# Patient Record
Sex: MALE | Race: WHITE | NOT HISPANIC OR LATINO | Employment: OTHER | URBAN - METROPOLITAN AREA
[De-identification: names, ages, dates, MRNs, and addresses within clinical notes are randomized per-mention and may not be internally consistent; named-entity substitution may affect disease eponyms.]

---

## 2017-04-21 ENCOUNTER — TELEPHONE (OUTPATIENT)
Dept: AUDIOLOGY | Facility: CLINIC | Age: 82
End: 2017-04-21

## 2017-04-21 NOTE — TELEPHONE ENCOUNTER
Reason for Call:  Form, our goal is to have forms completed with 72 hours, however, some forms may require a visit or additional information.    Type of letter, form or note:  medical    Who is the form from?: Patient    Where did the form come from: Patient or family brought in       What clinic location was the form placed at?: [facility]    Where the form was placed: 's Box    What number is listed as a contact on the form?: [phone]       Additional comments: Patient need for Tyrone to fill out the Certifying Professional part of this form (Third Party Certification of Eligibility for IP Cap Tel Service) When finished patient would like a call to come pick form up at the [facility]. Thanks    Call taken on 4/21/2017 at 10:11 AM by Denisa Evans

## 2017-04-25 NOTE — TELEPHONE ENCOUNTER
I informed the patient that the requested form had been filled out and that it would be submitted for him. The patient informed me that the caption phone, which the paperwork was for, has already been installed and everything is working well.     -Tyrone Conte CCC-A

## 2017-05-05 DIAGNOSIS — I10 HYPERTENSION GOAL BP (BLOOD PRESSURE) < 140/90: ICD-10-CM

## 2017-05-05 DIAGNOSIS — N18.30 CKD (CHRONIC KIDNEY DISEASE) STAGE 3, GFR 30-59 ML/MIN (H): ICD-10-CM

## 2017-05-05 DIAGNOSIS — E78.5 HYPERLIPIDEMIA LDL GOAL <130: ICD-10-CM

## 2017-05-05 RX ORDER — SIMVASTATIN 40 MG
TABLET ORAL
Qty: 90 TABLET | Refills: 0 | Status: SHIPPED | OUTPATIENT
Start: 2017-05-05 | End: 2017-07-10

## 2017-05-05 RX ORDER — ENALAPRIL MALEATE 5 MG/1
TABLET ORAL
Qty: 90 TABLET | Refills: 0 | Status: SHIPPED | OUTPATIENT
Start: 2017-05-05 | End: 2017-07-10

## 2017-05-05 NOTE — TELEPHONE ENCOUNTER
Routing refill request to provider for review/approval because:  Labs not current:  Rey REESE RN

## 2017-05-05 NOTE — TELEPHONE ENCOUNTER
Simvastatin     Last Written Prescription Date: 4/14/17  Last Fill Quantity: 30, # refills: 0  Last Office Visit with [facility], [facility] or [facility] prescribing provider: 8/29/16       Lab Results   Component Value Date    CHOL 141 07/12/2016     Lab Results   Component Value Date    HDL 61 07/12/2016     Lab Results   Component Value Date    LDL 67 07/12/2016     Lab Results   Component Value Date    TRIG 67 07/12/2016     Lab Results   Component Value Date    CHOLHDLRATIO 2.3 08/04/2014     Enalapril      Last Written Prescription Date: 4/14/17  Last Fill Quantity: 30, # refills: 0  Last Office Visit with [facility], [facility] or [facility] prescribing provider: 8/29/16       Potassium   Date Value Ref Range Status   04/25/2016 4.4 3.4 - 5.3 mmol/L Final     Creatinine   Date Value Ref Range Status   04/14/2016 1.14 0.66 - 1.25 mg/dL Final     BP Readings from Last 3 Encounters:   12/05/16 127/65   08/29/16 128/62   07/12/16 126/74

## 2017-05-10 NOTE — TELEPHONE ENCOUNTER
Spoke to patient in regards to approved meds and needing appointment. Patient states understanding. Scheduled patient to see PCP 7/13/17.    Tiffanie Dodson CMA

## 2017-06-22 NOTE — PROGRESS NOTES
"  SUBJECTIVE:                                                            Jose Manuel Gallo is a 82 year old male who presents for Preventive Visit.  {PVP to remind patient that this is not necessarily a physical exam; physical exam may or may not be done:357916::\"click delete button to remove this line now\"}  {PVP to inform patient that additional E&M charge may apply, if additional problems addressed:710581::\"click delete button to remove this line now\"}  Are you in the first 12 months of your Medicare Part B coverage?  {No Yes:212211::\"No\"}    Healthy Habits:    Do you get at least three servings of calcium containing foods daily (dairy, green leafy vegetables, etc.)? {YES/NO, DAIRY INTAKE:235198::\"yes\"}    Amount of exercise or daily activities, outside of work: {AMOUNT EXERCISE:199630}    Problems taking medications regularly {Yes /No default:351934::\"No\"}    Medication side effects: {Yes /No default.:103228::\"No\"}    Have you had an eye exam in the past two years? {YESNOBLANK:674533}    Do you see a dentist twice per year? {YESNOBLANK:351214}    Do you have sleep apnea, excessive snoring or daytime drowsiness?{YESNOBLANK:736709}    {AWV Cognitive Screenin}    {Outside tests to abstract? :163452}    {additional problems to add:373714}    Reviewed and updated as needed this visit by clinical staff         Reviewed and updated as needed this visit by Provider        Social History   Substance Use Topics     Smoking status: Former Smoker     Years: 40.00     Types: Cigarettes     Quit date: 2003     Smokeless tobacco: Never Used     Alcohol use Yes      Comment: 1 x month       {ETOH AUDIT:621611}    Today's PHQ-2 Score:   PHQ-2 (  [employer]) 2016   Q1: Little interest or pleasure in doing things 0 0   Q2: Feeling down, depressed or hopeless 0 0   PHQ-2 Score 0 0   Q1: Little interest or pleasure in doing things - -   Q2: Feeling down, depressed or hopeless - -   PHQ-2 Score - - " "    {PHQ-2 LOOK IN ASSESSMENTS :524450}  Do you feel safe in your environment - {YES/NO/NA:262695}    Do you have a Health Care Directive?: {HEALTHCARE DIRECTIVE STATUS:789257}    Current providers sharing in care for this patient include:   Patient Care Team:  Orlando Braxton MD as PCP - General (Internal Medicine)      Hearing impairment: {NO/YES:992012}    Ability to successfully perform activities of daily living: {YES/NO (MEDICARE):558848::\"Yes, no assistance needed\"}     Fall risk:  {Document Fall Risk in the Assessments Section of the Navigator:829334}    Home safety:  {IPPE SAFETY CONCERNS:229197::\"none identified\"}  {If any of the above assessments are answered yes, consider ordering appropriate referrals:818984::\"click delete button to remove this line now\"}    The following health maintenance items are reviewed in Epic and correct as of today:  Health Maintenance   Topic Date Due     ADVANCE DIRECTIVE PLANNING Q5 YRS  2016     BMP Q1 YR  2017     HEMOGLOBIN Q1 YR  2017     MICROALBUMIN Q1 YEAR  2017     EYE EXAM Q1 YEAR  2017     FALL RISK ASSESSMENT  2017     LIPID MONITORING Q1 YEAR  2017     INFLUENZA VACCINE (SYSTEM ASSIGNED)  2017     TETANUS IMMUNIZATION (SYSTEM ASSIGNED)  2023     PNEUMOCOCCAL  Completed         {Decision Support:866482}     ROS:  {ROS COMP:094823}    {CHRONICPROBDATA:067454}  OBJECTIVE:                                                            There were no vitals taken for this visit. Estimated body mass index is 21.84 kg/(m^2) as calculated from the following:    Height as of 16: 6' (1.829 m).    Weight as of 16: 161 lb (73 kg).  EXAM:   {Exam :262265}    ASSESSMENT / PLAN:                                                            {Diag Picklist:534589}    End of Life Planning:  Patient currently has an advanced directive: { :832446}    COUNSELING:  {Medicare Counselin}    {Blood Pressure - Adult " Preventive:339476}    Estimated body mass index is 21.84 kg/(m^2) as calculated from the following:    Height as of 8/29/16: 6' (1.829 m).    Weight as of 8/29/16: 161 lb (73 kg).  {Weight Management Plan -- Delete if patient has a normal BMI:119642}   reports that he quit smoking about 14 years ago. His smoking use included Cigarettes. He quit after 40.00 years of use. He has never used smokeless tobacco.  {Tobacco Cessation -- Delete if patient is a non-smoker:943019}    Appropriate preventive services were discussed with this patient, including applicable screening as appropriate for cardiovascular disease, diabetes, osteopenia/osteoporosis, and glaucoma.  As appropriate for age/gender, discussed screening for colorectal cancer, prostate cancer, breast cancer, and cervical cancer. Checklist reviewing preventive services available has been given to the patient.    Reviewed patients plan of care and provided an AVS. The {CarePlan:951219} for Jose Manuel meets the Care Plan requirement. This Care Plan has been established and reviewed with the {PATIENT, FAMILY MEMBER, CAREGIVER:234558}.    Counseling Resources:  ATP IV Guidelines  Pooled Cohorts Equation Calculator  Breast Cancer Risk Calculator  FRAX Risk Assessment  ICSI Preventive Guidelines  Dietary Guidelines for Americans, 2010  USDA's MyPlate  ASA Prophylaxis  Lung CA Screening    Orlando Braxton MD  [facility]

## 2017-06-22 NOTE — PATIENT INSTRUCTIONS
- Stop Enalapril and begin Lisinopril.    - Follow up with a dermatologist.    Preventive Health Recommendations:       Male Ages 65 and over    Yearly exam:             See your health care provider every year in order to  o   Review health changes.   o   Discuss preventive care.    o   Review your medicines if your doctor has prescribed any.    Talk with your health care provider about whether you should have a test to screen for prostate cancer (PSA).    Every 3 years, have a diabetes test (fasting glucose). If you are at risk for diabetes, you should have this test more often.    Every 5 years, have a cholesterol test. Have this test more often if you are at risk for high cholesterol or heart disease.     Every 10 years, have a colonoscopy. Or, have a yearly FIT test (stool test). These exams will check for colon cancer.    Talk to with your health care provider about screening for Abdominal Aortic Aneurysm if you have a family history of AAA or have a history of smoking.  Shots:     Get a flu shot each year.     Get a tetanus shot every 10 years.     Talk to your doctor about your pneumonia vaccines. There are now two you should receive - Pneumovax (PPSV 23) and Prevnar (PCV 13).    Talk to your doctor about a shingles vaccine.     Talk to your doctor about the hepatitis B vaccine.  Nutrition:     Eat at least 5 servings of fruits and vegetables each day.     Eat whole-grain bread, whole-wheat pasta and brown rice instead of white grains and rice.     Talk to your doctor about Calcium and Vitamin D.   Lifestyle    Exercise for at least 150 minutes a week (30 minutes a day, 5 days a week). This will help you control your weight and prevent disease.     Limit alcohol to one drink per day.     No smoking.     Wear sunscreen to prevent skin cancer.     See your dentist every six months for an exam and cleaning.     See your eye doctor every 1 to 2 years to screen for conditions such as glaucoma, macular  degeneration and cataracts.    [facility]    If you have any questions regarding to your visit please contact your care team:     Team Pink:   Clinic Hours Telephone Number   Internal Medicine:  Dr. Mary Brink, NP       7am-7pm  Monday - Thursday   7am-5pm  Fridays  [phone]  (Appointment scheduling available 24/7)    Questions about your visit?  Team Line  [phone]   Urgent Care - [place] and [facility] - 11am-9pm Monday-Friday Saturday-Sunday- 9am-5pm   [place] - 5pm-9pm Monday-Friday Saturday-Sunday- 9am-5pm  [phone] - [facility]  [phone] - [place]       What options do I have for visits at the clinic other than the traditional office visit?  To expand how we care for you, many of our providers are utilizing electronic visits (e-visits) and telephone visits, when medically appropriate, for interactions with their patients rather than a visit in the clinic.   We also offer nurse visits for many medical concerns. Just like any other service, we will bill your insurance company for this type of visit based on time spent on the phone with your provider. Not all insurance companies cover these visits. Please check with your medical insurance if this type of visit is covered. You will be responsible for any charges that are not paid by your insurance.      E-visits via Tweegee:  generally incur a $35.00 fee.  Telephone visits:  Time spent on the phone: *charged based on time that is spent on the phone in increments of 10 minutes. Estimated cost:   5-10 mins $30.00   11-20 mins. $59.00   21-30 mins. $85.00   Use [employer] (secure email communication and access to your chart) to send your primary care provider a message or make an appointment. Ask someone on your Team how to sign up for Tweegee.    For a Price Quote for your services, please call our Consumer Price Line at [phone].    As always, Thank you for trusting us with  your health care needs!  Discharged By:  WILFREDO ROMERO

## 2017-07-10 DIAGNOSIS — E78.5 HYPERLIPIDEMIA LDL GOAL <130: ICD-10-CM

## 2017-07-10 DIAGNOSIS — I10 HYPERTENSION GOAL BP (BLOOD PRESSURE) < 140/90: ICD-10-CM

## 2017-07-10 DIAGNOSIS — N18.30 CKD (CHRONIC KIDNEY DISEASE) STAGE 3, GFR 30-59 ML/MIN (H): ICD-10-CM

## 2017-07-10 NOTE — TELEPHONE ENCOUNTER
simvastatin (ZOCOR) 40 MG tablet     Last Written Prescription Date: 5/5/2017  Last Fill Quantity: 90, # refills: 0  Last Office Visit with [facility], [facility] or [facility] prescribing provider: 8/29/2016  Next 5 appointments (look out 90 days)     Jul 13, 2017 10:30 AM CDT   Office Visit with Orlando Braxton MD   [facility]    [address]  [place] [address]   [phone]                   Lab Results   Component Value Date    CHOL 141 07/12/2016     Lab Results   Component Value Date    HDL 61 07/12/2016     Lab Results   Component Value Date    LDL 67 07/12/2016     Lab Results   Component Value Date    TRIG 67 07/12/2016     Lab Results   Component Value Date    CHOLHDLRATIO 2.3 08/04/2014     enalapril (VASOTEC) 5 MG tablet      Last Written Prescription Date: 5/5/2017  Last Fill Quantity: 90, # refills: 0    Last Office Visit with [facility], [facility] or [facility] prescribing provider:  8/29/2016   Future Office Visit:    Next 5 appointments (look out 90 days)     Jul 13, 2017 10:30 AM CDT   Office Visit with Orlando Braxton MD   [facility] ([facility])    [address]  [place] [address]   [phone]                    BP Readings from Last 3 Encounters:   12/05/16 127/65   08/29/16 128/62   07/12/16 126/74

## 2017-07-11 RX ORDER — ENALAPRIL MALEATE 5 MG/1
TABLET ORAL
Qty: 90 TABLET | Refills: 0 | Status: SHIPPED | OUTPATIENT
Start: 2017-07-11 | End: 2018-05-11

## 2017-07-11 RX ORDER — SIMVASTATIN 40 MG
TABLET ORAL
Qty: 90 TABLET | Refills: 0 | Status: SHIPPED | OUTPATIENT
Start: 2017-07-11 | End: 2017-07-13

## 2017-07-11 NOTE — TELEPHONE ENCOUNTER
Medication is being filled for 1 time refill only due to:  Patient needs to be seen because needs appt-labs.   Has upcoming appt.  Kayley Turner RN

## 2017-07-13 ENCOUNTER — OFFICE VISIT (OUTPATIENT)
Dept: FAMILY MEDICINE | Facility: CLINIC | Age: 82
End: 2017-07-13
Payer: COMMERCIAL

## 2017-07-13 ENCOUNTER — RADIANT APPOINTMENT (OUTPATIENT)
Dept: GENERAL RADIOLOGY | Facility: CLINIC | Age: 82
End: 2017-07-13
Attending: INTERNAL MEDICINE
Payer: COMMERCIAL

## 2017-07-13 VITALS
HEIGHT: 71 IN | SYSTOLIC BLOOD PRESSURE: 106 MMHG | WEIGHT: 162 LBS | OXYGEN SATURATION: 95 % | HEART RATE: 59 BPM | BODY MASS INDEX: 22.68 KG/M2 | DIASTOLIC BLOOD PRESSURE: 60 MMHG | TEMPERATURE: 97 F

## 2017-07-13 DIAGNOSIS — M47.816 SPONDYLOSIS OF LUMBAR REGION WITHOUT MYELOPATHY OR RADICULOPATHY: ICD-10-CM

## 2017-07-13 DIAGNOSIS — N18.30 CKD (CHRONIC KIDNEY DISEASE) STAGE 3, GFR 30-59 ML/MIN (H): ICD-10-CM

## 2017-07-13 DIAGNOSIS — Z12.11 SCREENING FOR COLON CANCER: ICD-10-CM

## 2017-07-13 DIAGNOSIS — R42 DIZZINESS: ICD-10-CM

## 2017-07-13 DIAGNOSIS — L82.0 INFLAMED SEBORRHEIC KERATOSIS: ICD-10-CM

## 2017-07-13 DIAGNOSIS — Z00.00 ROUTINE GENERAL MEDICAL EXAMINATION AT A HEALTH CARE FACILITY: Primary | ICD-10-CM

## 2017-07-13 DIAGNOSIS — I10 HYPERTENSION GOAL BP (BLOOD PRESSURE) < 140/90: ICD-10-CM

## 2017-07-13 DIAGNOSIS — L60.3 BRITTLE NAILS: ICD-10-CM

## 2017-07-13 DIAGNOSIS — E78.5 HYPERLIPIDEMIA LDL GOAL <130: ICD-10-CM

## 2017-07-13 DIAGNOSIS — H61.23 BILATERAL IMPACTED CERUMEN: ICD-10-CM

## 2017-07-13 DIAGNOSIS — M41.25 OTHER IDIOPATHIC SCOLIOSIS, THORACOLUMBAR REGION: ICD-10-CM

## 2017-07-13 LAB
ALBUMIN SERPL-MCNC: 3.7 G/DL (ref 3.4–5)
ALP SERPL-CCNC: 82 U/L (ref 40–150)
ALT SERPL W P-5'-P-CCNC: 27 U/L (ref 0–70)
ANION GAP SERPL CALCULATED.3IONS-SCNC: 9 MMOL/L (ref 3–14)
AST SERPL W P-5'-P-CCNC: 22 U/L (ref 0–45)
BASOPHILS # BLD AUTO: 0 10E9/L (ref 0–0.2)
BASOPHILS NFR BLD AUTO: 0.1 %
BILIRUB SERPL-MCNC: 0.8 MG/DL (ref 0.2–1.3)
BUN SERPL-MCNC: 23 MG/DL (ref 7–30)
CALCIUM SERPL-MCNC: 9.5 MG/DL (ref 8.5–10.1)
CHLORIDE SERPL-SCNC: 105 MMOL/L (ref 94–109)
CHOLEST SERPL-MCNC: 157 MG/DL
CO2 SERPL-SCNC: 24 MMOL/L (ref 20–32)
CREAT SERPL-MCNC: 1.22 MG/DL (ref 0.66–1.25)
CREAT UR-MCNC: 124 MG/DL
DIFFERENTIAL METHOD BLD: NORMAL
EOSINOPHIL # BLD AUTO: 0.1 10E9/L (ref 0–0.7)
EOSINOPHIL NFR BLD AUTO: 1.3 %
ERYTHROCYTE [DISTWIDTH] IN BLOOD BY AUTOMATED COUNT: 13.2 % (ref 10–15)
GFR SERPL CREATININE-BSD FRML MDRD: 57 ML/MIN/1.7M2
GLUCOSE SERPL-MCNC: 91 MG/DL (ref 70–99)
HCT VFR BLD AUTO: 44 % (ref 40–53)
HDLC SERPL-MCNC: 69 MG/DL
HGB BLD-MCNC: 14.7 G/DL (ref 13.3–17.7)
LDLC SERPL CALC-MCNC: 80 MG/DL
LYMPHOCYTES # BLD AUTO: 1 10E9/L (ref 0.8–5.3)
LYMPHOCYTES NFR BLD AUTO: 15.3 %
MCH RBC QN AUTO: 32.8 PG (ref 26.5–33)
MCHC RBC AUTO-ENTMCNC: 33.4 G/DL (ref 31.5–36.5)
MCV RBC AUTO: 98 FL (ref 78–100)
MICROALBUMIN UR-MCNC: 5 MG/L
MICROALBUMIN/CREAT UR: 4.07 MG/G CR (ref 0–17)
MONOCYTES # BLD AUTO: 0.6 10E9/L (ref 0–1.3)
MONOCYTES NFR BLD AUTO: 8.1 %
NEUTROPHILS # BLD AUTO: 5.1 10E9/L (ref 1.6–8.3)
NEUTROPHILS NFR BLD AUTO: 75.2 %
NONHDLC SERPL-MCNC: 88 MG/DL
PLATELET # BLD AUTO: 190 10E9/L (ref 150–450)
POTASSIUM SERPL-SCNC: 5.7 MMOL/L (ref 3.4–5.3)
PROT SERPL-MCNC: 7.1 G/DL (ref 6.8–8.8)
RBC # BLD AUTO: 4.48 10E12/L (ref 4.4–5.9)
SODIUM SERPL-SCNC: 138 MMOL/L (ref 133–144)
TRIGL SERPL-MCNC: 41 MG/DL
WBC # BLD AUTO: 6.8 10E9/L (ref 4–11)

## 2017-07-13 PROCEDURE — 82043 UR ALBUMIN QUANTITATIVE: CPT | Performed by: INTERNAL MEDICINE

## 2017-07-13 PROCEDURE — 85025 COMPLETE CBC W/AUTO DIFF WBC: CPT | Performed by: INTERNAL MEDICINE

## 2017-07-13 PROCEDURE — 80061 LIPID PANEL: CPT | Performed by: INTERNAL MEDICINE

## 2017-07-13 PROCEDURE — 99213 OFFICE O/P EST LOW 20 MIN: CPT | Mod: 25 | Performed by: INTERNAL MEDICINE

## 2017-07-13 PROCEDURE — 72100 X-RAY EXAM L-S SPINE 2/3 VWS: CPT

## 2017-07-13 PROCEDURE — 80053 COMPREHEN METABOLIC PANEL: CPT | Performed by: INTERNAL MEDICINE

## 2017-07-13 PROCEDURE — 36415 COLL VENOUS BLD VENIPUNCTURE: CPT | Performed by: INTERNAL MEDICINE

## 2017-07-13 PROCEDURE — 99397 PER PM REEVAL EST PAT 65+ YR: CPT | Performed by: INTERNAL MEDICINE

## 2017-07-13 RX ORDER — SIMVASTATIN 40 MG
TABLET ORAL
Qty: 90 TABLET | Refills: 3 | Status: SHIPPED | OUTPATIENT
Start: 2017-07-13 | End: 2018-05-11

## 2017-07-13 RX ORDER — LISINOPRIL 2.5 MG/1
2.5 TABLET ORAL DAILY
Qty: 90 TABLET | Refills: 3 | Status: SHIPPED | OUTPATIENT
Start: 2017-07-13 | End: 2018-05-11

## 2017-07-13 RX ORDER — ENALAPRIL MALEATE 5 MG/1
TABLET ORAL
Qty: 90 TABLET | Refills: 0 | Status: CANCELLED | OUTPATIENT
Start: 2017-07-13

## 2017-07-13 ASSESSMENT — PAIN SCALES - GENERAL: PAINLEVEL: SEVERE PAIN (7)

## 2017-07-13 NOTE — NURSING NOTE
"Chief Complaint   Patient presents with     Physical     Health Maintenance     Needs Fall risk       Initial /60  Pulse 59  Temp 97  F (36.1  C) (Oral)  Ht 5' 11.25\" (1.81 m)  Wt 162 lb (73.5 kg)  SpO2 95%  BMI 22.44 kg/m2 Estimated body mass index is 22.44 kg/(m^2) as calculated from the following:    Height as of this encounter: 5' 11.25\" (1.81 m).    Weight as of this encounter: 162 lb (73.5 kg).  Medication Reconciliation: complete   Sandy Jackson MA    "

## 2017-07-13 NOTE — PROGRESS NOTES
"SUBJECTIVE:   Jose Manuel Gallo is a 82 year old male who presents for Preventive Visit.    Back Pain -- Patient states his back pain flared up in the last few days. He notes he used to have sharp pain, but he describes current pain as \"muscular pain around his belt area\". He tries to rest when pain is worse and intermittently takes Ibuprofen. Attributes his back problems to his scoliosis. He states he does some back exercises.    Dizziness -- Patient reports he has intermittent dizziness. He notes he has seen neurologists for this problem. He relates that his blood pressure is lower than normal today. His dizziness symptoms have been present regardless of his blood pressure. Denies coughing and SOB.  BP Readings from Last 6 Encounters:   07/13/17 106/60   12/05/16 127/65   08/29/16 128/62   07/12/16 126/74   04/14/16 130/70   12/07/15 108/60     Wt Readings from Last 5 Encounters:   07/13/17 73.5 kg (162 lb)   08/29/16 73 kg (161 lb)   07/12/16 75.2 kg (165 lb 12.8 oz)   04/14/16 75.9 kg (167 lb 6.4 oz)   12/19/14 74.4 kg (164 lb)     Hearing Loss -- Patient reports his hearing has been getting worse. He has seen an ENT specialist and had an audiology exam. Cannot hear higher frequencies well.    Moles -- Patient states he has rapidly developed multiple moles on his left side and back. Describes the development as gradual over the last few months.    Brittle Nails -- He notes in the last few months he has developed brittle nails. Describes \"if he touches them they crack\".    Additional Notes -- We discussed the possibility of colon screening. He would like to do FIT screening. Also complains of chronic rhinitis.    Are you in the first 12 months of your Medicare coverage?  No    HPI    COGNITIVE SCREEN  1) Repeat 3 items (Banana, Sunrise, Chair)    2) Clock draw: NORMAL  3) 3 item recall: Recalls 3 objects  Results: 3 items recalled: COGNITIVE IMPAIRMENT LESS LIKELY    Mini-CogTM Copyright ANABEL Carpenter. Licensed by " the author for use in [facility]; reprinted with permission (kostas@[email or web address]). All rights reserved.        Reviewed and updated as needed this visit by clinical staff  Tobacco  Allergies  Meds  Med Hx  Surg Hx  Fam Hx  Soc Hx        Reviewed and updated as needed this visit by Provider        Social History   Substance Use Topics     Smoking status: Former Smoker     Years: 40.00     Types: Cigarettes     Quit date: 1/1/2003     Smokeless tobacco: Never Used     Alcohol use Yes      Comment: 1 x month       The patient does not drink >3 drinks per day nor >7 drinks per week.    Today's PHQ-2 Score:   PHQ-2 ( 1999 Pfizer) 7/13/2017   Q1: Little interest or pleasure in doing things 0   Q2: Feeling down, depressed or hopeless 0   PHQ-2 Score 0   Q1: Little interest or pleasure in doing things -   Q2: Feeling down, depressed or hopeless -   PHQ-2 Score -     Do you feel safe in your environment - Yes    Do you have a Health Care Directive?: Yes: Advance Directive has been received and scanned.    Current providers sharing in care for this patient include:   Patient Care Team:  Orlando Braxton MD as PCP - General (Internal Medicine)      Hearing impairment: No    Ability to successfully perform activities of daily living: Yes, no assistance needed     Fall risk:  Fallen 2 or more times in the past year?: No  Any fall with injury in the past year?: No    Home safety:  none identified    The following health maintenance items are reviewed in Epic and correct as of today:  Health Maintenance   Topic Date Due     ADVANCE DIRECTIVE PLANNING Q5 YRS  08/09/2016     BMP Q1 YR  04/14/2017     HEMOGLOBIN Q1 YR  04/14/2017     MICROALBUMIN Q1 YEAR  04/14/2017     EYE EXAM Q1 YEAR  06/28/2017     LIPID MONITORING Q1 YEAR  07/12/2017     FALL RISK ASSESSMENT  07/12/2017     INFLUENZA VACCINE (SYSTEM ASSIGNED)  09/01/2017     TETANUS IMMUNIZATION (SYSTEM ASSIGNED)  07/22/2023     PNEUMOCOCCAL  Completed     Labs  "reviewed in EPIC    ROS:  Constitutional, HEENT, cardiovascular, pulmonary, GI, , musculoskeletal, neuro, skin, endocrine and psych systems are negative, except as otherwise noted.    This document serves as a record of the services and decisions personally performed and made by Orlando Braxton MD. It was created on their behalf by Aaron Mcgill, a trained medical scribe. The creation of this document is based the provider's statements to the medical scribe.  Aaron Mcgill July 13, 2017 11:33 AM    OBJECTIVE:   /60  Pulse 59  Temp 97  F (36.1  C) (Oral)  Ht 1.81 m (5' 11.25\")  Wt 73.5 kg (162 lb)  SpO2 95%  BMI 22.44 kg/m2 Estimated body mass index is 22.44 kg/(m^2) as calculated from the following:    Height as of this encounter: 1.81 m (5' 11.25\").    Weight as of this encounter: 73.5 kg (162 lb).  EXAM:   GENERAL: healthy, alert and no distress, answers all questions appropriately, appropriate grooming and affect, appears stated age   EYES: Eyes grossly normal to inspection, PERRL and conjunctivae and sclerae normal  HENT: ear canals and TM's normal, nose and mouth without ulcers or lesions, bilateral impacted cerumen  NECK: no adenopathy, no asymmetry, masses, or scars and thyroid normal to palpation  RESP: lungs clear to auscultation - no rales, rhonchi or wheezes  CV: regular rate and rhythm, normal S1 S2, no S3 or S4, no murmur, click or rub, no peripheral edema and peripheral pulses strong  ABDOMEN: soft, nontender, no hepatosplenomegaly, no masses and bowel sounds normal  MS: slight scoliotic deformity with spine, no edema  SKIN: dozens of seborrheic keratoses on the back, multiple varicose veins, normal toenails without evidence of fungal infection  NEURO: mentation intact and speech normal  PSYCH: mentation appears normal, affect normal/bright    ASSESSMENT / PLAN:   (Z00.00) Routine general medical examination at a health care facility  (primary encounter diagnosis)  Comment: routine " screening issues   Plan: CBC with platelets differential, Comprehensive         metabolic panel            (I10) Hypertension goal BP (blood pressure) < 140/90  Comment: changed blood pressure from enalapril (Vasotec) to a smaller dose of lisinopril   Plan: Albumin Random Urine Quantitative, lisinopril         (PRINIVIL/ZESTRIL) 2.5 MG tablet            (N18.3) CKD (chronic kidney disease) stage 3, GFR 30-59 ml/min  Comment: persistent but stable chronic kidney disease stage 3   Plan: Albumin Random Urine Quantitative, lisinopril         (PRINIVIL/ZESTRIL) 2.5 MG tablet            (E78.5) Hyperlipidemia LDL goal <130  Comment: continue current plan of care     Plan: simvastatin (ZOCOR) 40 MG tablet, Lipid panel         reflex to direct LDL, CANCELED: Lipid panel         reflex to direct LDL            (R42) Dizziness  Comment: suspect multifactorial and age related issues , but push fluids and decreased hypertension medication   Plan: CBC with platelets differential, Comprehensive         metabolic panel            (M41.25) Other idiopathic scoliosis, thoracolumbar region  Comment: xrays per patient. 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  Comment: depending on the test results consider possibly physical therapy consultation   Plan: XR Lumbar Spine 2/3 Views            (L60.3) Brittle nails  Comment: laboratory studies screening    Plan: CBC with platelets differential, Comprehensive         metabolic panel        doubt sinister dangerous dxs     (Z12.11) Screening for colon cancer  Comment: as detailed above   Plan: Fecal colorectal cancer screen (FIT)            (H61.23) Bilateral impacted cerumen  Comment: successfully irrigated out by my medical assistant    Plan: REMOVE IMPACTED CERUMEN            (L82.0) Inflamed seborrheic keratosis  Comment: no good explanation for an  "explosion of seborrheic keratosis   Plan: DERMATOLOGY REFERRAL        Can be associated with malignancy      End of Life Planning:  Patient currently has an advanced directive: Yes.  Practitioner is supportive of decision.    COUNSELING:  Reviewed preventive health counseling, as reflected in patient instructions       Regular exercise       Healthy diet/nutrition       Vision screening       Hearing screening       Dental care       Aspirin Prophylaxsis       Alcohol Use       Colon cancer screening    Estimated body mass index is 22.44 kg/(m^2) as calculated from the following:    Height as of this encounter: 1.81 m (5' 11.25\").    Weight as of this encounter: 73.5 kg (162 lb).  Weight management plan noted, stable and monitoring   reports that he quit smoking about 14 years ago. His smoking use included Cigarettes. He quit after 40.00 years of use. He has never used smokeless tobacco.    Appropriate preventive services were discussed with this patient, including applicable screening as appropriate for cardiovascular disease, diabetes, osteopenia/osteoporosis, and glaucoma.  As appropriate for age/gender, discussed screening for colorectal cancer, prostate cancer, breast cancer, and cervical cancer. Checklist reviewing preventive services available has been given to the patient.    Reviewed patients plan of care and provided an AVS. The Complex Care Plan (for patients with higher acuity and needing more deliberate coordination of services) for Jose Manuel meets the Care Plan requirement. This Care Plan has been established and reviewed with the Patient.    Counseling Resources:  ATP IV Guidelines  Pooled Cohorts Equation Calculator  Breast Cancer Risk Calculator  FRAX Risk Assessment  ICSI Preventive Guidelines  Dietary Guidelines for Americans, 2010  USDA's MyPlate  ASA Prophylaxis  Lung CA Screening    The information in this document, created by the medical scribe for me, accurately reflects the services I " personally performed and the decisions made by me. I have reviewed and approved this document for accuracy.   MD Orlando Mayer MD  [facility]

## 2017-07-13 NOTE — MR AVS SNAPSHOT
After Visit Summary   7/13/2017    Jose Manuel Gallo    MRN: [id number]           Patient Information     Date Of Birth          1935        Visit Information        Provider Department      7/13/2017 10:30 AM Orlando Braxton MD [facility]        Today's Diagnoses     Routine general medical examination at a health care facility    -  1    Hypertension goal BP (blood pressure) < 140/90        CKD (chronic kidney disease) stage 3, GFR 30-59 ml/min        Hyperlipidemia LDL goal <130        Dizziness        Other idiopathic scoliosis, thoracolumbar region        Spondylosis of lumbar region without myelopathy or radiculopathy        Brittle nails        Screening for colon cancer        Bilateral impacted cerumen        Inflamed seborrheic keratosis          Care Instructions    - Stop Enalapril and begin Lisinopril.    - Follow up with a dermatologist.    Preventive Health Recommendations:       Male Ages 65 and over    Yearly exam:             See your health care provider every year in order to  o   Review health changes.   o   Discuss preventive care.    o   Review your medicines if your doctor has prescribed any.    Talk with your health care provider about whether you should have a test to screen for prostate cancer (PSA).    Every 3 years, have a diabetes test (fasting glucose). If you are at risk for diabetes, you should have this test more often.    Every 5 years, have a cholesterol test. Have this test more often if you are at risk for high cholesterol or heart disease.     Every 10 years, have a colonoscopy. Or, have a yearly FIT test (stool test). These exams will check for colon cancer.    Talk to with your health care provider about screening for Abdominal Aortic Aneurysm if you have a family history of AAA or have a history of smoking.  Shots:     Get a flu shot each year.     Get a tetanus shot every 10 years.     Talk to your doctor about your pneumonia vaccines.  There are now two you should receive - Pneumovax (PPSV 23) and Prevnar (PCV 13).    Talk to your doctor about a shingles vaccine.     Talk to your doctor about the hepatitis B vaccine.  Nutrition:     Eat at least 5 servings of fruits and vegetables each day.     Eat whole-grain bread, whole-wheat pasta and brown rice instead of white grains and rice.     Talk to your doctor about Calcium and Vitamin D.   Lifestyle    Exercise for at least 150 minutes a week (30 minutes a day, 5 days a week). This will help you control your weight and prevent disease.     Limit alcohol to one drink per day.     No smoking.     Wear sunscreen to prevent skin cancer.     See your dentist every six months for an exam and cleaning.     See your eye doctor every 1 to 2 years to screen for conditions such as glaucoma, macular degeneration and cataracts.    [facility]    If you have any questions regarding to your visit please contact your care team:     Team Pink:   Clinic Hours Telephone Number   Internal Medicine:  Dr. Mary Brink NP       7am-7pm  Monday - Thursday   7am-5pm  Fridays  [phone]  (Appointment scheduling available 24/7)    Questions about your visit?  Team Line  [phone]   Urgent Care - [place] - 11am-9pm Monday-Friday Saturday-Sunday- 9am-5pm   [place] - 5pm-9pm Monday-Friday Saturday-Sunday- 9am-5pm  [phone] - [place]   [phone] - [place]       What options do I have for visits at the clinic other than the traditional office visit?  To expand how we care for you, many of our providers are utilizing electronic visits (e-visits) and telephone visits, when medically appropriate, for interactions with their patients rather than a visit in the clinic.   We also offer nurse visits for many medical concerns. Just like any other service, we will bill your insurance company for this type of visit based on time spent on  the phone with your provider. Not all insurance companies cover these visits. Please check with your medical insurance if this type of visit is covered. You will be responsible for any charges that are not paid by your insurance.      E-visits via [email or web address]:  generally incur a $35.00 fee.  Telephone visits:  Time spent on the phone: *charged based on time that is spent on the phone in increments of 10 minutes. Estimated cost:   5-10 mins $30.00   11-20 mins. $59.00   21-30 mins. $85.00   Use [employer] (secure email communication and access to your chart) to send your primary care provider a message or make an appointment. Ask someone on your Team how to sign up for [employer].    For a Price Quote for your services, please call our ItsGoinOn Line at [phone].    As always, Thank you for trusting us with your health care needs!  Discharged By:  WILFREDO ROMERO              Follow-ups after your visit        Additional Services     DERMATOLOGY REFERRAL       Your provider has referred you to: [facility] [place] (2 locations) [phone]   [email or web address]    Please be aware that coverage of these services is subject to the terms and limitations of your health insurance plan.  Call member services at your health plan with any benefit or coverage questions.      Please bring the following with you to your appointment:    (1) Any X-Rays, CTs or MRIs which have been performed.  Contact the facility where they were done to arrange for  prior to your scheduled appointment.    (2) List of current medications  (3) This referral request   (4) Any documents/labs given to you for this referral                  Your next 10 appointments already scheduled     Dec 04, 2017  8:30 AM CST   Return Visit with Aaron Gayle MD, [place] [facility]   [facility] [place] ([facility])    [address] [place] [address]  [place] [id number] [phone]              Future  "tests that were ordered for you today     Open Future Orders        Priority Expected Expires Ordered    Fecal colorectal cancer screen (FIT) Routine 8/3/2017 10/5/2017 7/13/2017            Who to contact     If you have questions or need follow up information about today's clinic visit or your schedule please contact [facility] directly at [phone].  Normal or non-critical lab and imaging results will be communicated to you by MyChart, letter or phone within 4 business days after the clinic has received the results. If you do not hear from us within 7 days, please contact the clinic through [employer] or phone. If you have a critical or abnormal lab result, we will notify you by phone as soon as possible.  Submit refill requests through [facility] or call your pharmacy and they will forward the refill request to us. Please allow 3 business days for your refill to be completed.          Additional Information About Your Visit        [employer] Information     [facility] [employer] gives you secure access to your electronic health record. If you see a primary care provider, you can also send messages to your care team and make appointments. If you have questions, please call your primary care clinic.  If you do not have a primary care provider, please call [phone] and they will assist you.        Care EveryWhere ID     This is your Care EveryWhere ID. This could be used by other organizations to access your [facility] medical records  [id number]        Your Vitals Were     Pulse Temperature Height Pulse Oximetry BMI (Body Mass Index)       59 97  F (36.1  C) (Oral) 5' 11.25\" (1.81 m) 95% 22.44 kg/m2        Blood Pressure from Last 3 Encounters:   07/13/17 106/60   12/05/16 127/65   08/29/16 128/62    Weight from Last 3 Encounters:   07/13/17 162 lb (73.5 kg)   08/29/16 161 lb (73 kg)   07/12/16 165 lb 12.8 oz (75.2 kg)              We Performed the Following     Albumin Random Urine Quantitative     CBC with " platelets differential     Comprehensive metabolic panel     DERMATOLOGY REFERRAL     Lipid panel reflex to direct LDL     REMOVE IMPACTED CERUMEN          Today's Medication Changes          These changes are accurate as of: 7/13/17 11:42 AM.  If you have any questions, ask your nurse or doctor.               Start taking these medicines.        Dose/Directions    lisinopril 2.5 MG tablet   Commonly known as:  PRINIVIL/Zestril   Used for:  Hypertension goal BP (blood pressure) < 140/90, CKD (chronic kidney disease) stage 3, GFR 30-59 ml/min   Started by:  Orlando Braxton MD        Dose:  2.5 mg   Take 1 tablet (2.5 mg) by mouth daily   Quantity:  90 tablet   Refills:  3         These medicines have changed or have updated prescriptions.        Dose/Directions    simvastatin 40 MG tablet   Commonly known as:  ZOCOR   This may have changed:  See the new instructions.   Used for:  Hyperlipidemia LDL goal <130   Changed by:  Orlando Braxton MD        TAKE 1 TABLET EVERY DAY   Quantity:  90 tablet   Refills:  3            Where to get your medicines      These medications were sent to [facility] - [facility] [address]  [address], [facility] [address]     Phone:  [phone]     lisinopril 2.5 MG tablet    simvastatin 40 MG tablet                Primary Care Provider Office Phone # Fax #    Orlanod Braxton [phone]       [address] [address]        Equal Access to Services     [facility]: Hadii mickie sloan hadasho Somiliali, waaxda luqadaha, qaybta kaalmada adeegyada, angelica guerra haychristine krause . So [facility] [phone].    ATENCIÓN: Si habla español, tiene a phelps disposición servicios gratuitos de asistencia lingüística. Llame al [phone].    We comply with applicable federal civil rights laws and [place] laws. We do not discriminate on the basis of race, color, national origin, age, disability sex, sexual  orientation or gender identity.            Thank you!     Thank you for choosing [facility]  for your care. Our goal is always to provide you with excellent care. Hearing back from our patients is one way we can continue to improve our services. Please take a few minutes to complete the written survey that you may receive in the mail after your visit with us. Thank you!             Your Updated Medication List - Protect others around you: Learn how to safely use, store and throw away your medicines at www.disposemymeds.org.          This list is accurate as of: 7/13/17 11:42 AM.  Always use your most recent med list.                   Brand Name Dispense Instructions for use Diagnosis    albuterol 108 (90 BASE) MCG/ACT Inhaler    PROAIR HFA/PROVENTIL HFA/VENTOLIN HFA    1 Inhaler    Inhale 2 puffs into the lungs every 6 hours as needed for shortness of breath / dyspnea or wheezing    Cough       B-12 1000 MCG Tbcr      Take 1,000 mcg by mouth daily    Vitamin B12 deficiency       enalapril 5 MG tablet    VASOTEC    90 tablet    TAKE 1 TABLET EVERY DAY    Hypertension goal BP (blood pressure) < 140/90, CKD (chronic kidney disease) stage 3, GFR 30-59 ml/min       lisinopril 2.5 MG tablet    PRINIVIL/Zestril    90 tablet    Take 1 tablet (2.5 mg) by mouth daily    Hypertension goal BP (blood pressure) < 140/90, CKD (chronic kidney disease) stage 3, GFR 30-59 ml/min       prednisoLONE acetate 0.12 % ophthalmic susp    PRED MILD    5 mL    Place 1 drop into both eyes 2 times daily as needed for dry eyes    Eye pain, right       simvastatin 40 MG tablet    ZOCOR    90 tablet    TAKE 1 TABLET EVERY DAY    Hyperlipidemia LDL goal <130

## 2017-07-14 ENCOUNTER — TELEPHONE (OUTPATIENT)
Dept: FAMILY MEDICINE | Facility: CLINIC | Age: 82
End: 2017-07-14

## 2017-07-14 ENCOUNTER — TELEPHONE (OUTPATIENT)
Dept: NURSING | Facility: CLINIC | Age: 82
End: 2017-07-14

## 2017-07-14 DIAGNOSIS — E87.5 HYPERKALEMIA: ICD-10-CM

## 2017-07-14 DIAGNOSIS — E87.5 HYPERKALEMIA: Primary | ICD-10-CM

## 2017-07-14 RX ORDER — SODIUM POLYSTYRENE SULFONATE 15 G/60ML
15 SUSPENSION ORAL; RECTAL ONCE
Qty: 60 ML | Refills: 0 | Status: SHIPPED | OUTPATIENT
Start: 2017-07-14 | End: 2017-07-14

## 2017-07-14 NOTE — TELEPHONE ENCOUNTER
Checked with [facility] [place] [facility], [facility] [place] [facility], [facility] [place] [facility], [facility] [place] [facility]  Was advised by [facility] to check with Hospital pharmacies instead of local pharmacy stores as they are more likely to carry this product    Called [facility] pharmacy and spoke with Raphael  He stated that they have the kayexalate oral suspension in stock.    Called patient.   He was ok with switching to [facility] pharmacy.  Prescription resent to [facility] pharmacy    Called [facility] and cancelled prescription there per patient's request.  Rodrigo the pharmacist verbalized understanding      Williams Franco RN

## 2017-07-14 NOTE — TELEPHONE ENCOUNTER
Reason for Call:  Other prescription    Detailed comments: Pharmacy calling letting PCP know that the Kayexalate powder is not in stock. This medication is typically needed to be given to the patient right away, pharmacy asking if its ok for them to special order it for patient to have by Monday. Pharmacy asking for an answer right away so they have time to order it today if needed. Please advise.     Phone Number Patient can be reached at: Other phone number:  [phone]    Best Time: any time    Can we leave a detailed message on this number? YES    Call taken on 7/14/2017 at 10:51 AM by Neda Lucas

## 2017-07-17 DIAGNOSIS — E87.5 HYPERKALEMIA: ICD-10-CM

## 2017-07-17 LAB — POTASSIUM SERPL-SCNC: 4.7 MMOL/L (ref 3.4–5.3)

## 2017-07-17 PROCEDURE — 84132 ASSAY OF SERUM POTASSIUM: CPT | Performed by: INTERNAL MEDICINE

## 2017-07-17 PROCEDURE — 82274 ASSAY TEST FOR BLOOD FECAL: CPT | Performed by: INTERNAL MEDICINE

## 2017-07-17 PROCEDURE — 36415 COLL VENOUS BLD VENIPUNCTURE: CPT | Performed by: INTERNAL MEDICINE

## 2017-07-18 DIAGNOSIS — Z12.11 SCREENING FOR COLON CANCER: ICD-10-CM

## 2017-07-19 LAB — HEMOCCULT STL QL IA: NEGATIVE

## 2017-11-27 ENCOUNTER — OFFICE VISIT (OUTPATIENT)
Dept: INTERNAL MEDICINE | Facility: CLINIC | Age: 82
End: 2017-11-27
Payer: COMMERCIAL

## 2017-11-27 ENCOUNTER — TELEPHONE (OUTPATIENT)
Dept: INTERNAL MEDICINE | Facility: CLINIC | Age: 82
End: 2017-11-27

## 2017-11-27 ENCOUNTER — MYC MEDICAL ADVICE (OUTPATIENT)
Dept: INTERNAL MEDICINE | Facility: CLINIC | Age: 82
End: 2017-11-27

## 2017-11-27 VITALS
HEART RATE: 88 BPM | OXYGEN SATURATION: 97 % | SYSTOLIC BLOOD PRESSURE: 124 MMHG | DIASTOLIC BLOOD PRESSURE: 58 MMHG | BODY MASS INDEX: 22.3 KG/M2 | TEMPERATURE: 97.6 F | WEIGHT: 161 LBS

## 2017-11-27 DIAGNOSIS — B02.9 HERPES ZOSTER WITHOUT COMPLICATION: Primary | ICD-10-CM

## 2017-11-27 PROCEDURE — 99214 OFFICE O/P EST MOD 30 MIN: CPT | Performed by: INTERNAL MEDICINE

## 2017-11-27 RX ORDER — GABAPENTIN 300 MG/1
300 CAPSULE ORAL 3 TIMES DAILY
Qty: 90 CAPSULE | Refills: 1 | Status: SHIPPED | OUTPATIENT
Start: 2017-11-27 | End: 2018-05-11

## 2017-11-27 RX ORDER — METHYLPREDNISOLONE 4 MG
TABLET, DOSE PACK ORAL
Qty: 21 TABLET | Refills: 0 | Status: SHIPPED | OUTPATIENT
Start: 2017-11-27 | End: 2018-05-11

## 2017-11-27 RX ORDER — VALACYCLOVIR HYDROCHLORIDE 1 G/1
1000 TABLET, FILM COATED ORAL 3 TIMES DAILY
Qty: 21 TABLET | Refills: 0 | Status: SHIPPED | OUTPATIENT
Start: 2017-11-27 | End: 2018-05-11

## 2017-11-27 ASSESSMENT — PAIN SCALES - GENERAL: PAINLEVEL: EXTREME PAIN (9)

## 2017-11-27 NOTE — TELEPHONE ENCOUNTER
Prescription sent.  Patient notified of Provider's message as written.  Patient verbalized understanding.  Williams Franco RN

## 2017-11-27 NOTE — PROGRESS NOTES
SUBJECTIVE:   Jose Manuel Gallo is a 82 year old male who presents to clinic today for the following health issues:    Facial Pain -- Patient states about 5 days ago he noticed some pain above his left ear. The pain started moving down his left skull and into his left neck, cheek, and upper shoulder. There are also rashes that have appeared in those spots. Pain is constant with intermittent sharp shooting pains. Denies recent change in hearing. This is a classic Herpes zoster eruption and it's a bit worrisome as this is definitely showing along the distribution of the facial nerve.    Problem list and histories reviewed & adjusted, as indicated.  Additional history: as documented    Patient Active Problem List   Diagnosis     PVD (peripheral vascular disease) (H)     Erectile dysfunction     Osteoarthritis     HYPERLIPIDEMIA LDL GOAL <130     Advanced directives, counseling/discussion     Hypertension goal BP (blood pressure) < 140/90     History of bladder cancer     CKD (chronic kidney disease) stage 3, GFR 30-59 ml/min     Pseudophakia, Yag Caps, os     Cataract, mild-mod, od     Eye pain, od     Posterior capsular opacification, left     Dizziness     Posterior vitreous detachment, right     Hx of vitrectomy for chronic macular hole, os (ER)     Other idiopathic scoliosis, thoracolumbar region     Spondylosis of lumbar region without myelopathy or radiculopathy     Brittle nails     Past Surgical History:   Procedure Laterality Date     C SPINAL FUSION,ANT,EA ADNL LEVEL      C6-7     CATARACT IOL, RT/LT       COLONOSCOPY  11/02/2008    Normal     HC REVISE ULNAR NERVE AT ELBOW      Left     LASER YAG CAPSULOTOMY  7/2016    left eye     PHACOEMULSIFICATION WITH STANDARD INTRAOCULAR LENS IMPLANT  7/2012    left eye     TURP  1997 ?     VASECTOMY       VITRECTOMY PARSPLANA  8/2011    left eye, repair macular hole       Social History   Substance Use Topics     Smoking status: Former Smoker     Years: 40.00      Types: Cigarettes     Quit date: 1/1/2003     Smokeless tobacco: Never Used     Alcohol use Yes      Comment: 1 x month     Family History   Problem Relation Age of Onset     OSTEOPOROSIS Mother      DIABETES Father      Arthritis Father      CANCER Father      Respiratory Father      Genitourinary Problems Brother      Circulatory Brother      Macular Degeneration No family hx of      Glaucoma No family hx of      Thyroid Disease No family hx of      Hypertension No family hx of          Current Outpatient Prescriptions   Medication Sig Dispense Refill     valACYclovir (VALTREX) 1000 mg tablet Take 1 tablet (1,000 mg) by mouth 3 times daily 21 tablet 0     simvastatin (ZOCOR) 40 MG tablet TAKE 1 TABLET EVERY DAY 90 tablet 3     enalapril (VASOTEC) 5 MG tablet TAKE 1 TABLET EVERY DAY 90 tablet 0     lisinopril (PRINIVIL/ZESTRIL) 2.5 MG tablet Take 1 tablet (2.5 mg) by mouth daily (Patient not taking: Reported on 11/27/2017) 90 tablet 3     albuterol (PROAIR HFA, PROVENTIL HFA, VENTOLIN HFA) 108 (90 BASE) MCG/ACT inhaler Inhale 2 puffs into the lungs every 6 hours as needed for shortness of breath / dyspnea or wheezing (Patient not taking: Reported on 7/13/2017) 1 Inhaler 1     Cyanocobalamin (B-12) 1000 MCG TBCR Take 1,000 mcg by mouth daily       prednisoLONE acetate (PRED MILD) 0.12 % ophthalmic suspension Place 1 drop into both eyes 2 times daily as needed for dry eyes (Patient not taking: Reported on 7/13/2017) 5 mL 5     Labs reviewed in EPIC      Reviewed and updated as needed this visit by clinical staffTobacco  Allergies  Meds  Med Hx  Surg Hx  Fam Hx  Soc Hx      Reviewed and updated as needed this visit by Provider         ROS:  Constitutional, HEENT, cardiovascular, pulmonary, GI, , musculoskeletal, neuro, skin, endocrine and psych systems are negative, except as otherwise noted.    This document serves as a record of the services and decisions personally performed and made by Orlando Braxton MD. It  was created on their behalf by Aaron Mcgill, a trained medical scribe. The creation of this document is based the provider's statements to the medical scribe.  Aaron Mcgill November 27, 2017 10:33 AM    OBJECTIVE:   /58  Pulse 88  Temp 97.6  F (36.4  C) (Oral)  Wt 73 kg (161 lb)  SpO2 97%  BMI 22.3 kg/m2  Body mass index is 22.3 kg/(m^2).  GENERAL: healthy, alert and no distress, answers all questions appropriately, appropriate grooming and affect, appears stated age   EYES: Eyes grossly normal to inspection, EOMI, conjunctivae and sclerae normal  HENT: ear canals and TM's normal with no evidence of Herpes zoster inside his ear  MS: no gross musculoskeletal defects noted, no edema  SKIN: patches of erythematous blistering rash on left face, left upper neck, and posterior to left ear. No evidence of rash in left ear canal or TM  NEURO: mentation intact and speech normal, no abnormal facial asymmetry  PSYCH: mentation appears normal, affect normal/bright    Diagnostic Test Results:  Results for orders placed or performed in visit on 07/18/17   Fecal colorectal cancer screen (FIT)   Result Value Ref Range    Occult Blood Scn FIT Negative NEG     ASSESSMENT/PLAN:     (B02.9) Herpes zoster without complication  (primary encounter diagnosis)  Comment: this is worrisome due to it's distribution for potentially turning into the Amaya Sherman syndrome or causing facial symptoms. I am going to run this case past one of the Ear, Nose, and Throat specialists just to see if any additional follow up or treatment would be recommended   Plan: valACYclovir (VALTREX) 1000 mg tablet              Patient Instructions     - I will discuss your case with an ENT specialist and follow up with you.    [facility]    If you have any questions regarding to your visit please contact your care team:     Team Pink:   Clinic Hours Telephone Number   Internal Medicine:  Dr. Mary Arreguin  CACH[facility] [place]       7am-7pm  Monday - Thursday   7am-5pm  Fridays  [phone]  (Appointment scheduling available 24/7)    Questions about your visit?  Team Line  [phone]   Urgent Care - [place] - 11am-9pm Monday-Friday Saturday-Sunday- 9am-5pm   [place] - 5pm-9pm Monday-Friday Saturday-Sunday- 9am-5pm  [phone] - [place]   [phone] - [place]       What options do I have for visits at the clinic other than the traditional office visit?  To expand how we care for you, many of our providers are utilizing electronic visits (e-visits) and telephone visits, when medically appropriate, for interactions with their patients rather than a visit in the clinic.   We also offer nurse visits for many medical concerns. Just like any other service, we will bill your insurance company for this type of visit based on time spent on the phone with your provider. Not all insurance companies cover these visits. Please check with your medical insurance if this type of visit is covered. You will be responsible for any charges that are not paid by your insurance.      E-visits via YupiCall:  generally incur a $35.00 fee.  Telephone visits:  Time spent on the phone: *charged based on time that is spent on the phone in increments of 10 minutes. Estimated cost:   5-10 mins $30.00   11-20 mins. $59.00   21-30 mins. $85.00   Use Polyerahart (secure email communication and access to your chart) to send your primary care provider a message or make an appointment. Ask someone on your Team how to sign up for YupiCall.    For a Price Quote for your services, please call our Consumer Price Line at [phone].    As always, Thank you for trusting us with your health care needs!    Discharged By:  WILFREDO ROMERO      The information in this document, created by the medical scribe for me, accurately reflects the services I personally performed and the decisions made by me. I have reviewed and approved this document  for accuracy.   MD Orlando Mayer MD  [facility]

## 2017-11-27 NOTE — Clinical Note
We need to let patient know that I did review his case with the Ear, Nose, and Throat specialist and it is recommended to also treat patient with a medrol dosepak [ send in prescription as soon as possible ] and then if patient developed any loss of hearing or muffled sounds and or evidence of facial paralysis we would want patient seen urgently for follow up in Ear, Nose, and Throat specialist MD

## 2017-11-27 NOTE — MR AVS SNAPSHOT
After Visit Summary   11/27/2017    Jose Manuel Gallo    MRN: [id number]           Patient Information     Date Of Birth          1935        Visit Information        Provider Department      11/27/2017 9:50 AM Orlando Braxton MD [facility]        Today's Diagnoses     Herpes zoster without complication    -  1      Care Instructions    - I will discuss your case with an ENT specialist and follow up with you.    [facility]    If you have any questions regarding to your visit please contact your care team:     Team Pink:   Clinic Hours Telephone Number   Internal Medicine:  Dr. Mary Brink NP       7am-7pm  Monday - Thursday   7am-5pm  Fridays  [phone]  (Appointment scheduling available 24/7)    Questions about your visit?  Team Line  [phone]   Urgent Care - [place] [facility] - 11am-9pm Monday-Friday Saturday-Sunday- 9am-5pm   [place] - 5pm-9pm Monday-Friday Saturday-Sunday- 9am-5pm  [phone] - [facility]  [phone] - [place]       What options do I have for visits at the clinic other than the traditional office visit?  To expand how we care for you, many of our providers are utilizing electronic visits (e-visits) and telephone visits, when medically appropriate, for interactions with their patients rather than a visit in the clinic.   We also offer nurse visits for many medical concerns. Just like any other service, we will bill your insurance company for this type of visit based on time spent on the phone with your provider. Not all insurance companies cover these visits. Please check with your medical insurance if this type of visit is covered. You will be responsible for any charges that are not paid by your insurance.      E-visits via [email or web address]:  generally incur a $35.00 fee.  Telephone visits:  Time spent on the phone: *charged based on time that is spent on the phone in increments  of 10 minutes. Estimated cost:   5-10 mins $30.00   11-20 mins. $59.00   21-30 mins. $85.00   Use EducationSuperHighway (secure email communication and access to your chart) to send your primary care provider a message or make an appointment. Ask someone on your Team how to sign up for EducationSuperHighway.    For a Price Quote for your services, please call our Kindful Line at [phone].    As always, Thank you for trusting us with your health care needs!    Discharged By:  WILFREDO ROMERO            Follow-ups after your visit        Your next 10 appointments already scheduled     Dec 04, 2017  8:30 AM CST   Return Visit with Aaron Gayle MD, [facility]    [address] [address]   [phone]            Jan 02, 2018  3:30 PM CST   New Visit [facility] Bonif[facility] Scales [facility]   [facility]    [address] [address] [phone]              Who to contact     If you have questions or need follow up information about today's clinic visit or your schedule please contact [facility] directly at [phone].  Normal or non-critical lab and imaging results will be communicated to you by MyChart, letter or phone within 4 business days after the clinic has received the results. If you do not hear from us within 7 days, please contact the clinic through MyChart or phone. If you have a critical or abnormal lab result, we will notify you by phone as soon as possible.  Submit refill requests through EducationSuperHighway or call your pharmacy and they will forward the refill request to us. Please allow 3 business days for your refill to be completed.          Additional Information About Your Visit        EducationSuperHighwayhart Information     EducationSuperHighway gives you secure access to your electronic health record. If you see a primary care provider, you can also send messages to your care team and make appointments. If you  have questions, please call your primary care clinic.  If you do not have a primary care provider, please call [phone] and they will assist you.        Care EveryWhere ID     This is your Care EveryWhere ID. This could be used by other organizations to access your [facility] medical records  [id number]        Your Vitals Were     Pulse Temperature Pulse Oximetry BMI (Body Mass Index)          88 97.6  F (36.4  C) (Oral) 97% 22.3 kg/m2         Blood Pressure from Last 3 Encounters:   11/27/17 124/58   07/13/17 106/60   12/05/16 127/65    Weight from Last 3 Encounters:   11/27/17 161 lb (73 kg)   07/13/17 162 lb (73.5 kg)   08/29/16 161 lb (73 kg)              Today, you had the following     No orders found for display         Today's Medication Changes          These changes are accurate as of: 11/27/17 10:35 AM.  If you have any questions, ask your nurse or doctor.               Start taking these medicines.        Dose/Directions    valACYclovir 1000 mg tablet   Commonly known as:  VALTREX   Used for:  Herpes zoster without complication   Started by:  Orlando Braxton MD        Dose:  1000 mg   Take 1 tablet (1,000 mg) by mouth 3 times daily   Quantity:  21 tablet   Refills:  0            Where to get your medicines      These medications were sent to [facility] - [place]  [address] [place] [address]     Phone:  [phone]     valACYclovir 1000 mg tablet                Primary Care Provider Office Phone # Fax #    Orlando Braxton [phone] [address] [address]        Equal Access to Services     LORNA JONES : Hadrichard Vail, gio moran, qaybta angelica grier. So [facility] [phone].    ATENCIÓN: Si habla español, tiene a phelps disposición servicios gratuitos de asistencia lingüística. Llame al [phone].    We comply with applicable federal  civil rights laws and [place] laws. We do not discriminate on the basis of race, color, national origin, age, disability, sex, sexual orientation, or gender identity.            Thank you!     Thank you for choosing [facility]  for your care. Our goal is always to provide you with excellent care. Hearing back from our patients is one way we can continue to improve our services. Please take a few minutes to complete the written survey that you may receive in the mail after your visit with us. Thank you!             Your Updated Medication List - Protect others around you: Learn how to safely use, store and throw away your medicines at www.disposemymeds.org.          This list is accurate as of: 11/27/17 10:35 AM.  Always use your most recent med list.                   Brand Name Dispense Instructions for use Diagnosis    albuterol 108 (90 BASE) MCG/ACT Inhaler    PROAIR HFA/PROVENTIL HFA/VENTOLIN HFA    1 Inhaler    Inhale 2 puffs into the lungs every 6 hours as needed for shortness of breath / dyspnea or wheezing    Cough       B-12 1000 MCG Tbcr      Take 1,000 mcg by mouth daily    Vitamin B12 deficiency       enalapril 5 MG tablet    VASOTEC    90 tablet    TAKE 1 TABLET EVERY DAY    Hypertension goal BP (blood pressure) < 140/90, CKD (chronic kidney disease) stage 3, GFR 30-59 ml/min       lisinopril 2.5 MG tablet    PRINIVIL/Zestril    90 tablet    Take 1 tablet (2.5 mg) by mouth daily    Hypertension goal BP (blood pressure) < 140/90, CKD (chronic kidney disease) stage 3, GFR 30-59 ml/min       prednisoLONE acetate 0.12 % ophthalmic susp    PRED MILD    5 mL    Place 1 drop into both eyes 2 times daily as needed for dry eyes    Eye pain, right       simvastatin 40 MG tablet    ZOCOR    90 tablet    TAKE 1 TABLET EVERY DAY    Hyperlipidemia LDL goal <130       valACYclovir 1000 mg tablet    VALTREX    21 tablet    Take 1 tablet (1,000 mg) by mouth 3 times daily    Herpes zoster without  complication

## 2017-11-27 NOTE — PATIENT INSTRUCTIONS
- I will discuss your case with an ENT specialist and follow up with you.    [facility]    If you have any questions regarding to your visit please contact your care team:     Team Pink:   Clinic Hours Telephone Number   Internal Medicine:  Dr. Mary Brink, NP       7am-7pm  Monday - Thursday   7am-5pm  Fridays  [phone]  (Appointment scheduling available 24/7)    Questions about your visit?  Team Line  [phone]   Urgent Care - [place] and [place] - 11am-9pm Monday-Friday Saturday-Sunday- 9am-5pm   [place] - 5pm-9pm Monday-Friday Saturday-Sunday- 9am-5pm  [phone] - [place]   [phone] - [place]       What options do I have for visits at the clinic other than the traditional office visit?  To expand how we care for you, many of our providers are utilizing electronic visits (e-visits) and telephone visits, when medically appropriate, for interactions with their patients rather than a visit in the clinic.   We also offer nurse visits for many medical concerns. Just like any other service, we will bill your insurance company for this type of visit based on time spent on the phone with your provider. Not all insurance companies cover these visits. Please check with your medical insurance if this type of visit is covered. You will be responsible for any charges that are not paid by your insurance.      E-visits via Mysafeplace:  generally incur a $35.00 fee.  Telephone visits:  Time spent on the phone: *charged based on time that is spent on the phone in increments of 10 minutes. Estimated cost:   5-10 mins $30.00   11-20 mins. $59.00   21-30 mins. $85.00   Use [email or web address] (secure email communication and access to your chart) to send your primary care provider a message or make an appointment. Ask someone on your Team how to sign up for Mysafeplace.    For a Price Quote for your services, please call our Consumer Price Line at  [phone].    As always, Thank you for trusting us with your health care needs!    Discharged By:  WILFREDO ROMERO

## 2017-11-27 NOTE — TELEPHONE ENCOUNTER
Copied from CC'd chart:  Orlando Braxton MD  P Fz Rn Triage Pool                   We need to let patient know that I did review his case with the Ear, Nose, and Throat specialist and it is recommended to also treat patient with a medrol dosepak [ send in prescription as soon as possible ] and then if patient developed any loss of hearing or muffled sounds and or evidence of facial paralysis we would want patient seen urgently for follow up in Ear, Nose, and Throat specialist MD Corrine Hadley, RN - [place]

## 2017-11-27 NOTE — NURSING NOTE
"Chief Complaint   Patient presents with     Pain     Left sided facial pain.  Startes at top of left ear and radiates into the shoulder.  Pain in worse in cheek       Initial /58  Pulse 88  Temp 97.6  F (36.4  C) (Oral)  Wt 161 lb (73 kg)  SpO2 97%  BMI 22.3 kg/m2 Estimated body mass index is 22.3 kg/(m^2) as calculated from the following:    Height as of 7/13/17: 5' 11.25\" (1.81 m).    Weight as of this encounter: 161 lb (73 kg).  Medication Reconciliation: complete   Sandy Jackson MA    "

## 2017-12-04 ENCOUNTER — OFFICE VISIT (OUTPATIENT)
Dept: UROLOGY | Facility: CLINIC | Age: 82
End: 2017-12-04
Payer: COMMERCIAL

## 2017-12-04 VITALS — HEART RATE: 66 BPM | DIASTOLIC BLOOD PRESSURE: 65 MMHG | SYSTOLIC BLOOD PRESSURE: 113 MMHG | OXYGEN SATURATION: 96 %

## 2017-12-04 DIAGNOSIS — Z85.51 HISTORY OF BLADDER CANCER: Primary | ICD-10-CM

## 2017-12-04 PROCEDURE — 99207 ZZC DROP WITH A PROCEDURE: CPT | Mod: 25 | Performed by: UROLOGY

## 2017-12-04 PROCEDURE — 52000 CYSTOURETHROSCOPY: CPT | Performed by: UROLOGY

## 2017-12-04 NOTE — PROGRESS NOTES
S: Jose Manuel Gallo is a 82 year old male returns for bladder cancer surveillance.   he has history of bladder cancer Grade 3 non-invasive, Stage ta, s/p turbt.   He received 2 courses of BCG therapy.   Cysto: The anterior urethra showed mild stricture   The prostatic urethra turp changes.   The length is 0cm, the coaptation is 0 cm.   In the bladder there is normal mucosa.   Assessment/Plan: V10.51B History of bladder cancer (primary encounter diagnosis)   Comment: no evidence of recurrence   Plan: CYSTOURETHROSCOPY   BTR in one year

## 2017-12-04 NOTE — PATIENT INSTRUCTIONS
Your next cystoscopy is scheduled 12/3/2018 @ 8:30am. Please call  if you need to reschedule this appointment.

## 2017-12-04 NOTE — MR AVS SNAPSHOT
After Visit Summary   12/4/2017    Jose Manuel Gallo    MRN: [id number]           Patient Information     Date Of Birth          1935        Visit Information        Provider Department      12/4/2017 8:30 AM Aaron Gayle MD; [facility] CYSTO PROC ROOM [facility]        Today's Diagnoses     History of bladder cancer    -  1      Care Instructions    Your next cystoscopy is scheduled 12/3/2018 @ 8:30am. Please call  if you need to reschedule this appointment.            Follow-ups after your visit        Your next 10 appointments already scheduled     Jan 02, 2018  3:30 PM CST   New Visit with Bonifacio Scales MD   [facility] ([facility])    [address] [address] [phone]            Dec 03, 2018  8:30 AM CST   Return Visit with Aaron Gayle MD, [facility] CYSTO PROC ROOM   [facility] ([facility])    [address] [address] [phone]              Who to contact     If you have questions or need follow up information about today's clinic visit or your schedule please contact [facility] directly at [phone].  Normal or non-critical lab and imaging results will be communicated to you by [employer], letter or phone within 4 business days after the clinic has received the results. If you do not hear from us within 7 days, please contact the clinic through [employer] or phone. If you have a critical or abnormal lab result, we will notify you by phone as soon as possible.  Submit refill requests through WiDaPeople or call your pharmacy and they will forward the refill request to us. Please allow 3 business days for your refill to be completed.          Additional Information About Your Visit        [employer] Information     [employer] gives you secure access to your electronic health record. If you see a primary care provider, you can also send messages to  your care team and make appointments. If you have questions, please call your primary care clinic.  If you do not have a primary care provider, please call [phone] and they will assist you.        Care EveryWhere ID     This is your Care EveryWhere ID. This could be used by other organizations to access your [place] medical records  [id number]        Your Vitals Were     Pulse Pulse Oximetry                66 96%           Blood Pressure from Last 3 Encounters:   12/04/17 113/65   11/27/17 124/58   07/13/17 106/60    Weight from Last 3 Encounters:   11/27/17 73 kg (161 lb)   07/13/17 73.5 kg (162 lb)   08/29/16 73 kg (161 lb)              We Performed the Following     CYSTOURETHROSCOPY        Primary Care Provider Office Phone # Fax #    Orlando Braxton [phone] [address] [facility] [address]        Equal Access to Services     LORNA JONES : Hadii aad ku hadasho Soomaali, waaxda luqadaha, qaybta kaalmada adeegyada, waxay mari haychristine krause . So [facility] [phone].    ATENCIÓN: Si habla español, tiene a phelps disposición servicios gratuitos de asistencia lingüística. Llame al [phone].    We comply with applicable federal civil rights laws and [place] laws. We do not discriminate on the basis of race, color, national origin, age, disability, sex, sexual orientation, or gender identity.            Thank you!     Thank you for choosing [facility]  for your care. Our goal is always to provide you with excellent care. Hearing back from our patients is one way we can continue to improve our services. Please take a few minutes to complete the written survey that you may receive in the mail after your visit with us. Thank you!             Your Updated Medication List - Protect others around you: Learn how to safely use, store and throw away your medicines at www.disposemymeds.org.          This list is accurate as of: 12/4/17  8:51 AM.  Always use  your most recent med list.                   Brand Name Dispense Instructions for use Diagnosis    albuterol 108 (90 BASE) MCG/ACT Inhaler    PROAIR HFA/PROVENTIL HFA/VENTOLIN HFA    1 Inhaler    Inhale 2 puffs into the lungs every 6 hours as needed for shortness of breath / dyspnea or wheezing    Cough       B-12 1000 MCG Tbcr      Take 1,000 mcg by mouth daily    Vitamin B12 deficiency       enalapril 5 MG tablet    VASOTEC    90 tablet    TAKE 1 TABLET EVERY DAY    Hypertension goal BP (blood pressure) < 140/90, CKD (chronic kidney disease) stage 3, GFR 30-59 ml/min       gabapentin 300 MG capsule    NEURONTIN    90 capsule    Take 1 capsule (300 mg) by mouth 3 times daily    Herpes zoster without complication       lisinopril 2.5 MG tablet    PRINIVIL/Zestril    90 tablet    Take 1 tablet (2.5 mg) by mouth daily    Hypertension goal BP (blood pressure) < 140/90, CKD (chronic kidney disease) stage 3, GFR 30-59 ml/min       methylPREDNISolone 4 MG tablet    MEDROL DOSEPAK    21 tablet    Follow package instructions    Herpes zoster without complication       prednisoLONE acetate 0.12 % ophthalmic susp    PRED MILD    5 mL    Place 1 drop into both eyes 2 times daily as needed for dry eyes    Eye pain, right       simvastatin 40 MG tablet    ZOCOR    90 tablet    TAKE 1 TABLET EVERY DAY    Hyperlipidemia LDL goal <130       valACYclovir 1000 mg tablet    VALTREX    21 tablet    Take 1 tablet (1,000 mg) by mouth 3 times daily    Herpes zoster without complication

## 2017-12-04 NOTE — NURSING NOTE
"Chief Complaint   Patient presents with     Cystoscopy       Initial /65 (BP Location: Right arm, Patient Position: Chair, Cuff Size: Adult Regular)  Pulse 66  SpO2 96% Estimated body mass index is 22.3 kg/(m^2) as calculated from the following:    Height as of 7/13/17: 1.81 m (5' 11.25\").    Weight as of 11/27/17: 73 kg (161 lb).  Medication Reconciliation: complete   Cristel Davila CMA    "

## 2018-01-02 ENCOUNTER — OFFICE VISIT (OUTPATIENT)
Dept: OPHTHALMOLOGY | Facility: CLINIC | Age: 83
End: 2018-01-02
Payer: COMMERCIAL

## 2018-01-02 DIAGNOSIS — Z96.1 PSEUDOPHAKIA: Primary | ICD-10-CM

## 2018-01-02 DIAGNOSIS — H57.11 PAIN OF RIGHT EYE: ICD-10-CM

## 2018-01-02 DIAGNOSIS — H43.811 POSTERIOR VITREOUS DETACHMENT, RIGHT: ICD-10-CM

## 2018-01-02 DIAGNOSIS — H25.811 COMBINED FORMS OF AGE-RELATED CATARACT OF RIGHT EYE: ICD-10-CM

## 2018-01-02 DIAGNOSIS — H52.4 PRESBYOPIA: ICD-10-CM

## 2018-01-02 DIAGNOSIS — H35.342 MACULAR HOLE, LEFT: ICD-10-CM

## 2018-01-02 PROCEDURE — 92015 DETERMINE REFRACTIVE STATE: CPT | Performed by: OPHTHALMOLOGY

## 2018-01-02 PROCEDURE — 92014 COMPRE OPH EXAM EST PT 1/>: CPT | Performed by: OPHTHALMOLOGY

## 2018-01-02 ASSESSMENT — SLIT LAMP EXAM - LIDS
COMMENTS: 1+ BLEPHARITIS, 1+ MEIBOMIAN GLAND DYSFUNCTION
COMMENTS: 1+ BLEPHARITIS, 1+ MEIBOMIAN GLAND DYSFUNCTION

## 2018-01-02 ASSESSMENT — REFRACTION_MANIFEST
OD_SPHERE: +0.75
OS_CYLINDER: +2.25
OD_CYLINDER: +5.00
OS_SPHERE: -1.75
OD_ADD: +3.50
OS_ADD: +3.50
OS_AXIS: 173
OD_AXIS: 035

## 2018-01-02 ASSESSMENT — REFRACTION_WEARINGRX
OS_SPHERE: -1.75
OS_AXIS: 171
OD_SPHERE: +0.25
OS_ADD: +3.50
OS_CYLINDER: +2.00
OD_AXIS: 032
OD_ADD: +3.50
SPECS_TYPE: TRIFOCAL
OD_CYLINDER: +4.50

## 2018-01-02 ASSESSMENT — CONF VISUAL FIELD
OS_NORMAL: 1
OD_NORMAL: 1

## 2018-01-02 ASSESSMENT — CUP TO DISC RATIO
OD_RATIO: 0.4
OS_RATIO: 0.4

## 2018-01-02 ASSESSMENT — VISUAL ACUITY
CORRECTION_TYPE: GLASSES
OS_CC: >12
METHOD: SNELLEN - LINEAR
OD_CC+: +2
OD_CC: 20/25
OD_CC: 2

## 2018-01-02 ASSESSMENT — EXTERNAL EXAM - LEFT EYE: OS_EXAM: PROLAPSED FAT PADS: UPPER, LOWER; MILD-MOD BROW

## 2018-01-02 ASSESSMENT — TONOMETRY
OD_IOP_MMHG: 17
OS_IOP_MMHG: 17
IOP_METHOD: APPLANATION

## 2018-01-02 ASSESSMENT — EXTERNAL EXAM - RIGHT EYE: OD_EXAM: PROLAPSED FAT PADS: UPPER, LOWER

## 2018-01-02 NOTE — PROGRESS NOTES
Current Eye Medications:  None.       Subjective:  Comprehensive Eye Exam.  Both eyes have been feeling a little sore since yesterday.  Vision appears to be about the same in both eyes.     Ointment in past seemed of little benefit.      Objective:  See Ophthalmology Exam.       Assessment:  Stable eye exam.      ICD-10-CM    1. Pseudophakia, Yag Caps, os Z96.1 EYE EXAM (SIMPLE-NONBILLABLE)   2. Combined forms of age-related cataract, mild-mod, of right eye H25.811    3. Pain of right eye H57.11    4. 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 Posterior vitreous detachment, right H43.811    6. Presbyopia H52.4 REFRACTIVE STATUS        Plan:   Glasses Rx given - optional  Could try Genteal Gel or Celluvisc drops at night and up to four times daily if desire.  Call in September 2018 for an appointment in January 2019 for Complete Exam    Dr. Scales [phone]

## 2018-01-02 NOTE — PATIENT INSTRUCTIONS
Glasses Rx given - optional  Could try Genteal Gel or Celluvisc drops at night and up to four times daily if desire.  Call in September 2018 for an appointment in January 2019 for Complete Exam    Dr. Scales [phone]

## 2018-01-02 NOTE — MR AVS SNAPSHOT
After Visit Summary   1/2/2018    Jose Manuel Gallo    MRN: [id number]           Patient Information     Date Of Birth          1935        Visit Information        Provider Department      1/2/2018 3:30 PM Bonifacio Scales MD [facility]        Today's Diagnoses     Presbyopia        Astigmatism of both eyes, unspecified type        Myopia of left eye          Care Instructions    Glasses Rx given - optional  Could try Genteal Gel or Celluvisc drops at night and up to four times daily if desire.  Call in September 2018 for an appointment in January 2019 for Complete Exam    Dr. Scales [phone]          Follow-ups after your visit        Your next 10 appointments already scheduled     Dec 03, 2018  8:30 AM CST   Return Visit with Aaron Gayle MD, [facility] CYSTO PROC ROOM   [facility] ([facility]    [address] [place], [place] [address] [address] [phone]              Who to contact     If you have questions or need follow up information about today's clinic visit or your schedule please contact [facility] directly at [phone].  Normal or non-critical lab and imaging results will be communicated to you by MyChart, letter or phone within 4 business days after the clinic has received the results. If you do not hear from us within 7 days, please contact the clinic through [employer] or phone. If you have a critical or abnormal lab result, we will notify you by phone as soon as possible.  Submit refill requests through [employer]orest or call your pharmacy and they will forward the refill request to us. Please allow 3 business days for your refill to be completed.          Additional Information About Your Visit        MyChart Information     [employer] gives you secure access to your electronic health record. If you see a primary care provider, you can also send messages to your care team and make appointments. If you have questions,  please call your primary care clinic.  If you do not have a primary care provider, please call [phone] and they will assist you.        Care EveryWhere ID     This is your Care EveryWhere ID. This could be used by other organizations to access your [place] medical records  [id number]         Blood Pressure from Last 3 Encounters:   12/04/17 113/65   11/27/17 124/58   07/13/17 106/60    Weight from Last 3 Encounters:   11/27/17 73 kg (161 lb)   07/13/17 73.5 kg (162 lb)   08/29/16 73 kg (161 lb)              Today, you had the following     No orders found for display       Primary Care Provider Office Phone # Fax #    Orlando Braxton [phone] [address] [facility] [address]        Equal Access to Services     [facility]: Hadii aad ku hadasho Soomaali, waaxda luqadaha, qaybta kaalmada adeegyada, angelica guerra haychristine krause . So [facility] [phone].    ATENCIÓN: Si habla español, tiene a phelps disposición servicios gratuitos de asistencia lingüística. Llame al [phone].    We comply with applicable federal civil rights laws and [place] laws. We do not discriminate on the basis of race, color, national origin, age, disability, sex, sexual orientation, or gender identity.            Thank you!     Thank you for choosing [facility]  for your care. Our goal is always to provide you with excellent care. Hearing back from our patients is one way we can continue to improve our services. Please take a few minutes to complete the written survey that you may receive in the mail after your visit with us. Thank you!             Your Updated Medication List - Protect others around you: Learn how to safely use, store and throw away your medicines at www.disposemymeds.org.          This list is accurate as of: 1/2/18  4:38 PM.  Always use your most recent med list.                   Brand Name Dispense Instructions for use Diagnosis    albuterol 108 (90 BASE)  MCG/ACT Inhaler    PROAIR HFA/PROVENTIL HFA/VENTOLIN HFA    1 Inhaler    Inhale 2 puffs into the lungs every 6 hours as needed for shortness of breath / dyspnea or wheezing    Cough       B-12 1000 MCG Tbcr      Take 1,000 mcg by mouth daily    Vitamin B12 deficiency       enalapril 5 MG tablet    VASOTEC    90 tablet    TAKE 1 TABLET EVERY DAY    Hypertension goal BP (blood pressure) < 140/90, CKD (chronic kidney disease) stage 3, GFR 30-59 ml/min       gabapentin 300 MG capsule    NEURONTIN    90 capsule    Take 1 capsule (300 mg) by mouth 3 times daily    Herpes zoster without complication       lisinopril 2.5 MG tablet    PRINIVIL/Zestril    90 tablet    Take 1 tablet (2.5 mg) by mouth daily    Hypertension goal BP (blood pressure) < 140/90, CKD (chronic kidney disease) stage 3, GFR 30-59 ml/min       methylPREDNISolone 4 MG tablet    MEDROL DOSEPAK    21 tablet    Follow package instructions    Herpes zoster without complication       simvastatin 40 MG tablet    ZOCOR    90 tablet    TAKE 1 TABLET EVERY DAY    Hyperlipidemia LDL goal <130       valACYclovir 1000 mg tablet    VALTREX    21 tablet    Take 1 tablet (1,000 mg) by mouth 3 times daily    Herpes zoster without complication

## 2018-01-03 PROBLEM — H25.811 COMBINED FORMS OF AGE-RELATED CATARACT OF RIGHT EYE: Status: ACTIVE | Noted: 2018-01-03

## 2018-01-03 PROBLEM — H57.11 PAIN OF RIGHT EYE: Status: ACTIVE | Noted: 2018-01-03

## 2018-05-09 RX ORDER — GABAPENTIN 300 MG/1
300 CAPSULE ORAL 3 TIMES DAILY
Qty: 90 CAPSULE | Refills: 1 | Status: CANCELLED | OUTPATIENT
Start: 2018-05-09

## 2018-05-11 ENCOUNTER — TELEPHONE (OUTPATIENT)
Dept: FAMILY MEDICINE | Facility: CLINIC | Age: 83
End: 2018-05-11

## 2018-05-11 ENCOUNTER — OFFICE VISIT (OUTPATIENT)
Dept: FAMILY MEDICINE | Facility: CLINIC | Age: 83
End: 2018-05-11
Payer: COMMERCIAL

## 2018-05-11 VITALS
TEMPERATURE: 97 F | HEART RATE: 66 BPM | BODY MASS INDEX: 23.38 KG/M2 | WEIGHT: 167 LBS | SYSTOLIC BLOOD PRESSURE: 140 MMHG | DIASTOLIC BLOOD PRESSURE: 70 MMHG | RESPIRATION RATE: 16 BRPM | HEIGHT: 71 IN | OXYGEN SATURATION: 94 %

## 2018-05-11 DIAGNOSIS — J30.0 CHRONIC VASOMOTOR RHINITIS: ICD-10-CM

## 2018-05-11 DIAGNOSIS — H61.23 BILATERAL IMPACTED CERUMEN: ICD-10-CM

## 2018-05-11 DIAGNOSIS — Z00.00 ROUTINE GENERAL MEDICAL EXAMINATION AT A HEALTH CARE FACILITY: Primary | ICD-10-CM

## 2018-05-11 DIAGNOSIS — H93.13 TINNITUS, BILATERAL: ICD-10-CM

## 2018-05-11 DIAGNOSIS — E78.5 HYPERLIPIDEMIA LDL GOAL <130: ICD-10-CM

## 2018-05-11 DIAGNOSIS — Z86.19 HISTORY OF SHINGLES: ICD-10-CM

## 2018-05-11 DIAGNOSIS — B02.9 HERPES ZOSTER WITHOUT COMPLICATION: ICD-10-CM

## 2018-05-11 DIAGNOSIS — R42 DIZZINESS: ICD-10-CM

## 2018-05-11 DIAGNOSIS — I10 HYPERTENSION GOAL BP (BLOOD PRESSURE) < 140/90: ICD-10-CM

## 2018-05-11 DIAGNOSIS — N18.30 CKD (CHRONIC KIDNEY DISEASE) STAGE 3, GFR 30-59 ML/MIN (H): ICD-10-CM

## 2018-05-11 DIAGNOSIS — Z85.51 HISTORY OF BLADDER CANCER: ICD-10-CM

## 2018-05-11 DIAGNOSIS — R05.9 COUGH: ICD-10-CM

## 2018-05-11 LAB
ALBUMIN UR-MCNC: NEGATIVE MG/DL
ALP SERPL-CCNC: 84 U/L (ref 40–150)
ALT SERPL W P-5'-P-CCNC: 29 U/L (ref 0–70)
ANION GAP SERPL CALCULATED.3IONS-SCNC: 4 MMOL/L (ref 3–14)
APPEARANCE UR: CLEAR
BASOPHILS # BLD AUTO: 0 10E9/L (ref 0–0.2)
BASOPHILS NFR BLD AUTO: 0.5 %
BILIRUB UR QL STRIP: NEGATIVE
BUN SERPL-MCNC: 20 MG/DL (ref 7–30)
CALCIUM SERPL-MCNC: 8.8 MG/DL (ref 8.5–10.1)
CHLORIDE SERPL-SCNC: 106 MMOL/L (ref 94–109)
CHOLEST SERPL-MCNC: 149 MG/DL
CO2 SERPL-SCNC: 28 MMOL/L (ref 20–32)
COLOR UR AUTO: YELLOW
CREAT SERPL-MCNC: 1.13 MG/DL (ref 0.66–1.25)
CREAT UR-MCNC: 91 MG/DL
DIFFERENTIAL METHOD BLD: NORMAL
EOSINOPHIL # BLD AUTO: 0.1 10E9/L (ref 0–0.7)
EOSINOPHIL NFR BLD AUTO: 2.3 %
ERYTHROCYTE [DISTWIDTH] IN BLOOD BY AUTOMATED COUNT: 13.3 % (ref 10–15)
GFR SERPL CREATININE-BSD FRML MDRD: 62 ML/MIN/1.7M2
GLUCOSE SERPL-MCNC: 91 MG/DL (ref 70–99)
GLUCOSE UR STRIP-MCNC: NEGATIVE MG/DL
HCT VFR BLD AUTO: 43.3 % (ref 40–53)
HDLC SERPL-MCNC: 77 MG/DL
HGB BLD-MCNC: 14.3 G/DL (ref 13.3–17.7)
HGB UR QL STRIP: NEGATIVE
KETONES UR STRIP-MCNC: NEGATIVE MG/DL
LDLC SERPL CALC-MCNC: 61 MG/DL
LEUKOCYTE ESTERASE UR QL STRIP: NEGATIVE
LYMPHOCYTES # BLD AUTO: 0.9 10E9/L (ref 0.8–5.3)
LYMPHOCYTES NFR BLD AUTO: 15.6 %
MCH RBC QN AUTO: 32.4 PG (ref 26.5–33)
MCHC RBC AUTO-ENTMCNC: 33 G/DL (ref 31.5–36.5)
MCV RBC AUTO: 98 FL (ref 78–100)
MICROALBUMIN UR-MCNC: 7 MG/L
MICROALBUMIN/CREAT UR: 8.04 MG/G CR (ref 0–17)
MONOCYTES # BLD AUTO: 0.5 10E9/L (ref 0–1.3)
MONOCYTES NFR BLD AUTO: 8.8 %
NEUTROPHILS # BLD AUTO: 4.1 10E9/L (ref 1.6–8.3)
NEUTROPHILS NFR BLD AUTO: 72.8 %
NITRATE UR QL: NEGATIVE
NON-SQ EPI CELLS #/AREA URNS LPF: NORMAL /LPF
NONHDLC SERPL-MCNC: 72 MG/DL
PH UR STRIP: 5.5 PH (ref 5–7)
PHOSPHATE SERPL-MCNC: 2.7 MG/DL (ref 2.5–4.5)
PLATELET # BLD AUTO: 213 10E9/L (ref 150–450)
POTASSIUM SERPL-SCNC: 5 MMOL/L (ref 3.4–5.3)
PTH-INTACT SERPL-MCNC: 103 PG/ML (ref 18–80)
RBC # BLD AUTO: 4.41 10E12/L (ref 4.4–5.9)
RBC #/AREA URNS AUTO: NORMAL /HPF
SODIUM SERPL-SCNC: 138 MMOL/L (ref 133–144)
SOURCE: NORMAL
SP GR UR STRIP: 1.02 (ref 1–1.03)
TRIGL SERPL-MCNC: 53 MG/DL
UROBILINOGEN UR STRIP-ACNC: 0.2 EU/DL (ref 0.2–1)
VIT B12 SERPL-MCNC: 222 PG/ML (ref 193–986)
WBC # BLD AUTO: 5.6 10E9/L (ref 4–11)
WBC #/AREA URNS AUTO: NORMAL /HPF

## 2018-05-11 PROCEDURE — 84075 ASSAY ALKALINE PHOSPHATASE: CPT | Performed by: INTERNAL MEDICINE

## 2018-05-11 PROCEDURE — 84460 ALANINE AMINO (ALT) (SGPT): CPT | Performed by: INTERNAL MEDICINE

## 2018-05-11 PROCEDURE — 81001 URINALYSIS AUTO W/SCOPE: CPT | Performed by: INTERNAL MEDICINE

## 2018-05-11 PROCEDURE — 99213 OFFICE O/P EST LOW 20 MIN: CPT | Mod: 25 | Performed by: INTERNAL MEDICINE

## 2018-05-11 PROCEDURE — 80061 LIPID PANEL: CPT | Performed by: INTERNAL MEDICINE

## 2018-05-11 PROCEDURE — 36415 COLL VENOUS BLD VENIPUNCTURE: CPT | Performed by: INTERNAL MEDICINE

## 2018-05-11 PROCEDURE — 83970 ASSAY OF PARATHORMONE: CPT | Performed by: INTERNAL MEDICINE

## 2018-05-11 PROCEDURE — 80048 BASIC METABOLIC PNL TOTAL CA: CPT | Performed by: INTERNAL MEDICINE

## 2018-05-11 PROCEDURE — 85025 COMPLETE CBC W/AUTO DIFF WBC: CPT | Performed by: INTERNAL MEDICINE

## 2018-05-11 PROCEDURE — 84100 ASSAY OF PHOSPHORUS: CPT | Performed by: INTERNAL MEDICINE

## 2018-05-11 PROCEDURE — 82607 VITAMIN B-12: CPT | Performed by: INTERNAL MEDICINE

## 2018-05-11 PROCEDURE — 82043 UR ALBUMIN QUANTITATIVE: CPT | Performed by: INTERNAL MEDICINE

## 2018-05-11 PROCEDURE — 82306 VITAMIN D 25 HYDROXY: CPT | Performed by: INTERNAL MEDICINE

## 2018-05-11 PROCEDURE — 69209 REMOVE IMPACTED EAR WAX UNI: CPT | Mod: 50 | Performed by: INTERNAL MEDICINE

## 2018-05-11 PROCEDURE — 99397 PER PM REEVAL EST PAT 65+ YR: CPT | Mod: 25 | Performed by: INTERNAL MEDICINE

## 2018-05-11 RX ORDER — LISINOPRIL 2.5 MG/1
2.5 TABLET ORAL DAILY
Qty: 90 TABLET | Refills: 3 | Status: SHIPPED | OUTPATIENT
Start: 2018-05-11 | End: 2018-12-13

## 2018-05-11 RX ORDER — ENALAPRIL MALEATE 5 MG/1
TABLET ORAL
Qty: 90 TABLET | Refills: 0 | Status: CANCELLED | OUTPATIENT
Start: 2018-05-11

## 2018-05-11 RX ORDER — ALBUTEROL SULFATE 90 UG/1
2 AEROSOL, METERED RESPIRATORY (INHALATION) EVERY 6 HOURS PRN
Qty: 1 INHALER | Refills: 1 | Status: CANCELLED | OUTPATIENT
Start: 2018-05-11

## 2018-05-11 RX ORDER — SIMVASTATIN 40 MG
TABLET ORAL
Qty: 90 TABLET | Refills: 3 | Status: SHIPPED | OUTPATIENT
Start: 2018-05-11 | End: 2018-12-13

## 2018-05-11 ASSESSMENT — ACTIVITIES OF DAILY LIVING (ADL)
CURRENT_FUNCTION: NO ASSISTANCE NEEDED
I_NEED_ASSISTANCE_FOR_THE_FOLLOWING_DAILY_ACTIVITIES:: NO ASSISTANCE IS NEEDED

## 2018-05-11 NOTE — LETTER
[facility]  [address] [place] [address]. [place] [address]    May 17, 2018    Jose Manuel Gallo  [address] [facility] [place] [address]          Dear Jose Manuel,  All of these tests are within acceptable limits . The parathyroid hormone elevation is of uncertain significance and we can just recheck this in a year.  Please let me know if you have any questions for me about all of these lab tests. Take care and have a great summer !   Results for orders placed or performed in visit on 05/11/18   Phosphorus   Result Value Ref Range    Phosphorus 2.7 2.5 - 4.5 mg/dL   Alkaline phosphatase   Result Value Ref Range    Alkaline Phosphatase 84 40 - 150 U/L   Parathyroid Hormone Intact   Result Value Ref Range    Parathyroid Hormone Intact 103 (H) 18 - 80 pg/mL   Basic metabolic panel   Result Value Ref Range    Sodium 138 133 - 144 mmol/L    Potassium 5.0 3.4 - 5.3 mmol/L    Chloride 106 94 - 109 mmol/L    Carbon Dioxide 28 20 - 32 mmol/L    Anion Gap 4 3 - 14 mmol/L    Glucose 91 70 - 99 mg/dL    Urea Nitrogen 20 7 - 30 mg/dL    Creatinine 1.13 0.66 - 1.25 mg/dL    GFR Estimate 62 >60 mL/min/1.7m2    GFR Estimate If Black 75 >60 mL/min/1.7m2    Calcium 8.8 8.5 - 10.1 mg/dL   Lipid panel reflex to direct LDL Fasting   Result Value Ref Range    Cholesterol 149 <200 mg/dL    Triglycerides 53 <150 mg/dL    HDL Cholesterol 77 >39 mg/dL    LDL Cholesterol Calculated 61 <100 mg/dL    Non HDL Cholesterol 72 <130 mg/dL   ALT   Result Value Ref Range    ALT 29 0 - 70 U/L   Albumin Random Urine Quantitative with Creat Ratio   Result Value Ref Range    Creatinine Urine 91 mg/dL    Albumin Urine mg/L 7 mg/L    Albumin Urine mg/g Cr 8.04 0 - 17 mg/g Cr   Vitamin D Deficiency   Result Value Ref Range    Vitamin D Deficiency screening 26 20 - 75 ug/L   UA with Microscopic   Result Value Ref Range    Color Urine Yellow     Appearance Urine Clear     Glucose Urine Negative  NEG^Negative mg/dL    Bilirubin Urine Negative NEG^Negative    Ketones Urine Negative NEG^Negative mg/dL    Specific Gravity Urine 1.025 1.003 - 1.035    pH Urine 5.5 5.0 - 7.0 pH    Protein Albumin Urine Negative NEG^Negative mg/dL    Urobilinogen Urine 0.2 0.2 - 1.0 EU/dL    Nitrite Urine Negative NEG^Negative    Blood Urine Negative NEG^Negative    Leukocyte Esterase Urine Negative NEG^Negative    Source Midstream Urine     WBC Urine 0 - 5 OTO5^0 - 5 /HPF    RBC Urine O - 2 OTO2^O - 2 /HPF    Squamous Epithelial /LPF Urine Few FEW^Few /LPF   CBC with platelets differential   Result Value Ref Range    WBC 5.6 4.0 - 11.0 10e9/L    RBC Count 4.41 4.4 - 5.9 10e12/L    Hemoglobin 14.3 13.3 - 17.7 g/dL    Hematocrit 43.3 40.0 - 53.0 %    MCV 98 78 - 100 fl    MCH 32.4 26.5 - 33.0 pg    MCHC 33.0 31.5 - 36.5 g/dL    RDW 13.3 10.0 - 15.0 %    Platelet Count 213 150 - 450 10e9/L    Diff Method Automated Method     % Neutrophils 72.8 %    % Lymphocytes 15.6 %    % Monocytes 8.8 %    % Eosinophils 2.3 %    % Basophils 0.5 %    Absolute Neutrophil 4.1 1.6 - 8.3 10e9/L    Absolute Lymphocytes 0.9 0.8 - 5.3 10e9/L    Absolute Monocytes 0.5 0.0 - 1.3 10e9/L    Absolute Eosinophils 0.1 0.0 - 0.7 10e9/L    Absolute Basophils 0.0 0.0 - 0.2 10e9/L   Vitamin B12   Result Value Ref Range    Vitamin B12 222 193 - 986 pg/mL       If you have any questions or concerns, please me or my clinic team at [phone].      Sincerely,        Orlando Braxton MD/bt

## 2018-05-11 NOTE — TELEPHONE ENCOUNTER
Reason for Call:  Other call back    Detailed comments: verbal referral given but the clinic needs one faxed over    Phone Number Patient can be reached at: Other phone number:  [phone] fax [phone]    Best Time: any    Can we leave a detailed message on this number? YES    Call taken on 5/11/2018 at 3:58 PM by Marleen Wall

## 2018-05-11 NOTE — PROGRESS NOTES
SUBJECTIVE:   Jose Manuel Gallo is a 83 year old male who presents for Preventive Visit.    Dizziness-- He states that he has been having chronic symptoms of lightheadedness. He notes that he sometimes does not like driving because of his symptoms. He has had his symptoms for several years, but it got more severe in the last couple of weeks. He has been to neurologist several years ago, but they did not find any issue leading to his symptoms. He denies excessive snoring or unplanned naps.     Sleep-- He states that he has been finding it hard to fall back asleep within the last 4-5 months when he wakes up in the middle of the night. He takes Melatonin which helps him fall back to sleep but this makes him drowsy when he wakes up in the morning.     Nasal drip-- He states that he has been having symptoms of nasal drips. He notes that he has had his symptoms for a couple years and he gets his symptoms especially when he eats. These are symptoms most consistent with vasomotor rhinitis     Hearing-- Patient states that his tinnitus has been getting worse. He is currently not wearing any hearing aids, but he has scheduled a hearing exam in 2 weeks.     Zoster-- Patient was prescribed gabapentin 300 mg after his break out. He reports that his symptoms are resolved and he no longer uses that gabapentin medication.     Bladder-- Patient is a survival of bladder cancer. He still follows up with his specialists once a year for check up.     Seborrheic keratosis-- He has a lot of lesions scattered around his back. He notes that he has an appointment with a dermatologist. He thought the seborrheic keratoses were moles     Weight-- He states that he has been eating a lot but he tends not to gain weight. He notes that he has also been losing muscle mass. We demonstrated no weight loss for years and years with the vital signs flow sheet and graph function   Wt Readings from Last 5 Encounters:   05/11/18 75.8 kg (167 lb)    11/27/17 73 kg (161 lb)   07/13/17 73.5 kg (162 lb)   08/29/16 73 kg (161 lb)   07/12/16 75.2 kg (165 lb 12.8 oz)      Additional Information-- Patient reports that his toes get purple in the winter and are back to their normal color when the weather starts getting warm. This has been evaluated in the past and is most consistent with Raynauds phenomenon and not Purple Toe Syndrome or vascular disease     Are you in the first 12 months of your Medicare coverage?  No    Physical   Annual:     Getting at least 3 servings of Calcium per day::  NO    Bi-annual eye exam::  Yes    Dental care twice a year::  NO    Sleep apnea or symptoms of sleep apnea::  Daytime drowsiness    Diet::  Regular (no restrictions)    Frequency of exercise::  None    Taking medications regularly::  Yes    Medication side effects::  None    Additional concerns today::  YES (nasal drip)    Ability to successfully perform activities of daily living: no assistance needed  Home Safety:  No safety concerns identified  Hearing Impairment: difficulty following a conversation in a noisy restaurant or crowded room and difficulty understanding soft or whispered speech        Fall risk:       COGNITIVE SCREEN  1) Repeat 3 items (Banana, Sunrise, Chair)    2) Clock draw: NORMAL  3) 3 item recall: Recalls 3 objects  Results: 3 items recalled: COGNITIVE IMPAIRMENT LESS LIKELY    Mini-CogTM Copyright ANABEL Carpenter. Licensed by the author for use in [facility]; reprinted with permission (kostas@[facility]). All rights reserved.        Reviewed and updated as needed this visit by clinical staff  Tobacco  Allergies  Meds  Med Hx  Surg Hx  Fam Hx  Soc Hx        Reviewed and updated as needed this visit by Provider        Social History   Substance Use Topics     Smoking status: Former Smoker     Years: 40.00     Types: Cigarettes     Quit date: 1/1/2003     Smokeless tobacco: Never Used     Alcohol use Yes      Comment: 1 x month       Alcohol Use  "5/11/2018   If you drink alcohol do you typically have greater than 3 drinks per day OR greater than 7 drinks per week? Not Applicable     Today's PHQ-2 Score:   PHQ-2 ( 1999 Pfizer) 5/11/2018   Q1: Little interest or pleasure in doing things 0   Q2: Feeling down, depressed or hopeless 0   PHQ-2 Score 0   Q1: Little interest or pleasure in doing things Not at all   Q2: Feeling down, depressed or hopeless Not at all   PHQ-2 Score 0       Do you feel safe in your environment - Yes    Do you have a Health Care Directive?: Yes: Patient states has Advance Directive and will bring in a copy to clinic.    Current providers sharing in care for this patient include:   Patient Care Team:  Orlando Braxton MD as PCP - General (Internal Medicine)    The following health maintenance items are reviewed in Epic and correct as of today:  Health Maintenance   Topic Date Due     ADVANCE DIRECTIVE PLANNING Q5 YRS  08/09/2016     BMP Q1 YR  07/13/2018     HEMOGLOBIN Q1 YR  07/13/2018     FALL RISK ASSESSMENT  07/13/2018     LIPID MONITORING Q1 YEAR  07/13/2018     MICROALBUMIN Q1 YEAR  07/13/2018     INFLUENZA VACCINE (Season Ended) 09/01/2018     EYE EXAM Q1 YEAR  01/03/2019     TETANUS IMMUNIZATION (SYSTEM ASSIGNED)  07/22/2023     PNEUMOCOCCAL  Completed     Labs reviewed in EPIC      Review of Systems  Constitutional, HEENT, cardiovascular, pulmonary, GI, , musculoskeletal, neuro, skin, endocrine and psych systems are negative, except as otherwise noted.    This document serves as a record of the services and decisions personally performed and made by Orlando Braxton MD. It was created on their behalf by Rene Pulido, a trained medical scribe. The creation of this document is based the provider's statements to the medical scribe.  Rene Pulido  May 11, 2018 9:27 AM   OBJECTIVE:   /70  Pulse 66  Temp 97  F (36.1  C) (Oral)  Resp 16  Ht 1.81 m (5' 11.25\")  Wt 75.8 kg (167 lb)  SpO2 94%  BMI 23.13 kg/m2 Estimated body " "mass index is 23.13 kg/(m^2) as calculated from the following:    Height as of this encounter: 1.81 m (5' 11.25\").    Weight as of this encounter: 75.8 kg (167 lb).  Physical Exam  GENERAL: healthy, alert and no distress  EYES: Eyes grossly normal to inspection, EOMI, conjunctivae and sclerae normal,  HENT: ear canals and TM's normal, nose and mouth without ulcers or lesions, presence of wax in both ears.   NECK: no adenopathy, no asymmetry, masses, or scars and thyroid normal to palpation  RESP: lungs clear to auscultation - no rales, rhonchi or wheezes  CV: regular rate and rhythm, normal S1 S2, no S3 or S4, no murmur, click or rub, no peripheral edema and peripheral pulses strong  ABDOMEN: soft, nontender, no hepatosplenomegaly, no masses and bowel sounds normal  MS: no gross musculoskeletal defects noted, no edema  SKIN: Presence of scattered seborrheic keratosis in his back   NEURO: Mentation intact and speech normal  PSYCH: mentation appears normal, affect normal/bright    ASSESSMENT / PLAN:   (Z00.00) Routine general medical examination at a health care facility  (primary encounter diagnosis)  Comment: routine screening issues   Plan: see orders section of this encounter     (R05) Cough  Comment:   Plan:     (I10) Hypertension goal BP (blood pressure) < 140/90  Comment: controlled within acceptable limits   BP Readings from Last 6 Encounters:   05/11/18 140/70   12/04/17 113/65   11/27/17 124/58   07/13/17 106/60   12/05/16 127/65   08/29/16 128/62       Plan: lisinopril (PRINIVIL/ZESTRIL) 2.5 MG tablet            (N18.3) CKD (chronic kidney disease) stage 3, GFR 30-59 ml/min  Comment: due for the chronic kidney disease annual laboratory studies   Plan: lisinopril (PRINIVIL/ZESTRIL) 2.5 MG tablet,         Phosphorus, Alkaline phosphatase, Parathyroid         Hormone Intact, Basic metabolic panel, Lipid         panel reflex to direct LDL Fasting, ALT,         Albumin Random Urine Quantitative with Creat        " " Ratio, Vitamin D Deficiency, UA with         Microscopic            (B02.9) Herpes zoster without complication  Comment: noted as a point of historical importance   Plan: declines ShingRx vaccination against herpes zoster     (H93.13) Tinnitus, bilateral  Comment: noted as a point of historical importance   Plan: as above     (R42) Dizziness  Comment: refractory chronic symptoms . 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  Comment: noted as a point of historical importance   Plan: recommended over the counter nonprescription Zyrtec [ cetirizine ] and fluticasone (FLONASE) 50 MCG/ACT nasal spray     (Z85.51) History of bladder cancer  Comment: noted as a point of historical importance   Plan: as above     (Z86.19) History of shingles  Comment: noted as a point of historical importance   Plan: as above     (H61.23) Bilateral impacted cerumen  Comment: successfully irrigated out by my medical assistant     Plan: REMOVE IMPACTED CERUMEN            (E78.5) Hyperlipidemia LDL goal <130  Comment: continue current plan of care     Plan: simvastatin (ZOCOR) 40 MG tablet              End of Life Planning:  Patient currently has an advanced directive: Yes, but the clinic is yet to receive a copy     COUNSELING:  Reviewed preventive health counseling, as reflected in patient instructions       Regular exercise       Healthy diet/nutrition       Vision screening       Hearing screening       Dental care        Estimated body mass index is 23.13 kg/(m^2) as calculated from the following:    Height as of this encounter: 1.81 m (5' 11.25\").    Weight as of this encounter: 75.8 kg (167 lb).     reports that he quit smoking about 15 years ago. His smoking use included Cigarettes. He quit after 40.00 years of use. He has never used smokeless tobacco.      Appropriate " preventive services were discussed with this patient, including applicable screening as appropriate for cardiovascular disease, diabetes, osteopenia/osteoporosis, and glaucoma.  As appropriate for age/gender, discussed screening for colorectal cancer, prostate cancer, breast cancer, and cervical cancer. Checklist reviewing preventive services available has been given to the patient.    Reviewed patients plan of care and provided an AVS. The Basic Care Plan (routine screening as documented in Health Maintenance) for Jose Manuel meets the Care Plan requirement. This Care Plan has been established and reviewed with the Patient.    Counseling Resources:  ATP IV Guidelines  Pooled Cohorts Equation Calculator  Breast Cancer Risk Calculator  FRAX Risk Assessment  ICSI Preventive Guidelines  Dietary Guidelines for Americans, 2010  [employer]'s MyPlate  ASA Prophylaxis  Lung CA Screening    The information in this document, created by the medical scribe for me, accurately reflects the services I personally performed and the decisions made by me. I have reviewed and approved this document for accuracy.   MD Orlando Mayer MD  [facility]

## 2018-05-11 NOTE — MR AVS SNAPSHOT
After Visit Summary   5/11/2018    Jose Manuel Gallo    MRN: [id number]           Patient Information     Date Of Birth          1935        Visit Information        Provider Department      5/11/2018 9:10 AM Orlando Braxton MD [facility]        Today's Diagnoses     Routine general medical examination at a health care facility    -  1    Cough        Hypertension goal BP (blood pressure) < 140/90        CKD (chronic kidney disease) stage 3, GFR 30-59 ml/min        Herpes zoster without complication        Tinnitus, bilateral        Dizziness        Chronic vasomotor rhinitis        History of bladder cancer        History of shingles        Bilateral impacted cerumen        Hyperlipidemia LDL goal <130          Care Instructions    -- Patient should use over-the-counter Zyrtec once a day and Flonase Nasal Spray twice a day for his nasal symptoms.This should be done for atleast a month.     -- I will follow up with you about lab results.         [facility]    If you have any questions regarding to your visit please contact your care team:     Team Pink:   Clinic Hours Telephone Number   Internal Medicine:  Dr. Mary Brink NP       7am-7pm  Monday - Thursday   7am-5pm  Fridays  [phone]  (Appointment scheduling available 24/7)    Questions about your recent visit?  Team Line  [phone]   Urgent Care - [place] - 11am-9pm Monday-Friday Saturday-Sunday- 9am-5pm   [place] - 5pm-9pm Monday-Friday Saturday-Sunday- 9am-5pm  [phone] - Anya Lazo  [phone] - [place]       What options do I have for a visit other than an office visit? We offer electronic visits (e-visits) and telephone visits, when medically appropriate.  Please check with your medical insurance to see if these types of visits are covered, as you will be responsible for any charges that are not paid by your insurance.       You can use [employer] (secure electronic communication) to access to your chart, send your primary care provider a message, or make an appointment. Ask a team member how to get started.     For a price quote for your services, please call our Consumer Price Line at [phone] or our Imaging Cost estimation line at [phone] (for imaging tests).  Lizeth FELIPE CMA ([facility])            Follow-ups after your visit        Additional Services     NEUROLOGY ADULT REFERRAL       Your provider has referred you for the following:   Consult at [facility] [phone]   [email or web address]    Please be aware that coverage of these services is subject to the terms and limitations of your health insurance plan.  Call member services at your health plan with any benefit or coverage questions.      Please bring the following with you to your appointment:    (1) Any X-Rays, CTs or MRIs which have been performed.  Contact the facility where they were done to arrange for  prior to your scheduled appointment.    (2) List of current medications  (3) This referral request   (4) Any documents/labs given to you for this referral                  Your next 10 appointments already scheduled     May 29, 2018  8:00 AM CDT   Hearing Aid Consult with Gen Holliday   [facility] ([facility])    [address] [place] [id number] [phone]            Dec 03, 2018  8:30 AM CST   Return Visit with Aaron Gayle MD, [facility]   [facility]    [address] [place] [id number]   [phone]              Who to contact     If you have questions or need follow up information about today's clinic visit or your schedule please contact [facility] directly at [phone].  Normal or non-critical lab and imaging results will be communicated to you by  "MyChart, letter or phone within 4 business days after the clinic has received the results. If you do not hear from us within 7 days, please contact the clinic through [employer] or phone. If you have a critical or abnormal lab result, we will notify you by phone as soon as possible.  Submit refill requests through [employer] or call your pharmacy and they will forward the refill request to us. Please allow 3 business days for your refill to be completed.          Additional Information About Your Visit        [employer] Information     [employer] gives you secure access to your electronic health record. If you see a primary care provider, you can also send messages to your care team and make appointments. If you have questions, please call your primary care clinic.  If you do not have a primary care provider, please call [phone] and they will assist you.        Care EveryWhere ID     This is your Care EveryWhere ID. This could be used by other organizations to access your [facility] medical records  [id number]        Your Vitals Were     Pulse Temperature Respirations Height Pulse Oximetry BMI (Body Mass Index)    66 97  F (36.1  C) (Oral) 16 5' 11.25\" (1.81 m) 94% 23.13 kg/m2       Blood Pressure from Last 3 Encounters:   05/11/18 150/70   12/04/17 113/65   11/27/17 124/58    Weight from Last 3 Encounters:   05/11/18 167 lb (75.8 kg)   11/27/17 161 lb (73 kg)   07/13/17 162 lb (73.5 kg)              We Performed the Following     Albumin Random Urine Quantitative with Creat Ratio     Alkaline phosphatase     ALT     Basic metabolic panel     CBC with platelets differential     Lipid panel reflex to direct LDL Fasting     NEUROLOGY ADULT REFERRAL     Parathyroid Hormone Intact     Phosphorus     REMOVE IMPACTED CERUMEN     UA with Microscopic     Vitamin B12     Vitamin D Deficiency          Today's Medication Changes          These changes are accurate as of 5/11/18  9:58 AM.  If you have any questions, ask your nurse or " doctor.               Stop taking these medicines if you haven't already. Please contact your care team if you have questions.     albuterol 108 (90 Base) MCG/ACT Inhaler   Commonly known as:  PROAIR HFA/PROVENTIL HFA/VENTOLIN HFA   Stopped by:  Orlando Braxton MD           enalapril 5 MG tablet   Commonly known as:  VASOTEC   Stopped by:  Orlando Braxton MD           gabapentin 300 MG capsule   Commonly known as:  NEURONTIN   Stopped by:  Orlando Braxton MD           methylPREDNISolone 4 MG tablet   Commonly known as:  MEDROL DOSEPAK   Stopped by:  Orlando Braxton MD           valACYclovir 1000 mg tablet   Commonly known as:  VALTREX   Stopped by:  rOlando Braxton MD                Where to get your medicines      These medications were sent to [facility] - [facility] [address]  [address], [facility] [address]     Phone:  [phone]     lisinopril 2.5 MG tablet    simvastatin 40 MG tablet                Primary Care Provider Office Phone # Fax #    Orlando Braxton [phone] [address] [facility] [address]        Equal Access to Services     [facility]: Hadii aad ku hadasho Soomaali, waaxda luqadaha, qaybta kaalmada adeegyada, angelica guerra haychristine krause . So [facility] [phone].    ATENCIÓN: Si habla español, tiene a phelps disposición servicios gratuitos de asistencia lingüística. [facility] [phone].    We comply with applicable federal civil rights laws and [place] laws. We do not discriminate on the basis of race, color, national origin, age, disability, sex, sexual orientation, or gender identity.            Thank you!     Thank you for choosing [facility]  for your care. Our goal is always to provide you with excellent care. Hearing back from our patients is one way we can continue to improve our services. Please take a few minutes to complete the written survey that you may receive in the mail after your visit  with us. Thank you!             Your Updated Medication List - Protect others around you: Learn how to safely use, store and throw away your medicines at www.disposemymeds.org.          This list is accurate as of 5/11/18  9:58 AM.  Always use your most recent med list.                   Brand Name Dispense Instructions for use Diagnosis    B-12 1000 MCG Tbcr      Take 1,000 mcg by mouth daily    Vitamin B12 deficiency       lisinopril 2.5 MG tablet    PRINIVIL/Zestril    90 tablet    Take 1 tablet (2.5 mg) by mouth daily    Hypertension goal BP (blood pressure) < 140/90, CKD (chronic kidney disease) stage 3, GFR 30-59 ml/min       simvastatin 40 MG tablet    ZOCOR    90 tablet    TAKE 1 TABLET EVERY DAY    Hyperlipidemia LDL goal <130

## 2018-05-11 NOTE — PATIENT INSTRUCTIONS
-- Patient should use over-the-counter Zyrtec once a day and Flonase Nasal Spray twice a day for his nasal symptoms.This should be done for atleast a month.     -- I will follow up with you about lab results.         [facility]    If you have any questions regarding to your visit please contact your care team:     Team Pink:   Clinic Hours Telephone Number   Internal Medicine:  Dr. Mary Brink, NP       7am-7pm  Monday - Thursday   7am-5pm  Fridays  [phone]  (Appointment scheduling available 24/7)    Questions about your recent visit?  Team Line  [phone]   Urgent Care - [place] - 11am-9pm Monday-Friday Saturday-Sunday- 9am-5pm   [place] - 5pm-9pm Monday-Friday Saturday-Sunday- 9am-5pm  [phone] - Anya Lazo  [phone] - [place]       What options do I have for a visit other than an office visit? We offer electronic visits (e-visits) and telephone visits, when medically appropriate.  Please check with your medical insurance to see if these types of visits are covered, as you will be responsible for any charges that are not paid by your insurance.      You can use [employer] (secure electronic communication) to access to your chart, send your primary care provider a message, or make an appointment. Ask a team member how to get started.     For a price quote for your services, please call our Consumer Price Line at [phone] or our Imaging Cost estimation line at [phone] (for imaging tests).  Lizeth FELIPE CMA ([facility])

## 2018-05-15 LAB — DEPRECATED CALCIDIOL+CALCIFEROL SERPL-MC: 26 UG/L (ref 20–75)

## 2018-05-29 ENCOUNTER — OFFICE VISIT (OUTPATIENT)
Dept: AUDIOLOGY | Facility: CLINIC | Age: 83
End: 2018-05-29
Payer: COMMERCIAL

## 2018-05-29 DIAGNOSIS — H90.3 SENSORINEURAL HEARING LOSS, BILATERAL: Primary | ICD-10-CM

## 2018-05-29 PROCEDURE — 99207 ZZC NO CHARGE LOS: CPT | Performed by: AUDIOLOGIST

## 2018-05-29 PROCEDURE — 92567 TYMPANOMETRY: CPT | Performed by: AUDIOLOGIST

## 2018-05-29 PROCEDURE — 92557 COMPREHENSIVE HEARING TEST: CPT | Performed by: AUDIOLOGIST

## 2018-05-29 NOTE — MR AVS SNAPSHOT
After Visit Summary   5/29/2018    Jose Manuel Gallo    MRN: [id number]           Patient Information     Date Of Birth          1935        Visit Information        Provider Department      5/29/2018 8:00 AM Tyrone Ennis [facility]D [facility]        Today's Diagnoses     Sensorineural hearing loss, bilateral    -  1       Follow-ups after your visit        Your next 10 appointments already scheduled     Dec 03, 2018  8:30 AM CST   Return Visit with Aaron Gayle MD, [facility] CYSTO PROC ROOM   [facility] ([address] [id number] [phone]              Who to contact     If you have questions or need follow up information about today's clinic visit or your schedule please contact [facility] directly at [phone].  Normal or non-critical lab and imaging results will be communicated to you by MyChart, letter or phone within 4 business days after the clinic has received the results. If you do not hear from us within 7 days, please contact the clinic through MyChart or phone. If you have a critical or abnormal lab result, we will notify you by phone as soon as possible.  Submit refill requests through [employer] or call your pharmacy and they will forward the refill request to us. Please allow 3 business days for your refill to be completed.          Additional Information About Your Visit        MyChart Information     [employer] gives you secure access to your electronic health record. If you see a primary care provider, you can also send messages to your care team and make appointments. If you have questions, please call your primary care clinic.  If you do not have a primary care provider, please call [phone] and they will assist you.        Care EveryWhere ID     This is your Care EveryWhere ID. This could be used by other organizations to access your [place] medical records  [id number]          Blood Pressure from Last 3 Encounters:   05/11/18 140/70   12/04/17 113/65   11/27/17 124/58    Weight from Last 3 Encounters:   05/11/18 167 lb (75.8 kg)   11/27/17 161 lb (73 kg)   07/13/17 162 lb (73.5 kg)              We Performed the Following     AUDIOGRAM/TYMPANOGRAM - INTERFACE     COMPREHENSIVE HEARING TEST     TYMPANOMETRY        Primary Care Provider Office Phone # Fax #    Orlando Braxton [phone] [address] [facility] [address]        Equal Access to Services     [facility]: Hadii aad ku hadasho Soomaali, waaxda luqadaha, qaybta kaalmada adeegyada, angelica guerra haychristine krause . So [facility] [phone].    ATENCIÓN: Si habla español, tiene a phelps disposición servicios gratuitos de asistencia lingüística. [facility] [phone].    We comply with applicable federal civil rights laws and [place] laws. We do not discriminate on the basis of race, color, national origin, age, disability, sex, sexual orientation, or gender identity.            Thank you!     Thank you for choosing [facility]  for your care. Our goal is always to provide you with excellent care. Hearing back from our patients is one way we can continue to improve our services. Please take a few minutes to complete the written survey that you may receive in the mail after your visit with us. Thank you!             Your Updated Medication List - Protect others around you: Learn how to safely use, store and throw away your medicines at www.disposemymeds.org.          This list is accurate as of 5/29/18  8:30 AM.  Always use your most recent med list.                   Brand Name Dispense Instructions for use Diagnosis    B-12 1000 MCG Tbcr      Take 1,000 mcg by mouth daily    Vitamin B12 deficiency       lisinopril 2.5 MG tablet    PRINIVIL/Zestril    90 tablet    Take 1 tablet (2.5 mg) by mouth daily    Hypertension goal BP (blood pressure) < 140/90, CKD (chronic kidney disease)  stage 3, GFR 30-59 ml/min       simvastatin 40 MG tablet    ZOCOR    90 tablet    TAKE 1 TABLET EVERY DAY    Hyperlipidemia LDL goal <130

## 2018-05-29 NOTE — PROGRESS NOTES
AUDIOLOGY REPORT    SUBJECTIVE:  Jose Manuel Gallo is a 83 year old male who was seen in the Audiology Clinic [facility] on 5/29/18 for audiologic evaluation, referred by Self.  The patient has been seen previously in this clinic on 9/7/2016 for assessment and results indicated asymmetric sensorineural hearing loss. The patient reports a decline in hearing and an increase in left ear tinnitus. The patient notes difficulty with communication in a variety of listening situations.     OBJECTIVE:    Otoscopic exam indicates ears are clear of cerumen bilaterally     Pure Tone Thresholds assessed using standard techniques  audiometry with good  reliability from 250-8000 Hz bilaterally using insert earphones     RIGHT:  mild and moderate-severe sensorineural hearing loss    LEFT:    mild and moderate-severe sensorineural hearing loss    Tympanogram:    RIGHT: normal eardrum mobility    LEFT:   normal eardrum mobility    Speech Reception Threshold:    RIGHT: 35 dB HL    LEFT:   25 dB HL    Word Recognition Score:     RIGHT: 100% at 75 dB HL using NU-6 recorded word list.    LEFT:   92% at 70 dB HL using NU-6 recorded word list.      ASSESSMENT:   Asymmetric sensorineural hearing loss      Compared to patient's previous audiogram dated 9/7/2016, hearing has remained stable. Patient requested and was provided a copy of his audiogram. Today s results were discussed with the patient in detail.     PLAN:  Patient was counseled regarding hearing loss and impact on communication.  Patient is a good candidate for amplification at this time.  Handout on good communication strategies, and hearing aid use was given to patient. It is recommended that the patient return for a hearing aid consultation.  Please call this clinic with questions regarding these results or recommendations.      Tyrone Conte CCC-A  Licensed Audiologist #[id number]  5/29/2018

## 2018-07-03 ENCOUNTER — TRANSFERRED RECORDS (OUTPATIENT)
Dept: HEALTH INFORMATION MANAGEMENT | Facility: CLINIC | Age: 83
End: 2018-07-03

## 2018-07-26 ENCOUNTER — TRANSFERRED RECORDS (OUTPATIENT)
Dept: HEALTH INFORMATION MANAGEMENT | Facility: CLINIC | Age: 83
End: 2018-07-26

## 2018-10-26 ENCOUNTER — TRANSFERRED RECORDS (OUTPATIENT)
Dept: HEALTH INFORMATION MANAGEMENT | Facility: CLINIC | Age: 83
End: 2018-10-26

## 2018-12-03 ENCOUNTER — OFFICE VISIT (OUTPATIENT)
Dept: UROLOGY | Facility: CLINIC | Age: 83
End: 2018-12-03
Payer: COMMERCIAL

## 2018-12-03 ENCOUNTER — TELEPHONE (OUTPATIENT)
Dept: FAMILY MEDICINE | Facility: CLINIC | Age: 83
End: 2018-12-03

## 2018-12-03 DIAGNOSIS — Z85.51 HISTORY OF BLADDER CANCER: Primary | ICD-10-CM

## 2018-12-03 PROCEDURE — 52000 CYSTOURETHROSCOPY: CPT | Performed by: UROLOGY

## 2018-12-03 NOTE — PROGRESS NOTES
S: Jose Manuel Gallo is a 83 year old male returns for bladder cancer surveillance.   he has history of bladder cancer Grade 3 non-invasive, Stage ta, s/p turbt.   He received 2 courses of BCG therapy.   Cysto: The anterior urethra showed mild stricture   The prostatic urethra turp changes.   The length is 0cm, the coaptation is 0 cm.   In the bladder there is normal mucosa.   Assessment/Plan: V10.51B History of bladder cancer (primary encounter diagnosis)   Comment: no evidence of recurrence   Plan: CYSTOURETHROSCOPY   BTR in one year

## 2018-12-03 NOTE — TELEPHONE ENCOUNTER
Reason for Call:  Request for results:From the  [facility]    Name of test or procedure:  8 weeks of studies    Date of test of procedure:  Started in Sept just ended in November    Location of the test or procedure: [facility] in [place]    OK to leave the result message on voice mail or with a family member? YES    Phone number Patient can be reached at:  Home number on file [phone] (home)    Additional comments: Jose Manuel would like a copy of these results. The [facility] stated he would need to get them from Dr Braxton    Call taken on 12/3/2018 at 8:51 AM by Osiris Jackson

## 2018-12-03 NOTE — TELEPHONE ENCOUNTER
Spoke to Carito at the [facility] ([phone]).  I told her what the patient told us regarding us giving him a copy of their records.  She said the patient will need to sign an PEG with them in order to get the records.  I called patient and spoke to Evi, his wife, as he was not home.  She will inform Jose Manuel to sign the release with the [facility].  Abbey Raphael,

## 2018-12-12 ENCOUNTER — MYC MEDICAL ADVICE (OUTPATIENT)
Dept: FAMILY MEDICINE | Facility: CLINIC | Age: 83
End: 2018-12-12

## 2018-12-13 DIAGNOSIS — E53.8 VITAMIN B12 DEFICIENCY: ICD-10-CM

## 2018-12-13 DIAGNOSIS — N18.30 CKD (CHRONIC KIDNEY DISEASE) STAGE 3, GFR 30-59 ML/MIN (H): ICD-10-CM

## 2018-12-13 DIAGNOSIS — I10 HYPERTENSION GOAL BP (BLOOD PRESSURE) < 140/90: ICD-10-CM

## 2018-12-13 DIAGNOSIS — E78.5 HYPERLIPIDEMIA LDL GOAL <130: ICD-10-CM

## 2018-12-13 RX ORDER — LISINOPRIL 2.5 MG/1
2.5 TABLET ORAL DAILY
Qty: 90 TABLET | Refills: 1 | Status: SHIPPED | OUTPATIENT
Start: 2018-12-13 | End: 2019-04-08

## 2018-12-13 RX ORDER — SIMVASTATIN 40 MG
TABLET ORAL
Qty: 90 TABLET | Refills: 1 | Status: SHIPPED | OUTPATIENT
Start: 2018-12-13 | End: 2019-05-24

## 2018-12-13 NOTE — TELEPHONE ENCOUNTER
Simvastatin and Lisinopril prescriptions local printed.  See patient's message below    Williams Franco RN

## 2019-01-31 ENCOUNTER — DOCUMENTATION ONLY (OUTPATIENT)
Dept: OPHTHALMOLOGY | Facility: CLINIC | Age: 84
End: 2019-01-31

## 2019-02-20 ENCOUNTER — OFFICE VISIT (OUTPATIENT)
Dept: OPHTHALMOLOGY | Facility: CLINIC | Age: 84
End: 2019-02-20
Payer: COMMERCIAL

## 2019-02-20 DIAGNOSIS — H25.811 COMBINED FORMS OF AGE-RELATED CATARACT OF RIGHT EYE: ICD-10-CM

## 2019-02-20 DIAGNOSIS — H52.4 PRESBYOPIA: ICD-10-CM

## 2019-02-20 DIAGNOSIS — H35.342 MACULAR HOLE, LEFT: ICD-10-CM

## 2019-02-20 DIAGNOSIS — Z01.01 ENCOUNTER FOR EXAMINATION OF EYES AND VISION WITH ABNORMAL FINDINGS: Primary | ICD-10-CM

## 2019-02-20 DIAGNOSIS — Z96.1 PSEUDOPHAKIA: ICD-10-CM

## 2019-02-20 DIAGNOSIS — H43.811 POSTERIOR VITREOUS DETACHMENT, RIGHT: ICD-10-CM

## 2019-02-20 PROCEDURE — 92015 DETERMINE REFRACTIVE STATE: CPT | Performed by: OPHTHALMOLOGY

## 2019-02-20 PROCEDURE — 92014 COMPRE OPH EXAM EST PT 1/>: CPT | Performed by: OPHTHALMOLOGY

## 2019-02-20 ASSESSMENT — REFRACTION_WEARINGRX
OS_AXIS: 172
OD_CYLINDER: +4.25
OD_AXIS: 033
OS_SPHERE: -1.75
OS_ADD: +3.50
OS_CYLINDER: +2.00
SPECS_TYPE: TRIFOCAL
OD_ADD: +3.50
OD_SPHERE: +0.50

## 2019-02-20 ASSESSMENT — EXTERNAL EXAM - LEFT EYE: OS_EXAM: PROLAPSED FAT PADS: UPPER, LOWER; MILD-MOD BROW

## 2019-02-20 ASSESSMENT — VISUAL ACUITY
OS_CC: >12
CORRECTION_TYPE: GLASSES
OD_PH_CC+: -2
OD_CC: 1-
OD_CC: 20/50
OD_PH_CC: 20/30
OS_CC: 20/125
METHOD: SNELLEN - LINEAR
OS_CC+: -1
OD_CC+: +2

## 2019-02-20 ASSESSMENT — REFRACTION_MANIFEST
OD_CYLINDER: +5.00
OS_ADD: +3.00
OS_CYLINDER: +2.50
OD_SPHERE: +0.75
OD_AXIS: 032
OS_SPHERE: -1.75
OS_AXIS: 165
OD_ADD: +3.00

## 2019-02-20 ASSESSMENT — TONOMETRY
OS_IOP_MMHG: 08
OS_IOP_MMHG: UA
OD_IOP_MMHG: 10
OD_IOP_MMHG: 16
IOP_METHOD: APPLANATION
IOP_METHOD: ICARE

## 2019-02-20 ASSESSMENT — CONF VISUAL FIELD
OD_NORMAL: 1
OS_NORMAL: 1

## 2019-02-20 ASSESSMENT — CUP TO DISC RATIO
OD_RATIO: 0.4
OS_RATIO: 0.4

## 2019-02-20 ASSESSMENT — EXTERNAL EXAM - RIGHT EYE: OD_EXAM: PROLAPSED FAT PADS: UPPER, LOWER; MILD-MOD BROW

## 2019-02-20 NOTE — LETTER
2/20/2019         RE: Jose Manuel Gallo  [address] [place] [address]        Dear Colleague,    [facility] Please see a copy of my visit note below.     Current Eye Medications:  Generic artificial tears as needed for soreness.  He is unsure if the artificial tears help.       Subjective:  Comprehensive Eye Exam.  Patient states his distance vision is clearest if he looks through the very top edge of his lenses- as he looks down through his lenses (through the optical center), his distance vision becomes more blurry.  Reading vision is adequate.       Objective:  See Ophthalmology Exam.       Assessment:  Mild refractive shift right eye; otherwise stable eye exam.      ICD-10-CM    1. Encounter for examination of eyes and vision with abnormal findings Z01.01 REFRACTIVE STATUS   2. Presbyopia H52.4 REFRACTIVE STATUS   3. Combined forms of age-related cataract, mild-mod, of right eye H25.811 EYE EXAM (SIMPLE-NONBILLABLE)   4. Pseudophakia, Yag Caps, os Z96.1    5. 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 Posterior vitreous detachment, right H43.811          Plan:  Glasses Rx given - optional.  Use artificial tears up to 4 times daily both eyes.  (Refresh Tears, Systane Ultra/Balance, or Theratears)  Could try using a gel tear at bedtime for additional comfort (Celluvisc, Refresh PM, Genteal Gel, etc)  Call in October 2019 for an appointment in February 2020 for Complete Exam.    Dr. Scales [phone]             Again, thank you for allowing me to participate in the care of your patient.        Sincerely,        Bonifacio Scales MD

## 2019-02-20 NOTE — PROGRESS NOTES
Current Eye Medications:  Generic artificial tears as needed for soreness.  He is unsure if the artificial tears help.       Subjective:  Comprehensive Eye Exam.  Patient states his distance vision is clearest if he looks through the very top edge of his lenses- as he looks down through his lenses (through the optical center), his distance vision becomes more blurry.  Reading vision is adequate.       Objective:  See Ophthalmology Exam.       Assessment:  Mild refractive shift right eye; otherwise stable eye exam.      ICD-10-CM    1. Encounter for examination of eyes and vision with abnormal findings Z01.01 REFRACTIVE STATUS   2. Presbyopia H52.4 REFRACTIVE STATUS   3. Combined forms of age-related cataract, mild-mod, of right eye H25.811 EYE EXAM (SIMPLE-NONBILLABLE)   4. Pseudophakia, Yag Caps, os Z96.1    5. 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 Posterior vitreous detachment, right H43.811          Plan:  Glasses Rx given - optional.  Use artificial tears up to 4 times daily both eyes.  (Refresh Tears, Systane Ultra/Balance, or Theratears)  Could try using a gel tear at bedtime for additional comfort (Celluvisc, Refresh PM, Genteal Gel, etc)  Call in October 2019 for an appointment in February 2020 for Complete Exam.    Dr. Scales [phone]

## 2019-02-20 NOTE — PATIENT INSTRUCTIONS
Glasses Rx given - optional.  Use artificial tears up to 4 times daily both eyes.  (Refresh Tears, Systane Ultra/Balance, or Theratears)  Could try using a gel tear at bedtime for additional comfort (Celluvisc, Refresh PM, Genteal Gel, etc)  Call in October 2019 for an appointment in February 2020 for Complete Exam.    Dr. Scales [phone]

## 2019-03-14 DIAGNOSIS — Z53.9 ERRONEOUS ENCOUNTER--DISREGARD: Primary | ICD-10-CM

## 2019-04-08 ENCOUNTER — TELEPHONE (OUTPATIENT)
Dept: FAMILY MEDICINE | Facility: CLINIC | Age: 84
End: 2019-04-08

## 2019-04-08 DIAGNOSIS — I10 HYPERTENSION GOAL BP (BLOOD PRESSURE) < 140/90: ICD-10-CM

## 2019-04-08 DIAGNOSIS — N18.30 CKD (CHRONIC KIDNEY DISEASE) STAGE 3, GFR 30-59 ML/MIN (H): ICD-10-CM

## 2019-04-08 RX ORDER — LISINOPRIL 2.5 MG/1
2.5 TABLET ORAL DAILY
Qty: 180 TABLET | Refills: 0 | Status: SHIPPED | OUTPATIENT
Start: 2019-04-08 | End: 2019-05-24

## 2019-04-08 RX ORDER — LISINOPRIL 2.5 MG/1
2.5 TABLET ORAL DAILY
Qty: 90 TABLET | Refills: 0 | Status: SHIPPED | OUTPATIENT
Start: 2019-04-08 | End: 2019-04-08

## 2019-04-08 NOTE — TELEPHONE ENCOUNTER
Spoke to patient.  Offered to schedule appointment for him, however, he said he will have to check his calendar and will call back to schedule appointment. Abbey Raphael,

## 2019-04-08 NOTE — TELEPHONE ENCOUNTER
180 tabs would be a full 6 month supply but it is almost a year since he was last seen  Patient needs to schedule a visit next month for any further refills as it will be a full year since last appointment    Called [facility] and spoke with Hope  She stated that patient is requesting 180 tabs as he has a coupon to help with savings  Prescription sent as requested and explained to her that patient is due for an appointment next month for any further refills and we normally would not approve so much especially if due for an appointment soon    Please call patient to schedule. Due for annual visit on 5/11/19    Williams Franco RN

## 2019-04-08 NOTE — TELEPHONE ENCOUNTER
Reason for call:  Other   Patient called regarding (reason for call): call back  Additional comments: Hope from [facility] pharmacy is calling because the patient is there wanting a 180 tablet supply of lisinopril. Please call back. He normally goes through [facility] order     Phone number to reach patient:  Other phone number:  [phone]    Best Time:  any    Can we leave a detailed message on this number?  YES

## 2019-04-08 NOTE — LETTER
[facility]  [address]  [place] [id number] [phone]          April 12, 2019    Jose Manuel Gallo                                                                                                        [address] [place] [address]        Dear Jose Manuel,    We are sending you this letter as a reminder to schedule your annual visit   anytime after 5/11/19.  We will need to see you to be able to refill any of   your medications.    Please call [phone], to schedule this visit.  Thank you.    Sincerely,         Orlando Braxton MD/jacquie

## 2019-05-24 ENCOUNTER — TELEPHONE (OUTPATIENT)
Dept: INTERNAL MEDICINE | Facility: CLINIC | Age: 84
End: 2019-05-24

## 2019-05-24 ENCOUNTER — OFFICE VISIT (OUTPATIENT)
Dept: FAMILY MEDICINE | Facility: CLINIC | Age: 84
End: 2019-05-24
Payer: COMMERCIAL

## 2019-05-24 VITALS
RESPIRATION RATE: 18 BRPM | OXYGEN SATURATION: 96 % | SYSTOLIC BLOOD PRESSURE: 134 MMHG | WEIGHT: 165.6 LBS | DIASTOLIC BLOOD PRESSURE: 70 MMHG | HEART RATE: 64 BPM | BODY MASS INDEX: 22.93 KG/M2

## 2019-05-24 DIAGNOSIS — N18.30 CKD (CHRONIC KIDNEY DISEASE) STAGE 3, GFR 30-59 ML/MIN (H): ICD-10-CM

## 2019-05-24 DIAGNOSIS — Z00.00 MEDICARE ANNUAL WELLNESS VISIT, SUBSEQUENT: Primary | ICD-10-CM

## 2019-05-24 DIAGNOSIS — H61.23 BILATERAL IMPACTED CERUMEN: ICD-10-CM

## 2019-05-24 DIAGNOSIS — E78.5 HYPERLIPIDEMIA LDL GOAL <130: ICD-10-CM

## 2019-05-24 DIAGNOSIS — I10 HYPERTENSION GOAL BP (BLOOD PRESSURE) < 140/90: ICD-10-CM

## 2019-05-24 DIAGNOSIS — I73.00 RAYNAUD'S DISEASE WITHOUT GANGRENE: ICD-10-CM

## 2019-05-24 DIAGNOSIS — Z23 NEED FOR SHINGLES VACCINE: ICD-10-CM

## 2019-05-24 LAB
ANION GAP SERPL CALCULATED.3IONS-SCNC: 7 MMOL/L (ref 3–14)
BUN SERPL-MCNC: 18 MG/DL (ref 7–30)
CALCIUM SERPL-MCNC: 9.1 MG/DL (ref 8.5–10.1)
CHLORIDE SERPL-SCNC: 105 MMOL/L (ref 94–109)
CHOLEST SERPL-MCNC: 169 MG/DL
CO2 SERPL-SCNC: 27 MMOL/L (ref 20–32)
CREAT SERPL-MCNC: 1.09 MG/DL (ref 0.66–1.25)
CREAT UR-MCNC: 163 MG/DL
GFR SERPL CREATININE-BSD FRML MDRD: 62 ML/MIN/{1.73_M2}
GLUCOSE SERPL-MCNC: 94 MG/DL (ref 70–99)
HDLC SERPL-MCNC: 70 MG/DL
HGB BLD-MCNC: 15.2 G/DL (ref 13.3–17.7)
LDLC SERPL CALC-MCNC: 88 MG/DL
MICROALBUMIN UR-MCNC: 8 MG/L
MICROALBUMIN/CREAT UR: 5.09 MG/G CR (ref 0–17)
NONHDLC SERPL-MCNC: 99 MG/DL
POTASSIUM SERPL-SCNC: 4.8 MMOL/L (ref 3.4–5.3)
SODIUM SERPL-SCNC: 139 MMOL/L (ref 133–144)
TRIGL SERPL-MCNC: 57 MG/DL

## 2019-05-24 PROCEDURE — 85018 HEMOGLOBIN: CPT | Performed by: INTERNAL MEDICINE

## 2019-05-24 PROCEDURE — 36415 COLL VENOUS BLD VENIPUNCTURE: CPT | Performed by: INTERNAL MEDICINE

## 2019-05-24 PROCEDURE — 82043 UR ALBUMIN QUANTITATIVE: CPT | Performed by: INTERNAL MEDICINE

## 2019-05-24 PROCEDURE — 80048 BASIC METABOLIC PNL TOTAL CA: CPT | Performed by: INTERNAL MEDICINE

## 2019-05-24 PROCEDURE — G0438 PPPS, INITIAL VISIT: HCPCS | Performed by: INTERNAL MEDICINE

## 2019-05-24 PROCEDURE — 99213 OFFICE O/P EST LOW 20 MIN: CPT | Mod: 25 | Performed by: INTERNAL MEDICINE

## 2019-05-24 PROCEDURE — 80061 LIPID PANEL: CPT | Performed by: INTERNAL MEDICINE

## 2019-05-24 PROCEDURE — 69209 REMOVE IMPACTED EAR WAX UNI: CPT | Mod: 50 | Performed by: INTERNAL MEDICINE

## 2019-05-24 RX ORDER — SIMVASTATIN 40 MG
TABLET ORAL
Qty: 180 TABLET | Refills: 1 | Status: SHIPPED | OUTPATIENT
Start: 2019-05-24 | End: 2020-03-04

## 2019-05-24 RX ORDER — LISINOPRIL 2.5 MG/1
2.5 TABLET ORAL DAILY
Qty: 180 TABLET | Refills: 1 | Status: SHIPPED | OUTPATIENT
Start: 2019-05-24 | End: 2019-11-06

## 2019-05-24 ASSESSMENT — ENCOUNTER SYMPTOMS
HEMATOCHEZIA: 0
COUGH: 0
CONSTIPATION: 0
HEMATURIA: 0
ABDOMINAL PAIN: 0
CHILLS: 0

## 2019-05-24 ASSESSMENT — ACTIVITIES OF DAILY LIVING (ADL): CURRENT_FUNCTION: NO ASSISTANCE NEEDED

## 2019-05-24 NOTE — PROGRESS NOTES
"   Answers for HPI/ROS submitted by the patient on 5/24/2019   Annual Exam:  In general, how would you rate your overall physical health?: good  Frequency of exercise:: 1 day/week  Do you usually eat at least 4 servings of fruit and vegetables a day, include whole grains & fiber, and avoid regularly eating high fat or \"junk\" foods? : No  Taking medications regularly:: Yes  Medication side effects:: None  Activities of Daily Living: no assistance needed  Home safety: throw rugs in the hallway  Hearing Impairment:: difficulty following a conversation in a noisy restaurant or crowded room, difficulty following dialogue in the theater, find that men's voices are easier to understand than woman's  In the past 6 months, have you been bothered by leaking of urine?: No  abdominal pain: No  Blood in stool: No  Blood in urine: No  chest pain: No  chills: No  congestion: No  constipation: No  cough: No  In general, how would you rate your overall mental or emotional health?: good  Additional concerns today:: Yes  Duration of exercise:: Less than 15 minutes    "

## 2019-05-24 NOTE — TELEPHONE ENCOUNTER
Patient was in today and per , is due for advance directive planning  Dr. Braxton was not able to find one on file but patient was sure he had filled out a form before    Was able to locate the form completed in 2003 that was scanned in on 8/10/2011  Called patient and he would like to keep everything as is.   udpated    Williams Franco RN

## 2019-05-24 NOTE — PROGRESS NOTES
"SUBJECTIVE:   Jose Manuel Gallo is a 84 year old male who presents for Preventive Visit.       Medicare annual wellness visit, subsequent  Hypertension goal BP (blood pressure) < 140/90  CKD (chronic kidney disease) stage 3, GFR 30-59 ml/min (H)  Hyperlipidemia LDL goal <130  Raynaud's disease without gangrene  Need for shingles vaccine  Bilateral impacted cerumen   Last appointment was a full year ago. No major issues in the interim.chr dizziness symptoms , he was evaluated [facility] and this was for naruss. Despite 10 physical therapy treatments he feels no improvement.  Are you in the first 12 months of your Medicare coverage?  No    Healthy Habits:     In general, how would you rate your overall health?  Good    Frequency of exercise:  1 day/week    Duration of exercise:  Less than 15 minutes    Do you usually eat at least 4 servings of fruit and vegetables a day, include whole grains    & fiber and avoid regularly eating high fat or \"junk\" foods?  No    Taking medications regularly:  Yes    Medication side effects:  None    Ability to successfully perform activities of daily living:  No assistance needed    Home Safety:  Throw rugs in the hallway    Hearing Impairment:  Difficulty following a conversation in a noisy restaurant or crowded room, difficulty following dialogue in the theater and find that men's voices are easier to understand than woman's    In the past 6 months, have you been bothered by leaking of urine?  No    In general, how would you rate your overall mental or emotional health?  Good      PHQ-2 Total Score: 0    Additional concerns today:  Yes    Do you feel safe in your environment? Yes    Do you have a Health Care Directive? Yes: Advance Directive has been received and scanned.    Patient has known hearing loss bur declines hearing aids .  Fall risk  Fallen 2 or more times in the past year?: No  Any fall with injury in the past year?: No  As detailed above, " chronic dizziness symptoms and see documentation from [facility]     Cognitive Screening   1) Repeat 3 items (Leader, Season, Table)    2) Clock draw: NORMAL  3) 3 item recall: Recalls 3 objects  Results: 3 items recalled: COGNITIVE IMPAIRMENT LESS LIKELY    Mini-CogTM Copyright ANABEL Carpenter. Licensed by the author for use in [facility]; reprinted with permission (kostas@[email or web address]). All rights reserved.      Do you have sleep apnea, excessive snoring or daytime drowsiness?: no    Reviewed and updated as needed this visit by clinical staff  Allergies         Reviewed and updated as needed this visit by Provider        Social History     Tobacco Use     Smoking status: Former Smoker     Years: 40.00     Types: Cigarettes     Last attempt to quit: 2003     Years since quittin.4     Smokeless tobacco: Never Used   Substance Use Topics     Alcohol use: Yes     Comment: 1 x month     If you drink alcohol do you typically have >3 drinks per day or >7 drinks per week? No    Alcohol Use 2018   Prescreen: >3 drinks/day or >7 drinks/week? Not Applicable   Prescreen: >3 drinks/day or >7 drinks/week? -   No flowsheet data found.        Current providers sharing in care for this patient include:   Patient Care Team:  Orlando Braxton MD as PCP - General (Internal Medicine)  Orlando Braxton MD as Assigned PCP    The following health maintenance items are reviewed in Epic and correct as of today:  Health Maintenance   Topic Date Due     ZOSTER IMMUNIZATION (2 of 3) 10/13/2012     ADVANCED DIRECTIVE PLANNING  2016     MEDICARE ANNUAL WELLNESS VISIT  2018     FALL RISK ASSESSMENT  2018     PHQ-2  2019     BMP  2019     LIPID  2019     MICROALBUMIN  2019     INFLUENZA VACCINE (Season Ended) 2019     DIABETIC EYE EXAM  2020     DTAP/TDAP/TD IMMUNIZATION (3 - Td) 2023     IPV IMMUNIZATION  Aged Out     MENINGITIS IMMUNIZATION  Aged  Out     Lab work is in process  Labs reviewed in EPIC  BP Readings from Last 3 Encounters:   19 134/70   18 140/70   17 113/65    Wt Readings from Last 3 Encounters:   19 75.1 kg (165 lb 9.6 oz)   18 75.8 kg (167 lb)   17 73 kg (161 lb)                  Patient Active Problem List   Diagnosis     PVD (peripheral vascular disease) (H)     Erectile dysfunction     Osteoarthritis     HYPERLIPIDEMIA LDL GOAL <130     Advanced directives, counseling/discussion     Hypertension goal BP (blood pressure) < 140/90     History of bladder cancer     CKD (chronic kidney disease) stage 3, GFR 30-59 ml/min (H)     Pseudophakia, Yag Caps, os     Dizziness     Posterior vitreous detachment, right     Hx of vitrectomy for chronic macular hole, os (ER)     Other idiopathic scoliosis, thoracolumbar region     Spondylosis of lumbar region without myelopathy or radiculopathy     Brittle nails     Combined forms of age-related cataract, mild-mod, of right eye     Pain of right eye     History of shingles     Past Surgical History:   Procedure Laterality Date     C SPINAL FUSION,ANT,EA ADNL LEVEL      C6-7     CATARACT IOL, RT/LT       COLONOSCOPY  2008    Normal     HC REVISE ULNAR NERVE AT ELBOW      Left     LASER YAG CAPSULOTOMY  2016    left eye     PHACOEMULSIFICATION WITH STANDARD INTRAOCULAR LENS IMPLANT  2012    left eye     TURP   ?     VASECTOMY       VITRECTOMY PARSPLANA  2011    left eye, repair macular hole       Social History     Tobacco Use     Smoking status: Former Smoker     Years: 40.00     Types: Cigarettes     Last attempt to quit: 2003     Years since quittin.4     Smokeless tobacco: Never Used   Substance Use Topics     Alcohol use: Yes     Comment: 1 x month     Family History   Problem Relation Age of Onset     Osteoporosis Mother      Diabetes Father      Arthritis Father      Cancer Father      Respiratory Father      Genitourinary Problems Brother   "    Circulatory Brother      Macular Degeneration No family hx of      Glaucoma No family hx of      Thyroid Disease No family hx of      Hypertension No family hx of          Current Outpatient Medications   Medication Sig Dispense Refill     Cyanocobalamin (B-12) 1000 MCG TBCR Take 1,000 mcg by mouth daily       lisinopril (PRINIVIL/ZESTRIL) 2.5 MG tablet Take 1 tablet (2.5 mg) by mouth daily 180 tablet 0     simvastatin (ZOCOR) 40 MG tablet TAKE 1 TABLET EVERY DAY 90 tablet 1     No Known Allergies  Recent Labs   Lab Test 05/11/18  1014 07/17/17  0745 07/13/17  1215 07/13/17  1210 07/12/16  1011  03/26/12  1008   LDL 61  --   --  80 67   < > 104   HDL 77  --   --  69 61   < > 53   TRIG 53  --   --  41 67   < > 67   ALT 29  --  27  --  26   < > 35   CR 1.13  --  1.22  --   --    < > 1.19   GFRESTIMATED 62  --  57*  --   --    < > 59*   GFRESTBLACK 75  --  69  --   --    < > 72   POTASSIUM 5.0 4.7 5.7*  --   --    < > 5.0   TSH  --   --   --   --  0.67  --  0.83    < > = values in this interval not displayed.        Review of Systems   Constitutional: Negative for chills.   HENT: Negative for congestion.    Respiratory: Negative for cough.    Cardiovascular: Negative for chest pain.   Gastrointestinal: Negative for abdominal pain, constipation and hematochezia.   Genitourinary: Negative for hematuria.     Constitutional, HEENT, cardiovascular, pulmonary, gi and gu systems are negative, except as otherwise noted.    OBJECTIVE:   /70   Pulse 64   Resp 18   Wt 75.1 kg (165 lb 9.6 oz)   SpO2 96%   BMI 22.93 kg/m   Estimated body mass index is 23.13 kg/m  as calculated from the following:    Height as of 5/11/18: 1.81 m (5' 11.25\").    Weight as of 5/11/18: 75.8 kg (167 lb).  Physical Exam  GENERAL: healthy, alert and no distress, appears his stated age   EYES: Eyes grossly normal to inspection, PERRL and conjunctivae and sclerae normal  HENT: ear canals and TM's abnormal with bilateral cerumen impactions " especially left ear, nose and mouth without ulcers or lesions  NECK: no adenopathy, no asymmetry, masses, or scars and thyroid normal to palpation  RESP: lungs clear to auscultation - no rales, rhonchi or wheezes  CV: regular rate and rhythm, normal S1 S2, no S3 or S4, no murmur, click or rub, no peripheral edema and peripheral pulses strong  ABDOMEN: soft, nontender, no hepatosplenomegaly, no masses and bowel sounds normal  MS: no gross musculoskeletal defects noted, no edema  SKIN: no suspicious lesions or rashes  NEURO: Normal strength and tone, mentation intact and speech normal  PSYCH: mentation appears normal, affect normal/bright    Diagnostic Test Results:  Labs reviewed in Epic  No orders of the defined types were placed in this encounter.        ASSESSMENT / PLAN:   (Z00.00) Medicare annual wellness visit, subsequent  (primary encounter diagnosis)  Comment: routine screening issues   Plan: see orders section of this encounter     (I10) Hypertension goal BP (blood pressure) < 140/90  Comment: controlled within acceptable limits   Plan: BASIC METABOLIC PANEL, Albumin Random Urine         Quantitative with Creat Ratio, lisinopril         (PRINIVIL/ZESTRIL) 2.5 MG tablet, Hemoglobin            (N18.3) CKD (chronic kidney disease) stage 3, GFR 30-59 ml/min (H)  Comment: recheck and follow up as indicated on results   Plan: lisinopril (PRINIVIL/ZESTRIL) 2.5 MG tablet,         Hemoglobin            (E78.5) Hyperlipidemia LDL goal <130  Comment: continue current plan of care    Plan: Lipid panel reflex to direct LDL Fasting,         simvastatin (ZOCOR) 40 MG tablet            (I73.00) Raynaud's disease without gangrene  Comment: this diagnosis is derived from his history which is certainly classic for this diagnosis, as during the warm months he has no symptoms at all with toes and no evidence of vascular disease. Then each winter he described classic cold and bluish - whitish toes when exposed to cold associated  "with some pain and then after warming up over 1-2 hours at the very most, his toes become normal. Interestingly his symptoms do not affect his hands. We discussed the possibility of Purple Toe Syndrome and whether or not there's any point to ankle brachial index tests or other workup and we agreed to a posture of observation . He's going to update us with how things go over the next few months and then into winter 2318-5706 if still concerned. Further work-up is always an option  Plan: as above     (Z23) Need for shingles vaccine  Comment: pharmacy router form given   Plan:     (H61.23) Bilateral impacted cerumen  Comment: successfully irrigated out by my medical assistant    Plan: REMOVE IMPACTED CERUMEN              End of Life Planning:  Patient currently has an advanced directive: Yes.  Practitioner is supportive of decision.    COUNSELING:  Reviewed preventive health counseling, as reflected in patient instructions    Estimated body mass index is 23.13 kg/m  as calculated from the following:    Height as of 5/11/18: 1.81 m (5' 11.25\").    Weight as of 5/11/18: 75.8 kg (167 lb).         reports that he quit smoking about 16 years ago. His smoking use included cigarettes. He quit after 40.00 years of use. He has never used smokeless tobacco.      Appropriate preventive services were discussed with this patient, including applicable screening as appropriate for cardiovascular disease, diabetes, osteopenia/osteoporosis, and glaucoma.  As appropriate for age/gender, discussed screening for colorectal cancer, prostate cancer, breast cancer, and cervical cancer. Checklist reviewing preventive services available has been given to the patient.    Reviewed patients plan of care and provided an AVS. The Basic Care Plan (routine screening as documented in Health Maintenance) for Jose Manuel meets the Care Plan requirement. This Care Plan has been established and reviewed with the Patient.    Counseling Resources:  ATP IV " Guidelines  Pooled Cohorts Equation Calculator  Breast Cancer Risk Calculator  FRAX Risk Assessment  ICSI Preventive Guidelines  Dietary Guidelines for Americans, 2010  [employer] for Parents's MyPlate  ASA Prophylaxis  Lung CA Screening    Orlando Braxton MD  [facility]    Identified Health Risks:

## 2019-11-04 ENCOUNTER — HEALTH MAINTENANCE LETTER (OUTPATIENT)
Age: 84
End: 2019-11-04

## 2019-11-06 DIAGNOSIS — I10 HYPERTENSION GOAL BP (BLOOD PRESSURE) < 140/90: ICD-10-CM

## 2019-11-06 DIAGNOSIS — N18.30 CKD (CHRONIC KIDNEY DISEASE) STAGE 3, GFR 30-59 ML/MIN (H): ICD-10-CM

## 2019-11-06 RX ORDER — LISINOPRIL 2.5 MG/1
2.5 TABLET ORAL DAILY
Qty: 180 TABLET | Refills: 1 | Status: SHIPPED | OUTPATIENT
Start: 2019-11-06 | End: 2020-05-21

## 2019-11-06 NOTE — TELEPHONE ENCOUNTER
Reason for Call:  Other prescription  URGENT!    Detailed comments: [facility] at [facility] called regarding patient, Jose Manuel Gallo.    Patient needs prescription for    lisinopril (PRINIVIL/ZESTRIL) 2.5 MG tablet      [facility] [place]  [address] [place] [address]    [phone]      Call taken on 11/6/2019 at 4:15 PM by Laura Montanez

## 2019-12-02 ENCOUNTER — OFFICE VISIT (OUTPATIENT)
Dept: UROLOGY | Facility: CLINIC | Age: 84
End: 2019-12-02
Payer: COMMERCIAL

## 2019-12-02 DIAGNOSIS — Z85.51 HISTORY OF BLADDER CANCER: Primary | ICD-10-CM

## 2019-12-02 PROCEDURE — 52000 CYSTOURETHROSCOPY: CPT | Performed by: UROLOGY

## 2019-12-02 NOTE — PROGRESS NOTES
S: Jose Manuel Gallo is a 84 year old male returns for bladder cancer surveillance.   he has history of bladder cancer Grade 3 non-invasive, Stage ta, s/p turbt.   He received 2 courses of BCG therapy.   Cysto: The anterior urethra showed mild stricture   The prostatic urethra turp changes.   The length is 0cm, the coaptation is 0 cm.   In the bladder there is normal mucosa.   Assessment/Plan: V10.51B History of bladder cancer (primary encounter diagnosis)   Comment: no evidence of recurrence   Plan: CYSTOURETHROSCOPY   BTR in one year

## 2020-01-10 ENCOUNTER — DOCUMENTATION ONLY (OUTPATIENT)
Dept: OPHTHALMOLOGY | Facility: CLINIC | Age: 85
End: 2020-01-10

## 2020-03-03 DIAGNOSIS — E78.5 HYPERLIPIDEMIA LDL GOAL <130: ICD-10-CM

## 2020-03-04 RX ORDER — SIMVASTATIN 40 MG
TABLET ORAL
Qty: 90 TABLET | Refills: 0 | Status: SHIPPED | OUTPATIENT
Start: 2020-03-04 | End: 2020-05-20

## 2020-03-23 ENCOUNTER — TELEPHONE (OUTPATIENT)
Dept: OPHTHALMOLOGY | Facility: CLINIC | Age: 85
End: 2020-03-23

## 2020-03-23 NOTE — TELEPHONE ENCOUNTER
Pt called to make a routine eye appt  And was told because of  covid 19 we  Are putting off such appts, pt then went on to  Say that he has been having headaches and pressure feeling behind and also pain in his face, pt wanted to  Come in for this . I told  Pt I  Will talk to Dr Scales about this and get back to him on what he should  do.

## 2020-03-23 NOTE — TELEPHONE ENCOUNTER
Discussion with patient.  Mild headache and ache in both eyes.  Has noted gradual decrease in vision right eye.  No redness or discharge.  Suggested Systane Ultra both eyes four times daily (he had) and Refresh PM at bedtime both eyes.  Could also prescribe Christian 128 if necessary.  Decreased vision likely due to cataract progression right eye.  Tylenol as needed for headache.  I will call him in one week for an update and have in when COVID situation allows.  Bonifacio Scales M.D.

## 2020-03-30 ENCOUNTER — TELEPHONE (OUTPATIENT)
Dept: OPHTHALMOLOGY | Facility: CLINIC | Age: 85
End: 2020-03-30

## 2020-03-30 NOTE — TELEPHONE ENCOUNTER
Discussion with patient.  Headache improved.  Using ointment only in right eye and having AM blurring until noon.  Vision same.  Mild ache right eye.  Recommend only small amount of ointment into right eye, or could discontinue.  Bright light for reading.  Schedule complete exam when COVID situation allows.  Call in interim if worse.  Bonifacio Scales M.D.

## 2020-05-20 ENCOUNTER — TELEPHONE (OUTPATIENT)
Dept: FAMILY MEDICINE | Facility: CLINIC | Age: 85
End: 2020-05-20

## 2020-05-20 DIAGNOSIS — E78.5 HYPERLIPIDEMIA LDL GOAL <130: ICD-10-CM

## 2020-05-20 RX ORDER — SIMVASTATIN 40 MG
TABLET ORAL
Qty: 90 TABLET | Refills: 0 | Status: SHIPPED | OUTPATIENT
Start: 2020-05-20 | End: 2020-05-21

## 2020-05-20 NOTE — TELEPHONE ENCOUNTER
Called and spoke to patient. Informed patient of message. Patient verbally understand. Tried to help patient scheduled an appointment but he wants to check with his insurance first to see if the virtual visit is cover. Patient will call back for an appointment.  Delfina Adams CMA

## 2020-05-20 NOTE — TELEPHONE ENCOUNTER
Medication is being filled for 1 time refill only due to:  Patient needs to be seen because it has been more than one year since last visit.   Please call to schedule.

## 2020-05-21 ENCOUNTER — VIRTUAL VISIT (OUTPATIENT)
Dept: INTERNAL MEDICINE | Facility: CLINIC | Age: 85
End: 2020-05-21
Payer: COMMERCIAL

## 2020-05-21 DIAGNOSIS — J30.0 VASOMOTOR RHINITIS: ICD-10-CM

## 2020-05-21 DIAGNOSIS — R13.10 SWALLOWING IMPAIRED: ICD-10-CM

## 2020-05-21 DIAGNOSIS — E53.8 VITAMIN B12 DEFICIENCY (NON ANEMIC): ICD-10-CM

## 2020-05-21 DIAGNOSIS — E78.5 HYPERLIPIDEMIA LDL GOAL <130: Primary | ICD-10-CM

## 2020-05-21 DIAGNOSIS — I10 HYPERTENSION GOAL BP (BLOOD PRESSURE) < 140/90: ICD-10-CM

## 2020-05-21 DIAGNOSIS — N18.30 CKD (CHRONIC KIDNEY DISEASE) STAGE 3, GFR 30-59 ML/MIN (H): ICD-10-CM

## 2020-05-21 PROCEDURE — 99214 OFFICE O/P EST MOD 30 MIN: CPT | Mod: 95 | Performed by: INTERNAL MEDICINE

## 2020-05-21 RX ORDER — SIMVASTATIN 40 MG
40 TABLET ORAL DAILY
Qty: 90 TABLET | Refills: 1 | Status: SHIPPED | OUTPATIENT
Start: 2020-05-21 | End: 2021-03-03

## 2020-05-21 RX ORDER — LISINOPRIL 2.5 MG/1
2.5 TABLET ORAL DAILY
Qty: 180 TABLET | Refills: 1 | Status: SHIPPED | OUTPATIENT
Start: 2020-05-21 | End: 2020-07-25

## 2020-05-21 NOTE — PROGRESS NOTES
"Jose Manuel Gallo is a 85 year old male who is being evaluated via a billable telephone visit.      The patient has been notified of following:     \"This telephone visit will be conducted via a call between you and your physician/provider. We have found that certain health care needs can be provided without the need for a physical exam.  This service lets us provide the care you need with a short phone conversation.  If a prescription is necessary we can send it directly to your pharmacy.  If lab work is needed we can place an order for that and you can then stop by our lab to have the test done at a later time.    Telephone visits are billed at different rates depending on your insurance coverage. During this emergency period, for some insurers they may be billed the same as an in-person visit.  Please reach out to your insurance provider with any questions.    If during the course of the call the physician/provider feels a telephone visit is not appropriate, you will not be charged for this service.\"    Patient has given verbal consent for Telephone visit?  Yes    What phone number would you like to be contacted at? [phone]    How would you like to obtain your AVS? Mail a copy    Subjective     Jose Manuel Gallo is a 85 year old male who presents via phone visit today for the following health issues:    HPI  Hyperlipidemia Follow-Up      Are you regularly taking any medication or supplement to lower your cholesterol?   Yes-      Are you having muscle aches or other side effects that you think could be caused by your cholesterol lowering medication?  No      How many servings of fruits and vegetables do you eat daily?  4 or more    On average, how many sweetened beverages do you drink each day (Examples: soda, juice, sweet tea, etc.  Do NOT count diet or artificially sweetened beverages)?   0    How many days per week do you exercise enough to make your heart beat faster? 3 or less    How many minutes a " day do you exercise enough to make your heart beat faster? 10 - 19    How many days per week do you miss taking your medication? 0      Patient Active Problem List   Diagnosis     PVD (peripheral vascular disease) (H)     Erectile dysfunction     Osteoarthritis     HYPERLIPIDEMIA LDL GOAL <130     Advanced directives, counseling/discussion     Hypertension goal BP (blood pressure) < 140/90     History of bladder cancer     CKD (chronic kidney disease) stage 3, GFR 30-59 ml/min (H)     Pseudophakia, Yag Caps, os     Dizziness     Posterior vitreous detachment, right     Hx of vitrectomy for chronic macular hole, os (ER)     Other idiopathic scoliosis, thoracolumbar region     Spondylosis of lumbar region without myelopathy or radiculopathy     Brittle nails     Combined forms of age-related cataract, mild-mod, of right eye     Pain of right eye     History of shingles     Past Surgical History:   Procedure Laterality Date     C SPINAL FUSION,ANT,EA ADNL LEVEL      C6-7     CATARACT IOL, RT/LT       COLONOSCOPY  2008    Normal     HC REVISE ULNAR NERVE AT ELBOW      Left     LASER YAG CAPSULOTOMY  2016    left eye     PHACOEMULSIFICATION WITH STANDARD INTRAOCULAR LENS IMPLANT  2012    left eye     TURP   ?     VASECTOMY       VITRECTOMY PARSPLANA  2011    left eye, repair macular hole       Social History     Tobacco Use     Smoking status: Former Smoker     Years: 40.00     Types: Cigarettes     Last attempt to quit: 2003     Years since quittin.3     Smokeless tobacco: Never Used   Substance Use Topics     Alcohol use: Yes     Comment: 1 x month     Family History   Problem Relation Age of Onset     Osteoporosis Mother      Diabetes Father      Arthritis Father      Cancer Father      Respiratory Father      Genitourinary Problems Brother      Circulatory Brother      Macular Degeneration No family hx of      Glaucoma No family hx of      Thyroid Disease No family hx of      Hypertension No  family hx of          Current Outpatient Medications   Medication Sig Dispense Refill     lisinopril (ZESTRIL) 2.5 MG tablet Take 1 tablet (2.5 mg) by mouth daily 180 tablet 1     simvastatin (ZOCOR) 40 MG tablet Take 1 tablet (40 mg) by mouth daily 90 tablet 1     No Known Allergies  Recent Labs   Lab Test 05/24/19  1210 05/11/18  1014  07/13/17  1215 07/13/17  1210 07/12/16  1011  03/26/12  1008   LDL 88 61  --   --  80 67   < > 104   HDL 70 77  --   --  69 61   < > 53   TRIG 57 53  --   --  41 67   < > 67   ALT  --  29  --  27  --  26   < > 35   CR 1.09 1.13  --  1.22  --   --    < > 1.19   GFRESTIMATED 62 62  --  57*  --   --    < > 59*   GFRESTBLACK 72 75  --  69  --   --    < > 72   POTASSIUM 4.8 5.0   < > 5.7*  --   --    < > 5.0   TSH  --   --   --   --   --  0.67  --  0.83    < > = values in this interval not displayed.      BP Readings from Last 3 Encounters:   05/24/19 134/70   05/11/18 140/70   12/04/17 113/65    Wt Readings from Last 3 Encounters:   05/24/19 75.1 kg (165 lb 9.6 oz)   05/11/18 75.8 kg (167 lb)   11/27/17 73 kg (161 lb)                    Reviewed and updated as needed this visit by Provider         Review of Systems   Constitutional, HEENT, cardiovascular, pulmonary, gi and gu systems are negative, except as otherwise noted.       Objective   Reported vitals:  There were no vitals taken for this visit.   healthy, alert and no distress  PSYCH: Alert and oriented times 3; coherent speech, normal   rate and volume, able to articulate logical thoughts, able   to abstract reason, no tangential thoughts, no hallucinations   or delusions  His affect is normal  RESP: No cough, no audible wheezing, able to talk in full sentences  Remainder of exam unable to be completed due to telephone visits    Diagnostic Test Results:  Labs reviewed in Epic        Assessment/Plan:  1. Hyperlipidemia LDL goal <130  Patient seems to be dong fine but needs the laboratory studies . Future ordered with further  follow up depending on the test results   - Lipid panel reflex to direct LDL Fasting; Future  - simvastatin (ZOCOR) 40 MG tablet; Take 1 tablet (40 mg) by mouth daily  Dispense: 90 tablet; Refill: 1    2. Hypertension goal BP (blood pressure) < 140/90  As above   - **Basic metabolic panel FUTURE anytime; Future  - lisinopril (ZESTRIL) 2.5 MG tablet; Take 1 tablet (2.5 mg) by mouth daily  Dispense: 180 tablet; Refill: 1    3. CKD (chronic kidney disease) stage 3, GFR 30-59 ml/min (H)  As above   - **Basic metabolic panel FUTURE anytime; Future  - lisinopril (ZESTRIL) 2.5 MG tablet; Take 1 tablet (2.5 mg) by mouth daily  Dispense: 180 tablet; Refill: 1    4. Vasomotor rhinitis  This was an unrelated problem to discuss . His symptoms are routine and garden variety chronic vasomotor rhinitis symptoms. The diagnosis explained . Treatment options reviewed. +/- on value of Ear, Nose, and Throat specialist consultation , he's going to think about this    5. Vitamin B12 deficiency (non anemic)  Definitely something that needs recheck   - Vitamin B12; Future  - CBC with platelets and differential; Future  Follow up as indicated on results     6. Swallowing impaired  I reviewed patients history which is chronic , going as far back as 3-4 years without change, no red flag symptoms here. No odynophagia or dysphagia  Or weight loss. No evidence of stroke or extrinsic or extrinsic compression of the esophagus. We discussed what to be on the watch for  , update me if significant changes have taken place . Try to use more liquid with eating and have smaller meals. Suspect presbyesophagus        No follow-ups on file.    Patient feels he has no reason to be complaining, generally doing well. Major concerns     Telephone MD visit     Call began at 12:16 PM   Call ended at 12:33 PM          Phone call duration:  17 minutes    Orlando Braxton MD

## 2020-05-28 DIAGNOSIS — N18.30 CKD (CHRONIC KIDNEY DISEASE) STAGE 3, GFR 30-59 ML/MIN (H): ICD-10-CM

## 2020-05-28 DIAGNOSIS — I10 HYPERTENSION GOAL BP (BLOOD PRESSURE) < 140/90: ICD-10-CM

## 2020-05-28 DIAGNOSIS — E53.8 VITAMIN B12 DEFICIENCY (NON ANEMIC): ICD-10-CM

## 2020-05-28 DIAGNOSIS — E78.5 HYPERLIPIDEMIA LDL GOAL <130: ICD-10-CM

## 2020-05-28 LAB
ANION GAP SERPL CALCULATED.3IONS-SCNC: 5 MMOL/L (ref 3–14)
BASOPHILS # BLD AUTO: 0 10E9/L (ref 0–0.2)
BASOPHILS NFR BLD AUTO: 0.5 %
BUN SERPL-MCNC: 28 MG/DL (ref 7–30)
CALCIUM SERPL-MCNC: 8.6 MG/DL (ref 8.5–10.1)
CHLORIDE SERPL-SCNC: 107 MMOL/L (ref 94–109)
CHOLEST SERPL-MCNC: 137 MG/DL
CO2 SERPL-SCNC: 26 MMOL/L (ref 20–32)
CREAT SERPL-MCNC: 1.23 MG/DL (ref 0.66–1.25)
DIFFERENTIAL METHOD BLD: ABNORMAL
EOSINOPHIL # BLD AUTO: 0.1 10E9/L (ref 0–0.7)
EOSINOPHIL NFR BLD AUTO: 2.2 %
ERYTHROCYTE [DISTWIDTH] IN BLOOD BY AUTOMATED COUNT: 13.3 % (ref 10–15)
GFR SERPL CREATININE-BSD FRML MDRD: 53 ML/MIN/{1.73_M2}
GLUCOSE SERPL-MCNC: 106 MG/DL (ref 70–99)
HCT VFR BLD AUTO: 41.9 % (ref 40–53)
HDLC SERPL-MCNC: 59 MG/DL
HGB BLD-MCNC: 13.6 G/DL (ref 13.3–17.7)
LDLC SERPL CALC-MCNC: 68 MG/DL
LYMPHOCYTES # BLD AUTO: 1 10E9/L (ref 0.8–5.3)
LYMPHOCYTES NFR BLD AUTO: 17.8 %
MCH RBC QN AUTO: 31.9 PG (ref 26.5–33)
MCHC RBC AUTO-ENTMCNC: 32.5 G/DL (ref 31.5–36.5)
MCV RBC AUTO: 98 FL (ref 78–100)
MONOCYTES # BLD AUTO: 0.5 10E9/L (ref 0–1.3)
MONOCYTES NFR BLD AUTO: 9.1 %
NEUTROPHILS # BLD AUTO: 3.9 10E9/L (ref 1.6–8.3)
NEUTROPHILS NFR BLD AUTO: 70.4 %
NONHDLC SERPL-MCNC: 78 MG/DL
PLATELET # BLD AUTO: 199 10E9/L (ref 150–450)
POTASSIUM SERPL-SCNC: 5.3 MMOL/L (ref 3.4–5.3)
RBC # BLD AUTO: 4.26 10E12/L (ref 4.4–5.9)
SODIUM SERPL-SCNC: 138 MMOL/L (ref 133–144)
TRIGL SERPL-MCNC: 51 MG/DL
VIT B12 SERPL-MCNC: 184 PG/ML (ref 193–986)
WBC # BLD AUTO: 5.5 10E9/L (ref 4–11)

## 2020-05-28 PROCEDURE — 85025 COMPLETE CBC W/AUTO DIFF WBC: CPT | Performed by: INTERNAL MEDICINE

## 2020-05-28 PROCEDURE — 82607 VITAMIN B-12: CPT | Performed by: INTERNAL MEDICINE

## 2020-05-28 PROCEDURE — 80048 BASIC METABOLIC PNL TOTAL CA: CPT | Performed by: INTERNAL MEDICINE

## 2020-05-28 PROCEDURE — 36415 COLL VENOUS BLD VENIPUNCTURE: CPT | Performed by: INTERNAL MEDICINE

## 2020-05-28 PROCEDURE — 80061 LIPID PANEL: CPT | Performed by: INTERNAL MEDICINE

## 2020-06-02 ENCOUNTER — VIRTUAL VISIT (OUTPATIENT)
Dept: INTERNAL MEDICINE | Facility: CLINIC | Age: 85
End: 2020-06-02
Payer: COMMERCIAL

## 2020-06-02 DIAGNOSIS — N18.30 CKD (CHRONIC KIDNEY DISEASE) STAGE 3, GFR 30-59 ML/MIN (H): ICD-10-CM

## 2020-06-02 DIAGNOSIS — R79.89 LOW VITAMIN B12 LEVEL: Primary | ICD-10-CM

## 2020-06-02 PROCEDURE — 99213 OFFICE O/P EST LOW 20 MIN: CPT | Mod: 95 | Performed by: INTERNAL MEDICINE

## 2020-06-02 NOTE — PROGRESS NOTES
"Jose Manuel Gallo is a 85 year old male who is being evaluated via a billable telephone visit.      The patient has been notified of following:     \"This telephone visit will be conducted via a call between you and your physician/provider. We have found that certain health care needs can be provided without the need for a physical exam.  This service lets us provide the care you need with a short phone conversation.  If a prescription is necessary we can send it directly to your pharmacy.  If lab work is needed we can place an order for that and you can then stop by our lab to have the test done at a later time.    Telephone visits are billed at different rates depending on your insurance coverage. During this emergency period, for some insurers they may be billed the same as an in-person visit.  Please reach out to your insurance provider with any questions.    If during the course of the call the physician/provider feels a telephone visit is not appropriate, you will not be charged for this service.\"    Patient has given verbal consent for Telephone visit?  Yes    What phone number would you like to be contacted at? [phone]    How would you like to obtain your AVS? Mail a copy    Subjective     Jose Manuel Gallo is a 85 year old male who presents via phone visit today for the following health issues:    HPI  Patient presents with:  Results       Low vitamin B12 level  CKD (chronic kidney disease) stage 3, GFR 30-59 ml/min (H)     GFR Estimate   Date Value Ref Range Status   05/28/2020 53 (L) >60 mL/min/[1.73_m2] Final     Comment:     Non  GFR Calc  Starting 12/18/2018, serum creatinine based estimated GFR (eGFR) will be   calculated using the Chronic Kidney Disease Epidemiology Collaboration   (CKD-EPI) equation.     05/24/2019 62 >60 mL/min/[1.73_m2] Final     Comment:     Non  GFR Calc  Starting 12/18/2018, serum creatinine based estimated GFR (eGFR) will be   calculated " using the Chronic Kidney Disease Epidemiology Collaboration   (CKD-EPI) equation.     05/11/2018 62 >60 mL/min/1.7m2 Final     Comment:     Non  GFR Calc   07/13/2017 57 (L) >60 mL/min/1.7m2 Final     Comment:     Non  GFR Calc   04/14/2016 62 >60 mL/min/1.7m2 Final     Comment:     Non  GFR Calc     GFR Estimate If Black   Date Value Ref Range Status   05/28/2020 62 >60 mL/min/[1.73_m2] Final     Comment:      GFR Calc  Starting 12/18/2018, serum creatinine based estimated GFR (eGFR) will be   calculated using the Chronic Kidney Disease Epidemiology Collaboration   (CKD-EPI) equation.     05/24/2019 72 >60 mL/min/[1.73_m2] Final     Comment:      GFR Calc  Starting 12/18/2018, serum creatinine based estimated GFR (eGFR) will be   calculated using the Chronic Kidney Disease Epidemiology Collaboration   (CKD-EPI) equation.     05/11/2018 75 >60 mL/min/1.7m2 Final     Comment:      GFR Calc   07/13/2017 69 >60 mL/min/1.7m2 Final     Comment:      GFR Calc   04/14/2016 75 >60 mL/min/1.7m2 Final     Comment:      GFR Calc     This patient had a telephone MD visit with me a few days ago and we follow up on his abnormal laboratory results .     We reviewed the decreased gfr [ glomerular filtration rate ] . Recommended using acetaminophen and not non-steroidal anti-inflammatory drugs. This is a minor and not significant issue.    The more serious discussion today has to do with the finding of a low vitamin B 12  . It is interesting to note that in point of fact we have seen his vitamin B 12 gradually drifting downwards for actual several years. 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     Results for CLAY LLAMAS (MRN [id number]) as of 2020 22:07   Ref. Range 2009 15:42 3/26/2012 10:08 2016 10:11 2018 10:14 2020 14:00   Vitamin B12 Latest Ref Range: 193 - 986 pg/mL 332 333 237 222 184 (L)       Patient Active Problem List   Diagnosis     PVD (peripheral vascular disease) (H)     Erectile dysfunction     Osteoarthritis     HYPERLIPIDEMIA LDL GOAL <130     Advanced directives, counseling/discussion     Hypertension goal BP (blood pressure) < 140/90     History of bladder cancer     CKD (chronic kidney disease) stage 3, GFR 30-59 ml/min (H)     Pseudophakia, Yag Caps, os     Dizziness     Posterior vitreous detachment, right     Hx of vitrectomy for chronic macular hole, os (ER)     Other idiopathic scoliosis, thoracolumbar region     Spondylosis of lumbar region without myelopathy or radiculopathy     Brittle nails     Combined forms of age-related cataract, mild-mod, of right eye     Pain of right eye     History of shingles     Past Surgical History:   Procedure Laterality Date     C SPINAL FUSION,ANT,EA ADNL LEVEL      C6-7     CATARACT IOL, RT/LT       COLONOSCOPY  2008    Normal     HC REVISE ULNAR NERVE AT ELBOW      Left     LASER YAG CAPSULOTOMY  2016    left eye     PHACOEMULSIFICATION WITH STANDARD INTRAOCULAR LENS IMPLANT  2012    left eye     TURP   ?     VASECTOMY       VITRECTOMY PARSPLANA  2011    left eye, repair macular hole       Social History     Tobacco Use     Smoking status: Former Smoker     Years: 40.00     Types: Cigarettes     Last attempt to quit: 2003     Years since quittin.4     Smokeless tobacco: Never Used   Substance Use Topics     Alcohol use: Yes     Comment: 1 x month     Family History   Problem Relation Age of Onset     Osteoporosis Mother      Diabetes Father      Arthritis Father      Cancer Father      Respiratory Father       Genitourinary Problems Brother      Circulatory Brother      Macular Degeneration No family hx of      Glaucoma No family hx of      Thyroid Disease No family hx of      Hypertension No family hx of          Current Outpatient Medications   Medication Sig Dispense Refill     lisinopril (ZESTRIL) 2.5 MG tablet Take 1 tablet (2.5 mg) by mouth daily 180 tablet 1     simvastatin (ZOCOR) 40 MG tablet Take 1 tablet (40 mg) by mouth daily 90 tablet 1     No Known Allergies  Recent Labs   Lab Test 05/28/20  1400 05/24/19  1210 05/11/18  1014  07/13/17  1215  07/12/16  1011  03/26/12  1008   LDL 68 88 61  --   --    < > 67   < > 104   HDL 59 70 77  --   --    < > 61   < > 53   TRIG 51 57 53  --   --    < > 67   < > 67   ALT  --   --  29  --  27  --  26   < > 35   CR 1.23 1.09 1.13  --  1.22  --   --    < > 1.19   GFRESTIMATED 53* 62 62  --  57*  --   --    < > 59*   GFRESTBLACK 62 72 75  --  69  --   --    < > 72   POTASSIUM 5.3 4.8 5.0   < > 5.7*  --   --    < > 5.0   TSH  --   --   --   --   --   --  0.67  --  0.83    < > = values in this interval not displayed.      BP Readings from Last 3 Encounters:   05/24/19 134/70   05/11/18 140/70   12/04/17 113/65    Wt Readings from Last 3 Encounters:   05/24/19 75.1 kg (165 lb 9.6 oz)   05/11/18 75.8 kg (167 lb)   11/27/17 73 kg (161 lb)                    Reviewed and updated as needed this visit by Provider        Other questions - can he come in for acute problem focused visits.     Review of Systems   Constitutional, HEENT, cardiovascular, pulmonary, gi and gu systems are negative, except as otherwise noted.       Objective   Reported vitals:  There were no vitals taken for this visit.   healthy, alert and no distress  PSYCH: Alert and oriented times 3; coherent speech, normal   rate and volume, able to articulate logical thoughts, able   to abstract reason, no tangential thoughts, no hallucinations   or delusions  His affect is normal  RESP: No cough, no audible wheezing,  able to talk in full sentences  Remainder of exam unable to be completed due to telephone visits    Diagnostic Test Results:  Labs reviewed in Epic        Assessment/Plan:    (E53.8) Low vitamin B12 level  (primary encounter diagnosis)  Comment: recommended to take 1 milligram [ 1000 micrograms] daily for 3 months and then three times a week thereafter   Plan: we will see him for a follow up appointment in 6 months to recheck vitamin B 12  . Reminded patient that a small percentage of patients will require B12 replacement therapy with injections     (N18.3) CKD (chronic kidney disease) stage 3, GFR 30-59 ml/min (H)  Comment: nominal issue, as detailed above   Plan: recheck basic metabolic panel in 6 months     No follow-ups on file.    2:35 PM   2:50 PM       Phone call duration: 15 minutes    Orlando Braxton MD

## 2020-06-03 NOTE — PATIENT INSTRUCTIONS
Assessment/Plan:    (E53.8) Low vitamin B12 level  (primary encounter diagnosis)  Comment: recommended to take 1 milligram [ 1000 micrograms] daily for 3 months and then three times a week thereafter   Plan: we will see him for a follow up appointment in 6 months to recheck vitamin B 12  . Reminded patient that a small percentage of patients will eventually require B12 replacement therapy with injections     (N18.3) CKD (chronic kidney disease) stage 3, GFR 30-59 ml/min (H)  Comment: nominal issue, as detailed above   Plan: recheck basic metabolic panel in 6 months

## 2020-07-14 DIAGNOSIS — E53.8 B12 NUTRITIONAL DEFICIENCY: Primary | ICD-10-CM

## 2020-07-14 RX ORDER — LANOLIN ALCOHOL/MO/W.PET/CERES
1000 CREAM (GRAM) TOPICAL DAILY
Qty: 90 TABLET | Refills: 3 | Status: SHIPPED | OUTPATIENT
Start: 2020-07-14 | End: 2020-07-20

## 2020-07-14 NOTE — TELEPHONE ENCOUNTER
Received fax from [facility], and requesting Vitamin B12 1000mg.     Vitamin B12 1000mg      Last Written Prescription Date:  ?  Last Fill Quantity: ?,   # refills: ?  Last Office Visit: 06/02/2020  Future Office visit:       Routing refill request to provider for review/approval because:  Drug not active on patient's medication list

## 2020-07-14 NOTE — TELEPHONE ENCOUNTER
Routing refill request to provider for review/approval because:  Drug not active on patient's medication list    Requested Prescriptions   Pending Prescriptions Disp Refills     cyanocobalamin (VITAMIN B-12) 1000 MCG tablet       Sig: Take 1 tablet (1,000 mcg) by mouth daily       There is no refill protocol information for this order        Katya Kerr RN

## 2020-07-15 ENCOUNTER — NURSE TRIAGE (OUTPATIENT)
Dept: FAMILY MEDICINE | Facility: CLINIC | Age: 85
End: 2020-07-15

## 2020-07-15 NOTE — TELEPHONE ENCOUNTER
Reason for call:  Symptom   Symptom or request: Back pain    Duration (how long have symptoms been present): Years  Have you been treated for this before? Yes    Additional comments: na    Phone number to reach patient:  Home number on file [phone] (home)    Best Time:  any    Can we leave a detailed message on this number?  YES    Travel screening: Not Applicable

## 2020-07-15 NOTE — TELEPHONE ENCOUNTER
Pt states that he has issues with chronic back pain, pain used to be primarily in the middle of his back, but is now getting worse and is in the left hip. Pt is able to stand and walk okay without complication and declines any swelling.  Pt reports that pain shifted to hip in past month or so. It was gradual. Rate of pain varies. He can't feel any pain right now while standing. Rates as 3-4/10 when walking, can be 8/10 in middle of night when he wakes up. He has tried lotions and sprays and his routine back exercises with no improvement.   Pt was scheduled for appointment on 07/21 with Dr. Sorto and advised that if symptoms are worsening to call back to be seen earlier.     Trudi Jordan RN        Additional Information    Back pain lasts > 2 weeks    MODERATE back pain (e.g., interferes with normal activities) and present > 3 days    Negative: SEVERE back pain (e.g., excruciating, unable to do any normal activities) and not improved after pain medicine and CARE ADVICE    Negative: Numbness in an arm or hand (i.e., loss of sensation) and upper back pain    Negative: Numbness in a leg or foot (i.e., loss of sensation)    Negative: High-risk adult (e.g., history of cancer, history of HIV, or history of IV drug abuse)    Negative: Painful rash with multiple small blisters grouped together (i.e., dermatomal distribution or 'band' or 'stripe')    Negative: Pain radiates into the thigh or further down the leg, and in both legs    Negative: Fever > 100.5 F (38.1 C) and flank pain    Negative: Pain or burning with passing urine (urination)    Negative: Pain radiates into groin, scrotum    Negative: Blood in urine (red, pink, or tea-colored)    Negative: Vomiting and pain over lower ribs of back (i.e., flank - kidney area)    Negative: Weakness of a leg or foot (e.g., unable to bear weight, dragging foot)    Negative: Patient sounds very sick or weak to the triager    Negative: SEVERE back pain of sudden onset and age >  60    Negative: SEVERE abdominal pain (e.g., excruciating)    Negative: Abdominal pain and age > 60    Negative: Unable to urinate (or only a few drops) and bladder feels very full    Negative: Loss of bladder or bowel control (urine or bowel incontinence; wetting self, leaking stool) of new onset    Negative: Numbness (loss of sensation) in groin or rectal area    Negative: Major injury to the back (e.g., MVA, fall > 10 feet or 3 meters, penetrating injury, etc.)    Negative: Pain in the upper back over the ribs (rib cage) that radiates (travels) into the chest    Negative: Pain in the upper back over the ribs (rib cage) and worsened by coughing (or clearly increases with breathing)    Negative: Passed out (i.e., fainted, collapsed and was not responding)    Negative: Shock suspected (e.g., cold/pale/clammy skin, too weak to stand, low BP, rapid pulse)    Negative: Sounds like a life-threatening emergency to the triager    Protocols used: BACK PAIN-A-OH

## 2020-07-17 DIAGNOSIS — E53.8 B12 NUTRITIONAL DEFICIENCY: ICD-10-CM

## 2020-07-17 NOTE — TELEPHONE ENCOUNTER
Called pharmacy, they state that pt was requesting refill. Please call patient to ask if he wants prescription sent to [facility] instead? It was [facility] to [facility]. We can send in a new prescription if he would like.

## 2020-07-17 NOTE — TELEPHONE ENCOUNTER
Reason for call:  Other   Patient called regarding (reason for call): call back  Additional comments: Patients pharmacy ([facility]) is calling to get some information about cyanocobalamin (VITAMIN B-12) 1000 MCG tablet. Please call back to discuss the issue.    Phone number to reach patient:    [phone]  Best Time:  Any      Can we leave a detailed message on this number?  YES    Travel screening: Negative

## 2020-07-20 RX ORDER — LANOLIN ALCOHOL/MO/W.PET/CERES
1000 CREAM (GRAM) TOPICAL DAILY
Qty: 90 TABLET | Refills: 3 | Status: SHIPPED | OUTPATIENT
Start: 2020-07-20

## 2020-07-20 NOTE — TELEPHONE ENCOUNTER
[facility] from [facility] pharmacy is calling about the prescription, please call back at  [phone]

## 2020-07-21 ENCOUNTER — ANCILLARY PROCEDURE (OUTPATIENT)
Dept: GENERAL RADIOLOGY | Facility: CLINIC | Age: 85
End: 2020-07-21
Attending: FAMILY MEDICINE
Payer: COMMERCIAL

## 2020-07-21 ENCOUNTER — OFFICE VISIT (OUTPATIENT)
Dept: FAMILY MEDICINE | Facility: CLINIC | Age: 85
End: 2020-07-21
Payer: COMMERCIAL

## 2020-07-21 VITALS
OXYGEN SATURATION: 97 % | SYSTOLIC BLOOD PRESSURE: 118 MMHG | TEMPERATURE: 97.6 F | BODY MASS INDEX: 22.02 KG/M2 | WEIGHT: 159 LBS | DIASTOLIC BLOOD PRESSURE: 60 MMHG | HEART RATE: 59 BPM

## 2020-07-21 DIAGNOSIS — M53.3 SACROILIAC JOINT PAIN: ICD-10-CM

## 2020-07-21 DIAGNOSIS — M54.50 CHRONIC BILATERAL LOW BACK PAIN, UNSPECIFIED WHETHER SCIATICA PRESENT: ICD-10-CM

## 2020-07-21 DIAGNOSIS — M54.50 CHRONIC BILATERAL LOW BACK PAIN, UNSPECIFIED WHETHER SCIATICA PRESENT: Primary | ICD-10-CM

## 2020-07-21 DIAGNOSIS — G89.29 CHRONIC BILATERAL LOW BACK PAIN, UNSPECIFIED WHETHER SCIATICA PRESENT: ICD-10-CM

## 2020-07-21 DIAGNOSIS — G89.29 CHRONIC BILATERAL LOW BACK PAIN, UNSPECIFIED WHETHER SCIATICA PRESENT: Primary | ICD-10-CM

## 2020-07-21 DIAGNOSIS — I73.9 PVD (PERIPHERAL VASCULAR DISEASE) (H): ICD-10-CM

## 2020-07-21 PROCEDURE — 72170 X-RAY EXAM OF PELVIS: CPT

## 2020-07-21 PROCEDURE — 99213 OFFICE O/P EST LOW 20 MIN: CPT | Performed by: FAMILY MEDICINE

## 2020-07-21 NOTE — PROGRESS NOTES
Subjective     Jose Manuel Gallo is a 85 year old male who presents to clinic today for the following health issues:    HPI     Back Pain     Has had chronic low back pain but in the last month been having pain in the left hip, mainly after waking up, sometimes wakes him as feel uncomfortable. Walking does not bother him.  No hx of injury      Duration: ongoing, and getting worse        Specific cause: none    Description:   Location of pain: hip left  Character of pain: sharp and dull ache  Pain radiation:none  New numbness or weakness in legs, not attributed to pain:  no     Intensity: mild, moderate    History:   Pain interferes with job: Not applicable  History of back problems: no prior back problems  Any previous MRI or X-rays: None  Sees a specialist for back pain:  No  Therapies tried without relief: none    Alleviating factors:   Improved by: none      Precipitating factors:  Worsened by: Nothing    LUMBAR SPINE TWO TO THREE VIEWS July 13, 2017 12:41 PM      HISTORY: Spondylosis without myelopathy or radiculopathy, lumbar  region.      COMPARISON: Lumbar spine x-ray 12/19/2014.                                                                      IMPRESSION: Three views of the lumbar spine are performed. There is  slight progression of patient's degenerative dextroscoliotic deformity  of the lumbar spine with apex at the L1-L2 disc level. Osteophytes and  disc space narrowing are noted predominantly along the internal aspect  of the curvature on the patient's left. No fracture or malalignment is  appreciated. Calcified abdominal aorta shows no obvious aneurysm.     PATRICIA RAND MD    Review of Systems   HEENT, cardiovascular, pulmonary, gi and gu systems are negative, except as otherwise noted.      Objective    /60   Pulse 59   Temp 97.6  F (36.4  C) (Oral)   Wt 72.1 kg (159 lb)   SpO2 97%   BMI 22.02 kg/m    Body mass index is 22.02 kg/m .  Physical Exam   GENERAL: healthy, alert and no  distress  RESP: lungs clear to auscultation - no rales, rhonchi or wheezes  CV: regular rate and rhythm,  no murmur, click or rub, no peripheral edema  MS: vague tenderness over posterior iliac crest region. Hip joint with FROM    Assessment & Plan     Jose Manuel was seen today for back pain.    Diagnoses and all orders for this visit:    Chronic bilateral low back pain, unspecified whether sciatica present    Pain in iliac region, xray shows no acute bony abnormality. Discussed conservative treatment with Tylenol as needed and PT  -     XR Pelvis 1/2 Views; Future  -     [facility]; Future    Sacroiliac joint pain     X ray non yielding.  -     XR Pelvis 1/2 Views; Future  -     [facility]; Future    PVD (peripheral vascular disease) (H)       -    stable    Return in about 2 weeks (around 8/4/2020) for Follow up for symptoms recheck.    Walker Sorto MD  [facility]

## 2020-07-22 DIAGNOSIS — I10 HYPERTENSION GOAL BP (BLOOD PRESSURE) < 140/90: ICD-10-CM

## 2020-07-22 DIAGNOSIS — N18.30 CKD (CHRONIC KIDNEY DISEASE) STAGE 3, GFR 30-59 ML/MIN (H): ICD-10-CM

## 2020-07-25 RX ORDER — LISINOPRIL 2.5 MG/1
TABLET ORAL
Qty: 180 TABLET | Refills: 0 | Status: SHIPPED | OUTPATIENT
Start: 2020-07-25 | End: 2021-05-27

## 2020-09-16 ENCOUNTER — OFFICE VISIT (OUTPATIENT)
Dept: OPTOMETRY | Facility: CLINIC | Age: 85
End: 2020-09-16
Payer: COMMERCIAL

## 2020-09-16 DIAGNOSIS — H43.811 POSTERIOR VITREOUS DETACHMENT, RIGHT: ICD-10-CM

## 2020-09-16 DIAGNOSIS — H52.4 PRESBYOPIA: ICD-10-CM

## 2020-09-16 DIAGNOSIS — Z01.01 ENCOUNTER FOR EXAMINATION OF EYES AND VISION WITH ABNORMAL FINDINGS: Primary | ICD-10-CM

## 2020-09-16 DIAGNOSIS — H04.123 DRY EYES: ICD-10-CM

## 2020-09-16 DIAGNOSIS — H25.811 COMBINED FORMS OF AGE-RELATED CATARACT OF RIGHT EYE: ICD-10-CM

## 2020-09-16 DIAGNOSIS — H52.01 HYPERMETROPIA, RIGHT: ICD-10-CM

## 2020-09-16 DIAGNOSIS — H52.12 MYOPIA, LEFT: ICD-10-CM

## 2020-09-16 DIAGNOSIS — H52.223 REGULAR ASTIGMATISM OF BOTH EYES: ICD-10-CM

## 2020-09-16 DIAGNOSIS — Z96.1 PSEUDOPHAKIA: ICD-10-CM

## 2020-09-16 DIAGNOSIS — H35.342 MACULAR HOLE, LEFT: ICD-10-CM

## 2020-09-16 PROCEDURE — 92015 DETERMINE REFRACTIVE STATE: CPT | Performed by: OPTOMETRIST

## 2020-09-16 PROCEDURE — 92014 COMPRE OPH EXAM EST PT 1/>: CPT | Performed by: OPTOMETRIST

## 2020-09-16 RX ORDER — CARBOXYMETHYLCELLULOSE SODIUM 10 MG/ML
1 GEL OPHTHALMIC 4 TIMES DAILY
Qty: 1 BOTTLE | Refills: 11 | Status: SHIPPED | OUTPATIENT
Start: 2020-09-16

## 2020-09-16 ASSESSMENT — VISUAL ACUITY
OD_CC+: -2
OS_CC+: -1
OD_CC: 20/30
OD_SC: 20/100
METHOD: SNELLEN - LINEAR
CORRECTION_TYPE: GLASSES
OD_CC: 20/50
OS_CC: 20/800-1
OS_SC: 20/150
OS_CC: 20/100

## 2020-09-16 ASSESSMENT — REFRACTION_WEARINGRX
OD_CYLINDER: +5.00
OD_ADD: +3.00
SPECS_TYPE: TRIFOCAL
OS_AXIS: 165
OS_ADD: +3.00
OD_SPHERE: +0.75
OS_SPHERE: -1.75
OS_CYLINDER: +2.50
OD_AXIS: 032

## 2020-09-16 ASSESSMENT — CONF VISUAL FIELD
OD_NORMAL: 1
METHOD: COUNTING FINGERS
OS_INFERIOR_TEMPORAL_RESTRICTION: 3

## 2020-09-16 ASSESSMENT — CUP TO DISC RATIO
OS_RATIO: 0.3
OD_RATIO: 0.4

## 2020-09-16 ASSESSMENT — TONOMETRY
OS_IOP_MMHG: 13
IOP_METHOD: APPLANATION
OD_IOP_MMHG: 14

## 2020-09-16 ASSESSMENT — REFRACTION_MANIFEST
OS_CYLINDER: +1.75
OS_SPHERE: -1.25
OD_AXIS: 035
OD_CYLINDER: +5.25
OD_SPHERE: +1.00
OD_ADD: +3.00
OS_ADD: +3.00
OS_AXIS: 164

## 2020-09-16 ASSESSMENT — EXTERNAL EXAM - RIGHT EYE: OD_EXAM: PROLAPSED FAT PADS: UPPER, LOWER; MILD-MOD BROW

## 2020-09-16 ASSESSMENT — EXTERNAL EXAM - LEFT EYE: OS_EXAM: PROLAPSED FAT PADS: UPPER, LOWER; MILD-MOD BROW

## 2020-09-16 NOTE — LETTER
9/16/2020         RE: Jose Manuel Gallo  [address] [place] [place] [address]        Dear Colleague,    Thank you for referring your patient, Jose Manuel Gallo, to the [facility]. Please see a copy of my visit note below.    Chief Complaint   Patient presents with     Annual Eye Exam      Accompanied by Daughter Sydni  Last Eye Exam: 2-19  Dilated Previously: Yes    What are you currently using to see?  glasses       Distance Vision Acuity: patient states he has noticed a drastic change in his distance visual acuity, but if he looks in the middle of his glasses his vision clears up. He states his eyes will often feel sore. No problems with his near visual acuity     Near Vision Acuity: Satisfied with vision while reading  with glasses    Eye Comfort: sore  Do you use eye drops? : uses artificial tears as needed.     Occupation or Hobbies: Not since Covid      Medical, surgical and family histories reviewed and updated 9/16/2020.       OBJECTIVE: See Ophthalmology exam    ASSESSMENT:    ICD-10-CM    1. Encounter for examination of eyes and vision with abnormal findings  Z01.01 EYE EXAM (SIMPLE-NONBILLABLE)   2. Hx of vitrectomy for chronic macular hole, os (ER)  H35.342 EYE EXAM (SIMPLE-NONBILLABLE)   3. Combined forms of age-related cataract, mild-mod, of right eye  H25.811 EYE EXAM (SIMPLE-NONBILLABLE)   4. Pseudophakia, Yag Caps, os  Z96.1 EYE EXAM (SIMPLE-NONBILLABLE)   5. Posterior vitreous detachment, right  H43.811 EYE EXAM (SIMPLE-NONBILLABLE)   6. Dry eyes  H04.123 carboxymethylcellulose PF (REFRESH LIQUIGEL) 1 % ophthalmic gel     EYE EXAM (SIMPLE-NONBILLABLE)   7. Hypermetropia, right  H52.01 REFRACTIVE STATUS     EYE EXAM (SIMPLE-NONBILLABLE)   8. Myopia, left  H52.12 REFRACTIVE STATUS     EYE EXAM (SIMPLE-NONBILLABLE)   9. Regular astigmatism of both eyes  H52.223 REFRACTIVE STATUS     EYE EXAM (SIMPLE-NONBILLABLE)   10. Presbyopia  H52.4 REFRACTIVE STATUS     EYE EXAM  (SIMPLE-NONBILLABLE)      PLAN:     Patient Instructions   Updated glasses prescription provided today.   Recommend filling new glasses. Optional to get bifocal or trifocal, but if you get a bifocal you will lose the area of the lens that is helpful for the computer and intermediate distances.     Mild cataract right eye. Monitor.     Macular hole left eye consistent with visual acuity.     I recommend trying Refresh Celluvisc. This eyedrop is a hybrid between a drop and a gel. It will last longer on the eye to give you more extended relief. Use up to 4x daily in each eye.     Return in 1 year for comprehensive eye exam, or sooner if needed.     The effects of the dilating drops last for 4- 6 hours.  You will be more sensitive to light and vision will be blurry up close.  Mydriatic sunglasses were given if needed.      Wilder Logan, ABBY  [facility]  [address] [place] [address]. [place]  [address]    [phone]           Again, thank you for allowing me to participate in the care of your patient.        Sincerely,        Wilder Logan, OD

## 2020-09-16 NOTE — PATIENT INSTRUCTIONS
Updated glasses prescription provided today.   Recommend filling new glasses. Optional to get bifocal or trifocal, but if you get a bifocal you will lose the area of the lens that is helpful for the computer and intermediate distances.     Mild cataract right eye. Monitor.     Macular hole left eye consistent with visual acuity.     I recommend trying Refresh Celluvisc. This eyedrop is a hybrid between a drop and a gel. It will last longer on the eye to give you more extended relief. Use up to 4x daily in each eye.     Return in 1 year for comprehensive eye exam, or sooner if needed.     The effects of the dilating drops last for 4- 6 hours.  You will be more sensitive to light and vision will be blurry up close.  Mydriatic sunglasses were given if needed.      Wilder Logan, OD  [address]  [place]  [address]    [phone]

## 2020-09-16 NOTE — PROGRESS NOTES
Chief Complaint   Patient presents with     Annual Eye Exam      Accompanied by Daughter Sydni  Last Eye Exam: 2-19  Dilated Previously: Yes    What are you currently using to see?  glasses       Distance Vision Acuity: patient states he has noticed a drastic change in his distance visual acuity, but if he looks in the middle of his glasses his vision clears up. He states his eyes will often feel sore. No problems with his near visual acuity     Near Vision Acuity: Satisfied with vision while reading  with glasses    Eye Comfort: sore  Do you use eye drops? : uses artificial tears as needed.     Occupation or Hobbies: Not since Covid      Medical, surgical and family histories reviewed and updated 9/16/2020.       OBJECTIVE: See Ophthalmology exam    ASSESSMENT:    ICD-10-CM    1. Encounter for examination of eyes and vision with abnormal findings  Z01.01 EYE EXAM (SIMPLE-NONBILLABLE)   2. Hx of vitrectomy for chronic macular hole, os (ER)  H35.342 EYE EXAM (SIMPLE-NONBILLABLE)   3. Combined forms of age-related cataract, mild-mod, of right eye  H25.811 EYE EXAM (SIMPLE-NONBILLABLE)   4. Pseudophakia, Yag Caps, os  Z96.1 EYE EXAM (SIMPLE-NONBILLABLE)   5. Posterior vitreous detachment, right  H43.811 EYE EXAM (SIMPLE-NONBILLABLE)   6. Dry eyes  H04.123 carboxymethylcellulose PF (REFRESH LIQUIGEL) 1 % ophthalmic gel     EYE EXAM (SIMPLE-NONBILLABLE)   7. Hypermetropia, right  H52.01 REFRACTIVE STATUS     EYE EXAM (SIMPLE-NONBILLABLE)   8. Myopia, left  H52.12 REFRACTIVE STATUS     EYE EXAM (SIMPLE-NONBILLABLE)   9. Regular astigmatism of both eyes  H52.223 REFRACTIVE STATUS     EYE EXAM (SIMPLE-NONBILLABLE)   10. Presbyopia  H52.4 REFRACTIVE STATUS     EYE EXAM (SIMPLE-NONBILLABLE)      PLAN:     Patient Instructions   Updated glasses prescription provided today.   Recommend filling new glasses. Optional to get bifocal or trifocal, but if you get a bifocal you will lose the area of the lens that is helpful for the  computer and intermediate distances.     Mild cataract right eye. Monitor.     Macular hole left eye consistent with visual acuity.     I recommend trying Refresh Celluvisc. This eyedrop is a hybrid between a drop and a gel. It will last longer on the eye to give you more extended relief. Use up to 4x daily in each eye.     Return in 1 year for comprehensive eye exam, or sooner if needed.     The effects of the dilating drops last for 4- 6 hours.  You will be more sensitive to light and vision will be blurry up close.  Mydriatic sunglasses were given if needed.      Wilder Logan, OD  [facility]  [address] [place] [address]. [place]  [place]  [address] [phone]

## 2020-10-09 ENCOUNTER — ALLIED HEALTH/NURSE VISIT (OUTPATIENT)
Dept: FAMILY MEDICINE | Facility: CLINIC | Age: 85
End: 2020-10-09

## 2020-10-09 ENCOUNTER — ALLIED HEALTH/NURSE VISIT (OUTPATIENT)
Dept: NURSING | Facility: CLINIC | Age: 85
End: 2020-10-09
Payer: COMMERCIAL

## 2020-10-09 VITALS — SYSTOLIC BLOOD PRESSURE: 152 MMHG | DIASTOLIC BLOOD PRESSURE: 70 MMHG

## 2020-10-09 VITALS — SYSTOLIC BLOOD PRESSURE: 142 MMHG | HEART RATE: 78 BPM | DIASTOLIC BLOOD PRESSURE: 72 MMHG

## 2020-10-09 DIAGNOSIS — I10 HYPERTENSION GOAL BP (BLOOD PRESSURE) < 140/90: Primary | ICD-10-CM

## 2020-10-09 DIAGNOSIS — Z01.30 BP CHECK: Primary | ICD-10-CM

## 2020-10-09 PROCEDURE — 99207 PR NO CHARGE NURSE ONLY: CPT | Performed by: INTERNAL MEDICINE

## 2020-10-09 NOTE — PROGRESS NOTES
Jose Manuel Gallo was evaluated at [facility] on October 9, 2020 at which time his blood pressure was:    BP Readings from Last 3 Encounters:   10/09/20 (!) 152/70   07/21/20 118/60   05/24/19 134/70     Pulse Readings from Last 3 Encounters:   07/21/20 59   05/24/19 64   05/11/18 66       Reviewed lifestyle modifications for blood pressure control and reduction: including making healthy food choices, managing weight, getting regular exercise, smoking cessation, reducing alcohol consumption, monitoring blood pressure regularly.     Symptoms: Blurry Vision    BP Goal:< 140/90 mmHg    BP Assessment:  BP too high    Potential Reasons for BP too high: Unknown/Other: feeling off today, fuzzy, blurred vision.     BP Follow-Up Plan: Referral to PCP    Recommendation to Provider: Was immediately sent upstairs to see RN.     Note completed by: Thank you,  Ssisy Kennedy, PharmD  [facility]  [phone]

## 2020-10-09 NOTE — Clinical Note
Routing message to PCP for review because BP checked at pharmacy is above goal. Recommended patient to follow-up with PCP.  PCP please close this encounter.

## 2020-10-09 NOTE — PROGRESS NOTES
Jose Manuel Gallo is a 85 year old male who presents with high blood pressure.  Patient states that today he feels woozy and dizzy. Symptoms have been present for the last few days, but were more noticeable yesterday, today symptoms are a little worse. He also has some blurred vision  Pt takes Lisinopril  No headache, no chest pain, no SOB or difficulty breathing, declines any new weakness or n/t.   BP re-checked on right arm was 142/72    NURSING ASSESSMENT:  Description:  High blood pressure, dizzy, blurred vision, woozy  Onset/duration:  A few days  Precip. factors:  n/a  Associated symptoms:  n/a  Improves/worsens symptoms:  Worse today  Pain scale (0-10)   N/a, pt has chronic pain unchanged  LMP/preg/breast feeding:  n/a  Last exam/Treatment:  n/a  Allergies: No Known Allergies    MEDICATIONS:   Taking medication(s) as prescribed? Yes  Taking over the counter medication(s?) N/A  Any medication side effects? No significant side effects    Any barriers to taking medication(s) as prescribed?  No  Medication(s) improving/managing symptoms?  N/A  Medication reconciliation completed: Yes      NURSING PLAN: Nursing advice to patient Recommended that patient present to UC today for symptoms of dizziness, blurred vision. Unable to huddle with PCP, not in clinic today. No current openings on provider's schedules today. Pt was given directions (address, phone #) for closest UC locations. Reviewed with patient stroke-like symptoms to monitor for.      RECOMMENDED DISPOSITION:  UC, ER if current symptoms worsening or pt develops chest pain, SOB, severe headache, new n/t, new weakness.  Will comply with recommendation: Yes, pt in agreement with plan.    Trudi Jordan, RN, RN

## 2020-11-12 ENCOUNTER — ALLIED HEALTH/NURSE VISIT (OUTPATIENT)
Dept: FAMILY MEDICINE | Facility: CLINIC | Age: 85
End: 2020-11-12
Payer: COMMERCIAL

## 2020-11-12 VITALS — DIASTOLIC BLOOD PRESSURE: 60 MMHG | SYSTOLIC BLOOD PRESSURE: 124 MMHG

## 2020-11-12 DIAGNOSIS — Z01.30 BP CHECK: Primary | ICD-10-CM

## 2020-11-12 PROCEDURE — 99207 PR NO CHARGE NURSE ONLY: CPT | Performed by: INTERNAL MEDICINE

## 2020-11-12 NOTE — PROGRESS NOTES
Jose Manuel Gallo was evaluated at [facility] on November 12, 2020 at which time his blood pressure was:    BP Readings from Last 3 Encounters:   11/12/20 124/60   10/09/20 (!) 152/70   10/09/20 (!) 142/72     Pulse Readings from Last 3 Encounters:   10/09/20 78   07/21/20 59   05/24/19 64       Reviewed lifestyle modifications for blood pressure control and reduction: including making healthy food choices, managing weight, getting regular exercise, smoking cessation, reducing alcohol consumption, monitoring blood pressure regularly.     Symptoms: None    BP Goal:< 140/90 mmHg    BP Assessment:  BP at goal    Potential Reasons for BP too high: NA - Not applicable    BP Follow-Up Plan: Recheck BP in 6 months at pharmacy    Recommendation to Provider: Continue with current plan.     Note completed by: Thank you,  Sissy Kennedy, PharmD  [facility]  [phone]

## 2020-11-23 ENCOUNTER — THERAPY VISIT (OUTPATIENT)
Dept: PHYSICAL THERAPY | Facility: CLINIC | Age: 85
End: 2020-11-23
Attending: FAMILY MEDICINE
Payer: COMMERCIAL

## 2020-11-23 DIAGNOSIS — M53.3 SACROILIAC JOINT PAIN: ICD-10-CM

## 2020-11-23 DIAGNOSIS — M54.50 CHRONIC BILATERAL LOW BACK PAIN, UNSPECIFIED WHETHER SCIATICA PRESENT: ICD-10-CM

## 2020-11-23 DIAGNOSIS — G89.29 CHRONIC BILATERAL LOW BACK PAIN, UNSPECIFIED WHETHER SCIATICA PRESENT: ICD-10-CM

## 2020-11-23 PROCEDURE — 97110 THERAPEUTIC EXERCISES: CPT | Mod: GP | Performed by: PHYSICAL THERAPIST

## 2020-11-23 PROCEDURE — 97161 PT EVAL LOW COMPLEX 20 MIN: CPT | Mod: GP | Performed by: PHYSICAL THERAPIST

## 2020-11-23 NOTE — PROGRESS NOTES
[facility] Initial Evaluation  Subjective:    Patient Health History           General health as reported by patient is good.  Pertinent medical history includes: cancer, high blood pressure, numbness/tingling and smoking.   Red flags:  Unexplained weight loss.  Medical allergies: none.   Surgeries include:  Cancer surgery.    Current medications:  High blood pressure medication.    Current occupation is Retired.                                       Objective:  System    Physical Exam    General     ROS    Assessment/Plan:

## 2020-11-23 NOTE — LETTER
[facility]  [address]  [address]  [place] [address]  [phone]    2020    Re: Jose Manuel Gallo   :   1935  MRN:  [id number]   REFERRING PHYSICIAN:   MD K[facility] PT  Date of Initial Evaluation:  2020  Visits:  Rxs Used: 1  Reason for Referral:     Chronic bilateral low back pain, unspecified whether sciatica present  Sacroiliac joint pain    EVALUATION SUMMARY    [facility] Initial Evaluation  Subjective:  Therapist Generated HPI Evaluation  Problem details: Patient reports the onset of left sided low back pain 3 weeks ago and he says it may be related to raking leaves. He reports a long history of a mild back ache that has persisted for 50 years, per patient report, but it has never been this bad. Patient reports having scoliosis..         Type of problem:  Lumbar.  This is a chronic condition.  Where condition occurred: other.  Patient reports pain:  Lower lumbar spine and lumbar spine left.  Pain is described as aching and is constant.  Pain radiates to:  Gluteals left. Pain is worse during the day.  Since onset symptoms are unchanged.  Associated symptoms:  Loss of motion/stiffness and loss of strength. Symptoms are exacerbated by nothing  and relieved by rest.  Special tests included:  X-ray (see epic).    Work activity restrictions: retired.  Barriers include:  None as reported by patient.    Patient Health History  General health as reported by patient is good.  Pertinent medical history includes: cancer, high blood pressure, numbness/tingling and smoking.   Red flags:  Unexplained weight loss.  Medical allergies: none.   Surgeries include:  Cancer surgery.    Current medications:  High blood pressure medication.    Current occupation is Retired.                       Re: Jose Manuel Gallo   :   1935    Objective:  Standing Alignment:    Cervical/Thoracic:  Forward head  Shoulder/UE:  Rounded  shoulders       Lumbar/SI Evaluation  ROM:    AROM Lumbar:   Flexion:            Finger tips to knee, mild pain  Ext:                    Min limitation, mild pain   Side Bend:        Left:  Finger tips to mid thigh, significant pain    Right:  Finger tips to mid thigh, mild pain  Rotation:           Left:  WNL, sig pain at end range    Right:  WNL, min pain  Side Glide:        Left:     Right:         Lumbar Myotomes:  Lumbar myotomes: grossly 4+/5 bilaterally, pain with resisted left hip flexion.    Lumbar Dermtomes:  normal    Neural Tension/Mobility:    Left side:  SLR; SLR w/DF and Slump positive.   Right side:   SLR positive.  Right side:   SLR w/DF or Slump  negative.     Lumbar Palpation:    Tenderness present at Left:    Piriformis; PSIS and Gluteus Medius  Tenderness not present at Right:  Piriformis or PSIS    Lumbar Provocation:    Left positive with:  PROM hip  Right positive with: PROM hip    Spinal Segmental Conclusions:   Level: Hypo noted at L3, L2, L1, L5 and L4    SI joint/Sacrum:      Left positive at:    Forward bend standing and Forward bend seated  Sacral conclusion left:  Upslip       Joanne Lumbar Evaluation  Test Movements:  FIS: During: produces  After: no effect  Mechanical Response: no effect  Repeat FIS: During: produces  After: no effect  Mechanical Response: no effect  EIS: During: produces  After: no effect  Mechanical Response: no effect  Repeat EIS: During: abolishes  After: better  Mechanical Response: IncROM          Re: Jose Manuel Gallo   :   1935    Assessment/Plan:    Patient is a 85 year old male with lumbar complaints.    Patient has the following significant findings with corresponding treatment plan.                Diagnosis 1:  Acute low back pain  Pain -  hot/cold therapy, manual therapy, self management, education, directional preference exercise and home program  Decreased ROM/flexibility - manual therapy, therapeutic exercise, therapeutic activity and home  program  Decreased joint mobility - manual therapy, therapeutic exercise, therapeutic activity and home program  Decreased strength - therapeutic exercise, therapeutic activities and home program  Decreased function - therapeutic activities and home program  Impaired posture - neuro re-education, therapeutic activities and home program    Therapy Evaluation Codes:   1) History comprised of:   Personal factors that impact the plan of care:      None.    Comorbidity factors that impact the plan of care are:      lumbar scoliosis.     Medications impacting care: None.  2) Examination of Body Systems comprised of:   Body structures and functions that impact the plan of care:      Lumbar spine.   Activity limitations that impact the plan of care are:      Bathing, Bending, Cooking, Dressing, Lifting, Reading/Computer work, Sitting, Squatting/kneeling, Stairs, Standing and Walking.  3) Clinical presentation characteristics are:   Stable/Uncomplicated.  4) Decision-Making    Low complexity using standardized patient assessment instrument and/or measureable assessment of functional outcome.  Cumulative Therapy Evaluation is: Low complexity.    Previous and current functional limitations:  (See Goal Flow Sheet for this information)    Short term and Long term goals: (See Goal Flow Sheet for this information)     Communication ability:  Patient appears to be able to clearly communicate and understand verbal and written communication and follow directions correctly.  Treatment Explanation - The following has been discussed with the patient:   RX ordered/plan of care  Anticipated outcomes  Possible risks and side effects  This patient would benefit from PT intervention to resume normal activities.   Rehab potential is good.    Frequency:  1 X week, once daily  Duration:  for 6 weeks  Discharge Plan:  Achieve all LTG.  Independent in home treatment program.  Reach maximal therapeutic benefit.    Re: Jose Manuel Gallo   :    1935    Please refer to the daily flowsheet for treatment today, total treatment time and time spent performing 1:1 timed codes.       Thank you for your referral.    INQUIRIES  Therapist: Tyrone Anguiano, PT, DPT  [facility]  [address] [address]  [place] [address]  Phone: [phone]  Fax: [phone]

## 2020-11-23 NOTE — PROGRESS NOTES
[facility] Initial Evaluation  Subjective:    Therapist Generated HPI Evaluation  Problem details: Patient reports the onset of left sided low back pain 3 weeks ago and he says it may be related to raking leaves. He reports a long history of a mild back ache that has persisted for 50 years, per patient report, but it has never been this bad. Patient reports having scoliosis..         Type of problem:  Lumbar.    This is a chronic condition.    Where condition occurred: other.  Patient reports pain:  Lower lumbar spine and lumbar spine left.  Pain is described as aching and is constant.  Pain radiates to:  Gluteals left. Pain is worse during the day.  Since onset symptoms are unchanged.  Associated symptoms:  Loss of motion/stiffness and loss of strength. Symptoms are exacerbated by nothing  and relieved by rest.  Special tests included:  X-ray (see epic).    Work activity restrictions: retired.  Barriers include:  None as reported by patient.                        Objective:  Standing Alignment:    Cervical/Thoracic:  Forward head  Shoulder/UE:  Rounded shoulders                             Lumbar/SI Evaluation  ROM:    AROM Lumbar:   Flexion:            Finger tips to knee, mild pain  Ext:                    Min limitation, mild pain   Side Bend:        Left:  Finger tips to mid thigh, significant pain    Right:  Finger tips to mid thigh, mild pain  Rotation:           Left:  WNL, sig pain at end range    Right:  WNL, min pain  Side Glide:        Left:     Right:           Lumbar Myotomes:  Lumbar myotomes: grossly 4+/5 bilaterally, pain with resisted left hip flexion.                Lumbar Dermtomes:  normal                Neural Tension/Mobility:    Left side:  SLR; SLR w/DF and Slump positive.   Right side:   SLR positive.  Right side:   SLR w/DF or Slump  negative.   Lumbar Palpation:    Tenderness present at Left:    Piriformis; PSIS and Gluteus Medius    Tenderness not present at Right:   Piriformis or PSIS    Lumbar Provocation:    Left positive with:  PROM hip  Right positive with: PROM hip  Spinal Segmental Conclusions:     Level: Hypo noted at L3, L2, L1, L5 and L4      SI joint/Sacrum:        Left positive at:    Forward bend standing and Forward bend seated    Sacral conclusion left:  Upslip                                                 Joanne Lumbar Evaluation        Test Movements:  FIS: During: produces  After: no effect  Mechanical Response: no effect  Repeat FIS: During: produces  After: no effect  Mechanical Response: no effect  EIS: During: produces  After: no effect  Mechanical Response: no effect  Repeat EIS: During: abolishes  After: better  Mechanical Response: IncROM                                                     ROS    Assessment/Plan:    Patient is a 85 year old male with lumbar complaints.    Patient has the following significant findings with corresponding treatment plan.                Diagnosis 1:  Acute low back pain  Pain -  hot/cold therapy, manual therapy, self management, education, directional preference exercise and home program  Decreased ROM/flexibility - manual therapy, therapeutic exercise, therapeutic activity and home program  Decreased joint mobility - manual therapy, therapeutic exercise, therapeutic activity and home program  Decreased strength - therapeutic exercise, therapeutic activities and home program  Decreased function - therapeutic activities and home program  Impaired posture - neuro re-education, therapeutic activities and home program    Therapy Evaluation Codes:   1) History comprised of:   Personal factors that impact the plan of care:      None.    Comorbidity factors that impact the plan of care are:      lumbar scoliosis.     Medications impacting care: None.  2) Examination of Body Systems comprised of:   Body structures and functions that impact the plan of care:      Lumbar spine.   Activity limitations that impact the plan of care  are:      Bathing, Bending, Cooking, Dressing, Lifting, Reading/Computer work, Sitting, Squatting/kneeling, Stairs, Standing and Walking.  3) Clinical presentation characteristics are:   Stable/Uncomplicated.  4) Decision-Making    Low complexity using standardized patient assessment instrument and/or measureable assessment of functional outcome.  Cumulative Therapy Evaluation is: Low complexity.    Previous and current functional limitations:  (See Goal Flow Sheet for this information)    Short term and Long term goals: (See Goal Flow Sheet for this information)     Communication ability:  Patient appears to be able to clearly communicate and understand verbal and written communication and follow directions correctly.  Treatment Explanation - The following has been discussed with the patient:   RX ordered/plan of care  Anticipated outcomes  Possible risks and side effects  This patient would benefit from PT intervention to resume normal activities.   Rehab potential is good.    Frequency:  1 X week, once daily  Duration:  for 6 weeks  Discharge Plan:  Achieve all LTG.  Independent in home treatment program.  Reach maximal therapeutic benefit.    Please refer to the daily flowsheet for treatment today, total treatment time and time spent performing 1:1 timed codes.

## 2020-12-01 ENCOUNTER — OFFICE VISIT (OUTPATIENT)
Dept: UROLOGY | Facility: CLINIC | Age: 85
End: 2020-12-01
Payer: COMMERCIAL

## 2020-12-01 DIAGNOSIS — Z85.51 HISTORY OF BLADDER CANCER: Primary | ICD-10-CM

## 2020-12-01 PROCEDURE — 52000 CYSTOURETHROSCOPY: CPT | Performed by: UROLOGY

## 2020-12-01 NOTE — PROGRESS NOTES
S: Jose Manuel Gallo is a 85 year old male returns for bladder cancer surveillance.   he has history of bladder cancer Grade 3 non-invasive, Stage ta, s/p turbt.   He received 2 courses of BCG therapy.   Cysto: The anterior urethra showed mild stricture   The prostatic urethra turp changes.   The length is 0cm, the coaptation is 0 cm.   In the bladder there is normal mucosa.   Assessment/Plan: V10.51B History of bladder cancer (primary encounter diagnosis)   Comment: no evidence of recurrence   Plan: CYSTOURETHROSCOPY   BTR in one year

## 2021-01-05 ENCOUNTER — VIRTUAL VISIT (OUTPATIENT)
Dept: INTERNAL MEDICINE | Facility: CLINIC | Age: 86
End: 2021-01-05
Payer: COMMERCIAL

## 2021-01-05 DIAGNOSIS — R63.4 UNINTENTIONAL WEIGHT LOSS: ICD-10-CM

## 2021-01-05 DIAGNOSIS — I73.00 RAYNAUD'S PHENOMENON WITHOUT GANGRENE: ICD-10-CM

## 2021-01-05 DIAGNOSIS — R09.89 RUNNY NOSE: ICD-10-CM

## 2021-01-05 DIAGNOSIS — M54.2 NECK PAIN: Primary | ICD-10-CM

## 2021-01-05 PROCEDURE — 99214 OFFICE O/P EST MOD 30 MIN: CPT | Mod: 95 | Performed by: INTERNAL MEDICINE

## 2021-01-05 NOTE — PROGRESS NOTES
Jose Manuel Gallo is a 85 year old male who is being evaluated via a billable telephone visit.      What phone number would you like to be contacted at? [phone]  How would you like to obtain your AVS? Mail a copy  Assessment & Plan     Neck pain  See as detailed below . Patient has had about a month of symptoms . It's not a cervical radiculopathy nor a clear cut cervical degenerative disc disease or cervical degenerative joint disease although that is what I suspect. 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 Patient has noted his nasal drainage has flare-ups mostly when he smells food. He's tried antihistamines and fluticasone (FLONASE) 50 MCG/ACT nasal spray without benefit. At this point patients best logical next step is to review his symptoms with an Ear, Nose, and Throat specialist   - OTOLARYNGOLOGY REFERRAL    Raynaud's phenomenon without gangrene  Patients symptoms are most consistent with Raynauds phenomenon which is fundamentally a benign condition. Certainly he's had no symptoms of gangrene and so I don't see this as needing further evaluation.    Unintentional weight loss  This is a fundamentally small amount of weight and has been slow over greater then a year. This is just not associated with other symptoms to suggest a pathological process and this is more consistent with age related issues . Patient was comfortable  With a posture of observation  And further workup is deferred for right now      19 minutes spent on the date of the encounter doing patient visit         Return in about 6 months (around 7/5/2021) for Routine Visit.    Orlando Braxton MD  [facility]     Jose Manuel Gallo is a 85 year old male who presents to  clinic today for the following health issues   HPI     Chief Complaint   Patient presents with     Pain     neck pain and tingling, left side of neck tingling, itching, and pain, not red      Nasal Congestion     Nasal drip, constant      Weight Loss     recent weight loss      Minor Surgery     talk about diagnosis        Review of Systems   Constitutional, HEENT, cardiovascular, pulmonary, gi and gu systems are negative, except as otherwise noted.      Objective           Vitals:  No vitals were obtained today due to virtual visit.    Physical Exam   healthy, alert and no distress  PSYCH: Alert and oriented times 3; coherent speech, normal   rate and volume, able to articulate logical thoughts, able   to abstract reason, no tangential thoughts, no hallucinations   or delusions  His affect is normal  RESP: No cough, no audible wheezing, able to talk in full sentences  Remainder of exam unable to be completed due to telephone visits    See note for details.        Phone call duration: 19 minutes    11:16 AM  11:37 AM     Tingling and numbess type sensation left side of neck, last 5-10 minutes , itching afterwards down the neck. This has been a few months not getting worse , nearly every hour this is happening    One other thing - constant nasal dripping, especially when eating. Getting worse, started 5+ years     Also weight loss - 15 pounds and loss of strength but this has been very gradual and over a 3 year period now    Toes turn almost purple Raynauds phenomenon is the problem , when he comes in from the cold he has purplish colored toes and these take 30-45 to warm up. This has been also for several years he's not sure how long    Now 4-6 weeks middle insomnia new. We recommended a trial of Melatonin 5 milligrams at night    Wt Readings from Last 5 Encounters:   07/21/20 72.1 kg (159 lb)   05/24/19 75.1 kg (165 lb 9.6 oz)   05/11/18 75.8 kg (167 lb)   11/27/17 73 kg (161 lb)   07/13/17 73.5 kg (162 lb)      Current weight is 155

## 2021-01-15 ENCOUNTER — HEALTH MAINTENANCE LETTER (OUTPATIENT)
Age: 86
End: 2021-01-15

## 2021-01-19 ENCOUNTER — ANCILLARY PROCEDURE (OUTPATIENT)
Dept: GENERAL RADIOLOGY | Facility: CLINIC | Age: 86
End: 2021-01-19
Payer: COMMERCIAL

## 2021-01-19 ENCOUNTER — MYC MEDICAL ADVICE (OUTPATIENT)
Dept: FAMILY MEDICINE | Facility: CLINIC | Age: 86
End: 2021-01-19

## 2021-01-19 DIAGNOSIS — M54.2 NECK PAIN: ICD-10-CM

## 2021-01-19 PROCEDURE — 72040 X-RAY EXAM NECK SPINE 2-3 VW: CPT | Performed by: RADIOLOGY

## 2021-03-01 DIAGNOSIS — E78.5 HYPERLIPIDEMIA LDL GOAL <130: ICD-10-CM

## 2021-03-03 RX ORDER — SIMVASTATIN 40 MG
TABLET ORAL
Qty: 90 TABLET | Refills: 0 | Status: SHIPPED | OUTPATIENT
Start: 2021-03-03 | End: 2021-06-08

## 2021-03-31 ENCOUNTER — OFFICE VISIT (OUTPATIENT)
Dept: FAMILY MEDICINE | Facility: CLINIC | Age: 86
End: 2021-03-31
Payer: COMMERCIAL

## 2021-03-31 VITALS
TEMPERATURE: 96.9 F | HEIGHT: 71 IN | WEIGHT: 166 LBS | SYSTOLIC BLOOD PRESSURE: 150 MMHG | DIASTOLIC BLOOD PRESSURE: 92 MMHG | BODY MASS INDEX: 23.24 KG/M2 | HEART RATE: 59 BPM

## 2021-03-31 DIAGNOSIS — L03.119 CELLULITIS OF HAND: Primary | ICD-10-CM

## 2021-03-31 DIAGNOSIS — M25.512 LEFT SHOULDER PAIN, UNSPECIFIED CHRONICITY: ICD-10-CM

## 2021-03-31 PROCEDURE — 99213 OFFICE O/P EST LOW 20 MIN: CPT | Performed by: FAMILY MEDICINE

## 2021-03-31 RX ORDER — CEPHALEXIN 500 MG/1
500 CAPSULE ORAL 3 TIMES DAILY
Qty: 30 CAPSULE | Refills: 0 | Status: SHIPPED | OUTPATIENT
Start: 2021-03-31 | End: 2021-04-10

## 2021-03-31 ASSESSMENT — MIFFLIN-ST. JEOR: SCORE: 1459.06

## 2021-03-31 NOTE — PROGRESS NOTES
"         Subjective   Enmanuel is a 86 year old who presents for the following health issues     HPI     Fell and injured right hand and now thinks it might be infected  He has also been feeling dizzy today      Review of Systems   Tripped 5 days ago, hit hand and scraped up    Hand and shoulder better after ibuprofen today     Dizziness ongoing, comes and goes    Not real well hydrated        Objective    BP (!) 173/74 (BP Location: Left arm, Patient Position: Chair, Cuff Size: Adult Regular)   Pulse 59   Temp 96.9  F (36.1  C) (Oral)   Ht 1.81 m (5' 11.25\")   Wt 75.3 kg (166 lb)   BMI 22.99 kg/m    Body mass index is 22.99 kg/m .  Physical Exam    Full physical not done     Mentation and affect are fine    No tremor of speech or extremity    On right hand patient has two arrow shaped abrasions / evidence of skin tear over distal dorsum of hand near base of index and middle fingers  Scab/ crusting present, no drainage    Sensation and range of motion of fingers/ hand normal though per patient feels a bit tight    He dose have about 2 cm of erythema surrounding the wounds, not real warm and nontender    No red streaks going up hand/ arm    Left shoulder nontender     Range of motion of shoulder fine             ASSESSMENT / PLAN:  (L03.119) Cellulitis of hand  (primary encounter diagnosis)  Comment: prudent to cover for cellulitis.  Start antibiotics.  If symptoms worsen, to emergency room.  He understands and agrees.  Plan: cephALEXin (KEFLEX) 500 MG capsule             (M25.512) Left shoulder pain, unspecified chronicity  Comment: symptoms resolved, exam very reassuring  Plan: as above       I reviewed the patient's medications, allergies, medical history, family history, and social history.    Omkar Munoz MD              "

## 2021-03-31 NOTE — PATIENT INSTRUCTIONS
Take the antibiotics 3x daily    Get at least two doses in today    If hand worsens, go to emergency room     Increase hydration    Call/ return to clinic with problems/ questions

## 2021-04-09 ENCOUNTER — NURSE TRIAGE (OUTPATIENT)
Dept: NURSING | Facility: CLINIC | Age: 86
End: 2021-04-09

## 2021-04-09 ENCOUNTER — ALLIED HEALTH/NURSE VISIT (OUTPATIENT)
Dept: NURSING | Facility: CLINIC | Age: 86
End: 2021-04-09
Payer: COMMERCIAL

## 2021-04-09 VITALS
TEMPERATURE: 97.6 F | SYSTOLIC BLOOD PRESSURE: 161 MMHG | OXYGEN SATURATION: 100 % | WEIGHT: 168.5 LBS | BODY MASS INDEX: 23.34 KG/M2 | HEART RATE: 56 BPM | DIASTOLIC BLOOD PRESSURE: 78 MMHG

## 2021-04-09 DIAGNOSIS — R42 DIZZINESS: Primary | ICD-10-CM

## 2021-04-09 DIAGNOSIS — H53.8 BLURRED VISION: ICD-10-CM

## 2021-04-09 PROCEDURE — 99207 PR NO CHARGE NURSE ONLY: CPT

## 2021-04-09 NOTE — NURSING NOTE
Jose Manuel Gallo is a 86 year old male who presents with Dizziness and blurred vision.    NURSING ASSESSMENT:  Description:  Pt states he fell 2 weeks ago. When he fell, he braced his head and hurt his left shoulder and right hand. He was seen in clinic for the laceration last week. States today at 11am was having dizzy spells and blurred vision and the spells had worsened. Thinks he may have lost consciousness when he fell.   Onset/duration:  2 weeks ago  Precip. factors:  Fall  Associated symptoms:  Denies headache, one sided weakness, denies chest pain, denies SOB  Improves/worsens symptoms:  States symptoms have improved since 11am  Last exam/Treatment:  3/31/2021  Allergies: No Known Allergies    MEDICATIONS:   Taking medication(s) as prescribed? N/A  Taking over the counter medication(s?) N/A  Any medication side effects? Not Applicable    Any barriers to taking medication(s) as prescribed?  N/A  Medication(s) improving/managing symptoms?  N/A  Medication reconciliation completed: No      NURSING PLAN: Huddle with provider, plan includes Huddled with Dr. Braxton. He advised that concussion is a clinical diagnosis. He recommended pt be seen by provider on Monday 4/12. Appt scheduled. Pt to go to UC/ER if symptoms worsen or if he develops chest pain, SOB, one sided weakness needs to be evaluated asap over the weekend in ER.    RECOMMENDED DISPOSITION:  Home care advice - Pt to be seen in clinic on 4/12. Appt scheduled  Will comply with recommendation: Yes  If further questions/concerns or if symptoms do not improve, worsen or new symptoms develop, call your PCP or [facility] Nurse Advisors as soon as possible.      Guideline used:  Telephone Triage Protocols for Nurses, Fifth Edition, Chaenl Dillon RN

## 2021-04-09 NOTE — TELEPHONE ENCOUNTER
Enmanuel calls in to report a fall 2 weeks ago and he did hit his head he reports.  He states that ha increasing issues of dizziness, blurred vision and some confusion.  Per protocol he should be  Seen today .  If in clinic that is fine, if not go to urgent care or ED.  Enmanuel agrees to this plan.     Additional Information    Concussion symptoms are worsening    Protocols used: CONCUSSION (MT[facility]) LESS THAN 14 DAYS AGO FOLLOW-UP CALL-A-[facility]

## 2021-04-12 ENCOUNTER — OFFICE VISIT (OUTPATIENT)
Dept: INTERNAL MEDICINE | Facility: CLINIC | Age: 86
End: 2021-04-12
Payer: COMMERCIAL

## 2021-04-12 VITALS
HEART RATE: 63 BPM | SYSTOLIC BLOOD PRESSURE: 140 MMHG | TEMPERATURE: 98.2 F | BODY MASS INDEX: 22.44 KG/M2 | OXYGEN SATURATION: 96 % | WEIGHT: 162 LBS | RESPIRATION RATE: 16 BRPM | DIASTOLIC BLOOD PRESSURE: 62 MMHG

## 2021-04-12 DIAGNOSIS — I73.9 PVD (PERIPHERAL VASCULAR DISEASE) (H): ICD-10-CM

## 2021-04-12 DIAGNOSIS — H53.8 BLURRED VISION: ICD-10-CM

## 2021-04-12 DIAGNOSIS — R42 DIZZINESS: ICD-10-CM

## 2021-04-12 DIAGNOSIS — M54.81 OCCIPITAL NEURALGIA OF LEFT SIDE: ICD-10-CM

## 2021-04-12 DIAGNOSIS — N18.31 STAGE 3A CHRONIC KIDNEY DISEASE (H): ICD-10-CM
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
## 2021-04-12 PROCEDURE — 99214 OFFICE O/P EST MOD 30 MIN: CPT | Performed by: INTERNAL MEDICINE

## 2021-04-12 NOTE — PROGRESS NOTES
"    Assessment & Plan     Fall, subsequent encounter  Today's appointment is needed to address several different patient concerns . The main unifying point at this time is that patient sustained a fall, roughly 2 and a 1/2 weeks ago. He feel forwards as he tripped over something he did not see and this was due to bad luck and not a pre-fall loss of consciousness or unusual level of dizziness symptoms. This patient has a complicated history in that he has a pre-existing condition of a chronic dizziness symptoms that has defied a precise diagnosis. It has not responded to vestibular reconditioning and treatments aimed at a vestibular cause and neither has orthostatic hypotension but clearly dilineated. As a general rule of thumb, he's just learned to live his symptoms which would be described most accurately in my opinion as chronic disequilibrium concerns but not enough to prompt patient to use a walker or cane. And indeed in point of fact he's not had falls much at all. Unfortunately after his fall from 2.5 weeks ago he just feels he's had some newer somewhat worsened dizziness symptoms . More of a sense of a] blurry vision b] just worsened \" fogginess\". As before he's a difficult to pin down historian. His neurology checks today are perfect and his exam is without change from what we've seen before. At this point we are primarily going to assume a posture of observation . The most likely diagnosis is a mild post-concussive syndrome . In the great majority of times this is a condition that is a self-limited illness . I recommended that if his symptoms are persistent and without change past 2-4 weeks the logical next step is  A] simple non-contrasted head CT scan   B] neurological consultation     Dizziness  As above     Blurred vision  This patient had his usual eye exams roughly 6 months ago. Now he can't read. This doesn't make sense to me. We need an ophthalmologist to evaluate his vision and his eye glass " "prescription. His description of symptoms make it not completely clear to me that his symptoms are completely due to his fall.    Occipital neuralgia of left side  A separate matter. He has a persistent tingling in the left sided occiput in a way that is most consistent with a diagnosis of occipital neuralgia , this isn't at a severe level. We discussed neurological consultation, and possibly steroid injections or medications , for now he's just going to keep an eye on things     PVD (peripheral vascular disease) (H)  Problem is stable and ongoing monitoring      Stage 3a chronic kidney disease  Problem is stable and ongoing monitoring      [id number]}      Return in about 6 months (around 10/12/2021).    Orlando Braxton MD  [facility] ALIREZA Singer is a 86 year old who presents for the following health issues   Encounter Diagnoses   Name Primary?     Fall, subsequent encounter Yes     Dizziness      Blurred vision      Occipital neuralgia of left side      PVD (peripheral vascular disease) (H)      Stage 3a chronic kidney disease        I had a status report with this patient actually last Friday 4/9/2021, due to a [facility] Triage RN exam with patient, and we agreed to see you today and that I felt was an adequate plan of care.    By the way is status post Pfizer Covid 19 vaccine both vaccinations.    Patient sustained a fall 2 and 1/2 weeks ago, he fell forwards and did put his hands over his face. He suspects he had a total loss of consciousness of about a minute or so. He came out of this virtually immediately. Emergency medical personnel did come and gave him \" the whole 9 yards\". He had some pretty good bleeding with his laceration from the dorsal surface of left hand. But no emergency room evaluation ever happened.    He remains with a persistent pain with his left shoulder , which is only since his fall.    He has had no xrays since this fall.    Patient has symptoms of being dizziness " symptoms - worse with rapidly or slowly rising and standing up too fast , to some extent this is indeed a rotatory dysequilibrium feeling. This feeling is always there, just more then before. 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 Anatomically, eyes seem fine.    Basic questions is all these symptoms not only not getting better but maybe even getting worse.      HPI   Chief Complaint   Patient presents with     Fall     x2 weeks ago, fell on hand, and left shoulder in pain     Dizziness     worse since the fall x2 weeks ago      Eye Problem     blurry vision since fall x2 weeks      Nasal Congestion     nasal drip      Finally patient has a refractory chronic vasomotor rhinitis which is reviewed today , consider Ear, Nose, and Throat specialist consultation if still concerned     Review of Systems   Constitutional, HEENT, cardiovascular, pulmonary, gi and gu systems are negative, except as otherwise noted.      Objective    BP (!) 148/72   Pulse 63   Temp 98.2  F (36.8  C) (Oral)   Resp 16   Wt 73.5 kg (162 lb)   SpO2 96%   BMI 22.44 kg/m    Body mass index is 22.44 kg/m .  Physical Exam   GENERAL: healthy, alert and no distress  NECK: no adenopathy, no asymmetry, masses, or scars and thyroid normal to palpation  RESP: lungs clear to auscultation - no rales, rhonchi or wheezes  CV: regular rate and rhythm, normal S1 S2, no S3 or S4, no murmur, click or rub, no peripheral edema and peripheral pulses strong  MS: no gross musculoskeletal defects noted, no  edema  NEURO: Normal strength and tone, mentation intact and speech normal, negative Romberg's test , normal cranial nerve exam    No orders of the defined types were placed in this encounter.

## 2021-04-25 ENCOUNTER — MYC MEDICAL ADVICE (OUTPATIENT)
Dept: INTERNAL MEDICINE | Facility: CLINIC | Age: 86
End: 2021-04-25

## 2021-04-28 ENCOUNTER — MYC MEDICAL ADVICE (OUTPATIENT)
Dept: INTERNAL MEDICINE | Facility: CLINIC | Age: 86
End: 2021-04-28

## 2021-04-28 DIAGNOSIS — M25.512 CHRONIC LEFT SHOULDER PAIN: Primary | ICD-10-CM

## 2021-04-28 DIAGNOSIS — G89.29 CHRONIC LEFT SHOULDER PAIN: Primary | ICD-10-CM

## 2021-04-28 NOTE — TELEPHONE ENCOUNTER
Patient would like her prescription for gabapentin changed over to a different pharmacy to [facility] it is cheaper. She will need a 6 week supply she will be out of town that long.   See MyChart jorge Braxton MD

## 2021-04-30 NOTE — PROGRESS NOTES
SUBJECTIVE:  Jose Manuel Gallo is a 86 year old male who is seen in consultation at the request of Orlando Braxton MD for left shoulder injury that occurred that started/occurred  5 weeks ago.   Cause: Following acute injury.  Mechanism of injury:  Fell forward with arm outstretched..  No pain immediately  Gradually over time the shoulder has worsented  Present symptoms: pain with ADL's (dressing),  pain with overhead activities,  pain reaching behind back,  pain reaching out or away from body (flexion/ abduction),  positional night pain,  pain lifting,  no weakness with lifting,  no popping/snapping  Pain location: lateral shoulder and posterior  Associated symptoms: none    Treatment up to this point:ice, NSAIDS and analgesic balm  Prior history of related problems: no prior problems with this area in the past      Past Medical History:   Diagnosis Date     Cataract, mild, od; mod, os 7/2/2012     Chronic low back pain      CKD (chronic kidney disease) stage 3, GFR 30-59 ml/min      Erectile dysfunction      Hemorrhoids      History of bladder cancer          HTN      Hyperlipidemia LDL goal <130      Kidney stones      Macular degeneration      Osteoarthritis      Personal history of colonic polyps     10/2002     Personal history of malignant neoplasm of bladder      Personal history of tobacco use     Smoked 40 years, Quit 2003     PVD (peripheral vascular disease) (H)     Left Vertebral Artery occlusion       Past Surgical History:   Procedure Laterality Date     C SPINAL FUSION,ANT,EA ADNL LEVEL      C6-7     CATARACT IOL, RT/LT       COLONOSCOPY  11/02/2008    Normal     HC REVISE ULNAR NERVE AT ELBOW      Left     LASER YAG CAPSULOTOMY  7/2016    left eye     PHACOEMULSIFICATION WITH STANDARD INTRAOCULAR LENS IMPLANT  7/2012    left eye     TURP  1997 ?     VASECTOMY       VITRECTOMY PARSPLANA  8/2011    left eye, repair macular hole       REVIEW OF SYSTEMS:  CONSTITUTIONAL:  NEGATIVE for fever,  "chills, change in weight  INTEGUMENTARY/SKIN:  NEGATIVE for worrisome rashes, moles or lesions  EYES:  NEGATIVE for vision changes or irritation  ENT/MOUTH:  NEGATIVE for ear, mouth and throat problems  RESP:  NEGATIVE for significant cough or SOB  BREAST:  NEGATIVE for masses, tenderness or discharge  CV:  NEGATIVE for chest pain, palpitations or peripheral edema  GI:  NEGATIVE for nausea, abdominal pain, heartburn, or change in bowel habits  :  Negative   MUSCULOSKELETAL:  See HPI above  NEURO:  NEGATIVE for weakness, dizziness or paresthesias  ENDOCRINE:  NEGATIVE for temperature intolerance, skin/hair changes  HEME/ALLERGY/IMMUNE:  NEGATIVE for bleeding problems  PSYCHIATRIC:  NEGATIVE for changes in mood or affect    OBJECTIVE:  /76 (BP Location: Left arm, Patient Position: Sitting, Cuff Size: Adult Regular)   Pulse 64   Ht 1.81 m (5' 11.25\")   Wt 73.8 kg (162 lb 9.6 oz)   SpO2 96%   BMI 22.52 kg/m       GENERAL: healthy, alert and no distress  GAIT: normal   SKIN: no suspicious lesions or rashes  NEURO: Normal strength and tone, mentation intact and speech normal  VASCULAR:  normal pulses and cappillary refill   PSYCH:  mentation appears normal and affect normal/bright    MUSCULOSKELETAL:    NECK:  Cervical range of motion: limited,  painfree, and does not cause shoulder pain or reproduce shoulder pain.  Sensory deficits:  none  Motor deficits:  none    SHOULDER:  Shoulder Inspection: no swelling, bruising, discoloration, or obvious deformity or asymmetry  symmetrical muscle atrophy bilateral     Tender: proximal bicep tendon and greater tuberosity  Non-tender: AC joint  Range of Motion:   Active:forward flexion 140 degrees, internal rotation  L1   Passive: forward flexion 150 degrees, external rotation  60 degrees  Impingement: all grade 2 positive  Strength: forward flexion 5/5, External rotation 5/5  Liftoff: Able     Special tests: Speed: Positive  Anterior Drawer: 0  Posterior Drawer: " 0  Spurling's: Negative  O'Maral (SLAP/biceps):  Positive    X-RAY INTERPRETATION  Essentially normal. Some osteopenia.    MRI:    none    ASSESSMENT    ICD-10-CM    1. Superior glenoid labrum lesion of left shoulder, initial encounter  S43.432A    2. Chronic left shoulder pain  M25.512 XR Shoulder Left G/E 3 Views    G89.29 Orthopedic & Spine  Referral   I suspect a SLAP tear, possible small anterior supraspinatus tear    PLAN:   MRI obtained. Follow up in the office / virtual visit/ MyChart for the results.  We will direct treatment based on the MRI findings.    DESTINEE Hastings MD  Dept. Orthopedic Surgery  [facility]

## 2021-05-05 ENCOUNTER — ANCILLARY PROCEDURE (OUTPATIENT)
Dept: GENERAL RADIOLOGY | Facility: CLINIC | Age: 86
End: 2021-05-05
Attending: ORTHOPAEDIC SURGERY
Payer: COMMERCIAL

## 2021-05-05 ENCOUNTER — OFFICE VISIT (OUTPATIENT)
Dept: ORTHOPEDICS | Facility: CLINIC | Age: 86
End: 2021-05-05
Attending: INTERNAL MEDICINE
Payer: COMMERCIAL

## 2021-05-05 VITALS
SYSTOLIC BLOOD PRESSURE: 129 MMHG | HEART RATE: 64 BPM | OXYGEN SATURATION: 96 % | BODY MASS INDEX: 22.76 KG/M2 | WEIGHT: 162.6 LBS | HEIGHT: 71 IN | DIASTOLIC BLOOD PRESSURE: 76 MMHG

## 2021-05-05 DIAGNOSIS — M25.512 CHRONIC LEFT SHOULDER PAIN: ICD-10-CM

## 2021-05-05 DIAGNOSIS — G89.29 CHRONIC LEFT SHOULDER PAIN: ICD-10-CM

## 2021-05-05 DIAGNOSIS — S43.432A SUPERIOR GLENOID LABRUM LESION OF LEFT SHOULDER, INITIAL ENCOUNTER: Primary | ICD-10-CM

## 2021-05-05 PROCEDURE — 73030 X-RAY EXAM OF SHOULDER: CPT | Mod: LT | Performed by: RADIOLOGY

## 2021-05-05 PROCEDURE — 99204 OFFICE O/P NEW MOD 45 MIN: CPT | Performed by: ORTHOPAEDIC SURGERY

## 2021-05-05 ASSESSMENT — MIFFLIN-ST. JEOR: SCORE: 1443.64

## 2021-05-05 NOTE — LETTER
5/5/2021         RE: Jose Manuel Gallo  [address] [place] [address]        Dear Colleague,    Thank you for referring your patient, Jose Manuel Gallo, to the [facility]. Please see a copy of my visit note below.    SUBJECTIVE:  Jose Manuel Gallo is a 86 year old male who is seen in consultation at the request of Orlando Braxton MD for left shoulder injury that occurred that started/occurred  5 weeks ago.   Cause: Following acute injury.  Mechanism of injury:  Fell forward with arm outstretched..  No pain immediately  Gradually over time the shoulder has worsented  Present symptoms: pain with ADL's (dressing),  pain with overhead activities,  pain reaching behind back,  pain reaching out or away from body (flexion/ abduction),  positional night pain,  pain lifting,  no weakness with lifting,  no popping/snapping  Pain location: lateral shoulder and posterior  Associated symptoms: none    Treatment up to this point:ice, NSAIDS and analgesic balm  Prior history of related problems: no prior problems with this area in the past      Past Medical History:   Diagnosis Date     Cataract, mild, od; mod, os 7/2/2012     Chronic low back pain      CKD (chronic kidney disease) stage 3, GFR 30-59 ml/min      Erectile dysfunction      Hemorrhoids      History of bladder cancer          HTN      Hyperlipidemia LDL goal <130      Kidney stones      Macular degeneration      Osteoarthritis      Personal history of colonic polyps     10/2002     Personal history of malignant neoplasm of bladder      Personal history of tobacco use     Smoked 40 years, Quit 2003     PVD (peripheral vascular disease) (H)     Left Vertebral Artery occlusion       Past Surgical History:   Procedure Laterality Date     C SPINAL FUSION,ANT,EA ADNL LEVEL      C6-7     CATARACT IOL, RT/LT       COLONOSCOPY  11/02/2008    Normal     HC REVISE ULNAR NERVE AT ELBOW      Left     LASER YAG CAPSULOTOMY  7/2016     "left eye     PHACOEMULSIFICATION WITH STANDARD INTRAOCULAR LENS IMPLANT  7/2012    left eye     TURP  1997 ?     VASECTOMY       VITRECTOMY PARSPLANA  8/2011    left eye, repair macular hole       REVIEW OF SYSTEMS:  CONSTITUTIONAL:  NEGATIVE for fever, chills, change in weight  INTEGUMENTARY/SKIN:  NEGATIVE for worrisome rashes, moles or lesions  EYES:  NEGATIVE for vision changes or irritation  ENT/MOUTH:  NEGATIVE for ear, mouth and throat problems  RESP:  NEGATIVE for significant cough or SOB  BREAST:  NEGATIVE for masses, tenderness or discharge  CV:  NEGATIVE for chest pain, palpitations or peripheral edema  GI:  NEGATIVE for nausea, abdominal pain, heartburn, or change in bowel habits  :  Negative   MUSCULOSKELETAL:  See HPI above  NEURO:  NEGATIVE for weakness, dizziness or paresthesias  ENDOCRINE:  NEGATIVE for temperature intolerance, skin/hair changes  HEME/ALLERGY/IMMUNE:  NEGATIVE for bleeding problems  PSYCHIATRIC:  NEGATIVE for changes in mood or affect    OBJECTIVE:  /76 (BP Location: Left arm, Patient Position: Sitting, Cuff Size: Adult Regular)   Pulse 64   Ht 1.81 m (5' 11.25\")   Wt 73.8 kg (162 lb 9.6 oz)   SpO2 96%   BMI 22.52 kg/m       GENERAL: healthy, alert and no distress  GAIT: normal   SKIN: no suspicious lesions or rashes  NEURO: Normal strength and tone, mentation intact and speech normal  VASCULAR:  normal pulses and cappillary refill   PSYCH:  mentation appears normal and affect normal/bright    MUSCULOSKELETAL:    NECK:  Cervical range of motion: limited,  painfree, and does not cause shoulder pain or reproduce shoulder pain.  Sensory deficits:  none  Motor deficits:  none    SHOULDER:  Shoulder Inspection: no swelling, bruising, discoloration, or obvious deformity or asymmetry  symmetrical muscle atrophy bilateral     Tender: proximal bicep tendon and greater tuberosity  Non-tender: AC joint  Range of Motion:   Active:forward flexion 140 degrees, internal rotation  " L1   Passive: forward flexion 150 degrees, external rotation  60 degrees  Impingement: all grade 2 positive  Strength: forward flexion 5/5, External rotation 5/5  Liftoff: Able     Special tests: Speed: Positive  Anterior Drawer: 0  Posterior Drawer: 0  Spurling's: Negative  O'Maral (SLAP/biceps):  Positive    X-RAY INTERPRETATION  Essentially normal. Some osteopenia.    MRI:    none    ASSESSMENT    ICD-10-CM    1. Superior glenoid labrum lesion of left shoulder, initial encounter  S43.432A    2. Chronic left shoulder pain  M25.512 XR Shoulder Left G/E 3 Views    G89.29 Orthopedic & Spine  Referral   I suspect a SLAP tear, possible small anterior supraspinatus tear    PLAN:   MRI obtained. Follow up in the office / virtual visit/ MyChart for the results.  We will direct treatment based on the MRI findings.    DESTINEE Hastings MD  Dept. Orthopedic Surgery  [facility]      Again, thank you for allowing me to participate in the care of your patient.        Sincerely,        Rodrigo Hastings MD

## 2021-05-06 ENCOUNTER — NURSE TRIAGE (OUTPATIENT)
Dept: NURSING | Facility: CLINIC | Age: 86
End: 2021-05-06

## 2021-05-06 NOTE — TELEPHONE ENCOUNTER
Patient wanted to know the plan from his ov yesterday.    Advised that he is to have the MRI and the treatment plan will be based from the MRI findings.    Cristel Mcclain RN  [facility] Nurse Advisor  1:13 PM  5/6/2021    Additional Information    Question about upcoming scheduled test, no triage required and triager able to answer question    Protocols used: INFORMATION ONLY CALL-A-AH

## 2021-05-13 ENCOUNTER — ANCILLARY PROCEDURE (OUTPATIENT)
Dept: MRI IMAGING | Facility: CLINIC | Age: 86
End: 2021-05-13
Attending: ORTHOPAEDIC SURGERY
Payer: COMMERCIAL

## 2021-05-13 DIAGNOSIS — S43.432A SUPERIOR GLENOID LABRUM LESION OF LEFT SHOULDER, INITIAL ENCOUNTER: ICD-10-CM

## 2021-05-13 PROCEDURE — 73221 MRI JOINT UPR EXTREM W/O DYE: CPT | Mod: LT | Performed by: RADIOLOGY

## 2021-05-19 NOTE — PROGRESS NOTES
SUBJECTIVE:  Jose Manuel Gallo returns for MRI results from 5/13 of the left shoulder, which are reviewed with the patient by myself and showed:  Motion partially compromising assessment.  1. Rotator cuff tendinosis without high-grade tear. Focal subchondral  edema of the anterior facet of greater tuberosity, query enthesitis.  2. Strain of the infraspinatus.  3. Query mild subacromial/subdeltoid bursitis.  4. Mild acromioclavicular joint degenerative change with subacromial  enthesophyte.  5. Extensive subchondral cystlike changes of the posteroinferior  glenoid which is intimate with triceps proximal attachment without  substantial cartilage abnormality, likely reflecting intra-osseous  ganglion cysts.    Review of Systems:  Constitutional/General: Negative for fever, chills, change in weight  Integumentary/Skin: Negative for worrisome rashes, moles, or lesions  Neuro: Negative for weakness, dizziness, or paresthesias   Psychiatric: negative for changes in mood or affect    OBJECTIVE:  Physical Exam:  /53 (BP Location: Left arm, Patient Position: Sitting)   Pulse 58   Wt 73.5 kg (162 lb)   SpO2 97%   BMI 22.44 kg/m    General Appearance: healthy, alert and no distress   Skin: no suspicious lesions or rashes  Neuro: Normal strength and tone, mentation intact and speech normal  Vascular: good pulses, and cappillary refill   Lymph: no lymphadenopathy   Psych:  mentation appears normal and affect normal/bright  Resp: no increased work of breathing    4=/5 ER strength  5- flexion  +  Impingement signs    ASSESSMENT:   Rotator cuff tendonitis  SA bursitis    PLAN:    We talked about the merits of an injection vs surgery.  MICHELLE vs glenohumeral joint corticosteroid injection discussed.    Your MRI showed no rotator cuff tear.  There is some tendonitis of the rotator cuff and the biceps within the shoulder.  There are some cysts in the bone of shoulder's socket, which could be a sign of some arthritis. There is  some bursitis as well.  The biceps attachment may be compromised as well, but it's difficult to tell for sure. There is nothing that would suggest that surgery would be the first choice of treatment.  I think possibly a cortisone shot, with some physical therapy may be helpful.     physical therapy ordered  Procedure Note:   Informed consent obtained. Risks, benefits and complications of the injection were discussed with the patient and the patient elected to proceed. Left shoulder injected into the subacromial space after sterile prep, using 80mg Depomedrol and 4cc local anesthetic.    Return to clinic: as needed   He also mentioned low back pain.  I suggested he ask his primary care provider about that. We could see him another time about that if needed, but I don't do back surgeries.  I told him this.     Total time spent was 15 minutes;     DESTINEE Hastings MD  Dept. Orthopedic Surgery  [facility]

## 2021-05-25 ENCOUNTER — OFFICE VISIT (OUTPATIENT)
Dept: ORTHOPEDICS | Facility: CLINIC | Age: 86
End: 2021-05-25
Payer: COMMERCIAL

## 2021-05-25 VITALS
OXYGEN SATURATION: 97 % | SYSTOLIC BLOOD PRESSURE: 125 MMHG | HEART RATE: 58 BPM | BODY MASS INDEX: 22.44 KG/M2 | DIASTOLIC BLOOD PRESSURE: 53 MMHG | WEIGHT: 162 LBS

## 2021-05-25 DIAGNOSIS — M19.019 SHOULDER ARTHRITIS: ICD-10-CM

## 2021-05-25 DIAGNOSIS — I10 HYPERTENSION GOAL BP (BLOOD PRESSURE) < 140/90: ICD-10-CM

## 2021-05-25 DIAGNOSIS — M75.82 ROTATOR CUFF TENDINITIS, LEFT: ICD-10-CM

## 2021-05-25 DIAGNOSIS — M25.512 CHRONIC LEFT SHOULDER PAIN: Primary | ICD-10-CM

## 2021-05-25 DIAGNOSIS — N18.30 CKD (CHRONIC KIDNEY DISEASE) STAGE 3, GFR 30-59 ML/MIN (H): ICD-10-CM

## 2021-05-25 DIAGNOSIS — G89.29 CHRONIC LEFT SHOULDER PAIN: Primary | ICD-10-CM

## 2021-05-25 PROCEDURE — 20610 DRAIN/INJ JOINT/BURSA W/O US: CPT | Mod: LT | Performed by: ORTHOPAEDIC SURGERY

## 2021-05-25 PROCEDURE — 99213 OFFICE O/P EST LOW 20 MIN: CPT | Mod: 25 | Performed by: ORTHOPAEDIC SURGERY

## 2021-05-25 RX ORDER — METHYLPREDNISOLONE ACETATE 80 MG/ML
80 INJECTION, SUSPENSION INTRA-ARTICULAR; INTRALESIONAL; INTRAMUSCULAR; SOFT TISSUE
Status: SHIPPED | OUTPATIENT
Start: 2021-05-25

## 2021-05-25 RX ORDER — LIDOCAINE HYDROCHLORIDE 10 MG/ML
4 INJECTION, SOLUTION EPIDURAL; INFILTRATION; INTRACAUDAL; PERINEURAL
Status: DISCONTINUED | OUTPATIENT
Start: 2021-05-25 | End: 2023-05-23

## 2021-05-25 RX ADMIN — LIDOCAINE HYDROCHLORIDE 4 ML: 10 INJECTION, SOLUTION EPIDURAL; INFILTRATION; INTRACAUDAL; PERINEURAL at 09:11

## 2021-05-25 RX ADMIN — METHYLPREDNISOLONE ACETATE 80 MG: 80 INJECTION, SUSPENSION INTRA-ARTICULAR; INTRALESIONAL; INTRAMUSCULAR; SOFT TISSUE at 09:11

## 2021-05-25 ASSESSMENT — PAIN SCALES - GENERAL: PAINLEVEL: MODERATE PAIN (5)

## 2021-05-25 NOTE — LETTER
5/25/2021         RE: Jose Manuel Gallo  [address] [place] [address]        Dear Colleague,    Thank you for referring your patient, Jose Manuel Gallo, to the [facility]. Please see a copy of my visit note below.    SUBJECTIVE:  Jose Manuel Gallo returns for MRI results from 5/13 of the left shoulder, which are reviewed with the patient by myself and showed:  Motion partially compromising assessment.  1. Rotator cuff tendinosis without high-grade tear. Focal subchondral  edema of the anterior facet of greater tuberosity, query enthesitis.  2. Strain of the infraspinatus.  3. Query mild subacromial/subdeltoid bursitis.  4. Mild acromioclavicular joint degenerative change with subacromial  enthesophyte.  5. Extensive subchondral cystlike changes of the posteroinferior  glenoid which is intimate with triceps proximal attachment without  substantial cartilage abnormality, likely reflecting intra-osseous  ganglion cysts.    Review of Systems:  Constitutional/General: Negative for fever, chills, change in weight  Integumentary/Skin: Negative for worrisome rashes, moles, or lesions  Neuro: Negative for weakness, dizziness, or paresthesias   Psychiatric: negative for changes in mood or affect    OBJECTIVE:  Physical Exam:  /53 (BP Location: Left arm, Patient Position: Sitting)   Pulse 58   Wt 73.5 kg (162 lb)   SpO2 97%   BMI 22.44 kg/m    General Appearance: healthy, alert and no distress   Skin: no suspicious lesions or rashes  Neuro: Normal strength and tone, mentation intact and speech normal  Vascular: good pulses, and cappillary refill   Lymph: no lymphadenopathy   Psych:  mentation appears normal and affect normal/bright  Resp: no increased work of breathing    4=/5 ER strength  5- flexion  +  Impingement signs    ASSESSMENT:   Rotator cuff tendonitis  SA bursitis    PLAN:    We talked about the merits of an injection vs surgery.  MICHELLE vs glenohumeral joint  corticosteroid injection discussed.    Your MRI showed no rotator cuff tear.  There is some tendonitis of the rotator cuff and the biceps within the shoulder.  There are some cysts in the bone of shoulder's socket, which could be a sign of some arthritis. There is some bursitis as well.  The biceps attachment may be compromised as well, but it's difficult to tell for sure. There is nothing that would suggest that surgery would be the first choice of treatment.  I think possibly a cortisone shot, with some physical therapy may be helpful.     physical therapy ordered  Procedure Note:   Informed consent obtained. Risks, benefits and complications of the injection were discussed with the patient and the patient elected to proceed. Left shoulder injected into the subacromial space after sterile prep, using 80mg Depomedrol and 4cc local anesthetic.    Return to clinic: as needed   He also mentioned low back pain.  I suggested he ask his primary care provider about that. We could see him another time about that if needed, but I don't do back surgeries.  I told him this.     Total time spent was 15 minutes;     DESTINEE Hastings MD  Dept. Orthopedic Surgery  [facility]        Large Joint Injection/Arthocentesis: L subacromial bursa    Date/Time: 5/25/2021 9:11 AM  Performed by: Jimmy Mancera  Authorized by: Rodrigo Hastings MD     Indications:  Pain  Needle Size:  22 G  Guidance: landmark guided    Approach:  Lateral  Location:  Shoulder      Site:  L subacromial bursa  Medications:  80 mg methylPREDNISolone 80 MG/ML; 4 mL lidocaine (PF) 1 %  Outcome:  Tolerated well, no immediate complications  Procedure discussed: discussed risks, benefits, and alternatives    Consent Given by:  Patient  Timeout: timeout called immediately prior to procedure    Prep: patient was prepped and draped in usual sterile fashion              Again, thank you for allowing me to participate in the care of your patient.         Sincerely,        Rodrigo Hastings MD

## 2021-05-25 NOTE — PATIENT INSTRUCTIONS
AFTER VISIT SUMMARY    Comment: Physical Therapy, Hand Therapy and Chiropractic Care are available through:   *[facility]   *Hand Therapy (Occupational Therapy or Physical Therapy)   *[facility]      Dx:  Rotator Cuff Tendonitis      GOAL:  Decrease pain, increase strength.  Education.      RX:  Rotator cuff strengthening program, Therabands, scapular stabilizers.  Modalities prn.      # VISITS: # of visits per therapist's discretion home exercise program          Call one number to schedule at any of the above locations: [phone]         [facility] Injection Discharge Instructions     You may shower, however avoid swimming, tub baths or hot tubs for 24 hours following your procedure     You may have a mild to moderate increase in pain for several days following the injection.     It may take up to 14 days for the steroid medication to start working although you may feel the effect as early as a few days after the procedure.     You may use ice packs for 10-15 minutes, 3 to 4 times a day at the injection site for comfort     You may use anti-inflammatory medications (such as Ibuprofen or Aleve or Advil) or Tylenol for pain control if necessary     If you were fasting, you may resume your normal diet and medications after the procedure     If you have diabetes, check your blood sugar more frequently than usual as your blood sugar may be higher than normal for 10-14 days following a steroid injection. Contact your doctor who manages your diabetes if your blood sugar is higher than usual      If you experience any of the following, call [facility] [phone]  -Fever over 100 degree F  -Swelling, bleeding, redness, drainage, warmth at the injection site  - New or worsening pain

## 2021-05-25 NOTE — PROGRESS NOTES
Large Joint Injection/Arthocentesis: L subacromial bursa    Date/Time: 5/25/2021 9:11 AM  Performed by: Jimmy Mancera  Authorized by: Rodrigo Hastings MD     Indications:  Pain  Needle Size:  22 G  Guidance: landmark guided    Approach:  Lateral  Location:  Shoulder      Site:  L subacromial bursa  Medications:  80 mg methylPREDNISolone 80 MG/ML; 4 mL lidocaine (PF) 1 %  Outcome:  Tolerated well, no immediate complications  Procedure discussed: discussed risks, benefits, and alternatives    Consent Given by:  Patient  Timeout: timeout called immediately prior to procedure    Prep: patient was prepped and draped in usual sterile fashion

## 2021-05-27 RX ORDER — LISINOPRIL 2.5 MG/1
TABLET ORAL
Qty: 180 TABLET | Refills: 1 | Status: SHIPPED | OUTPATIENT
Start: 2021-05-27 | End: 2021-11-18

## 2021-06-04 ENCOUNTER — OFFICE VISIT (OUTPATIENT)
Dept: OPHTHALMOLOGY | Facility: CLINIC | Age: 86
End: 2021-06-04
Payer: COMMERCIAL

## 2021-06-04 DIAGNOSIS — H35.342 MACULAR HOLE, LEFT: ICD-10-CM

## 2021-06-04 DIAGNOSIS — H57.11 PAIN OF RIGHT EYE: ICD-10-CM

## 2021-06-04 DIAGNOSIS — Z96.1 PSEUDOPHAKIA: ICD-10-CM

## 2021-06-04 DIAGNOSIS — H25.811 COMBINED FORMS OF AGE-RELATED CATARACT OF RIGHT EYE: Primary | ICD-10-CM

## 2021-06-04 DIAGNOSIS — H43.811 POSTERIOR VITREOUS DETACHMENT, RIGHT: ICD-10-CM

## 2021-06-04 DIAGNOSIS — H52.4 PRESBYOPIA: ICD-10-CM

## 2021-06-04 PROCEDURE — 92015 DETERMINE REFRACTIVE STATE: CPT | Performed by: OPHTHALMOLOGY

## 2021-06-04 PROCEDURE — 92014 COMPRE OPH EXAM EST PT 1/>: CPT | Performed by: OPHTHALMOLOGY

## 2021-06-04 ASSESSMENT — REFRACTION_WEARINGRX
OS_AXIS: 165
OS_CYLINDER: +2.50
OD_SPHERE: +1.00
SPECS_TYPE: TRIFOCAL
OD_CYLINDER: +5.25
OD_ADD: +3.00
OS_ADD: +3.00
OS_SPHERE: -1.25
OD_AXIS: 035

## 2021-06-04 ASSESSMENT — TONOMETRY
IOP_METHOD: APPLANATION
OS_IOP_MMHG: 14
OD_IOP_MMHG: 13

## 2021-06-04 ASSESSMENT — EXTERNAL EXAM - RIGHT EYE: OD_EXAM: PROLAPSED FAT PADS: UPPER, LOWER; MILD-MOD BROW

## 2021-06-04 ASSESSMENT — REFRACTION_MANIFEST
OD_SPHERE: +0.50
OS_SPHERE: -1.25
OD_AXIS: 032
OS_AXIS: 165
OS_ADD: +3.00
OD_CYLINDER: +5.25
OD_ADD: +3.00
OS_CYLINDER: +2.50

## 2021-06-04 ASSESSMENT — VISUAL ACUITY
OD_CC: J2+
METHOD: SNELLEN - LINEAR
CORRECTION_TYPE: GLASSES
OS_CC: J2+
OS_CC: 20/100-1
OD_CC: 20/30-2

## 2021-06-04 ASSESSMENT — CUP TO DISC RATIO
OS_RATIO: 0.4
OD_RATIO: 0.4

## 2021-06-04 ASSESSMENT — EXTERNAL EXAM - LEFT EYE: OS_EXAM: PROLAPSED FAT PADS: UPPER, LOWER; MILD-MOD BROW

## 2021-06-04 ASSESSMENT — CONF VISUAL FIELD
OS_NORMAL: 1
OD_NORMAL: 1

## 2021-06-04 NOTE — PROGRESS NOTES
Current Eye Medications:  Systane Tears prn      Subjective:  Blurred vision right eye.   Pt reports that his eye often feel sore , would a recommendation of what drop would be best this problem. Pt also states his vision as worsened over the past 6 mos.     Objective:  See Ophthalmology Exam.       Assessment:  Worsening cataract in patient's better seeing eye.      ICD-10-CM    1. Combined forms of age-related cataract, mild-mod, of right eye  H25.811    2. Pain of right eye  H57.11    3. Hx of vitrectomy for chronic macular hole, os (ER)  H35.342    4. Pseudophakia, Yag Caps, os  Z96.1    5. Posterior vitreous detachment, right  H43.811    6. Presbyopia  H52.4 REFRACTIVE STATUS        Plan:  Glasses Rx given - optional.  Use artificial tears up to 4 times daily both eyes. (Refresh Tears, Systane Ultra/Balance, or Theratears).  Could try Celluvisc eye drop up to 4 times daily.  Call in February 2022 for an appointment in June 2022 for Complete Exam    Dr. Scales [phone]

## 2021-06-04 NOTE — LETTER
6/4/2021         RE: Jose Manuel Gallo  [address] [place] [address]        Dear Colleague,    Thank you for referring your patient, Jose Manuel Gallo, to the [facility]. Please see a copy of my visit note below.     Current Eye Medications:  Systane Tears prn      Subjective:  Blurred vision right eye.   Pt reports that his eye often feel sore , would a recommendation of what drop would be best this problem. Pt also states his vision as worsened over the past 6 mos.     Objective:  See Ophthalmology Exam.       Assessment:  Worsening cataract in patient's better seeing eye.      ICD-10-CM    1. Combined forms of age-related cataract, mild-mod, of right eye  H25.811    2. Pain of right eye  H57.11    3. Hx of vitrectomy for chronic macular hole, os (ER)  H35.342    4. Pseudophakia, Yag Caps, os  Z96.1    5. Posterior vitreous detachment, right  H43.811    6. Presbyopia  H52.4 REFRACTIVE STATUS        Plan:  Glasses Rx given - optional.  Use artificial tears up to 4 times daily both eyes. (Refresh Tears, Systane Ultra/Balance, or Theratears).  Could try Celluvisc eye drop up to 4 times daily.  Call in February 2022 for an appointment in June 2022 for Complete Exam    Dr. Scales [phone]         Again, thank you for allowing me to participate in the care of your patient.        Sincerely,        Bonifacio Scales MD

## 2021-06-04 NOTE — PATIENT INSTRUCTIONS
Glasses Rx given - optional.  Use artificial tears up to 4 times daily both eyes. (Refresh Tears, Systane Ultra/Balance, or Theratears).  Could try Celluvisc eye drop up to 4 times daily.  Call in February 2022 for an appointment in June 2022 for Complete Exam    Dr. Scales [phone]

## 2021-09-18 ENCOUNTER — HEALTH MAINTENANCE LETTER (OUTPATIENT)
Age: 86
End: 2021-09-18

## 2021-09-20 DIAGNOSIS — E78.5 HYPERLIPIDEMIA LDL GOAL <130: ICD-10-CM

## 2021-09-21 NOTE — TELEPHONE ENCOUNTER
Routing refill request to provider for review/approval because:  Labs not current:    Lab Results   Component Value Date    CHOL 137 05/28/2020     Lab Results   Component Value Date    HDL 59 05/28/2020     Lab Results   Component Value Date    LDL 68 05/28/2020     Lab Results   Component Value Date    TRIG 51 05/28/2020     Lab Results   Component Value Date    CHOLHDLRATIO 2.3 08/04/2014

## 2021-09-22 RX ORDER — SIMVASTATIN 40 MG
40 TABLET ORAL DAILY
Qty: 60 TABLET | Refills: 0 | Status: SHIPPED | OUTPATIENT
Start: 2021-09-22 | End: 2021-11-18

## 2021-10-12 NOTE — PROGRESS NOTES
SUBJECTIVE:  Jose Manuel Gallo is a 86 year old male who is seen in consultation at the request of Dr. Braxton, in follow-up for left shoulder pain.  In May he had an injection for rotator cuff tendonitis.  He also has glenohumeral joint osteoarthritis     Present symptoms: pain worse problably from raking.,   Associated symptoms: none    Treatment up to this point: corticosteroid injection in May helped x 4-5 months  Hasn't done any physical therapy, and is not interested in that right now.  Wants another injection     Ortho PMH:     Past Medical History:   Past Medical History:   Diagnosis Date     Cataract, mild, od; mod, os 7/2/2012     Chronic low back pain      CKD (chronic kidney disease) stage 3, GFR 30-59 ml/min      Erectile dysfunction      Hemorrhoids      History of bladder cancer          HTN      Hyperlipidemia LDL goal <130      Kidney stones      Macular degeneration      Osteoarthritis      Personal history of colonic polyps     10/2002     Personal history of malignant neoplasm of bladder      Personal history of tobacco use     Smoked 40 years, Quit 2003     PVD (peripheral vascular disease) (H)     Left Vertebral Artery occlusion     Rotator cuff tendinitis, left      Past Surgical History:   Past Surgical History:   Procedure Laterality Date     C SPINAL FUSION,ANT,EA ADNL LEVEL      C6-7     CATARACT IOL, RT/LT       COLONOSCOPY  11/02/2008    Normal     HC REVISE ULNAR NERVE AT ELBOW      Left     LASER YAG CAPSULOTOMY  7/2016    left eye     PHACOEMULSIFICATION WITH STANDARD INTRAOCULAR LENS IMPLANT  7/2012    left eye     TURP  1997 ?     VASECTOMY       VITRECTOMY PARSPLANA  8/2011    left eye, repair macular hole     Family History:   Family History   Problem Relation Age of Onset     Osteoporosis Mother      Diabetes Father      Arthritis Father      Cancer Father      Respiratory Father      Genitourinary Problems Brother      Circulatory Brother      Macular Degeneration No  "family hx of      Glaucoma No family hx of      Thyroid Disease No family hx of      Hypertension No family hx of      Social History:   Social History     Tobacco Use     Smoking status: Former Smoker     Years: 40.00     Types: Cigarettes     Quit date: 2003     Years since quittin.7     Smokeless tobacco: Never Used   Substance Use Topics     Alcohol use: Yes     Comment: 1 x month       Review of Systems:  Constitutional:  NEGATIVE for fever, chills, change in weight  Integumentary/Skin:  NEGATIVE for worrisome rashes, moles or lesions  Eyes:  NEGATIVE for vision changes or irritation  ENT/Mouth:  NEGATIVE for ear, mouth and throat problems  Resp:  NEGATIVE for significant cough or SOB  Breast:  NEGATIVE for masses, tenderness or discharge  CV:  NEGATIVE for chest pain, palpitations or peripheral edema  GI:  NEGATIVE for nausea, abdominal pain, heartburn, or change in bowel habits  :  Negative   Musculoskeletal:  See HPI above  Neuro:  NEGATIVE for weakness, dizziness or paresthesias  Endocrine:  NEGATIVE for temperature intolerance, skin/hair changes  Heme/allergy/immune:  NEGATIVE for bleeding problems  Psychiatric:  NEGATIVE for changes in mood or affect    OBJECTIVE:  Physical Exam:  BP (!) 153/63 (BP Location: Left arm, Patient Position: Sitting, Cuff Size: Adult Regular)   Pulse 62   Ht 1.81 m (5' 11.25\")   Wt 72.6 kg (160 lb)   SpO2 93%   BMI 22.16 kg/m    General Appearance: healthy, alert and in no distress   Skin: no suspicious lesions or rashes  Neuro: Normal strength and tone, mentation intact and speech normal  Vascular: good pulses, and cappillary refill   Lymph: no lymphadenopathy   Psych:  mentation appears normal and affect normal/bright  Resp: no increased work of breathing      Left Shoulder Exam:  Shoulder Inspection: no swelling, bruising, discoloration, or obvious deformity or asymmetry  no atrophy  Tender:  Non-tender  Strength: forward flexion: 5-/5, external rotation: " 5-/5, Liftoff: Able  Impingement: all grade 2 positive      MRI:   From 5/13/21 : left shoulder    Motion partially compromising assessment.  1. Rotator cuff tendinosis without high-grade tear. Focal subchondral  edema of the anterior facet of greater tuberosity, query enthesitis.  2. Strain of the infraspinatus.  3. Query mild subacromial/subdeltoid bursitis.  4. Mild acromioclavicular joint degenerative change with subacromial  enthesophyte.  5. Extensive subchondral cystlike changes of the posteroinferior  glenoid which is intimate with triceps proximal attachment without  substantial cartilage abnormality, likely reflecting intra-osseous  ganglion cysts.      ASSESSMENT:  Encounter Diagnoses   Name Primary?     Rotator cuff tendinitis, left Yes     Chronic left shoulder pain      Superior glenoid labrum lesion of left shoulder, initial encounter      Shoulder arthritis         PLAN:  He would like to repeat the injection   I strongly recommended physical therapy, but he just wanted the shot today.  Hopefully he will reconsider.    Procedure Note:   Informed consent obtained. Risks, benefits and complications of the injection were discussed with the patient and the patient elected to proceed. Left shoulder injected into the subacromial space after sterile prep, using 80mg Depomedrol and 4cc local anesthetic.    Total time spent was 25 minutes; including but not limited to prep time and discussion.    Return to clinic: as needed     DESTINEE Hastings MD  Dept. Orthopedic Surgery  [facility]

## 2021-10-13 ENCOUNTER — OFFICE VISIT (OUTPATIENT)
Dept: ORTHOPEDICS | Facility: CLINIC | Age: 86
End: 2021-10-13
Payer: COMMERCIAL

## 2021-10-13 VITALS
HEIGHT: 71 IN | DIASTOLIC BLOOD PRESSURE: 63 MMHG | BODY MASS INDEX: 22.4 KG/M2 | HEART RATE: 62 BPM | OXYGEN SATURATION: 93 % | WEIGHT: 160 LBS | SYSTOLIC BLOOD PRESSURE: 153 MMHG

## 2021-10-13 DIAGNOSIS — M25.512 CHRONIC LEFT SHOULDER PAIN: ICD-10-CM

## 2021-10-13 DIAGNOSIS — S43.432D SUPERIOR GLENOID LABRUM LESION OF LEFT SHOULDER, SUBSEQUENT ENCOUNTER: ICD-10-CM

## 2021-10-13 DIAGNOSIS — G89.29 CHRONIC LEFT SHOULDER PAIN: ICD-10-CM

## 2021-10-13 DIAGNOSIS — M19.019 SHOULDER ARTHRITIS: ICD-10-CM

## 2021-10-13 DIAGNOSIS — M75.82 ROTATOR CUFF TENDINITIS, LEFT: Primary | ICD-10-CM

## 2021-10-13 DIAGNOSIS — M75.22 BICEPS TENDONITIS ON LEFT: ICD-10-CM

## 2021-10-13 PROCEDURE — 20610 DRAIN/INJ JOINT/BURSA W/O US: CPT | Mod: LT | Performed by: ORTHOPAEDIC SURGERY

## 2021-10-13 PROCEDURE — 99214 OFFICE O/P EST MOD 30 MIN: CPT | Mod: 25 | Performed by: ORTHOPAEDIC SURGERY

## 2021-10-13 RX ORDER — LIDOCAINE HYDROCHLORIDE 10 MG/ML
4 INJECTION, SOLUTION EPIDURAL; INFILTRATION; INTRACAUDAL; PERINEURAL
Status: SHIPPED | OUTPATIENT
Start: 2021-10-13

## 2021-10-13 RX ORDER — METHYLPREDNISOLONE ACETATE 80 MG/ML
80 INJECTION, SUSPENSION INTRA-ARTICULAR; INTRALESIONAL; INTRAMUSCULAR; SOFT TISSUE
Status: SHIPPED | OUTPATIENT
Start: 2021-10-13

## 2021-10-13 RX ADMIN — METHYLPREDNISOLONE ACETATE 80 MG: 80 INJECTION, SUSPENSION INTRA-ARTICULAR; INTRALESIONAL; INTRAMUSCULAR; SOFT TISSUE at 09:25

## 2021-10-13 RX ADMIN — LIDOCAINE HYDROCHLORIDE 4 ML: 10 INJECTION, SOLUTION EPIDURAL; INFILTRATION; INTRACAUDAL; PERINEURAL at 09:25

## 2021-10-13 ASSESSMENT — PAIN SCALES - GENERAL: PAINLEVEL: MODERATE PAIN (5)

## 2021-10-13 ASSESSMENT — MIFFLIN-ST. JEOR: SCORE: 1431.85

## 2021-10-13 NOTE — PROGRESS NOTES
Large Joint Injection/Arthocentesis: L subacromial bursa    Date/Time: 10/13/2021 9:25 AM  Performed by: Jimmy Mancera  Authorized by: Rodrigo Hastings MD     Indications:  Pain and osteoarthritis  Needle Size:  22 G  Guidance: landmark guided    Approach:  Lateral  Location:  Shoulder      Site:  L subacromial bursa  Medications:  80 mg methylPREDNISolone 80 MG/ML; 4 mL lidocaine (PF) 1 %  Outcome:  Tolerated well, no immediate complications  Procedure discussed: discussed risks, benefits, and alternatives    Consent Given by:  Patient  Timeout: timeout called immediately prior to procedure    Prep: patient was prepped and draped in usual sterile fashion

## 2021-10-13 NOTE — PATIENT INSTRUCTIONS
AFTER VISIT SUMMARY    [facility] CORTISONE Injection Discharge Instructions     You may shower, however avoid swimming, tub baths or hot tubs for 24 hours following your procedure     You may have a mild to moderate increase in pain for several days following the injection.     It may take up to 14 days for the steroid medication to start working although you may feel the effect as early as a few days after the procedure.     You may use ice packs for 10-15 minutes, 3 to 4 times a day at the injection site for comfort     You may use anti-inflammatory medications (such as Ibuprofen or Aleve or Advil) or Tylenol for pain control if necessary     If you were fasting, you may resume your normal diet and medications after the procedure     If you have diabetes, check your blood sugar more frequently than usual as your blood sugar may be higher than normal for 10-14 days following a steroid injection. Contact your doctor who manages your diabetes if your blood sugar is higher than usual      If you experience any of the following, call [facility] [phone]  -Fever over 100 degree F  -Swelling, bleeding, redness, drainage, warmth at the injection site  - New or worsening pain

## 2021-10-13 NOTE — LETTER
10/13/2021         RE: Jose Manuel Gallo  [address] [place] [address]        Dear Colleague,    Thank you for referring your patient, Jose Manuel Gallo, to the [facility]. Please see a copy of my visit note below.    SUBJECTIVE:  Jose Manuel Gallo is a 86 year old male who is seen in consultation at the request of Dr. Braxton, in follow-up for left shoulder pain.  In May he had an injection for rotator cuff tendonitis.  He also has glenohumeral joint osteoarthritis     Present symptoms: pain worse problably from raking.,   Associated symptoms: none    Treatment up to this point: corticosteroid injection in May helped x 4-5 months  Hasn't done any physical therapy, and is not interested in that right now.  Wants another injection     Ortho PMH:     Past Medical History:   Past Medical History:   Diagnosis Date     Cataract, mild, od; mod, os 7/2/2012     Chronic low back pain      CKD (chronic kidney disease) stage 3, GFR 30-59 ml/min      Erectile dysfunction      Hemorrhoids      History of bladder cancer          HTN      Hyperlipidemia LDL goal <130      Kidney stones      Macular degeneration      Osteoarthritis      Personal history of colonic polyps     10/2002     Personal history of malignant neoplasm of bladder      Personal history of tobacco use     Smoked 40 years, Quit 2003     PVD (peripheral vascular disease) (H)     Left Vertebral Artery occlusion     Rotator cuff tendinitis, left      Past Surgical History:   Past Surgical History:   Procedure Laterality Date     C SPINAL FUSION,ANT,EA ADNL LEVEL      C6-7     CATARACT IOL, RT/LT       COLONOSCOPY  11/02/2008    Normal     HC REVISE ULNAR NERVE AT ELBOW      Left     LASER YAG CAPSULOTOMY  7/2016    left eye     PHACOEMULSIFICATION WITH STANDARD INTRAOCULAR LENS IMPLANT  7/2012    left eye     TURP  1997 ?     VASECTOMY       VITRECTOMY PARSPLANA  8/2011    left eye, repair macular hole     Family  "History:   Family History   Problem Relation Age of Onset     Osteoporosis Mother      Diabetes Father      Arthritis Father      Cancer Father      Respiratory Father      Genitourinary Problems Brother      Circulatory Brother      Macular Degeneration No family hx of      Glaucoma No family hx of      Thyroid Disease No family hx of      Hypertension No family hx of      Social History:   Social History     Tobacco Use     Smoking status: Former Smoker     Years: 40.00     Types: Cigarettes     Quit date: 2003     Years since quittin.7     Smokeless tobacco: Never Used   Substance Use Topics     Alcohol use: Yes     Comment: 1 x month       Review of Systems:  Constitutional:  NEGATIVE for fever, chills, change in weight  Integumentary/Skin:  NEGATIVE for worrisome rashes, moles or lesions  Eyes:  NEGATIVE for vision changes or irritation  ENT/Mouth:  NEGATIVE for ear, mouth and throat problems  Resp:  NEGATIVE for significant cough or SOB  Breast:  NEGATIVE for masses, tenderness or discharge  CV:  NEGATIVE for chest pain, palpitations or peripheral edema  GI:  NEGATIVE for nausea, abdominal pain, heartburn, or change in bowel habits  :  Negative   Musculoskeletal:  See HPI above  Neuro:  NEGATIVE for weakness, dizziness or paresthesias  Endocrine:  NEGATIVE for temperature intolerance, skin/hair changes  Heme/allergy/immune:  NEGATIVE for bleeding problems  Psychiatric:  NEGATIVE for changes in mood or affect    OBJECTIVE:  Physical Exam:  BP (!) 153/63 (BP Location: Left arm, Patient Position: Sitting, Cuff Size: Adult Regular)   Pulse 62   Ht 1.81 m (5' 11.25\")   Wt 72.6 kg (160 lb)   SpO2 93%   BMI 22.16 kg/m    General Appearance: healthy, alert and in no distress   Skin: no suspicious lesions or rashes  Neuro: Normal strength and tone, mentation intact and speech normal  Vascular: good pulses, and cappillary refill   Lymph: no lymphadenopathy   Psych:  mentation appears normal and affect " normal/bright  Resp: no increased work of breathing      Left Shoulder Exam:  Shoulder Inspection: no swelling, bruising, discoloration, or obvious deformity or asymmetry  no atrophy  Tender:  Non-tender  Strength: forward flexion: 5-/5, external rotation: 5-/5, Liftoff: Able  Impingement: all grade 2 positive      MRI:   From 5/13/21 : left shoulder    Motion partially compromising assessment.  1. Rotator cuff tendinosis without high-grade tear. Focal subchondral  edema of the anterior facet of greater tuberosity, query enthesitis.  2. Strain of the infraspinatus.  3. Query mild subacromial/subdeltoid bursitis.  4. Mild acromioclavicular joint degenerative change with subacromial  enthesophyte.  5. Extensive subchondral cystlike changes of the posteroinferior  glenoid which is intimate with triceps proximal attachment without  substantial cartilage abnormality, likely reflecting intra-osseous  ganglion cysts.      ASSESSMENT:  Encounter Diagnoses   Name Primary?     Rotator cuff tendinitis, left Yes     Chronic left shoulder pain      Superior glenoid labrum lesion of left shoulder, initial encounter      Shoulder arthritis         PLAN:  He would like to repeat the injection   I strongly recommended physical therapy, but he just wanted the shot today.  Hopefully he will reconsider.    Procedure Note:   Informed consent obtained. Risks, benefits and complications of the injection were discussed with the patient and the patient elected to proceed. Left shoulder injected into the subacromial space after sterile prep, using 80mg Depomedrol and 4cc local anesthetic.    Total time spent was 25 minutes; including but not limited to prep time and discussion.    Return to clinic: as needed     DESTINEE Hastings MD  Dept. Orthopedic Surgery  [facility]      Large Joint Injection/Arthocentesis: L subacromial bursa    Date/Time: 10/13/2021 9:25 AM  Performed by: Jimmy Mancera  Authorized by: Rodrigo Hastings  MD Anna     Indications:  Pain and osteoarthritis  Needle Size:  22 G  Guidance: landmark guided    Approach:  Lateral  Location:  Shoulder      Site:  L subacromial bursa  Medications:  80 mg methylPREDNISolone 80 MG/ML; 4 mL lidocaine (PF) 1 %  Outcome:  Tolerated well, no immediate complications  Procedure discussed: discussed risks, benefits, and alternatives    Consent Given by:  Patient  Timeout: timeout called immediately prior to procedure    Prep: patient was prepped and draped in usual sterile fashion              Again, thank you for allowing me to participate in the care of your patient.        Sincerely,        Rodrigo Hastings MD

## 2021-11-04 ENCOUNTER — VIRTUAL VISIT (OUTPATIENT)
Dept: INTERNAL MEDICINE | Facility: CLINIC | Age: 86
End: 2021-11-04
Payer: COMMERCIAL

## 2021-11-04 DIAGNOSIS — G89.29 CHRONIC MIDLINE LOW BACK PAIN WITHOUT SCIATICA: ICD-10-CM

## 2021-11-04 DIAGNOSIS — M54.50 CHRONIC MIDLINE LOW BACK PAIN WITHOUT SCIATICA: ICD-10-CM

## 2021-11-04 DIAGNOSIS — Z00.00 ENCOUNTER FOR MEDICARE ANNUAL WELLNESS EXAM: Primary | ICD-10-CM

## 2021-11-04 DIAGNOSIS — R13.10 SWALLOWING DISORDER: ICD-10-CM

## 2021-11-04 DIAGNOSIS — R06.83 SNORING: ICD-10-CM

## 2021-11-04 PROCEDURE — 99213 OFFICE O/P EST LOW 20 MIN: CPT | Mod: 25 | Performed by: INTERNAL MEDICINE

## 2021-11-04 PROCEDURE — 99207 PR ANNUAL WELLNESS VISIT, PPS, SUBSEQUENT STAT: CPT | Mod: 95 | Performed by: INTERNAL MEDICINE

## 2021-11-04 NOTE — PROGRESS NOTES
SUBJECTIVE:   Jose Manuel Gallo is a 86 year old male who presents for Preventive Visit.    He has an appointment with me on 2021. Plan for a follow up of all issues     Patient has been advised of split billing requirements and indicates understanding: Yes   Are you in the first 12 months of your Medicare coverage?  No    HPI  Do you feel safe in your environment? Yes    Have you ever done Advance Care Planning? (For example, a Health Directive, POLST, or a discussion with a medical provider or your loved ones about your wishes): No, advance care planning information given to patient to review.  Patient plans to discuss their wishes with loved ones or provider.      Discussed     12:28 PM      Fall risk  Fallen 2 or more times in the past year?: Yes  Any fall with injury in the past year?: Yes     He had one fall about 6 months ago, tripped, only once    Cognitive Screening Unable to complete due to virtual visit; need for additional assessment in future face-to-face visit    Do you have sleep apnea, excessive snoring or daytime drowsiness?: yes he has had worse and worse daytime hypersomnolence / napping behavior . He thinks this is due aging .    Reviewed and updated as needed this visit by clinical staff   Allergies  Meds              Reviewed and updated as needed this visit by Provider                Social History     Tobacco Use     Smoking status: Former Smoker     Years: 40.00     Types: Cigarettes     Quit date: 2003     Years since quittin.8     Smokeless tobacco: Never Used   Substance Use Topics     Alcohol use: Yes     Comment: 1 x month     If you drink alcohol do you typically have >3 drinks per day or >7 drinks per week? Not applicable    Alcohol Use 2019   Prescreen: >3 drinks/day or >7 drinks/week? No   Prescreen: >3 drinks/day or >7 drinks/week? -         Current providers sharing in care for this patient include:   Patient Care Team:  Orlando Braxton MD as PCP -  General (Internal Medicine)  Orlando Braxton MD as Assigned PCP  Rodrigo Hastings MD as Assigned Musculoskeletal Provider  Bonfiacio Scales MD as Assigned Surgical Provider    The following health maintenance items are reviewed in Epic and correct as of today:  Health Maintenance Due   Topic Date Due     ANNUAL REVIEW OF HM ORDERS  Never done     MEDICARE ANNUAL WELLNESS VISIT  05/24/2020     MICROALBUMIN  05/24/2020     FALL RISK ASSESSMENT  05/21/2021     BMP  05/28/2021     LIPID  05/28/2021     ZOSTER IMMUNIZATION (3 of 3) 12/28/2020     Labs reviewed in EPIC  BP Readings from Last 3 Encounters:   10/13/21 (!) 153/63   05/25/21 125/53   05/05/21 129/76    Wt Readings from Last 3 Encounters:   10/13/21 72.6 kg (160 lb)   05/25/21 73.5 kg (162 lb)   05/05/21 73.8 kg (162 lb 9.6 oz)                  Patient Active Problem List   Diagnosis     PVD (peripheral vascular disease) (H)     Erectile dysfunction     Osteoarthritis     HYPERLIPIDEMIA LDL GOAL <130     Advanced directives, counseling/discussion     Hypertension goal BP (blood pressure) < 140/90     History of bladder cancer     CKD (chronic kidney disease) stage 3, GFR 30-59 ml/min (H)     Pseudophakia, Yag Caps, os     Dizziness     Posterior vitreous detachment, right     Hx of vitrectomy for chronic macular hole, os (ER)     Other idiopathic scoliosis, thoracolumbar region     Spondylosis of lumbar region without myelopathy or radiculopathy     Brittle nails     Combined forms of age-related cataract, mild-mod, of right eye     Pain of right eye     History of shingles     Low vitamin B12 level     B12 nutritional deficiency     Occipital neuralgia of left side     Past Surgical History:   Procedure Laterality Date     C SPINAL FUSION,ANT,EA ADNL LEVEL      C6-7     CATARACT IOL, RT/LT       COLONOSCOPY  11/02/2008    Normal     HC REVISE ULNAR NERVE AT ELBOW      Left     LASER YAG CAPSULOTOMY  7/2016    left eye     PHACOEMULSIFICATION WITH  STANDARD INTRAOCULAR LENS IMPLANT  2012    left eye     TURP   ?     VASECTOMY       VITRECTOMY PARSPLANA  2011    left eye, repair macular hole       Social History     Tobacco Use     Smoking status: Former Smoker     Years: 40.00     Types: Cigarettes     Quit date: 2003     Years since quittin.8     Smokeless tobacco: Never Used   Substance Use Topics     Alcohol use: Yes     Comment: 1 x month     Family History   Problem Relation Age of Onset     Osteoporosis Mother      Diabetes Father      Arthritis Father      Cancer Father      Respiratory Father      Genitourinary Problems Brother      Circulatory Brother      Macular Degeneration No family hx of      Glaucoma No family hx of      Thyroid Disease No family hx of      Hypertension No family hx of          Current Outpatient Medications   Medication Sig Dispense Refill     carboxymethylcellulose PF (REFRESH LIQUIGEL) 1 % ophthalmic gel Place 1 drop into both eyes 4 times daily 1 Bottle 11     cyanocobalamin (VITAMIN B-12) 1000 MCG tablet Take 1 tablet (1,000 mcg) by mouth daily 90 tablet 3     lisinopril (ZESTRIL) 2.5 MG tablet TAKE ONE TABLET BY MOUTH EVERY  tablet 1     simvastatin (ZOCOR) 40 MG tablet Take 1 tablet (40 mg) by mouth daily Please make appointment for additional refills, laboratory and appointment with primary health care provider 60 tablet 0     No Known Allergies  Recent Labs   Lab Test 20  1400 19  1210 18  1014 18  1014 17  0745 17  1215 17  1210 16  1011   LDL 68 88  --  61  --   --    < > 67   HDL 59 70  --  77  --   --    < > 61   TRIG 51 57  --  53  --   --    < > 67   ALT  --   --   --  29  --  27  --  26   CR 1.23 1.09   < > 1.13  --  1.22  --   --    GFRESTIMATED 53* 62   < > 62  --  57*  --   --    GFRESTBLACK 62 72   < > 75  --  69  --   --    POTASSIUM 5.3 4.8   < > 5.0   < > 5.7*  --   --    TSH  --   --   --   --   --   --   --  0.67    < > = values in  "this interval not displayed.      Patient is having worse and worse problems with troubles swallowing especially such as a \" pill dysphagia\"     Review of Systems  Constitutional, HEENT, cardiovascular, pulmonary, gi and gu systems are negative, except as otherwise noted.    OBJECTIVE:   There were no vitals taken for this visit. Estimated body mass index is 22.16 kg/m  as calculated from the following:    Height as of 10/13/21: 1.81 m (5' 11.25\").    Weight as of 10/13/21: 72.6 kg (160 lb).  Physical Exam  GENERAL: healthy, alert and no distress    Diagnostic Test Results:  Labs reviewed in Epic       ASSESSMENT / PLAN:   (Z00.00) Encounter for Medicare annual wellness exam  (primary encounter diagnosis)  Comment: routine screening issues   Plan: see as detailed above     (R06.83) Snoring  Comment: we discussed his symptoms. He is none to keen on scheduling appointment with sleep disorder specialist   Plan: assume a posture of observation     (R13.10) Swallowing disorder  Comment: he's had some sense that foods are getting stuck in his throat and while there was no actually worrisome red flag symptoms , I nevertheless feel compelled to evaluate him for fully with radiographic images   Plan: XR Esophagram w Upper GI        Further follow up depending on the test results     (M54.50,  G89.29) Chronic midline low back pain without sciatica  Comment: his spouse received some steroid injections for symptoms he feels aren't so different from his own  Plan: this requires a face to face encounter and I am seeing patient for that on November 18th, later this month     Patient has been advised of split billing requirements and indicates understanding: Yes  COUNSELING:  Reviewed preventive health counseling, as reflected in patient instructions    Estimated body mass index is 22.16 kg/m  as calculated from the following:    Height as of 10/13/21: 1.81 m (5' 11.25\").    Weight as of 10/13/21: 72.6 kg (160 lb).        He " reports that he quit smoking about 18 years ago. His smoking use included cigarettes. He quit after 40.00 years of use. He has never used smokeless tobacco.      Appropriate preventive services were discussed with this patient, including applicable screening as appropriate for cardiovascular disease, diabetes, osteopenia/osteoporosis, and glaucoma.  As appropriate for age/gender, discussed screening for colorectal cancer, prostate cancer, breast cancer, and cervical cancer. Checklist reviewing preventive services available has been given to the patient.    Reviewed patients plan of care and provided an AVS. The Basic Care Plan (routine screening as documented in Health Maintenance) for Jose Manuel meets the Care Plan requirement. This Care Plan has been established and reviewed with the Patient.    Counseling Resources:  ATP IV Guidelines  Pooled Cohorts Equation Calculator  Breast Cancer Risk Calculator  Breast Cancer: Medication to Reduce Risk  FRAX Risk Assessment  ICSI Preventive Guidelines  Dietary Guidelines for Americans, 2010  USDA's MyPlate  ASA Prophylaxis  Lung CA Screening    Orlando Braxton MD  [facility]    Identified Health Risks:    He is at risk for falling and has been provided with information to reduce the risk of falling at home.

## 2021-11-04 NOTE — PATIENT INSTRUCTIONS
At your visit today, we discussed your risk for falls and preventive options.    Fall Prevention  Falls often occur due to slipping, tripping or losing your balance. Millions of people fall every year and injure themselves. Here are ways to reduce your risk of falling again.     Think about your fall, was there anything that caused your fall that can be fixed, removed, or replaced?    Make your home safe by keeping walkways clear of objects you may trip over, such as electric cords.    Use non-slip pads under rugs. Don't use area rugs or small throw rugs.    Use non-slip mats in bathtubs and showers.    Install handrails and lights on staircases. The handrails should be on both sides of the stairs.    Don't walk in poorly lit areas.    Don't stand on chairs or wobbly ladders.    Use caution when reaching overhead or looking upward. This position can cause a loss of balance.    Be sure your shoes fit properly, have non-slip bottoms and are in good condition.     Wear shoes both inside and out. Don't go barefoot or wear slippers.    Be cautious when going up and down stairs, curbs, and when walking on uneven sidewalks.    If your balance is poor, consider using a cane or walker.    If your fall was related to alcohol use, stop or limit alcohol intake.     If your fall was related to use of sleeping medicines, talk to your healthcare provider about this. You may need to reduce your dosage at bedtime if you awaken during the night to go to the bathroom.      To reduce the need for nighttime bathroom trips:  ? Don't drink fluids for several hours before going to bed  ? Empty your bladder before going to bed  ? Men can keep a urinal at the bedside    Stay as active as you can. Balance, flexibility, strength, and endurance all come from exercise. They all play a role in preventing falls. Ask your healthcare provider which types of activity are right for you.    Get your vision checked on a regular basis.    If you have  pets, know where they are before you stand up or walk so you don't trip over them.    Use night lights.    Go over all your medicines with a pharmacist or other healthcare provider to see if any of them could make you more likely to fall.  [employer] last reviewed this educational content on 4/1/2018 2000-2021 [employer]. All rights reserved. This information is not intended as a substitute for professional medical care. Always follow your healthcare professional's instructions.        Patient Education   Personalized Prevention Plan  You are due for the preventive services outlined below.  Your care team is available to assist you in scheduling these services.  If you have already completed any of these items, please share that information with your care team to update in your medical record.  Health Maintenance Due   Topic Date Due     ANNUAL REVIEW OF  ORDERS  Never done     Kidney Microalbumin Urine Test  05/24/2020     FALL RISK ASSESSMENT  05/21/2021     Basic Metabolic Panel  05/28/2021     Cholesterol Lab  05/28/2021     Zoster (Shingles) Vaccine (3 of 3) 12/28/2020       Preventing Falls at Home  A person can fall for many reasons. Older adults may fall because reaction time slows down as we age. Your muscles and joints may get stiff, weak, or less flexible because of illness, medicines, or a physical condition.   Other health problems that make falls more likely include:     Arthritis    Dizziness or lightheadedness when you stand up (orthostatic hypotension)    History of a stroke    Dizziness    Anemia    Certain medicines taken for mental illness or to control blood pressure.    Problems with balance or gait    Bladder or urinary problems    History of falling    Changes in vision (vision impairment)    Changes in thinking skills and memory (cognitive impairment)  Injuries from a fall can include serious injuries such as broken bones, dislocated joints, internal bleeding and cuts.  Injuries like these can limit your independence.   Prevention tips  To help prevent falls and fall-related injuries, follow the tips below.    Floors  To make floors safer:     Put nonskid pads under area rugs.    Remove small rugs.    Replace worn floor coverings.    Tack carpets firmly to each step on carpeted stairs. Put nonskid strips on the edges of uncarpeted stairs.    Keep floors and stairs free of clutter and cords.    Arrange furniture so there are clear pathways.    Clean up any spills right away.  Bathrooms    To make bathrooms safer:     Install grab bars in the tub or shower.    Apply nonskid strips or put a nonskid rubber mat in the tub or shower.    Sit on a bath chair to bathe.    Use bathmats with nonskid backing.  Lighting  To improve visibility in your home:      Keep a flashlight in each room. Or put a lamp next to the bed within easy reach.    Put nightlights in the bedrooms, hallways, kitchen, and bathrooms.    Make sure all stairways have good lighting.    Take your time when going up and down stairs.    Put handrails on both sides of stairs and in walkways for more support. To prevent injury to your wrist or arm, don t use handrails to pull yourself up.    Install grab bars to pull yourself up.    Move or rearrange items that you use often. This will make them easier to find or reach.    Look at your home to find any safety hazards. Especially look at doorways, walkways, and the driveway. Remove or repair any safety problems that you find.  Other changes to make    Look around to find any safety hazards. Look closely at doorways, walkways, and the driveway. Remove or repair any safety problems that you find.    Wear shoes that fit well.    Take your time when going up and down stairs.    Put handrails on both sides of stairs and in walkways for more support. To prevent injury to your wrist or arm, don t use handrails to pull yourself up.    Install grab bars wherever needed to pull yourself  up.    Arrange items that you use often. This will make them easier to find or reach.    [employer] last reviewed this educational content on 3/1/2020    7683-7845 [employer]. All rights reserved. This information is not intended as a substitute for professional medical care. Always follow your healthcare professional's instructions.

## 2021-11-05 ENCOUNTER — TELEPHONE (OUTPATIENT)
Dept: FAMILY MEDICINE | Facility: CLINIC | Age: 86
End: 2021-11-05
Payer: COMMERCIAL

## 2021-11-05 NOTE — TELEPHONE ENCOUNTER
Reason for call:  Form   Our goal is to have forms completed within 72 hours, however some forms may require a visit or additional information.     Who is the form from? Insurance comp  Where did the form come from? Patient or family brought in     What clinic location was the form placed at? [facility]  Where was the form placed? [facility]Folder  What number is listed as a contact on the form? [phone]    Phone call message - patient request for a letter, form or note:     Date needed: as soon as possible  Mail to: [address] [place] [address] [place] [address]  Has the patient signed a consent form for release of information? Not Applicable    Additional comments: NA    Type of letter, form or note: medical    Phone number to reach patient:  Home number on file [phone] (home)    Best Time:  Anytime    Can we leave a detailed message on this number?  YES    Travel screening: Not Applicable

## 2021-11-07 NOTE — TELEPHONE ENCOUNTER
I was looking out for any form associated with this patient and nothing was given to me or put on my desk as of Friday 11/5/2021.    Please reroute once form located    Orlando Braxton MD

## 2021-11-08 NOTE — TELEPHONE ENCOUNTER
$25 gift card Medicare Annual Wellness Visit form received and given to Dr. Braxton to sign. Abbey Raphael,

## 2021-11-10 ENCOUNTER — ANCILLARY PROCEDURE (OUTPATIENT)
Dept: GENERAL RADIOLOGY | Facility: CLINIC | Age: 86
End: 2021-11-10
Attending: INTERNAL MEDICINE
Payer: COMMERCIAL

## 2021-11-10 DIAGNOSIS — R13.10 SWALLOWING DISORDER: ICD-10-CM

## 2021-11-10 PROCEDURE — 74240 X-RAY XM UPR GI TRC 1CNTRST: CPT | Mod: GC | Performed by: RADIOLOGY

## 2021-11-18 ENCOUNTER — OFFICE VISIT (OUTPATIENT)
Dept: INTERNAL MEDICINE | Facility: CLINIC | Age: 86
End: 2021-11-18
Payer: COMMERCIAL

## 2021-11-18 VITALS
WEIGHT: 159 LBS | OXYGEN SATURATION: 98 % | HEART RATE: 60 BPM | SYSTOLIC BLOOD PRESSURE: 118 MMHG | BODY MASS INDEX: 22.02 KG/M2 | DIASTOLIC BLOOD PRESSURE: 60 MMHG | TEMPERATURE: 97.4 F

## 2021-11-18 DIAGNOSIS — R42 DIZZINESS: ICD-10-CM

## 2021-11-18 DIAGNOSIS — R13.10 DYSPHAGIA, UNSPECIFIED TYPE: ICD-10-CM

## 2021-11-18 DIAGNOSIS — E78.5 HYPERLIPIDEMIA LDL GOAL <130: ICD-10-CM

## 2021-11-18 DIAGNOSIS — R63.4 UNINTENDED WEIGHT LOSS: ICD-10-CM

## 2021-11-18 DIAGNOSIS — R09.82 POST-NASAL DRIP: ICD-10-CM

## 2021-11-18 DIAGNOSIS — I10 HYPERTENSION GOAL BP (BLOOD PRESSURE) < 140/90: Primary | ICD-10-CM

## 2021-11-18 DIAGNOSIS — N18.31 STAGE 3A CHRONIC KIDNEY DISEASE (H): ICD-10-CM

## 2021-11-18 LAB
BASOPHILS # BLD AUTO: 0 10E3/UL (ref 0–0.2)
BASOPHILS NFR BLD AUTO: 0 %
EOSINOPHIL # BLD AUTO: 0.2 10E3/UL (ref 0–0.7)
EOSINOPHIL NFR BLD AUTO: 3 %
ERYTHROCYTE [DISTWIDTH] IN BLOOD BY AUTOMATED COUNT: 13.3 % (ref 10–15)
ERYTHROCYTE [SEDIMENTATION RATE] IN BLOOD BY WESTERGREN METHOD: 7 MM/HR (ref 0–20)
HCT VFR BLD AUTO: 45.2 % (ref 40–53)
HGB BLD-MCNC: 14.5 G/DL (ref 13.3–17.7)
LYMPHOCYTES # BLD AUTO: 1.2 10E3/UL (ref 0.8–5.3)
LYMPHOCYTES NFR BLD AUTO: 18 %
MCH RBC QN AUTO: 32 PG (ref 26.5–33)
MCHC RBC AUTO-ENTMCNC: 32.1 G/DL (ref 31.5–36.5)
MCV RBC AUTO: 100 FL (ref 78–100)
MONOCYTES # BLD AUTO: 0.7 10E3/UL (ref 0–1.3)
MONOCYTES NFR BLD AUTO: 10 %
NEUTROPHILS # BLD AUTO: 4.7 10E3/UL (ref 1.6–8.3)
NEUTROPHILS NFR BLD AUTO: 69 %
PLATELET # BLD AUTO: 207 10E3/UL (ref 150–450)
RBC # BLD AUTO: 4.53 10E6/UL (ref 4.4–5.9)
WBC # BLD AUTO: 6.8 10E3/UL (ref 4–11)

## 2021-11-18 PROCEDURE — 82607 VITAMIN B-12: CPT | Performed by: INTERNAL MEDICINE

## 2021-11-18 PROCEDURE — 82043 UR ALBUMIN QUANTITATIVE: CPT | Performed by: INTERNAL MEDICINE

## 2021-11-18 PROCEDURE — 80061 LIPID PANEL: CPT | Performed by: INTERNAL MEDICINE

## 2021-11-18 PROCEDURE — 86140 C-REACTIVE PROTEIN: CPT | Performed by: INTERNAL MEDICINE

## 2021-11-18 PROCEDURE — 36415 COLL VENOUS BLD VENIPUNCTURE: CPT | Performed by: INTERNAL MEDICINE

## 2021-11-18 PROCEDURE — 85652 RBC SED RATE AUTOMATED: CPT | Performed by: INTERNAL MEDICINE

## 2021-11-18 PROCEDURE — 80053 COMPREHEN METABOLIC PANEL: CPT | Performed by: INTERNAL MEDICINE

## 2021-11-18 PROCEDURE — 99214 OFFICE O/P EST MOD 30 MIN: CPT | Performed by: INTERNAL MEDICINE

## 2021-11-18 PROCEDURE — 85025 COMPLETE CBC W/AUTO DIFF WBC: CPT | Performed by: INTERNAL MEDICINE

## 2021-11-18 RX ORDER — SIMVASTATIN 40 MG
40 TABLET ORAL DAILY
Qty: 90 TABLET | Refills: 3 | Status: SHIPPED | OUTPATIENT
Start: 2021-11-18 | End: 2022-11-07

## 2021-11-18 RX ORDER — LISINOPRIL 2.5 MG/1
2.5 TABLET ORAL DAILY
Qty: 90 TABLET | Refills: 3 | Status: SHIPPED | OUTPATIENT
Start: 2021-11-18 | End: 2022-12-19

## 2021-11-18 NOTE — LETTER
November 22, 2021      Jose Manuel Gallo  [address] [place] [address]        Dear ,    We are writing to inform you of your test results.    All of these tests are within acceptable limits , Things look good !          Resulted Orders   Lipid panel reflex to direct LDL Non-fasting   Result Value Ref Range    Cholesterol 178 <200 mg/dL    Triglycerides 79 <150 mg/dL    Direct Measure HDL 79 >=40 mg/dL    LDL Cholesterol Calculated 83 <=100 mg/dL    Non HDL Cholesterol 99 <130 mg/dL    Patient Fasting > 8hrs? No     Narrative    Cholesterol  Desirable:  <200 mg/dL    Triglycerides  Normal:  Less than 150 mg/dL  Borderline High:  150-199 mg/dL  High:  200-499 mg/dL  Very High:  Greater than or equal to 500 mg/dL    Direct Measure HDL  Female:  Greater than or equal to 50 mg/dL   Male:  Greater than or equal to 40 mg/dL    LDL Cholesterol  Desirable:  <100mg/dL  Above Desirable:  100-129 mg/dL   Borderline High:  130-159 mg/dL   High:  160-189 mg/dL   Very High:  >= 190 mg/dL    Non HDL Cholesterol  Desirable:  130 mg/dL  Above Desirable:  130-159 mg/dL  Borderline High:  160-189 mg/dL  High:  190-219 mg/dL  Very High:  Greater than or equal to 220 mg/dL   Vitamin B12   Result Value Ref Range    Vitamin B12 517 193 - 986 pg/mL   CRP, inflammation   Result Value Ref Range    CRP Inflammation <2.9 0.0 - 8.0 mg/L   ESR: Erythrocyte sedimentation rate   Result Value Ref Range    Erythrocyte Sedimentation Rate 7 0 - 20 mm/hr   Comprehensive metabolic panel (BMP + Alb, Alk Phos, ALT, AST, Total. Bili, TP)   Result Value Ref Range    Sodium 139 133 - 144 mmol/L    Potassium 5.4 (H) 3.4 - 5.3 mmol/L    Chloride 109 94 - 109 mmol/L    Carbon Dioxide (CO2) 27 20 - 32 mmol/L    Anion Gap 3 3 - 14 mmol/L    Urea Nitrogen 22 7 - 30 mg/dL    Creatinine 1.05 0.66 - 1.25 mg/dL    Calcium 9.0 8.5 - 10.1 mg/dL    Glucose 88 70 - 99 mg/dL    Alkaline Phosphatase 84 40 - 150 U/L    AST 24 0 - 45 U/L    ALT 30 0  - 70 U/L    Protein Total 7.2 6.8 - 8.8 g/dL    Albumin 3.6 3.4 - 5.0 g/dL    Bilirubin Total 0.4 0.2 - 1.3 mg/dL    GFR Estimate 64 >60 mL/min/1.73m2      Comment:      As of July 11, 2021, eGFR is calculated by the CKD-EPI creatinine equation, without race adjustment. eGFR can be influenced by muscle mass, exercise, and diet. The reported eGFR is an estimation only and is only applicable if the renal function is stable.   Albumin Random Urine Quantitative with Creat Ratio   Result Value Ref Range    Creatinine Urine mg/dL 125 mg/dL    Albumin Urine mg/L 9 mg/L    Albumin Urine mg/g Cr 7.20 0.00 - 17.00 mg/g Cr   CBC with platelets and differential   Result Value Ref Range    WBC Count 6.8 4.0 - 11.0 10e3/uL    RBC Count 4.53 4.40 - 5.90 10e6/uL    Hemoglobin 14.5 13.3 - 17.7 g/dL    Hematocrit 45.2 40.0 - 53.0 %     78 - 100 fL    MCH 32.0 26.5 - 33.0 pg    MCHC 32.1 31.5 - 36.5 g/dL    RDW 13.3 10.0 - 15.0 %    Platelet Count 207 150 - 450 10e3/uL    % Neutrophils 69 %    % Lymphocytes 18 %    % Monocytes 10 %    % Eosinophils 3 %    % Basophils 0 %    Absolute Neutrophils 4.7 1.6 - 8.3 10e3/uL    Absolute Lymphocytes 1.2 0.8 - 5.3 10e3/uL    Absolute Monocytes 0.7 0.0 - 1.3 10e3/uL    Absolute Eosinophils 0.2 0.0 - 0.7 10e3/uL    Absolute Basophils 0.0 0.0 - 0.2 10e3/uL       If you have any questions or concerns, please call the clinic at the number listed above.       Sincerely,      Orlando Braxton MD/yunior

## 2021-11-18 NOTE — PROGRESS NOTES
Assessment & Plan     Hypertension goal BP (blood pressure) < 140/90  I am seeing Jose Manuel HUNTER Marlenynancy on the heels of a telephone MD visit we had which was insufficient in my opinion, it was an attempt at a complete physical exam with an 86 years old man with multiple medical problems and questions and I ordered a Swallowing evaluation and recommended he come in for a brief recheck office visit, face to face encounter also so he could get the annual laboratory studies done. We spent quite a bit of time in review today. Fundamentally this patient is doing quite well for himself and we answered all of patient's questions. His blood pressure is controlled within acceptable limits   - Albumin Random Urine Quantitative with Creat Ratio; Future  - lisinopril (ZESTRIL) 2.5 MG tablet; Take 1 tablet (2.5 mg) by mouth daily  - Albumin Random Urine Quantitative with Creat Ratio    Dizziness  This patient has an absolutely refractory chronic dizziness symptoms that has defied a precise diagnosis although typically in such a patient is caused from age related issues including a degree of aging of the central nervous system with cerebellar dysfunction and a chronic lightheadedness component. Clearly at this point I am not concerned that this symptom presents a new pathological process that os progressive. We agreed to do comprehensive tests today. He's not interested to go back to neurologist or to Ear, Nose, and Throat specialist MD   - CBC with platelets and differential; Future  - Comprehensive metabolic panel (BMP + Alb, Alk Phos, ALT, AST, Total. Bili, TP); Future  - ESR: Erythrocyte sedimentation rate; Future  - CRP, inflammation; Future  - Vitamin B12; Future  - Vitamin B12  - CRP, inflammation  - ESR: Erythrocyte sedimentation rate  - Comprehensive metabolic panel (BMP + Alb, Alk Phos, ALT, AST, Total. Bili, TP)  - CBC with platelets and differential    Dysphagia, unspecified type  He has a presbyesophagus type of  picture with no extrinsic or extrinsic compression of the esophagus. I think this is a minor and very chronic issue but suggested a follow up appointment with a Speech Language Pathologist which he is not interested to do. Therefore he will keep an eye on things and will send me a heads up if he notices any worsening with this problem   - CBC with platelets and differential; Future  - Comprehensive metabolic panel (BMP + Alb, Alk Phos, ALT, AST, Total. Bili, TP); Future  - ESR: Erythrocyte sedimentation rate; Future  - CRP, inflammation; Future  - Vitamin B12; Future  - Vitamin B12  - CRP, inflammation  - ESR: Erythrocyte sedimentation rate  - Comprehensive metabolic panel (BMP + Alb, Alk Phos, ALT, AST, Total. Bili, TP)  - CBC with platelets and differential    Unintended weight loss  This is a tiny and nonsignificant number. 10 pounds in 5 years or so. Update me if significant changes have taken place, we discussed what to be on the watch for    - CBC with platelets and differential; Future  - Comprehensive metabolic panel (BMP + Alb, Alk Phos, ALT, AST, Total. Bili, TP); Future  - ESR: Erythrocyte sedimentation rate; Future  - CRP, inflammation; Future  - Vitamin B12; Future  - Vitamin B12  - CRP, inflammation  - ESR: Erythrocyte sedimentation rate  - Comprehensive metabolic panel (BMP + Alb, Alk Phos, ALT, AST, Total. Bili, TP)  - CBC with platelets and differential    Post-nasal drip  Another absolutely chronic refractory problem. See patient after-visit summary   - CBC with platelets and differential; Future  - Comprehensive metabolic panel (BMP + Alb, Alk Phos, ALT, AST, Total. Bili, TP); Future  - ESR: Erythrocyte sedimentation rate; Future  - CRP, inflammation; Future  - Vitamin B12; Future  - Vitamin B12  - CRP, inflammation  - ESR: Erythrocyte sedimentation rate  - Comprehensive metabolic panel (BMP + Alb, Alk Phos, ALT, AST, Total. Bili, TP)  - CBC with platelets and differential    Stage 3a chronic  kidney disease (H)  A minute and early finding.  - Comprehensive metabolic panel (BMP + Alb, Alk Phos, ALT, AST, Total. Bili, TP); Future  - lisinopril (ZESTRIL) 2.5 MG tablet; Take 1 tablet (2.5 mg) by mouth daily  - Comprehensive metabolic panel (BMP + Alb, Alk Phos, ALT, AST, Total. Bili, TP)    Hyperlipidemia LDL goal <130  Continue current plan of care    - simvastatin (ZOCOR) 40 MG tablet; Take 1 tablet (40 mg) by mouth daily  - Lipid panel reflex to direct LDL Non-fasting; Future  - Lipid panel reflex to direct LDL Non-fasting    Review of the result(s) of each unique test - todays labs  Prescription drug management  28 minutes spent on the date of the encounter doing chart review, history and exam, documentation and further activities per the note    Return in about 1 year (around 11/18/2022).    Orlando Braxton MD  [facility] ALIREZA Singer is a 86 year old who presents for the following health issues  accompanied by his self.    HPI     Medication Refills    Wt Readings from Last 5 Encounters:   11/18/21 72.1 kg (159 lb)   10/13/21 72.6 kg (160 lb)   05/25/21 73.5 kg (162 lb)   05/05/21 73.8 kg (162 lb 9.6 oz)   04/12/21 73.5 kg (162 lb)     Body mass index is 22.02 kg/m .    Review of Systems   Constitutional, HEENT, cardiovascular, pulmonary, gi and gu systems are negative, except as otherwise noted.      Objective    BP (!) 169/78 (BP Location: Right arm, Patient Position: Chair, Cuff Size: Adult Regular)   Pulse 60   Temp 97.4  F (36.3  C) (Oral)   Wt 72.1 kg (159 lb)   SpO2 98%   BMI 22.02 kg/m    Body mass index is 22.02 kg/m .  Physical Exam   GENERAL: healthy, alert and no distress  EYES: Eyes grossly normal to inspection, PERRL and conjunctivae and sclerae normal  MS: no gross musculoskeletal defects noted, no edema  NEURO: Normal strength and tone, mentation intact and speech normal  PSYCH: mentation appears normal, affect normal/bright    Orders Placed This  Encounter   Procedures     Albumin Random Urine Quantitative with Creat Ratio     Comprehensive metabolic panel (BMP + Alb, Alk Phos, ALT, AST, Total. Bili, TP)     ESR: Erythrocyte sedimentation rate     CRP, inflammation     Vitamin B12     Lipid panel reflex to direct LDL Non-fasting     CBC with platelets and differential

## 2021-11-18 NOTE — PATIENT INSTRUCTIONS
Zyrtec [ cetirizine ] is recommended to try for the chronic postnasal drip you have . This is an over the counter nonprescription , and recommended to take once a day. If this isn't helping you, and you want to get an opinion form the Ear, Nose, and Throat specialist MD , please let me know

## 2021-11-19 LAB
ALBUMIN SERPL-MCNC: 3.6 G/DL (ref 3.4–5)
ALP SERPL-CCNC: 84 U/L (ref 40–150)
ALT SERPL W P-5'-P-CCNC: 30 U/L (ref 0–70)
ANION GAP SERPL CALCULATED.3IONS-SCNC: 3 MMOL/L (ref 3–14)
AST SERPL W P-5'-P-CCNC: 24 U/L (ref 0–45)
BILIRUB SERPL-MCNC: 0.4 MG/DL (ref 0.2–1.3)
BUN SERPL-MCNC: 22 MG/DL (ref 7–30)
CALCIUM SERPL-MCNC: 9 MG/DL (ref 8.5–10.1)
CHLORIDE BLD-SCNC: 109 MMOL/L (ref 94–109)
CHOLEST SERPL-MCNC: 178 MG/DL
CO2 SERPL-SCNC: 27 MMOL/L (ref 20–32)
CREAT SERPL-MCNC: 1.05 MG/DL (ref 0.66–1.25)
CREAT UR-MCNC: 125 MG/DL
CRP SERPL-MCNC: <2.9 MG/L (ref 0–8)
FASTING STATUS PATIENT QL REPORTED: NO
GFR SERPL CREATININE-BSD FRML MDRD: 64 ML/MIN/1.73M2
GLUCOSE BLD-MCNC: 88 MG/DL (ref 70–99)
HDLC SERPL-MCNC: 79 MG/DL
LDLC SERPL CALC-MCNC: 83 MG/DL
MICROALBUMIN UR-MCNC: 9 MG/L
MICROALBUMIN/CREAT UR: 7.2 MG/G CR (ref 0–17)
NONHDLC SERPL-MCNC: 99 MG/DL
POTASSIUM BLD-SCNC: 5.4 MMOL/L (ref 3.4–5.3)
PROT SERPL-MCNC: 7.2 G/DL (ref 6.8–8.8)
SODIUM SERPL-SCNC: 139 MMOL/L (ref 133–144)
TRIGL SERPL-MCNC: 79 MG/DL
VIT B12 SERPL-MCNC: 517 PG/ML (ref 193–986)

## 2021-11-19 ASSESSMENT — PATIENT HEALTH QUESTIONNAIRE - PHQ9: SUM OF ALL RESPONSES TO PHQ QUESTIONS 1-9: 0

## 2022-01-09 ENCOUNTER — MYC MEDICAL ADVICE (OUTPATIENT)
Dept: INTERNAL MEDICINE | Facility: CLINIC | Age: 87
End: 2022-01-09
Payer: COMMERCIAL

## 2022-01-11 ENCOUNTER — THERAPY VISIT (OUTPATIENT)
Dept: PHYSICAL THERAPY | Facility: CLINIC | Age: 87
End: 2022-01-11
Attending: PHYSICIAN ASSISTANT
Payer: COMMERCIAL

## 2022-01-11 ENCOUNTER — ANCILLARY PROCEDURE (OUTPATIENT)
Dept: GENERAL RADIOLOGY | Facility: CLINIC | Age: 87
End: 2022-01-11
Attending: PHYSICIAN ASSISTANT
Payer: COMMERCIAL

## 2022-01-11 ENCOUNTER — OFFICE VISIT (OUTPATIENT)
Dept: FAMILY MEDICINE | Facility: CLINIC | Age: 87
End: 2022-01-11
Payer: COMMERCIAL

## 2022-01-11 VITALS
OXYGEN SATURATION: 96 % | BODY MASS INDEX: 22.85 KG/M2 | HEIGHT: 71 IN | SYSTOLIC BLOOD PRESSURE: 126 MMHG | HEART RATE: 76 BPM | DIASTOLIC BLOOD PRESSURE: 60 MMHG | TEMPERATURE: 97 F | WEIGHT: 163.2 LBS

## 2022-01-11 DIAGNOSIS — M54.50 LUMBAR SPINE PAIN: ICD-10-CM

## 2022-01-11 DIAGNOSIS — N18.30 STAGE 3 CHRONIC KIDNEY DISEASE, UNSPECIFIED WHETHER STAGE 3A OR 3B CKD (H): ICD-10-CM

## 2022-01-11 DIAGNOSIS — M47.26 OSTEOARTHRITIS OF SPINE WITH RADICULOPATHY, LUMBAR REGION: ICD-10-CM

## 2022-01-11 DIAGNOSIS — M47.26 OSTEOARTHRITIS OF SPINE WITH RADICULOPATHY, LUMBAR REGION: Primary | ICD-10-CM

## 2022-01-11 LAB
CREAT UR-MCNC: 121 MG/DL
MICROALBUMIN UR-MCNC: 639 MG/L
MICROALBUMIN/CREAT UR: 528.1 MG/G CR (ref 0–17)

## 2022-01-11 PROCEDURE — 99214 OFFICE O/P EST MOD 30 MIN: CPT | Performed by: PHYSICIAN ASSISTANT

## 2022-01-11 PROCEDURE — 82043 UR ALBUMIN QUANTITATIVE: CPT | Performed by: PHYSICIAN ASSISTANT

## 2022-01-11 PROCEDURE — 72100 X-RAY EXAM L-S SPINE 2/3 VWS: CPT | Performed by: RADIOLOGY

## 2022-01-11 PROCEDURE — 97161 PT EVAL LOW COMPLEX 20 MIN: CPT | Mod: GP | Performed by: PHYSICAL THERAPIST

## 2022-01-11 PROCEDURE — 97110 THERAPEUTIC EXERCISES: CPT | Mod: GP | Performed by: PHYSICAL THERAPIST

## 2022-01-11 RX ORDER — SENNOSIDES 8.6 MG
650 CAPSULE ORAL EVERY 8 HOURS PRN
Qty: 60 TABLET | Refills: 1 | Status: SHIPPED | OUTPATIENT
Start: 2022-01-11

## 2022-01-11 ASSESSMENT — MIFFLIN-ST. JEOR: SCORE: 1446.36

## 2022-01-11 NOTE — PROGRESS NOTES
Assessment & Plan     Osteoarthritis of spine with radiculopathy, lumbar region  - XR Lumbar Spine 2/3 Views; Future  - acetaminophen (TYLENOL) 650 MG CR tablet; Take 1 tablet (650 mg) by mouth every 8 hours as needed for pain  - [facility] Referral; Future    Stage 3 chronic kidney disease, unspecified whether stage 3a or 3b CKD (H)            Return in about 2 months (around 3/11/2022) for follow up if symptoms persist, change or worsen.    СВЕТЛАНА Gonzalez [facility] ALIREZA Singer is a 86 year old who presents for the following health issues  accompanied by his self.    HPI     Pain History:  When did you first notice your pain? - Less than 1 week   Have you seen anyone else for your pain? No  Where in your body do you have pain? Back Pain  Patient presents with:  Back Pain: having lower back pain x 1 week. did have incontinence and diarrhea within the 1 week but have resolved. does have a history of lower back pain and in the past did see a [facility] doctor.   Dizziness      Onset/Duration: 1 week  Description:   Location of pain: low back left  Character of pain: sharp at times  Pain radiation: radiates into the left hip  New numbness or weakness in legs, not attributed to pain: no   Intensity: Currently 2/10, At its worst 10/10  Progression of Symptoms: worsening  History:   Specific cause: none  Pain interferes with job: not applicable  History of back problems: yes   Any previous MRI or X-rays: Yes- at [facility].  Date 10/14/2013,12/19/2014, 07/13/2017,07/21/2020  Sees a specialist for back pain: No  Alleviating factors:   Improved by: acetaminophen (Tylenol)    Precipitating factors:  Worsened by: Bending  Therapies tried and outcome: acetaminophen (Tylenol)    Patient has noticed symptoms progressing.  Amenable to additional work up.  Patient referral placed.     Review of Systems   Constitutional, HEENT, cardiovascular, pulmonary, gi and gu systems are  "negative, except as otherwise noted.      Objective    /60   Pulse 76   Temp 97  F (36.1  C) (Oral)   Ht 1.81 m (5' 11.25\")   Wt 74 kg (163 lb 3.2 oz)   SpO2 96%   BMI 22.60 kg/m    Body mass index is 22.6 kg/m .     Physical Exam   GENERAL: healthy, alert and no distress  MS: no gross musculoskeletal defects noted, no edema  SKIN: no suspicious lesions or rashes  Comprehensive back pain exam:  No tenderness, Range of motion not limited by pain, Lower extremity strength functional and equal on both sides, Lower extremity reflexes within normal limits bilaterally and Straight leg positive on  left, indicating possible ipsilateral radiculopathy                "

## 2022-01-11 NOTE — PROGRESS NOTES
Physical Therapy Initial Evaluation  Subjective:  The history is provided by the patient.   Therapist Generated HPI Evaluation  Problem details: Has had back pain off and on for years. Last week back flared up again and hurt no matter which way he moved. Gets pain in left low back and into left posterior hip. Bending over will set off symptoms. Making bed, washing dishes, washing clothes are all difficult to do. Usually not bending over will relieve symptoms..         Type of problem:  Lumbar.    This is a recurrent condition.  Condition occurred with:  Insidious onset.  Where condition occurred: for unknown reasons.  Patient reports pain:  Lumbar spine left and SI joint left.  Pain is described as stabbing and is constant.  Pain radiates to:  No radiation. Pain is the same all the time.    Symptoms are exacerbated by bending  and relieved by rest and analgesics.    Previous treatment includes physical therapy. There was mild improvement following previous treatment.  Barriers include:  None as reported by patient.    Patient Health History  Jose Manuel Gallo being seen for Lower left back.          Pain is reported as 7/10 on pain scale.  General health as reported by patient is good.  Pertinent medical history includes: high blood pressure and smoking.   Red flags:  Changes in bowel and bladder habits and pain at rest/night.         Current medications:  High blood pressure medication and pain medication.    Current occupation is Retired.   Primary job tasks include:  Prolonged sitting.                                    Objective:    Gait:  Minimal lumbar rotation. Good heel strike and toe off.                   Lumbar/SI Evaluation  ROM:    AROM Lumbar:   Flexion:          Fingertips 12 inches from floor with pain in left lumbar spine  Ext:                    8 degrees / Repeated motion with mild pain in lumbar spine B   Side Bend:        Left:  Fingertips to distal thigh with pain in left lumbar spine     Right:  Fingertips to distal thigh  Rotation:           Left:     Right:   Side Glide:        Left:     Right:           Lumbar Myotomes:    T12-L3 (Hip Flex):  Left: 2    Right: 5  L2-4 (Quads):  Left:  5    Right:  5  L4 (Ankle DF):  Left:  5    Right:  5  L5 (Great Toe Ext): Left: 5    Right: 5   S1 (Toe Raise):  Left: 5    Right: 5  Lumbar DTR's:    L4 (Quad):  Left:  2   Right:  2      Lumbar Dermtomes:  normal                Neural Tension/Mobility:    Left side:  SLR and SLR w/DF positive.  Left side:Slump  negative.     Right side:   SLR w/DF; Slump or SLR  negative.         Spinal Segmental Conclusions: Hypomobility of L1-L5. Increase in left posterior hip pain with palpation of L4-L5.          SI joint/Sacrum:          Left negative at:    Thigh thrust and Sacral thrust    Right negative at:  Thigh thrust and Sacral thrust                                                 General     ROS    Assessment/Plan:    Patient is a 86 year old male with lumbar complaints.    Patient has the following significant findings with corresponding treatment plan.                Diagnosis 1:  Lumbar spine pain  Pain -  manual therapy, self management, directional preference exercise and home program  Decreased ROM/flexibility - manual therapy, therapeutic exercise, therapeutic activity and home program  Decreased joint mobility - manual therapy, therapeutic exercise, therapeutic activity and home program  Decreased strength - therapeutic exercise, therapeutic activities and home program  Decreased function - therapeutic activities and home program    Therapy Evaluation Codes:     Cumulative Therapy Evaluation is: Low complexity.    Previous and current functional limitations:  (See Goal Flow Sheet for this information)    Short term and Long term goals: (See Goal Flow Sheet for this information)     Communication ability:  Patient appears to be able to clearly communicate and understand verbal and written communication and  follow directions correctly.  Treatment Explanation - The following has been discussed with the patient:   RX ordered/plan of care  Anticipated outcomes  Possible risks and side effects  This patient would benefit from PT intervention to resume normal activities.   Rehab potential is good.    Frequency:  1 X week, once daily  Duration:  for 8 weeks  Discharge Plan:  Achieve all LTG.  Independent in home treatment program.  Reach maximal therapeutic benefit.    Please refer to the daily flowsheet for treatment today, total treatment time and time spent performing 1:1 timed codes.

## 2022-01-11 NOTE — PATIENT INSTRUCTIONS
Patient Education     Degenerative Disk Disease    Spinal disks are gel-filled cushions between the bones, or vertebrae, of the spine. The disks act like shock absorbers. Over time, the disks may break down. This is called degenerative disk disease.  This condition can affect the neck or back. It is one of the most common causes of low back pain. The pain often remains localized to the lower back or neck. Muscle spasm is often present and adds to the pain.  Disk degeneration is a natural part of aging. But it is not painful for most people. It may also occur as a result of repeated minor injuries due to daily activities, sports, or accidents. It may lead to osteoarthritis of the spine. Back pain related to disk disease may come and go. Or it may become chronic and last for months or years. The disk may bulge or rupture. This is called a slipped disk or herniated disk. That can put pressure on a nearby spinal nerve and cause neck or back pain that spreads down one arm or leg.  X-rays, CT scan, or an MRI scan may help to diagnose this condition. For acute pain, treatment includes anti-inflammatory medicines, muscle relaxants, rest, ice, or heat. Strong prescription pain medicines, called opioids, may be needed for short-term treatment if pain suddenly gets worse. Opioid medicines can be addictive. So they are not advised for long-term pain management. Other types of medicines are preferred. Surgery is generally not used to treat this condition unless there is a complication.    Home care    For neck pain: Use a comfortable pillow that supports the head and keeps the spine in a neutral position. Your head should not be tilted forward or backward.    For back pain: Avoid sitting for long periods of time. This puts more stress on the lower back than standing or walking. Starting a regular exercise program to strengthen the supporting muscles of the spine will make it easier to live with degenerative disk  disease.    Apply an ice pack over the injured area for no more than 15 to 20 minutes. Do this every 3 to 6 hours for the first 24 to 48 hours. To make an ice pack, put ice cubes in a plastic bag that seals at the top. Wrap the bag in a clean, thin towel or cloth. Never put ice or an ice pack directly on the skin. Keep using ice packs to ease pain and swelling as needed. After 48 hours, apply heat (warm shower or warm bath) for 20 minutes several times a day, or switch between ice and heat.     You may use over-the-counter pain medicine to control pain, unless another pain medicine was prescribed. If you have chronic liver or kidney disease or ever had a stomach ulcer or GI bleeding, talk with your provider before using these medicines.    Follow-up care  Follow up with your healthcare provider, or as directed.  If X-rays, a CT scan or an MRI scan were done, you will be notified of any new findings that may affect your care.  When to seek medical advice  Contact your healthcare provider right away if any of these occur:    Increasing back pain    Your foot drags when you walk, a condition called foot drop    You have new weakness, numbness, or pain in one or both arms or legs    Loss of bowel or bladder control    Numbness or tingling in the buttock or groin area  [employer] last reviewed this educational content on 3/1/2018    9936-9370 [employer]. All rights reserved. This information is not intended as a substitute for professional medical care. Always follow your healthcare professional's instructions.

## 2022-01-11 NOTE — PROGRESS NOTES
[facility]    OUTPATIENT Physical Therapy ORTHOPEDIC EVALUATION  PLAN OF TREATMENT FOR OUTPATIENT REHABILITATION  (COMPLETE FOR INITIAL CLAIMS ONLY)  Patient's Last Name, First Name, M.I.  YOB: 1935  Jose Manuel Gallo    Provider s Name:  [facility]   Medical Record No.  [id number]   Start of Care Date:  01/11/22   Onset Date:   01/04/22   Type:     _X__PT   ___OT Medical Diagnosis:    Encounter Diagnoses   Name Primary?     Osteoarthritis of spine with radiculopathy, lumbar region      Lumbar spine pain         Treatment Diagnosis:  Lumbar spine pain        Goals:     01/11/22 0500   Body Part   Goals listed below are for Lumbar   Goal #1   Goal #1 bending   Previous Functional Level No restrictions   Current Functional Level Can bend until hands reach midlower leg   Performance level with 7/10 pain   STG Target Performance Bend until hands reach midlower leg   Performance level with 4/10 pain   Rationale for household tasks such as bedmaking, emptying diswasher, washer and dryer, washing floors   Due date 02/01/22   LTG Target Performance Bend until hands reach midlower leg   Performance Level 1-2/10 pain   Rationale for household tasks such as bedmaking, emptying diswasher, washer and dryer, washing floors   Due date 03/08/22       Therapy Frequency:  1 time per week  Predicted Duration of Therapy Intervention:  8 weeks    Bradford Hoyt, PT                 I CERTIFY THE NEED FOR THESE SERVICES FURNISHED UNDER        THIS PLAN OF TREATMENT AND WHILE UNDER MY CARE     (Physician attestation of this document indicates review and certification of the therapy plan).                       Certification Date From:  01/11/22   Certification Date To:  03/08/22    Referring Provider:  Deirdre Yañez    Initial Assessment        See Epic Evaluation SOC Date:  01/11/22

## 2022-01-13 NOTE — TELEPHONE ENCOUNTER
Patient has not scheduled appointment yet as advised in previous message. . Please advise on results.    Yary IRIZARRY RN  [facility]

## 2022-01-25 NOTE — PROGRESS NOTES
SUBJECTIVE:  Jose Manuel Gallo is a 86 year old male who is seen in follow-up for left shoulder pain.  In May, and then in October, he had injections for rotator cuff tendonitis. Length of effectiveness:  3 months or so.  He also has glenohumeral joint osteoarthritis     Present symptoms: pain worse problably from ? Shoveling, but hasn't done heavy work., pain rotating arm with reaching out.  No weakness.   Associated symptoms: none    Treatment up to this point: corticosteroid injection in May helped x 4-5 months  Hasn't done any physical therapy, and is not interested in that.  Wants another injection     Ortho PMH:     Past Medical History:   Past Medical History:   Diagnosis Date     Cataract, mild, od; mod, os 7/2/2012     Chronic low back pain      CKD (chronic kidney disease) stage 3, GFR 30-59 ml/min (H)      Erectile dysfunction      Hemorrhoids      History of bladder cancer          HTN      Hyperlipidemia LDL goal <130      Kidney stones      Macular degeneration      Osteoarthritis      Personal history of colonic polyps     10/2002     Personal history of malignant neoplasm of bladder      Personal history of tobacco use     Smoked 40 years, Quit 2003     PVD (peripheral vascular disease) (H)     Left Vertebral Artery occlusion     Rotator cuff tendinitis, left      Past Surgical History:   Past Surgical History:   Procedure Laterality Date     CATARACT IOL, RT/LT       COLONOSCOPY  11/02/2008    Normal     HC REVISE ULNAR NERVE AT ELBOW      Left     LASER YAG CAPSULOTOMY  7/2016    left eye     PHACOEMULSIFICATION WITH STANDARD INTRAOCULAR LENS IMPLANT  7/2012    left eye     TURP  1997 ?     VASECTOMY       VITRECTOMY PARSPLANA  8/2011    left eye, repair macular hole     ZZC SPINAL FUSION,ANT,EA ADNL LEVEL      C6-7     Family History:   Family History   Problem Relation Age of Onset     Osteoporosis Mother      Diabetes Father      Arthritis Father      Cancer Father      Respiratory  "Father      Genitourinary Problems Brother      Circulatory Brother      Macular Degeneration No family hx of      Glaucoma No family hx of      Thyroid Disease No family hx of      Hypertension No family hx of      Social History:   Social History     Tobacco Use     Smoking status: Former Smoker     Years: 40.00     Types: Cigarettes     Quit date: 2003     Years since quittin.0     Smokeless tobacco: Never Used   Substance Use Topics     Alcohol use: Yes     Comment: 1 x month       Review of Systems:  Constitutional:  NEGATIVE for fever, chills, change in weight  Integumentary/Skin:  NEGATIVE for worrisome rashes, moles or lesions  Eyes:  NEGATIVE for vision changes or irritation  ENT/Mouth:  NEGATIVE for ear, mouth and throat problems  Resp:  NEGATIVE for significant cough or SOB  Breast:  NEGATIVE for masses, tenderness or discharge  CV:  NEGATIVE for chest pain, palpitations or peripheral edema  GI:  NEGATIVE for nausea, abdominal pain, heartburn, or change in bowel habits  :  Negative   Musculoskeletal:  See HPI above  Neuro:  NEGATIVE for weakness, dizziness or paresthesias  Endocrine:  NEGATIVE for temperature intolerance, skin/hair changes  Heme/allergy/immune:  NEGATIVE for bleeding problems  Psychiatric:  NEGATIVE for changes in mood or affect    OBJECTIVE:  Physical Exam:  BP (!) 143/75 (BP Location: Right arm, Patient Position: Sitting, Cuff Size: Adult Regular)   Pulse 70   Ht 1.803 m (5' 11\")   Wt 73.9 kg (163 lb)   SpO2 96%   BMI 22.73 kg/m    General Appearance: healthy, alert and in no distress   Skin: no suspicious lesions or rashes  Neuro: Normal strength and tone, mentation intact and speech normal  Vascular: good pulses, and cappillary refill   Lymph: no lymphadenopathy   Psych:  mentation appears normal and affect normal/bright  Resp: no increased work of breathing      Left Shoulder Exam:  Shoulder Inspection: no swelling, bruising, discoloration, or obvious deformity or " asymmetry  no atrophy  Tender:  Non-tender  Strength: forward flexion: 5*/5, external rotation: 5-/5, Liftoff: Able  Impingement: all grade 2 positive      MRI:   From 5/13/21 : left shoulder    Motion partially compromising assessment.  1. Rotator cuff tendinosis without high-grade tear. Focal subchondral  edema of the anterior facet of greater tuberosity, query enthesitis.  2. Strain of the infraspinatus.  3. Query mild subacromial/subdeltoid bursitis.  4. Mild acromioclavicular joint degenerative change with subacromial  enthesophyte.  5. Extensive subchondral cystlike changes of the posteroinferior  glenoid which is intimate with triceps proximal attachment without  substantial cartilage abnormality, likely reflecting intra-osseous  ganglion cysts.      ASSESSMENT:  Encounter Diagnoses   Name Primary?     Rotator cuff tendinitis, left Yes     Biceps tendonitis on left      Glenohumeral joint arthritis left      Superior glenoid labrum lesion of left shoulder, subsequent encounter         PLAN:  He would like to repeat the injection   I strongly recommended physical therapy, but he again just wanted the shot today.  Hopefully he will reconsider.    Procedure Note:   Informed consent obtained. Risks, benefits and complications of the injection were discussed with the patient and the patient elected to proceed. Left shoulder injected into the subacromial space after sterile prep, using 80mg Depomedrol and 4cc local anesthetic.    Total time spent was 12 minutes; including but not limited to prep time and discussion.    Return to clinic: as needed     DESTINEE Hastings MD  Dept. Orthopedic Surgery  [facility]

## 2022-01-26 ENCOUNTER — OFFICE VISIT (OUTPATIENT)
Dept: ORTHOPEDICS | Facility: CLINIC | Age: 87
End: 2022-01-26
Payer: COMMERCIAL

## 2022-01-26 VITALS
BODY MASS INDEX: 22.82 KG/M2 | OXYGEN SATURATION: 96 % | SYSTOLIC BLOOD PRESSURE: 143 MMHG | HEART RATE: 70 BPM | DIASTOLIC BLOOD PRESSURE: 75 MMHG | WEIGHT: 163 LBS | HEIGHT: 71 IN

## 2022-01-26 DIAGNOSIS — M75.82 ROTATOR CUFF TENDINITIS, LEFT: Primary | ICD-10-CM

## 2022-01-26 DIAGNOSIS — S43.432D SUPERIOR GLENOID LABRUM LESION OF LEFT SHOULDER, SUBSEQUENT ENCOUNTER: ICD-10-CM

## 2022-01-26 DIAGNOSIS — M75.22 BICEPS TENDONITIS ON LEFT: ICD-10-CM

## 2022-01-26 DIAGNOSIS — M19.019 SHOULDER ARTHRITIS: ICD-10-CM

## 2022-01-26 PROCEDURE — 99213 OFFICE O/P EST LOW 20 MIN: CPT | Mod: 25 | Performed by: ORTHOPAEDIC SURGERY

## 2022-01-26 PROCEDURE — 20610 DRAIN/INJ JOINT/BURSA W/O US: CPT | Mod: LT | Performed by: ORTHOPAEDIC SURGERY

## 2022-01-26 RX ORDER — LIDOCAINE HYDROCHLORIDE 10 MG/ML
4 INJECTION, SOLUTION INFILTRATION; PERINEURAL
Status: SHIPPED | OUTPATIENT
Start: 2022-01-26

## 2022-01-26 RX ORDER — METHYLPREDNISOLONE ACETATE 80 MG/ML
80 INJECTION, SUSPENSION INTRA-ARTICULAR; INTRALESIONAL; INTRAMUSCULAR; SOFT TISSUE
Status: SHIPPED | OUTPATIENT
Start: 2022-01-26

## 2022-01-26 RX ADMIN — LIDOCAINE HYDROCHLORIDE 4 ML: 10 INJECTION, SOLUTION INFILTRATION; PERINEURAL at 14:52

## 2022-01-26 RX ADMIN — METHYLPREDNISOLONE ACETATE 80 MG: 80 INJECTION, SUSPENSION INTRA-ARTICULAR; INTRALESIONAL; INTRAMUSCULAR; SOFT TISSUE at 14:52

## 2022-01-26 ASSESSMENT — MIFFLIN-ST. JEOR: SCORE: 1441.49

## 2022-01-26 ASSESSMENT — PAIN SCALES - GENERAL: PAINLEVEL: MILD PAIN (3)

## 2022-01-26 NOTE — PROGRESS NOTES
Large Joint Injection/Arthocentesis: L subacromial bursa    Date/Time: 1/26/2022 2:52 PM  Performed by: Rodrigo Hastings MD  Authorized by: Rodrigo Hastings MD     Indications:  Pain  Needle Size:  22 G  Guidance: landmark guided    Approach:  Lateral  Location:  Shoulder      Site:  L subacromial bursa  Medications:  80 mg methylPREDNISolone 80 MG/ML; 4 mL lidocaine 1 %  Outcome:  Tolerated well, no immediate complications  Procedure discussed: discussed risks, benefits, and alternatives    Consent Given by:  Patient  Timeout: timeout called immediately prior to procedure    Prep: patient was prepped and draped in usual sterile fashion

## 2022-01-26 NOTE — LETTER
1/26/2022         RE: Jose Manuel Gallo  [address] [place] [address]        Dear Colleague,    Thank you for referring your patient, Jose Manuel Gallo, to the [facility]. Please see a copy of my visit note below.    SUBJECTIVE:  Jose Manuel Gallo is a 86 year old male who is seen in follow-up for left shoulder pain.  In May, and then in October, he had injections for rotator cuff tendonitis. Length of effectiveness:  3 months or so.  He also has glenohumeral joint osteoarthritis     Present symptoms: pain worse problably from ? Shoveling, but hasn't done heavy work., pain rotating arm with reaching out.  No weakness.   Associated symptoms: none    Treatment up to this point: corticosteroid injection in May helped x 4-5 months  Hasn't done any physical therapy, and is not interested in that.  Wants another injection     Ortho PMH:     Past Medical History:   Past Medical History:   Diagnosis Date     Cataract, mild, od; mod, os 7/2/2012     Chronic low back pain      CKD (chronic kidney disease) stage 3, GFR 30-59 ml/min (H)      Erectile dysfunction      Hemorrhoids      History of bladder cancer          HTN      Hyperlipidemia LDL goal <130      Kidney stones      Macular degeneration      Osteoarthritis      Personal history of colonic polyps     10/2002     Personal history of malignant neoplasm of bladder      Personal history of tobacco use     Smoked 40 years, Quit 2003     PVD (peripheral vascular disease) (H)     Left Vertebral Artery occlusion     Rotator cuff tendinitis, left      Past Surgical History:   Past Surgical History:   Procedure Laterality Date     CATARACT IOL, RT/LT       COLONOSCOPY  11/02/2008    Normal     HC REVISE ULNAR NERVE AT ELBOW      Left     LASER YAG CAPSULOTOMY  7/2016    left eye     PHACOEMULSIFICATION WITH STANDARD INTRAOCULAR LENS IMPLANT  7/2012    left eye     TURP  1997 ?     VASECTOMY       VITRECTOMY PARSPLANA  8/2011  "   left eye, repair macular hole     ZZC SPINAL FUSION,ANT,EA ADNL LEVEL      C6-7     Family History:   Family History   Problem Relation Age of Onset     Osteoporosis Mother      Diabetes Father      Arthritis Father      Cancer Father      Respiratory Father      Genitourinary Problems Brother      Circulatory Brother      Macular Degeneration No family hx of      Glaucoma No family hx of      Thyroid Disease No family hx of      Hypertension No family hx of      Social History:   Social History     Tobacco Use     Smoking status: Former Smoker     Years: 40.00     Types: Cigarettes     Quit date: 2003     Years since quittin.0     Smokeless tobacco: Never Used   Substance Use Topics     Alcohol use: Yes     Comment: 1 x month       Review of Systems:  Constitutional:  NEGATIVE for fever, chills, change in weight  Integumentary/Skin:  NEGATIVE for worrisome rashes, moles or lesions  Eyes:  NEGATIVE for vision changes or irritation  ENT/Mouth:  NEGATIVE for ear, mouth and throat problems  Resp:  NEGATIVE for significant cough or SOB  Breast:  NEGATIVE for masses, tenderness or discharge  CV:  NEGATIVE for chest pain, palpitations or peripheral edema  GI:  NEGATIVE for nausea, abdominal pain, heartburn, or change in bowel habits  :  Negative   Musculoskeletal:  See HPI above  Neuro:  NEGATIVE for weakness, dizziness or paresthesias  Endocrine:  NEGATIVE for temperature intolerance, skin/hair changes  Heme/allergy/immune:  NEGATIVE for bleeding problems  Psychiatric:  NEGATIVE for changes in mood or affect    OBJECTIVE:  Physical Exam:  BP (!) 143/75 (BP Location: Right arm, Patient Position: Sitting, Cuff Size: Adult Regular)   Pulse 70   Ht 1.803 m (5' 11\")   Wt 73.9 kg (163 lb)   SpO2 96%   BMI 22.73 kg/m    General Appearance: healthy, alert and in no distress   Skin: no suspicious lesions or rashes  Neuro: Normal strength and tone, mentation intact and speech normal  Vascular: good pulses, and " cappillary refill   Lymph: no lymphadenopathy   Psych:  mentation appears normal and affect normal/bright  Resp: no increased work of breathing      Left Shoulder Exam:  Shoulder Inspection: no swelling, bruising, discoloration, or obvious deformity or asymmetry  no atrophy  Tender:  Non-tender  Strength: forward flexion: 5*/5, external rotation: 5-/5, Liftoff: Able  Impingement: all grade 2 positive      MRI:   From 5/13/21 : left shoulder    Motion partially compromising assessment.  1. Rotator cuff tendinosis without high-grade tear. Focal subchondral  edema of the anterior facet of greater tuberosity, query enthesitis.  2. Strain of the infraspinatus.  3. Query mild subacromial/subdeltoid bursitis.  4. Mild acromioclavicular joint degenerative change with subacromial  enthesophyte.  5. Extensive subchondral cystlike changes of the posteroinferior  glenoid which is intimate with triceps proximal attachment without  substantial cartilage abnormality, likely reflecting intra-osseous  ganglion cysts.      ASSESSMENT:  Encounter Diagnoses   Name Primary?     Rotator cuff tendinitis, left Yes     Biceps tendonitis on left      Glenohumeral joint arthritis left      Superior glenoid labrum lesion of left shoulder, subsequent encounter         PLAN:  He would like to repeat the injection   I strongly recommended physical therapy, but he again just wanted the shot today.  Hopefully he will reconsider.    Procedure Note:   Informed consent obtained. Risks, benefits and complications of the injection were discussed with the patient and the patient elected to proceed. Left shoulder injected into the subacromial space after sterile prep, using 80mg Depomedrol and 4cc local anesthetic.    Total time spent was 12 minutes; including but not limited to prep time and discussion.    Return to clinic: as needed     DESTINEE Hastings MD  Dept. Orthopedic Surgery  [facility]    Large Joint Injection/Arthocentesis: L  subacromial bursa    Date/Time: 1/26/2022 2:52 PM  Performed by: Rodrigo Hastings MD  Authorized by: Rodrigo Hastings MD     Indications:  Pain  Needle Size:  22 G  Guidance: landmark guided    Approach:  Lateral  Location:  Shoulder      Site:  L subacromial bursa  Medications:  80 mg methylPREDNISolone 80 MG/ML; 4 mL lidocaine 1 %  Outcome:  Tolerated well, no immediate complications  Procedure discussed: discussed risks, benefits, and alternatives    Consent Given by:  Patient  Timeout: timeout called immediately prior to procedure    Prep: patient was prepped and draped in usual sterile fashion              Again, thank you for allowing me to participate in the care of your patient.        Sincerely,        Rodrigo Hastings MD

## 2022-02-14 ENCOUNTER — OFFICE VISIT (OUTPATIENT)
Dept: UROLOGY | Facility: CLINIC | Age: 87
End: 2022-02-14
Payer: COMMERCIAL

## 2022-02-14 DIAGNOSIS — Z85.51 HISTORY OF BLADDER CANCER: Primary | ICD-10-CM

## 2022-02-14 PROCEDURE — 52000 CYSTOURETHROSCOPY: CPT | Performed by: UROLOGY

## 2022-02-14 NOTE — PROGRESS NOTES
S: Jose Manuel Gallo is a 86 year old male returns for bladder cancer surveillance.   he has history of bladder cancer Grade 3 non-invasive, Stage ta, s/p turbt.   He received 2 courses of BCG therapy.   Cysto: The anterior urethra showed mild stricture   The prostatic urethra turp changes.   The length is 0cm, the coaptation is 0 cm.   In the bladder there is normal mucosa.   Assessment/Plan: V10.51B History of bladder cancer (primary encounter diagnosis)   Comment: no evidence of recurrence   Plan: CYSTOURETHROSCOPY   BTR in one year

## 2022-02-15 PROBLEM — M54.50 LUMBAR SPINE PAIN: Status: RESOLVED | Noted: 2022-01-11 | Resolved: 2022-02-15

## 2022-05-06 ENCOUNTER — TELEPHONE (OUTPATIENT)
Dept: OPHTHALMOLOGY | Facility: CLINIC | Age: 87
End: 2022-05-06
Payer: COMMERCIAL

## 2022-05-06 NOTE — TELEPHONE ENCOUNTER
Left voicemail for patient asking for him to call back to schedule appointment for complete eye exam/cataract evaluation with Dr. Lux end of June.

## 2022-05-06 NOTE — TELEPHONE ENCOUNTER
Patient came by stating that he would like to discuss cataract surgery. The last time he saw Dr. Scales; Loyda recommended it according to the patient. He would like a call back to discuss this as soon as possible at: [phone]. Patient states we can leave a voicemail if he doesn't answer. Patient is aware that Dr. Lux conducts these surgeries.    Thank You,    David Alex  Patient Rep

## 2022-06-02 ENCOUNTER — TELEPHONE (OUTPATIENT)
Dept: FAMILY MEDICINE | Facility: CLINIC | Age: 87
End: 2022-06-02
Payer: COMMERCIAL

## 2022-06-02 DIAGNOSIS — M25.519 CHRONIC SHOULDER PAIN, UNSPECIFIED LATERALITY: Primary | ICD-10-CM

## 2022-06-02 DIAGNOSIS — G89.29 CHRONIC SHOULDER PAIN, UNSPECIFIED LATERALITY: Primary | ICD-10-CM

## 2022-06-02 NOTE — TELEPHONE ENCOUNTER
Dr. Henley,      Patient stated has a referral in past for steroid injection to his shoulder in past and wondering if he could have a new referral. Has not seen you for a few years but has been seen by your colleagues. Can he have referral without visit or does pt have to see you first?    Thanks,  HANNAH Aguayo  [facility]

## 2022-06-07 ENCOUNTER — OFFICE VISIT (OUTPATIENT)
Dept: ORTHOPEDICS | Facility: CLINIC | Age: 87
End: 2022-06-07
Payer: COMMERCIAL

## 2022-06-07 VITALS
HEIGHT: 72 IN | HEART RATE: 59 BPM | BODY MASS INDEX: 21.35 KG/M2 | OXYGEN SATURATION: 97 % | DIASTOLIC BLOOD PRESSURE: 66 MMHG | WEIGHT: 157.6 LBS | SYSTOLIC BLOOD PRESSURE: 130 MMHG

## 2022-06-07 DIAGNOSIS — M75.82 ROTATOR CUFF TENDINITIS, LEFT: Primary | ICD-10-CM

## 2022-06-07 DIAGNOSIS — M19.019 SHOULDER ARTHRITIS: ICD-10-CM

## 2022-06-07 DIAGNOSIS — M75.22 BICEPS TENDONITIS ON LEFT: ICD-10-CM

## 2022-06-07 PROCEDURE — 20610 DRAIN/INJ JOINT/BURSA W/O US: CPT | Mod: LT | Performed by: ORTHOPAEDIC SURGERY

## 2022-06-07 PROCEDURE — 99213 OFFICE O/P EST LOW 20 MIN: CPT | Mod: 25 | Performed by: ORTHOPAEDIC SURGERY

## 2022-06-07 RX ORDER — LIDOCAINE HYDROCHLORIDE 10 MG/ML
4 INJECTION, SOLUTION EPIDURAL; INFILTRATION; INTRACAUDAL; PERINEURAL
Status: SHIPPED | OUTPATIENT
Start: 2022-06-07

## 2022-06-07 RX ORDER — METHYLPREDNISOLONE ACETATE 80 MG/ML
80 INJECTION, SUSPENSION INTRA-ARTICULAR; INTRALESIONAL; INTRAMUSCULAR; SOFT TISSUE
Status: SHIPPED | OUTPATIENT
Start: 2022-06-07

## 2022-06-07 RX ADMIN — LIDOCAINE HYDROCHLORIDE 4 ML: 10 INJECTION, SOLUTION EPIDURAL; INFILTRATION; INTRACAUDAL; PERINEURAL at 13:40

## 2022-06-07 RX ADMIN — METHYLPREDNISOLONE ACETATE 80 MG: 80 INJECTION, SUSPENSION INTRA-ARTICULAR; INTRALESIONAL; INTRAMUSCULAR; SOFT TISSUE at 13:40

## 2022-06-07 ASSESSMENT — PAIN SCALES - GENERAL: PAINLEVEL: NO PAIN (0)

## 2022-06-07 NOTE — LETTER
6/7/2022         RE: Jose Manuel Gallo  [address] [place] [address]        Dear Colleague,    Thank you for referring your patient, Jose Manuel Gallo, to the [facility]. Please see a copy of my visit note below.    SUBJECTIVE:  Jose Manuel Gallo is a 86 year old male who is seen in follow-up for left shoulder pain.  In May, and then in October, then on 1/26/22 he had SA  injections for rotator cuff tendonitis. Length of effectiveness:  3-5 months or so.  He also has glenohumeral joint osteoarthritis     Present symptoms:  pain rotating arm with reaching out.  No weakness.   Associated symptoms: none    Treatment up to this point:   Hasn't done any physical therapy, but now says he's interested in that.  Wants another injection     Ortho PMH:     Past Medical History:   Past Medical History:   Diagnosis Date     Cataract, mild, od; mod, os 7/2/2012     Chronic low back pain      CKD (chronic kidney disease) stage 3, GFR 30-59 ml/min (H)      Erectile dysfunction      Hemorrhoids      History of bladder cancer          HTN      Hyperlipidemia LDL goal <130      Kidney stones      Macular degeneration      Osteoarthritis      Personal history of colonic polyps     10/2002     Personal history of malignant neoplasm of bladder      Personal history of tobacco use     Smoked 40 years, Quit 2003     PVD (peripheral vascular disease) (H)     Left Vertebral Artery occlusion     Rotator cuff tendinitis, left      Past Surgical History:   Past Surgical History:   Procedure Laterality Date     CATARACT IOL, RT/LT       COLONOSCOPY  11/02/2008    Normal     HC REVISE ULNAR NERVE AT ELBOW      Left     LASER YAG CAPSULOTOMY  7/2016    left eye     PHACOEMULSIFICATION WITH STANDARD INTRAOCULAR LENS IMPLANT  7/2012    left eye     TURP  1997 ?     VASECTOMY       VITRECTOMY PARSPLANA  8/2011    left eye, repair macular hole     ZZC SPINAL FUSION,ANT,EA ADNL LEVEL      C6-7  "    Family History:   Family History   Problem Relation Age of Onset     Osteoporosis Mother      Diabetes Father      Arthritis Father      Cancer Father      Respiratory Father      Genitourinary Problems Brother      Circulatory Brother      Macular Degeneration No family hx of      Glaucoma No family hx of      Thyroid Disease No family hx of      Hypertension No family hx of      Social History:   Social History     Tobacco Use     Smoking status: Former Smoker     Years: 40.00     Types: Cigarettes     Quit date: 2003     Years since quittin.4     Smokeless tobacco: Never Used   Substance Use Topics     Alcohol use: Yes     Comment: 1 x month       Review of Systems:  Constitutional:  NEGATIVE for fever, chills, change in weight  Integumentary/Skin:  NEGATIVE for worrisome rashes, moles or lesions  Eyes:  NEGATIVE for vision changes or irritation  ENT/Mouth:  NEGATIVE for ear, mouth and throat problems  Resp:  NEGATIVE for significant cough or SOB  Breast:  NEGATIVE for masses, tenderness or discharge  CV:  NEGATIVE for chest pain, palpitations or peripheral edema  GI:  NEGATIVE for nausea, abdominal pain, heartburn, or change in bowel habits  :  Negative   Musculoskeletal:  See HPI above  Neuro:  NEGATIVE for weakness, dizziness or paresthesias  Endocrine:  NEGATIVE for temperature intolerance, skin/hair changes  Heme/allergy/immune:  NEGATIVE for bleeding problems  Psychiatric:  NEGATIVE for changes in mood or affect    OBJECTIVE:  Physical Exam:  /66 (BP Location: Left arm, Patient Position: Sitting, Cuff Size: Adult Regular)   Pulse 59   Ht 1.816 m (5' 11.5\")   Wt 71.5 kg (157 lb 9.6 oz)   SpO2 97%   BMI 21.67 kg/m    General Appearance: healthy, alert and in no distress   Skin: no suspicious lesions or rashes  Neuro: Normal strength and tone, mentation intact and speech normal  Vascular: good pulses, and cappillary refill   Lymph: no lymphadenopathy   Psych:  mentation appears normal " and affect normal/bright  Resp: no increased work of breathing    Left Shoulder Exam:  Shoulder Inspection: no swelling, bruising, discoloration, or obvious deformity or asymmetry  no atrophy  Tender:  Non-tender  Strength: forward flexion: 5*/5, external rotation: 5-/5, Liftoff: Able  Impingement: all grade 2 positive      MRI:   From 5/13/21 : left shoulder    Motion partially compromising assessment.  1. Rotator cuff tendinosis without high-grade tear. Focal subchondral  edema of the anterior facet of greater tuberosity, query enthesitis.  2. Strain of the infraspinatus.  3. Query mild subacromial/subdeltoid bursitis.  4. Mild acromioclavicular joint degenerative change with subacromial  enthesophyte.  5. Extensive subchondral cystlike changes of the posteroinferior  glenoid which is intimate with triceps proximal attachment without  substantial cartilage abnormality, likely reflecting intra-osseous  ganglion cysts.      ASSESSMENT:  Encounter Diagnoses   Name Primary?     Rotator cuff tendinitis, left Yes     Biceps tendonitis on left      Glenohumeral joint arthritis left         PLAN:  He would like to repeat the injection   He agreed to try physical therapy,     Procedure Note:   Informed consent obtained. Risks, benefits and complications of the injection were discussed with the patient and the patient elected to proceed. Left shoulder injected into the subacromial space after sterile prep, using 80mg Depomedrol and 4cc local anesthetic.    physical therapy ordered    Return to clinic: as needed     DESTINEE Hastings MD  Dept. Orthopedic Surgery  [facility]    Large Joint Injection/Arthocentesis: L subacromial bursa    Date/Time: 6/7/2022 1:40 PM  Performed by: Jimmy Mancera  Authorized by: Rodrigo Hastings MD     Indications:  Pain  Needle Size:  22 G  Guidance: landmark guided    Approach:  Lateral  Location:  Shoulder      Site:  L subacromial bursa  Medications:  80 mg  methylPREDNISolone 80 MG/ML; 4 mL lidocaine (PF) 1 %  Outcome:  Tolerated well, no immediate complications  Procedure discussed: discussed risks, benefits, and alternatives    Consent Given by:  Patient  Timeout: timeout called immediately prior to procedure    Prep: patient was prepped and draped in usual sterile fashion              Again, thank you for allowing me to participate in the care of your patient.        Sincerely,        Rodrigo Hastings MD

## 2022-06-07 NOTE — PROGRESS NOTES
Large Joint Injection/Arthocentesis: L subacromial bursa    Date/Time: 6/7/2022 1:40 PM  Performed by: Jimmy Mancera  Authorized by: Rodrigo Hastings MD     Indications:  Pain  Needle Size:  22 G  Guidance: landmark guided    Approach:  Lateral  Location:  Shoulder      Site:  L subacromial bursa  Medications:  80 mg methylPREDNISolone 80 MG/ML; 4 mL lidocaine (PF) 1 %  Outcome:  Tolerated well, no immediate complications  Procedure discussed: discussed risks, benefits, and alternatives    Consent Given by:  Patient  Timeout: timeout called immediately prior to procedure    Prep: patient was prepped and draped in usual sterile fashion

## 2022-06-07 NOTE — PROGRESS NOTES
SUBJECTIVE:  Jose Manuel Gallo is a 86 year old male who is seen in follow-up for left shoulder pain.  In May, and then in October, then on 1/26/22 he had SA  injections for rotator cuff tendonitis. Length of effectiveness:  3-5 months or so.  He also has glenohumeral joint osteoarthritis     Present symptoms:  pain rotating arm with reaching out.  No weakness.   Associated symptoms: none    Treatment up to this point:   Hasn't done any physical therapy, but now says he's interested in that.  Wants another injection     Ortho PMH:     Past Medical History:   Past Medical History:   Diagnosis Date     Cataract, mild, od; mod, os 7/2/2012     Chronic low back pain      CKD (chronic kidney disease) stage 3, GFR 30-59 ml/min (H)      Erectile dysfunction      Hemorrhoids      History of bladder cancer          HTN      Hyperlipidemia LDL goal <130      Kidney stones      Macular degeneration      Osteoarthritis      Personal history of colonic polyps     10/2002     Personal history of malignant neoplasm of bladder      Personal history of tobacco use     Smoked 40 years, Quit 2003     PVD (peripheral vascular disease) (H)     Left Vertebral Artery occlusion     Rotator cuff tendinitis, left      Past Surgical History:   Past Surgical History:   Procedure Laterality Date     CATARACT IOL, RT/LT       COLONOSCOPY  11/02/2008    Normal     HC REVISE ULNAR NERVE AT ELBOW      Left     LASER YAG CAPSULOTOMY  7/2016    left eye     PHACOEMULSIFICATION WITH STANDARD INTRAOCULAR LENS IMPLANT  7/2012    left eye     TURP  1997 ?     VASECTOMY       VITRECTOMY PARSPLANA  8/2011    left eye, repair macular hole     ZZC SPINAL FUSION,ANT,EA ADNL LEVEL      C6-7     Family History:   Family History   Problem Relation Age of Onset     Osteoporosis Mother      Diabetes Father      Arthritis Father      Cancer Father      Respiratory Father      Genitourinary Problems Brother      Circulatory Brother      Macular Degeneration  "No family hx of      Glaucoma No family hx of      Thyroid Disease No family hx of      Hypertension No family hx of      Social History:   Social History     Tobacco Use     Smoking status: Former Smoker     Years: 40.00     Types: Cigarettes     Quit date: 2003     Years since quittin.4     Smokeless tobacco: Never Used   Substance Use Topics     Alcohol use: Yes     Comment: 1 x month       Review of Systems:  Constitutional:  NEGATIVE for fever, chills, change in weight  Integumentary/Skin:  NEGATIVE for worrisome rashes, moles or lesions  Eyes:  NEGATIVE for vision changes or irritation  ENT/Mouth:  NEGATIVE for ear, mouth and throat problems  Resp:  NEGATIVE for significant cough or SOB  Breast:  NEGATIVE for masses, tenderness or discharge  CV:  NEGATIVE for chest pain, palpitations or peripheral edema  GI:  NEGATIVE for nausea, abdominal pain, heartburn, or change in bowel habits  :  Negative   Musculoskeletal:  See HPI above  Neuro:  NEGATIVE for weakness, dizziness or paresthesias  Endocrine:  NEGATIVE for temperature intolerance, skin/hair changes  Heme/allergy/immune:  NEGATIVE for bleeding problems  Psychiatric:  NEGATIVE for changes in mood or affect    OBJECTIVE:  Physical Exam:  /66 (BP Location: Left arm, Patient Position: Sitting, Cuff Size: Adult Regular)   Pulse 59   Ht 1.816 m (5' 11.5\")   Wt 71.5 kg (157 lb 9.6 oz)   SpO2 97%   BMI 21.67 kg/m    General Appearance: healthy, alert and in no distress   Skin: no suspicious lesions or rashes  Neuro: Normal strength and tone, mentation intact and speech normal  Vascular: good pulses, and cappillary refill   Lymph: no lymphadenopathy   Psych:  mentation appears normal and affect normal/bright  Resp: no increased work of breathing    Left Shoulder Exam:  Shoulder Inspection: no swelling, bruising, discoloration, or obvious deformity or asymmetry  no atrophy  Tender:  Non-tender  Strength: forward flexion: 5*/5, external rotation: " 5-/5, Liftoff: Able  Impingement: all grade 2 positive      MRI:   From 5/13/21 : left shoulder    Motion partially compromising assessment.  1. Rotator cuff tendinosis without high-grade tear. Focal subchondral  edema of the anterior facet of greater tuberosity, query enthesitis.  2. Strain of the infraspinatus.  3. Query mild subacromial/subdeltoid bursitis.  4. Mild acromioclavicular joint degenerative change with subacromial  enthesophyte.  5. Extensive subchondral cystlike changes of the posteroinferior  glenoid which is intimate with triceps proximal attachment without  substantial cartilage abnormality, likely reflecting intra-osseous  ganglion cysts.      ASSESSMENT:  Encounter Diagnoses   Name Primary?     Rotator cuff tendinitis, left Yes     Biceps tendonitis on left      Glenohumeral joint arthritis left         PLAN:  He would like to repeat the injection   He agreed to try physical therapy,     Procedure Note:   Informed consent obtained. Risks, benefits and complications of the injection were discussed with the patient and the patient elected to proceed. Left shoulder injected into the subacromial space after sterile prep, using 80mg Depomedrol and 4cc local anesthetic.    physical therapy ordered    Return to clinic: as needed     DESTINEE Hastings MD  Dept. Orthopedic Surgery  [facility]

## 2022-06-30 ENCOUNTER — OFFICE VISIT (OUTPATIENT)
Dept: OPHTHALMOLOGY | Facility: CLINIC | Age: 87
End: 2022-06-30
Payer: COMMERCIAL

## 2022-06-30 DIAGNOSIS — Z96.1 PSEUDOPHAKIA: ICD-10-CM

## 2022-06-30 DIAGNOSIS — H25.811 COMBINED FORMS OF AGE-RELATED CATARACT OF RIGHT EYE: Primary | ICD-10-CM

## 2022-06-30 DIAGNOSIS — H52.01 HYPEROPIA OF RIGHT EYE: ICD-10-CM

## 2022-06-30 DIAGNOSIS — H52.12 MYOPIA OF LEFT EYE: ICD-10-CM

## 2022-06-30 DIAGNOSIS — H52.4 PRESBYOPIA OF BOTH EYES: ICD-10-CM

## 2022-06-30 DIAGNOSIS — H52.223 REGULAR ASTIGMATISM OF BOTH EYES: ICD-10-CM

## 2022-06-30 DIAGNOSIS — H35.342 MACULAR HOLE, LEFT: ICD-10-CM

## 2022-06-30 DIAGNOSIS — H43.811 POSTERIOR VITREOUS DETACHMENT, RIGHT: ICD-10-CM

## 2022-06-30 PROCEDURE — 92014 COMPRE OPH EXAM EST PT 1/>: CPT | Performed by: STUDENT IN AN ORGANIZED HEALTH CARE EDUCATION/TRAINING PROGRAM

## 2022-06-30 ASSESSMENT — REFRACTION_WEARINGRX
SPECS_TYPE: TRIFOCAL
OD_ADD: +3.00
OS_SPHERE: -1.25
OD_CYLINDER: +5.25
OS_AXIS: 165
OD_AXIS: 035
OS_CYLINDER: +2.50
OD_SPHERE: +1.00
OS_ADD: +3.00

## 2022-06-30 ASSESSMENT — CUP TO DISC RATIO
OD_RATIO: 0.4
OS_RATIO: 0.4

## 2022-06-30 ASSESSMENT — REFRACTION_MANIFEST
OS_ADD: +3.00
OS_AXIS: 165
OD_SPHERE: +0.50
OD_ADD: +3.00
OS_CYLINDER: +2.50
OS_SPHERE: -1.75
OD_CYLINDER: +5.75
OD_AXIS: 035

## 2022-06-30 ASSESSMENT — TONOMETRY
OS_IOP_MMHG: 11
OD_IOP_MMHG: 11
IOP_METHOD: APPLANATION

## 2022-06-30 ASSESSMENT — EXTERNAL EXAM - RIGHT EYE: OD_EXAM: PROLAPSED FAT PADS: UPPER, LOWER; MILD-MOD BROW

## 2022-06-30 ASSESSMENT — VISUAL ACUITY
OD_BAT_MED: 20/60
CORRECTION_TYPE: GLASSES
OD_BAT_HIGH: 20/60
OD_CC: 20/50
OS_CC: 20/150
OD_PH_CC: 20/40
OD_CC+: -1
OS_PH_CC: 20/125
OD_BAT_LOW: 20/50-1
OS_PH_CC+: -1
METHOD: SNELLEN - LINEAR
OD_PH_CC+: -1

## 2022-06-30 ASSESSMENT — EXTERNAL EXAM - LEFT EYE: OS_EXAM: PROLAPSED FAT PADS: UPPER, LOWER; MILD-MOD BROW

## 2022-06-30 ASSESSMENT — CONF VISUAL FIELD
OD_NORMAL: 1
OS_NORMAL: 1
METHOD: COUNTING FINGERS

## 2022-06-30 NOTE — PROGRESS NOTES
" Current Eye Medications:  Refresh gel as needed both eyes      Subjective:  Complete eye exam/cataract eval. Vision has been getting worse both eyes last 6 months distance and near both eyes. Every once in a while gets real sharp pain in the morning, can last 30 minutes. Patient has also been having trouble with depth perception. Sometimes runs in to door when walking through it.     Patient states \"Had macular hole surgery 2012 left eye, not cataract surgery\".      Objective:  See Ophthalmology Exam.      Assessment:  Jose Manuel Gallo is a 87 year old male who presents with:   Encounter Diagnoses   Name Primary?     Combined forms of age-related cataract, mild-mod, of right eye Yes    Recommend updating glasses for now. Approaching visually significance in the right eye.        Pseudophakia, Yag Caps, os Reviewed that he has had cataract surgery in the left eye back in 2012 with Dr. Scales.     Hx of vitrectomy for chronic macular hole, os (ER)        Posterior vitreous detachment, right        Hyperopia of right eye      Myopia of left eye      Regular astigmatism of both eyes      Presbyopia of both eyes        Plan:  Glasses prescription given - recommend updating    Could use a gel tear at bedtime and/or up to four times a day (like Refresh Liquigel or GenTeal Gel Tears)    And continue artificial tears up to four times a day as needed (like Systane)    Soraya Lux MD  [phone]      "

## 2022-06-30 NOTE — PATIENT INSTRUCTIONS
Glasses prescription given - recommend updating    Could use a gel tear at bedtime and/or up to four times a day (like Refresh Liquigel or GenTeal Gel Tears)    And continue artificial tears up to four times a day as needed (like Systane)    Soraya Lux MD  [phone]

## 2022-06-30 NOTE — LETTER
"    6/30/2022         RE: Jose Manuel Gallo  [address] [place] [address]        Dear Colleague,    Thank you for referring your patient, Jose Manuel Gallo, to the [facility]. Please see a copy of my visit note below.     Current Eye Medications:  Refresh gel as needed both eyes      Subjective:  Complete eye exam/cataract eval. Vision has been getting worse both eyes last 6 months distance and near both eyes. Every once in a while gets real sharp pain in the morning, can last 30 minutes. Patient has also been having trouble with depth perception. Sometimes runs in to door when walking through it.     Patient states \"Had macular hole surgery 2012 left eye, not cataract surgery\".      Objective:  See Ophthalmology Exam.      Assessment:  Jose Manuel Gallo is a 87 year old male who presents with:   Encounter Diagnoses   Name Primary?     Combined forms of age-related cataract, mild-mod, of right eye Yes    Recommend updating glasses for now. Approaching visually significance in the right eye.        Pseudophakia, Yag Caps, os Reviewed that he has had cataract surgery in the left eye back in 2012 with Dr. Scales.     Hx of vitrectomy for chronic macular hole, os (ER)        Posterior vitreous detachment, right        Hyperopia of right eye      Myopia of left eye      Regular astigmatism of both eyes      Presbyopia of both eyes        Plan:  Glasses prescription given - recommend updating    Could use a gel tear at bedtime and/or up to four times a day (like Refresh Liquigel or GenTeal Gel Tears)    And continue artificial tears up to four times a day as needed (like Systane)    Soraya Lux MD  [phone]          Again, thank you for allowing me to participate in the care of your patient.        Sincerely,        Soraya Lux MD    "

## 2022-08-03 ENCOUNTER — TRANSFERRED RECORDS (OUTPATIENT)
Dept: HEALTH INFORMATION MANAGEMENT | Facility: CLINIC | Age: 87
End: 2022-08-03

## 2022-08-03 ENCOUNTER — TRANSFERRED RECORDS (OUTPATIENT)
Dept: INTERNAL MEDICINE | Facility: CLINIC | Age: 87
End: 2022-08-03

## 2022-08-03 LAB
ALT SERPL-CCNC: 12 U/L (ref 10–53)
AST SERPL-CCNC: 13 U/L (ref 14–43)
CHOLESTEROL (EXTERNAL): 160 MG/DL (ref 100–200)
CREATININE (EXTERNAL): 1.3 MG/DL (ref 0.62–1.44)
GLUCOSE (EXTERNAL): 89 MG/DL (ref 60–115)
POTASSIUM (EXTERNAL): 4.8 MMOL/L (ref 3.5–5.1)
TRIGLYCERIDES (EXTERNAL): 63 MG/DL (ref 50–150)

## 2022-08-17 ENCOUNTER — MYC MEDICAL ADVICE (OUTPATIENT)
Dept: INTERNAL MEDICINE | Facility: CLINIC | Age: 87
End: 2022-08-17

## 2022-08-17 NOTE — TELEPHONE ENCOUNTER
Reason for Call:  Form, our goal is to have forms completed with 72 hours, however, some forms may require a visit or additional information.    Type of letter, form or note:  ECG Analysis Report - FYI    Who is the form from?: Patient    Where did the form come from: Patient or family brought in       What clinic location was the form placed at?: [facility]    Where the form was placed:  mailbox, 2nd floor Box/Folder    What number is listed as a contact on the form?: [phone]       Additional comments: Patient dropping off this form for Dr. Braxton to review and then it can be sent to abstracting.  Thank you.    Call taken on 8/17/2022 at 1:09 PM by Laura Young

## 2022-08-18 NOTE — TELEPHONE ENCOUNTER
I have reviewed the electrocardiogram and laboratory data/ these numbers are now into your chart, these numbers do not cause me to worry specifically about anything     I still recommend appointment.    Orlando Braxton MD

## 2022-08-19 NOTE — TELEPHONE ENCOUNTER
Patient's daughter (Sydni) calling for assistance to schedule an appointment.    Was able to use a hold spot with PCP permission for 8/22    Called Sydni back and left VM that appointment was made- gave date and time and to reach out if any questions.      Pretty Fajardo RN

## 2022-08-22 ENCOUNTER — OFFICE VISIT (OUTPATIENT)
Dept: INTERNAL MEDICINE | Facility: CLINIC | Age: 87
End: 2022-08-22
Payer: COMMERCIAL

## 2022-08-22 VITALS
BODY MASS INDEX: 20.49 KG/M2 | OXYGEN SATURATION: 98 % | SYSTOLIC BLOOD PRESSURE: 146 MMHG | RESPIRATION RATE: 14 BRPM | HEART RATE: 62 BPM | WEIGHT: 149 LBS | DIASTOLIC BLOOD PRESSURE: 70 MMHG | TEMPERATURE: 98 F

## 2022-08-22 DIAGNOSIS — N39.0 URINARY TRACT INFECTION WITH HEMATURIA, SITE UNSPECIFIED: ICD-10-CM

## 2022-08-22 DIAGNOSIS — R31.9 URINARY TRACT INFECTION WITH HEMATURIA, SITE UNSPECIFIED: ICD-10-CM

## 2022-08-22 DIAGNOSIS — R35.0 INCREASED FREQUENCY OF URINATION: Primary | ICD-10-CM

## 2022-08-22 DIAGNOSIS — Z12.5 SCREENING PSA (PROSTATE SPECIFIC ANTIGEN): ICD-10-CM

## 2022-08-22 DIAGNOSIS — Z85.51 HISTORY OF BLADDER CANCER: ICD-10-CM

## 2022-08-22 DIAGNOSIS — R73.09 ELEVATED GLUCOSE: ICD-10-CM

## 2022-08-22 DIAGNOSIS — R63.4 WEIGHT LOSS: ICD-10-CM

## 2022-08-22 DIAGNOSIS — N18.30 STAGE 3 CHRONIC KIDNEY DISEASE, UNSPECIFIED WHETHER STAGE 3A OR 3B CKD (H): ICD-10-CM

## 2022-08-22 LAB
ALBUMIN UR-MCNC: ABNORMAL MG/DL
ANION GAP SERPL CALCULATED.3IONS-SCNC: 4 MMOL/L (ref 3–14)
APPEARANCE UR: ABNORMAL
BACTERIA #/AREA URNS HPF: ABNORMAL /HPF
BILIRUB UR QL STRIP: NEGATIVE
BUN SERPL-MCNC: 27 MG/DL (ref 7–30)
CALCIUM SERPL-MCNC: 8.9 MG/DL (ref 8.5–10.1)
CHLORIDE BLD-SCNC: 109 MMOL/L (ref 94–109)
CO2 SERPL-SCNC: 27 MMOL/L (ref 20–32)
COLOR UR AUTO: YELLOW
CREAT SERPL-MCNC: 1.36 MG/DL (ref 0.66–1.25)
GFR SERPL CREATININE-BSD FRML MDRD: 50 ML/MIN/1.73M2
GLUCOSE BLD-MCNC: 103 MG/DL (ref 70–99)
GLUCOSE UR STRIP-MCNC: NEGATIVE MG/DL
HBA1C MFR BLD: 5.7 % (ref 0–5.6)
HGB UR QL STRIP: ABNORMAL
KETONES UR STRIP-MCNC: NEGATIVE MG/DL
LEUKOCYTE ESTERASE UR QL STRIP: ABNORMAL
NITRATE UR QL: POSITIVE
PH UR STRIP: 5 [PH] (ref 5–7)
POTASSIUM BLD-SCNC: 4.8 MMOL/L (ref 3.4–5.3)
PSA SERPL-MCNC: 0.94 UG/L (ref 0–4)
RBC #/AREA URNS AUTO: ABNORMAL /HPF
SODIUM SERPL-SCNC: 140 MMOL/L (ref 133–144)
SP GR UR STRIP: 1.02 (ref 1–1.03)
UROBILINOGEN UR STRIP-ACNC: 0.2 E.U./DL
WBC #/AREA URNS AUTO: >100 /HPF

## 2022-08-22 PROCEDURE — 81001 URINALYSIS AUTO W/SCOPE: CPT | Performed by: INTERNAL MEDICINE

## 2022-08-22 PROCEDURE — 83036 HEMOGLOBIN GLYCOSYLATED A1C: CPT | Performed by: INTERNAL MEDICINE

## 2022-08-22 PROCEDURE — 36415 COLL VENOUS BLD VENIPUNCTURE: CPT | Performed by: INTERNAL MEDICINE

## 2022-08-22 PROCEDURE — G0103 PSA SCREENING: HCPCS | Performed by: INTERNAL MEDICINE

## 2022-08-22 PROCEDURE — 87186 SC STD MICRODIL/AGAR DIL: CPT | Performed by: INTERNAL MEDICINE

## 2022-08-22 PROCEDURE — 80048 BASIC METABOLIC PNL TOTAL CA: CPT | Performed by: INTERNAL MEDICINE

## 2022-08-22 PROCEDURE — 99214 OFFICE O/P EST MOD 30 MIN: CPT | Performed by: INTERNAL MEDICINE

## 2022-08-22 PROCEDURE — 87086 URINE CULTURE/COLONY COUNT: CPT | Performed by: INTERNAL MEDICINE

## 2022-08-22 NOTE — PROGRESS NOTES
Assessment & Plan     Increased frequency of urination  I am seeing this patient for a follow up appointment on the heels of a couple of [employer] message we sent back and forth . The long and short of it that this patient may have a real new pathological process brewing or possibly not. The issue is a dramatic uptick in bladder frequency / urgency and went from rare urination 1 or two times per day and almost none at night to now 8-12 times per day including up at night. My concerns revolve around possible diabetes mellitus type 2  Versus benign prostatic hyperplasia issues , versus obstructive uropathy  Versus urinary tract infection [ least likely ] further follow up depending on the test results   - US Abdomen Complete; Future  - Hemoglobin A1c; Future  - Basic metabolic panel  (Ca, Cl, CO2, Creat, Gluc, K, Na, BUN); Future  - UA with Microscopic reflex to Culture - lab collect; Future  - PSA, screen; Future  - Hemoglobin A1c  - Basic metabolic panel  (Ca, Cl, CO2, Creat, Gluc, K, Na, BUN)  - UA with Microscopic reflex to Culture - lab collect  - PSA, screen  - Urine Microscopic Exam  - Urine Culture    Weight loss  There has been slight weight loss, not enough to really suggest anything sinister dangerous diagnoses   - US Abdomen Complete; Future  - Hemoglobin A1c; Future  - Basic metabolic panel  (Ca, Cl, CO2, Creat, Gluc, K, Na, BUN); Future  - UA with Microscopic reflex to Culture - lab collect; Future  - PSA, screen; Future  - Hemoglobin A1c  - Basic metabolic panel  (Ca, Cl, CO2, Creat, Gluc, K, Na, BUN)  - UA with Microscopic reflex to Culture - lab collect  - PSA, screen  - Urine Microscopic Exam  - Urine Culture    History of bladder cancer  Dropped out of follow up over 10 years ago  - US Abdomen Complete; Future  - Hemoglobin A1c; Future  - Basic metabolic panel  (Ca, Cl, CO2, Creat, Gluc, K, Na, BUN); Future  - UA with Microscopic reflex to Culture - lab collect; Future  - PSA, screen; Future  -  Hemoglobin A1c  - Basic metabolic panel  (Ca, Cl, CO2, Creat, Gluc, K, Na, BUN)  - UA with Microscopic reflex to Culture - lab collect  - PSA, screen  - Urine Microscopic Exam  - Urine Culture    Stage 3 chronic kidney disease, unspecified whether stage 3a or 3b CKD (H)  Due for recheck   - US Abdomen Complete; Future  - Hemoglobin A1c; Future  - Basic metabolic panel  (Ca, Cl, CO2, Creat, Gluc, K, Na, BUN); Future  - UA with Microscopic reflex to Culture - lab collect; Future  - PSA, screen; Future  - Hemoglobin A1c  - Basic metabolic panel  (Ca, Cl, CO2, Creat, Gluc, K, Na, BUN)  - UA with Microscopic reflex to Culture - lab collect  - PSA, screen  - Urine Microscopic Exam  - Urine Culture    Screening PSA (prostate specific antigen)  Continue to consider benign prostatic hyperplasia issues    - US Abdomen Complete; Future  - Hemoglobin A1c; Future  - Basic metabolic panel  (Ca, Cl, CO2, Creat, Gluc, K, Na, BUN); Future  - UA with Microscopic reflex to Culture - lab collect; Future  - PSA, screen; Future  - Hemoglobin A1c  - Basic metabolic panel  (Ca, Cl, CO2, Creat, Gluc, K, Na, BUN)  - UA with Microscopic reflex to Culture - lab collect  - PSA, screen  - Urine Microscopic Exam  - Urine Culture    Elevated glucose  Doubt clinical significance but warrants hemoglobin a1c  [ diabetes test ]   - Hemoglobin A1c; Future  - Hemoglobin A1c    Review of the result(s) of each unique test - todays tests  Prescription drug management  26 minutes spent on the date of the encounter doing chart review, history and exam, documentation and further activities per the note      Return in about 6 months (around 2/22/2023).    Orlando Braxton MD  [facility] Center[facility]GGIE Singer is a 87 year old accompanied by his daughter, presenting for the following health issues:  Bladder Problems    So there are some signs of disorded sleep breathing with snoring, gasping, and snort arousals, so thje possibility of a  "Polysomnography (PSG) / sleep study should be pursued    HPI     Patient is here with daughter Sydni case of now lives locally for 2-3 years since     This a follow up appointment regarding bladder issues and weight loss, probably within normal limits for age related issues  , see MyChart message   Me  to Enmanuel Gallo    RL      7:04 PM  Enmanuel,     What you are telling troubles me . It's possible that the weight loss is part of normal aging although it is also possible something else is going on. The increased bladder frequency / urgency syndrome is definitely not \"normal\". It's going to be caused by something. Most likely this is benign prostatic hyperplasia issues or possibly prostate cancer . Less likely but also possible are other diagnoses such as diabetes mellitus or urinary tract infection.     In my opinion an appointment would be wise to mere at this more closely unless you specifically reviewed these symptoms with a qualified health care provider and had some testing already . I will look at the electrocardiogram and make comments when I am back in the office tomorrow. Meanwhile consider my words. An appointment is probably wise. If you are up for this call we will try to find an appointment for you, either with me or with one of my partners      Orlando Braxton MD     From [facility], 8-17-22    In the past 6 to 8 months or so, I have  lost over 15 pounds and lost strength and stamina. I recently participated in a keratosis study which also required a complete physical. I will drop off those results to you for your analysis.  Also, in the approximate time frame I have had to go from urinating 2 to 3 times a day (never at night) to a dozen or so including at night.    Had a transurethral resection of the prostate 20+ years ago.   He had a pain 5-6 months ago, was running a fever at that time . He was about 101.0     Review of Systems   Constitutional, HEENT, cardiovascular, pulmonary, gi and gu systems are " negative, except as otherwise noted.      Objective    Pulse 62   Temp 98  F (36.7  C) (Oral)   Resp 14   Wt 67.6 kg (149 lb)   SpO2 98%   BMI 20.49 kg/m    Body mass index is 20.49 kg/m .  Physical Exam   GENERAL: healthy, alert and no distress  NECK: no adenopathy, no asymmetry, masses, or scars and thyroid normal to palpation  RESP: lungs clear to auscultation - no rales, rhonchi or wheezes  CV: regular rate and rhythm, normal S1 S2, no S3 or S4, no murmur, click or rub, no peripheral edema and peripheral pulses strong  ABDOMEN: soft, nontender, no hepatosplenomegaly, no masses and bowel sounds normal  MS: no gross musculoskeletal defects noted, no edema    Orders Placed This Encounter   Procedures     US Abdomen Complete     Hemoglobin A1c     Basic metabolic panel  (Ca, Cl, CO2, Creat, Gluc, K, Na, BUN)     UA with Microscopic reflex to Culture - lab collect     PSA, screen     Urine Microscopic Exam

## 2022-08-23 ENCOUNTER — TELEPHONE (OUTPATIENT)
Dept: INTERNAL MEDICINE | Facility: CLINIC | Age: 87
End: 2022-08-23

## 2022-08-23 RX ORDER — NITROFURANTOIN 25; 75 MG/1; MG/1
100 CAPSULE ORAL 2 TIMES DAILY
Qty: 14 CAPSULE | Refills: 0 | Status: SHIPPED | OUTPATIENT
Start: 2022-08-23 | End: 2022-08-24

## 2022-08-23 NOTE — TELEPHONE ENCOUNTER
Patient returned call, I advised him of Dr. Braxton's result note and plan.     Patient verbalized understanding of and agreement with plan.    He is frustrated that he was on hold so long; requests we leave a detailed message on his answering machine if he does not answer when we call back regarding final urine culture.    Amalia Barlow RN  [facility]

## 2022-08-23 NOTE — TELEPHONE ENCOUNTER
Orlando Braxton MD   8/23/2022 10:53 AM CDT Back to Top        You can let the patient know the following things. Of all the possible diagnoses we reviewed , regarding his frequent urination , the one that seems most likely he is actually infection. Without question there is a very large amount of white blood cells in the urine and very few red blood cells thus this is pyuria. We are awaiting the urine culture results to be certain. There's a chance this is going to turn out to be a sterile pyuria which is more complicated but until proven otherwise this is infection and I favor starting some treatment with antibiotics immediately. These are already ordered but I need you to review all of this with patient and possibly daughter if appropriate      Orlando Braxton MD

## 2022-08-23 NOTE — TELEPHONE ENCOUNTER
Left message on answering machine for patient to call back to the nurse at [phone].    Cristel Dillon RN  [facility]

## 2022-08-24 LAB — BACTERIA UR CULT: ABNORMAL

## 2022-08-26 ENCOUNTER — MYC MEDICAL ADVICE (OUTPATIENT)
Dept: INTERNAL MEDICINE | Facility: CLINIC | Age: 87
End: 2022-08-26

## 2022-08-29 NOTE — TELEPHONE ENCOUNTER
Patient seen  8/22/22    cephALEXin (KEFLEX) 500 MG capsule 15 capsule 0 8/24/2022 8/29/2022 --   Sig - Route: Take 1 capsule (500 mg) by mouth 3 times daily for 5 days - Oral

## 2022-08-30 ENCOUNTER — ANCILLARY PROCEDURE (OUTPATIENT)
Dept: ULTRASOUND IMAGING | Facility: CLINIC | Age: 87
End: 2022-08-30
Attending: INTERNAL MEDICINE
Payer: COMMERCIAL

## 2022-08-30 DIAGNOSIS — R35.0 INCREASED FREQUENCY OF URINATION: ICD-10-CM

## 2022-08-30 DIAGNOSIS — R63.4 WEIGHT LOSS: ICD-10-CM

## 2022-08-30 DIAGNOSIS — Z85.51 HISTORY OF BLADDER CANCER: ICD-10-CM

## 2022-08-30 DIAGNOSIS — N18.30 STAGE 3 CHRONIC KIDNEY DISEASE, UNSPECIFIED WHETHER STAGE 3A OR 3B CKD (H): ICD-10-CM

## 2022-08-30 DIAGNOSIS — Z12.5 SCREENING PSA (PROSTATE SPECIFIC ANTIGEN): ICD-10-CM

## 2022-08-30 PROCEDURE — 76700 US EXAM ABDOM COMPLETE: CPT | Mod: TC | Performed by: RADIOLOGY

## 2022-08-30 NOTE — TELEPHONE ENCOUNTER
Lets please contact patient and relate that his abdominal ultrasound shows no worrisome findings at all.     Orlando Braxton MD

## 2022-10-09 ENCOUNTER — NURSE TRIAGE (OUTPATIENT)
Dept: NURSING | Facility: CLINIC | Age: 87
End: 2022-10-09

## 2022-10-09 NOTE — TELEPHONE ENCOUNTER
Pt's daughter calling, consent to communicate on file, Wants to see if pt qualifies for paxlovid, symptoms started yesterday, tests positive today.  Fever 100, achy, sleeping most of the day, breathing does not seem to be big issue        Triage to call PCP now, care advice given, transferred to scheduling for virtual appointment. Call back if symptoms worsen.      Estella Louie RN, BSN  10/9/2022 at 3:54 PM  [facility]      Reason for Disposition    HIGH RISK for severe COVID complications (e.g., weak immune system, age > 64 years, obesity with BMI > 25, pregnant, chronic lung disease or other chronic medical condition)  (Exception: Already seen by PCP and no new or worsening symptoms.)    Additional Information    Negative: SEVERE difficulty breathing (e.g., struggling for each breath, speaks in single words)    Negative: Difficult to awaken or acting confused (e.g., disoriented, slurred speech)    Negative: Bluish (or gray) lips or face now    Negative: Shock suspected (e.g., cold/pale/clammy skin, too weak to stand, low BP, rapid pulse)    Negative: Sounds like a life-threatening emergency to the triager    Negative: SEVERE or constant chest pain or pressure  (Exception: Mild central chest pain, present only when coughing.)    Negative: MODERATE difficulty breathing (e.g., speaks in phrases, SOB even at rest, pulse 100-120)    Negative: [1] Headache AND [2] stiff neck (can't touch chin to chest)    Negative: Oxygen level (e.g., pulse oximetry) 90 percent or lower    Negative: Chest pain or pressure    Negative: Patient sounds very sick or weak to the triager    Negative: MILD difficulty breathing (e.g., minimal/no SOB at rest, SOB with walking, pulse <100)    Negative: Fever > 103 F (39.4 C)    Negative: [1] Fever > 101 F (38.3 C) AND [2] age > 60 years    Negative: [1] Fever > 100.0 F (37.8 C) AND [2] bedridden (e.g., nursing home patient, CVA, chronic illness, recovering from surgery)    Protocols  used: CORONAVIRUS (COVID-19) DIAGNOSED OR [id number] 1.18.2022

## 2022-10-10 ENCOUNTER — VIRTUAL VISIT (OUTPATIENT)
Dept: URGENT CARE | Facility: CLINIC | Age: 87
End: 2022-10-10
Payer: COMMERCIAL

## 2022-10-10 DIAGNOSIS — U07.1 CLINICAL DIAGNOSIS OF COVID-19: Primary | ICD-10-CM

## 2022-10-10 PROCEDURE — 99441 PR PHYSICIAN TELEPHONE EVALUATION 5-10 MIN: CPT | Mod: CS

## 2022-10-10 NOTE — PROGRESS NOTES
"Enmanuel is a 87 year old who is being evaluated via a billable telephone visit.      Tested + yesterday.  Sx started on 10/8.  COVID vaccinated and boosted.  Cough and ST.  Wife + today.    What phone number would you like to be contacted at?  cell  [facility]    Assessment & Plan     1. Clinical diagnosis of COVID-19    - nirmatrelvir and ritonavir (PAXLOVID) therapy pack; Take 2 tablets by mouth 2 times daily for 5 days (Take 1 tablet of Nirmatelvir and 1 tablet of Ritonavir twice daily for 5 days)  Dispense: 30 each; Refill: 0  COVID-19 positive patient.  Encounter for consideration of medication intervention. Patient does qualify for a prescription. Full discussion with patient including medication options, risks and benefits. Potential drug interactions reviewed with patient.     Treatment Planned Paxlovid RX sent to [facility]    Temporary change to home medications:   Hold simvastatin while on Paxlovid x 5 days and resume one week after last dose of Paxlovid (hold for total of 12 days)    Estimated body mass index is 20.49 kg/m  as calculated from the following:    Height as of 6/7/22: 1.816 m (5' 11.5\").    Weight as of 8/22/22: 67.6 kg (149 lb).  GFR Estimate   Date Value Ref Range Status   08/22/2022 50 (L) >60 mL/min/1.73m2 Final     Comment:     Effective December 21, 2021 eGFRcr in adults is calculated using the 2021 CKD-EPI creatinine equation which includes age and gender (Kristine et al., NEJM, DOI: 10.1056/GBUUcc4031226)   05/28/2020 53 (L) >60 mL/min/[1.73_m2] Final     Comment:     Non  GFR Calc  Starting 12/18/2018, serum creatinine based estimated GFR (eGFR) will be   calculated using the Chronic Kidney Disease Epidemiology Collaboration   (CKD-EPI) equation.         Kamilah Owens MD  [facility]    Subjective   Enmanuel is a 87 year old, presenting for the following health issues:  No chief complaint on " file.      HPI           Review of Systems   Constitutional, HEENT, cardiovascular, pulmonary, GI, , musculoskeletal, neuro, skin, endocrine and psych systems are negative, except as otherwise noted.      Objective           Vitals:  No vitals were obtained today due to virtual visit.    Physical Exam   healthy, alert and no distress  PSYCH: Alert and oriented times 3; coherent speech, normal   rate and volume, able to articulate logical thoughts, able   to abstract reason, no tangential thoughts, no hallucinations   or delusions  His affect is normal and pleasant  RESP: No cough, no audible wheezing, able to talk in full sentences  Remainder of exam unable to be completed due to telephone visits          Phone call duration: 10 minutes

## 2022-11-01 ENCOUNTER — MYC MEDICAL ADVICE (OUTPATIENT)
Dept: INTERNAL MEDICINE | Facility: CLINIC | Age: 87
End: 2022-11-01

## 2022-11-01 DIAGNOSIS — L57.0 AK (ACTINIC KERATOSIS): Primary | ICD-10-CM

## 2022-11-02 NOTE — TELEPHONE ENCOUNTER
Routing to provider to advise. Pt was referred to [facility] in [place] 7/13/2017.    Cristel Dillon RN  [facility]

## 2022-12-17 DIAGNOSIS — I10 HYPERTENSION GOAL BP (BLOOD PRESSURE) < 140/90: ICD-10-CM

## 2022-12-17 DIAGNOSIS — N18.31 STAGE 3A CHRONIC KIDNEY DISEASE (H): ICD-10-CM

## 2022-12-19 RX ORDER — LISINOPRIL 2.5 MG/1
TABLET ORAL
Qty: 90 TABLET | Refills: 1 | Status: SHIPPED | OUTPATIENT
Start: 2022-12-19 | End: 2023-02-13

## 2022-12-24 ENCOUNTER — HEALTH MAINTENANCE LETTER (OUTPATIENT)
Age: 87
End: 2022-12-24

## 2023-01-31 ENCOUNTER — OFFICE VISIT (OUTPATIENT)
Dept: ORTHOPEDICS | Facility: CLINIC | Age: 88
End: 2023-01-31
Payer: COMMERCIAL

## 2023-01-31 VITALS — HEART RATE: 64 BPM | DIASTOLIC BLOOD PRESSURE: 66 MMHG | SYSTOLIC BLOOD PRESSURE: 133 MMHG | OXYGEN SATURATION: 95 %

## 2023-01-31 DIAGNOSIS — E78.5 HYPERLIPIDEMIA LDL GOAL <130: ICD-10-CM

## 2023-01-31 DIAGNOSIS — M75.82 ROTATOR CUFF TENDINITIS, LEFT: Primary | ICD-10-CM

## 2023-01-31 DIAGNOSIS — M75.22 BICEPS TENDONITIS ON LEFT: ICD-10-CM

## 2023-01-31 DIAGNOSIS — M19.019 SHOULDER ARTHRITIS: ICD-10-CM

## 2023-01-31 PROCEDURE — 20610 DRAIN/INJ JOINT/BURSA W/O US: CPT | Mod: LT | Performed by: ORTHOPAEDIC SURGERY

## 2023-01-31 RX ORDER — SIMVASTATIN 40 MG
TABLET ORAL
Qty: 90 TABLET | Refills: 1 | Status: SHIPPED | OUTPATIENT
Start: 2023-01-31 | End: 2023-02-13

## 2023-01-31 RX ORDER — METHYLPREDNISOLONE ACETATE 80 MG/ML
80 INJECTION, SUSPENSION INTRA-ARTICULAR; INTRALESIONAL; INTRAMUSCULAR; SOFT TISSUE
Status: SHIPPED | OUTPATIENT
Start: 2023-01-31

## 2023-01-31 RX ORDER — LIDOCAINE HYDROCHLORIDE 10 MG/ML
4 INJECTION, SOLUTION INFILTRATION; PERINEURAL
Status: SHIPPED | OUTPATIENT
Start: 2023-01-31

## 2023-01-31 RX ADMIN — LIDOCAINE HYDROCHLORIDE 4 ML: 10 INJECTION, SOLUTION INFILTRATION; PERINEURAL at 13:22

## 2023-01-31 RX ADMIN — METHYLPREDNISOLONE ACETATE 80 MG: 80 INJECTION, SUSPENSION INTRA-ARTICULAR; INTRALESIONAL; INTRAMUSCULAR; SOFT TISSUE at 13:22

## 2023-01-31 ASSESSMENT — PAIN SCALES - GENERAL: PAINLEVEL: MODERATE PAIN (4)

## 2023-01-31 NOTE — PATIENT INSTRUCTIONS
AFTER VISIT SUMMARY    [facility] CORTISONE Injection Discharge Instructions    You may shower, however avoid swimming, tub baths or hot tubs for 24 hours following your procedure    You may have a mild to moderate increase in pain for several days following the injection.    It may take up to 14 days for the steroid medication to start working although you may feel the effect as early as a few days after the procedure.    You may use ice packs for 10-15 minutes, 3 to 4 times a day at the injection site for comfort    You may use anti-inflammatory medications (such as Ibuprofen or Aleve or Advil) or Tylenol for pain control if necessary    If you were fasting, you may resume your normal diet and medications after the procedure    If you have diabetes, check your blood sugar more frequently than usual as your blood sugar may be higher than normal for 10-14 days following a steroid injection. Contact your doctor who manages your diabetes if your blood sugar is higher than usual      If you experience any of the following, call [facility] [phone]  -Fever over 100 degree F  -Swelling, bleeding, redness, drainage, warmth at the injection site  - New or worsening pain

## 2023-01-31 NOTE — TELEPHONE ENCOUNTER
Lab Results   Component Value Date    CHOL 178 11/18/2021    CHOL 137 05/28/2020     Lab Results   Component Value Date    HDL 79 11/18/2021    HDL 59 05/28/2020     Lab Results   Component Value Date    LDL 83 11/18/2021    LDL 68 05/28/2020     Lab Results   Component Value Date    TRIG 63 08/03/2022    TRIG 51 05/28/2020     Lab Results   Component Value Date    CHOLHDLRATIO 2.3 08/04/2014

## 2023-01-31 NOTE — PROGRESS NOTES
Large Joint Injection/Arthocentesis: L subacromial bursa    Date/Time: 1/31/2023 1:22 PM  Performed by: Jimmy Mancera  Authorized by: Rodrigo Hastings MD     Indications:  Pain  Needle Size:  22 G  Guidance: landmark guided    Approach:  Lateral  Location:  Shoulder      Site:  L subacromial bursa  Medications:  80 mg methylPREDNISolone 80 MG/ML; 4 mL lidocaine 1 %  Outcome:  Tolerated well, no immediate complications  Procedure discussed: discussed risks, benefits, and alternatives    Consent Given by:  Patient  Timeout: timeout called immediately prior to procedure    Prep: patient was prepped and draped in usual sterile fashion

## 2023-01-31 NOTE — LETTER
1/31/2023         RE: Jose Manuel Gallo  [address] [place] [address]        Dear Colleague,    Thank you for referring your patient, Jose Manuel Gallo, to the [facility]. Please see a copy of my visit note below.    Large Joint Injection/Arthocentesis: L subacromial bursa    Date/Time: 1/31/2023 1:22 PM  Performed by: Jimmy Mancera  Authorized by: Rodrigo Hastings MD     Indications:  Pain  Needle Size:  22 G  Guidance: landmark guided    Approach:  Lateral  Location:  Shoulder      Site:  L subacromial bursa  Medications:  80 mg methylPREDNISolone 80 MG/ML; 4 mL lidocaine 1 %  Outcome:  Tolerated well, no immediate complications  Procedure discussed: discussed risks, benefits, and alternatives    Consent Given by:  Patient  Timeout: timeout called immediately prior to procedure    Prep: patient was prepped and draped in usual sterile fashion          SUBJECTIVE:  Jose Manuel Gallo is a 87 year old male who is seen in follow-up for left shoulder pain.  In May, in October, then on 1/26/22, and again 6/7/22 he had SA  injections for rotator cuff tendonitis. Length of effectiveness:  3-5 months or so usually, but had 7 months relief from the last injection.  He also has glenohumeral joint osteoarthritis     Present symptoms:  pain rotating arm with reaching out.  No weakness.   Associated symptoms: none    Treatment up to this point:   Hasn't done any physical therapy, but said he was interested last time, but I don't think he went.  Wants another injection     Ortho PMH:     Past Medical History:   Past Medical History:   Diagnosis Date     Cataract, mild, od; mod, os 7/2/2012     Chronic low back pain      CKD (chronic kidney disease) stage 3, GFR 30-59 ml/min (H)      Erectile dysfunction      Hemorrhoids      History of bladder cancer          HTN      Hyperlipidemia LDL goal <130      Kidney stones      Macular degeneration      Osteoarthritis       Personal history of colonic polyps     10/2002     Personal history of malignant neoplasm of bladder      Personal history of tobacco use     Smoked 40 years, Quit      PVD (peripheral vascular disease) (H)     Left Vertebral Artery occlusion     Rotator cuff tendinitis, left      Past Surgical History:   Past Surgical History:   Procedure Laterality Date     CATARACT IOL, RT/LT       COLONOSCOPY  2008    Normal     HC REVISE ULNAR NERVE AT ELBOW      Left     LASER YAG CAPSULOTOMY  2016    left eye     PHACOEMULSIFICATION WITH STANDARD INTRAOCULAR LENS IMPLANT  2012    left eye     TURP   ?     VASECTOMY       VITRECTOMY PARSPLANA  2011    left eye, repair macular hole     ZZC SPINAL FUSION,ANT,EA ADNL LEVEL      C6-7     Family History:   Family History   Problem Relation Age of Onset     Osteoporosis Mother      Diabetes Father      Arthritis Father      Cancer Father      Respiratory Father      Genitourinary Problems Brother      Circulatory Brother      Macular Degeneration No family hx of      Glaucoma No family hx of      Thyroid Disease No family hx of      Hypertension No family hx of      Social History:   Social History     Tobacco Use     Smoking status: Former     Years: 40.00     Types: Cigarettes     Quit date: 2003     Years since quittin.0     Smokeless tobacco: Never   Substance Use Topics     Alcohol use: Yes     Comment: 1 x month       Review of Systems:  Constitutional:  NEGATIVE for fever, chills, change in weight  Integumentary/Skin:  NEGATIVE for worrisome rashes, moles or lesions  Eyes:  NEGATIVE for vision changes or irritation  ENT/Mouth:  NEGATIVE for ear, mouth and throat problems  Resp:  NEGATIVE for significant cough or SOB  Breast:  NEGATIVE for masses, tenderness or discharge  CV:  NEGATIVE for chest pain, palpitations or peripheral edema  GI:  NEGATIVE for nausea, abdominal pain, heartburn, or change in bowel habits  :  Negative   Musculoskeletal:   See HPI above  Neuro:  NEGATIVE for weakness, dizziness or paresthesias  Endocrine:  NEGATIVE for temperature intolerance, skin/hair changes  Heme/allergy/immune:  NEGATIVE for bleeding problems  Psychiatric:  NEGATIVE for changes in mood or affect    OBJECTIVE:  Physical Exam:  /66 (BP Location: Left arm, Patient Position: Sitting, Cuff Size: Adult Regular)   Pulse 64   SpO2 95%   General Appearance: healthy, alert and in no distress   Skin: no suspicious lesions or rashes  Neuro: Normal strength and tone, mentation intact and speech normal  Vascular: good pulses, and cappillary refill   Lymph: no lymphadenopathy   Psych:  mentation appears normal and affect normal/bright  Resp: no increased work of breathing    Left Shoulder Exam:  Shoulder Inspection: no swelling, bruising, discoloration, or obvious deformity or asymmetry  no atrophy  Tender:  Non-tender  Strength: forward flexion: 5*/5, external rotation: 5-/5, Liftoff: Able  Impingement: all grade 2 positive      MRI:   From 5/13/21 : left shoulder    Motion partially compromising assessment.  1. Rotator cuff tendinosis without high-grade tear. Focal subchondral  edema of the anterior facet of greater tuberosity, query enthesitis.  2. Strain of the infraspinatus.  3. Query mild subacromial/subdeltoid bursitis.  4. Mild acromioclavicular joint degenerative change with subacromial  enthesophyte.  5. Extensive subchondral cystlike changes of the posteroinferior  glenoid which is intimate with triceps proximal attachment without  substantial cartilage abnormality, likely reflecting intra-osseous  ganglion cysts.      ASSESSMENT:  Encounter Diagnoses   Name Primary?     Rotator cuff tendinitis, left Yes     Biceps tendonitis on left      Glenohumeral joint arthritis left         PLAN:  He would like to repeat the injection     Procedure Note:   Informed consent obtained. Risks, benefits and complications of the injection were discussed with the patient and  the patient elected to proceed. Left shoulder injected into the subacromial space after sterile prep, using 80mg Depomedrol and 4cc local anesthetic.    Return to clinic: as needed     DESTINEE Hastings MD  Dept. Orthopedic Surgery  [facility]      Again, thank you for allowing me to participate in the care of your patient.        Sincerely,        Rodrigo Hastings MD

## 2023-02-01 NOTE — PROGRESS NOTES
SUBJECTIVE:  Jose Manuel Gallo is a 87 year old male who is seen in follow-up for left shoulder pain.  In May, in October, then on 1/26/22, and again 6/7/22 he had SA  injections for rotator cuff tendonitis. Length of effectiveness:  3-5 months or so usually, but had 7 months relief from the last injection.  He also has glenohumeral joint osteoarthritis     Present symptoms:  pain rotating arm with reaching out.  No weakness.   Associated symptoms: none    Treatment up to this point:   Hasn't done any physical therapy, but said he was interested last time, but I don't think he went.  Wants another injection     Ortho PMH:     Past Medical History:   Past Medical History:   Diagnosis Date     Cataract, mild, od; mod, os 7/2/2012     Chronic low back pain      CKD (chronic kidney disease) stage 3, GFR 30-59 ml/min (H)      Erectile dysfunction      Hemorrhoids      History of bladder cancer          HTN      Hyperlipidemia LDL goal <130      Kidney stones      Macular degeneration      Osteoarthritis      Personal history of colonic polyps     10/2002     Personal history of malignant neoplasm of bladder      Personal history of tobacco use     Smoked 40 years, Quit 2003     PVD (peripheral vascular disease) (H)     Left Vertebral Artery occlusion     Rotator cuff tendinitis, left      Past Surgical History:   Past Surgical History:   Procedure Laterality Date     CATARACT IOL, RT/LT       COLONOSCOPY  11/02/2008    Normal     HC REVISE ULNAR NERVE AT ELBOW      Left     LASER YAG CAPSULOTOMY  7/2016    left eye     PHACOEMULSIFICATION WITH STANDARD INTRAOCULAR LENS IMPLANT  7/2012    left eye     TURP  1997 ?     VASECTOMY       VITRECTOMY PARSPLANA  8/2011    left eye, repair macular hole     ZZC SPINAL FUSION,ANT,EA ADNL LEVEL      C6-7     Family History:   Family History   Problem Relation Age of Onset     Osteoporosis Mother      Diabetes Father      Arthritis Father      Cancer Father      Respiratory  Father      Genitourinary Problems Brother      Circulatory Brother      Macular Degeneration No family hx of      Glaucoma No family hx of      Thyroid Disease No family hx of      Hypertension No family hx of      Social History:   Social History     Tobacco Use     Smoking status: Former     Years: 40.00     Types: Cigarettes     Quit date: 2003     Years since quittin.0     Smokeless tobacco: Never   Substance Use Topics     Alcohol use: Yes     Comment: 1 x month       Review of Systems:  Constitutional:  NEGATIVE for fever, chills, change in weight  Integumentary/Skin:  NEGATIVE for worrisome rashes, moles or lesions  Eyes:  NEGATIVE for vision changes or irritation  ENT/Mouth:  NEGATIVE for ear, mouth and throat problems  Resp:  NEGATIVE for significant cough or SOB  Breast:  NEGATIVE for masses, tenderness or discharge  CV:  NEGATIVE for chest pain, palpitations or peripheral edema  GI:  NEGATIVE for nausea, abdominal pain, heartburn, or change in bowel habits  :  Negative   Musculoskeletal:  See HPI above  Neuro:  NEGATIVE for weakness, dizziness or paresthesias  Endocrine:  NEGATIVE for temperature intolerance, skin/hair changes  Heme/allergy/immune:  NEGATIVE for bleeding problems  Psychiatric:  NEGATIVE for changes in mood or affect    OBJECTIVE:  Physical Exam:  /66 (BP Location: Left arm, Patient Position: Sitting, Cuff Size: Adult Regular)   Pulse 64   SpO2 95%   General Appearance: healthy, alert and in no distress   Skin: no suspicious lesions or rashes  Neuro: Normal strength and tone, mentation intact and speech normal  Vascular: good pulses, and cappillary refill   Lymph: no lymphadenopathy   Psych:  mentation appears normal and affect normal/bright  Resp: no increased work of breathing    Left Shoulder Exam:  Shoulder Inspection: no swelling, bruising, discoloration, or obvious deformity or asymmetry  no atrophy  Tender:  Non-tender  Strength: forward flexion: 5*/5, external  rotation: 5-/5, Liftoff: Able  Impingement: all grade 2 positive      MRI:   From 5/13/21 : left shoulder    Motion partially compromising assessment.  1. Rotator cuff tendinosis without high-grade tear. Focal subchondral  edema of the anterior facet of greater tuberosity, query enthesitis.  2. Strain of the infraspinatus.  3. Query mild subacromial/subdeltoid bursitis.  4. Mild acromioclavicular joint degenerative change with subacromial  enthesophyte.  5. Extensive subchondral cystlike changes of the posteroinferior  glenoid which is intimate with triceps proximal attachment without  substantial cartilage abnormality, likely reflecting intra-osseous  ganglion cysts.      ASSESSMENT:  Encounter Diagnoses   Name Primary?     Rotator cuff tendinitis, left Yes     Biceps tendonitis on left      Glenohumeral joint arthritis left         PLAN:  He would like to repeat the injection     Procedure Note:   Informed consent obtained. Risks, benefits and complications of the injection were discussed with the patient and the patient elected to proceed. Left shoulder injected into the subacromial space after sterile prep, using 80mg Depomedrol and 4cc local anesthetic.    Return to clinic: as needed     DESTINEE Hastings MD  Dept. Orthopedic Surgery  [facility]

## 2023-02-09 ENCOUNTER — TELEPHONE (OUTPATIENT)
Dept: FAMILY MEDICINE | Facility: CLINIC | Age: 88
End: 2023-02-09

## 2023-02-09 NOTE — TELEPHONE ENCOUNTER
Reason for Call:  Appointment Request    Patient requesting this type of appt: PT needs to be seen ASAP regarding diarrhea that he's had for more than a week looking for this afternoon or tomorrow afternoon in  w/PCP or other at   Requested provider: Orlando Braxton    Reason patient unable to be scheduled: Not within requested timeframe    When does patient want to be seen/preferred time: Same day    Comments: PT said he spoke to nurse and was advised to get in asap I set appointment for 2/13 incase  can't get him in sooner.     Could we [facility] this [facility] to you [facility] or would you prefer to receive a phone call?:   Patient would prefer a phone call   Okay to leave a detailed message?: Yes at Home number on file [phone] (home)    Call taken on 2/9/2023 at 10:35 AM by Jaylin Briceño

## 2023-02-13 ENCOUNTER — OFFICE VISIT (OUTPATIENT)
Dept: FAMILY MEDICINE | Facility: CLINIC | Age: 88
End: 2023-02-13
Payer: COMMERCIAL

## 2023-02-13 ENCOUNTER — OFFICE VISIT (OUTPATIENT)
Dept: UROLOGY | Facility: CLINIC | Age: 88
End: 2023-02-13
Payer: COMMERCIAL

## 2023-02-13 VITALS
OXYGEN SATURATION: 97 % | HEART RATE: 58 BPM | SYSTOLIC BLOOD PRESSURE: 132 MMHG | TEMPERATURE: 96.9 F | HEIGHT: 72 IN | DIASTOLIC BLOOD PRESSURE: 71 MMHG | WEIGHT: 151 LBS | BODY MASS INDEX: 20.45 KG/M2 | RESPIRATION RATE: 16 BRPM

## 2023-02-13 DIAGNOSIS — N18.31 STAGE 3A CHRONIC KIDNEY DISEASE (H): ICD-10-CM

## 2023-02-13 DIAGNOSIS — K52.9 CHRONIC DIARRHEA OF UNKNOWN ORIGIN: Primary | ICD-10-CM

## 2023-02-13 DIAGNOSIS — Z85.51 HISTORY OF BLADDER CANCER: Primary | ICD-10-CM

## 2023-02-13 DIAGNOSIS — E78.5 HYPERLIPIDEMIA LDL GOAL <130: ICD-10-CM

## 2023-02-13 DIAGNOSIS — I10 HYPERTENSION GOAL BP (BLOOD PRESSURE) < 140/90: ICD-10-CM

## 2023-02-13 PROCEDURE — 99213 OFFICE O/P EST LOW 20 MIN: CPT | Performed by: INTERNAL MEDICINE

## 2023-02-13 PROCEDURE — 52000 CYSTOURETHROSCOPY: CPT | Performed by: UROLOGY

## 2023-02-13 RX ORDER — SIMVASTATIN 40 MG
40 TABLET ORAL DAILY
Qty: 90 TABLET | Refills: 4 | Status: SHIPPED | OUTPATIENT
Start: 2023-02-13 | End: 2024-04-22

## 2023-02-13 RX ORDER — METRONIDAZOLE 500 MG/1
500 TABLET ORAL 3 TIMES DAILY
Qty: 21 TABLET | Refills: 0 | Status: SHIPPED | OUTPATIENT
Start: 2023-02-13 | End: 2023-02-20

## 2023-02-13 RX ORDER — LISINOPRIL 2.5 MG/1
2.5 TABLET ORAL DAILY
Qty: 90 TABLET | Refills: 4 | Status: SHIPPED | OUTPATIENT
Start: 2023-02-13 | End: 2023-10-16

## 2023-02-13 ASSESSMENT — ENCOUNTER SYMPTOMS: DIARRHEA: 1

## 2023-02-13 NOTE — PROGRESS NOTES
S: Jose Manuel Gallo is a 87 year old male returns for bladder cancer surveillance.   he has history of bladder cancer Grade 3 non-invasive, Stage ta, s/p turbt.   He received 2 courses of BCG therapy.   Cysto: The anterior urethra showed mild stricture   The prostatic urethra turp changes.   The length is 0cm, the coaptation is 0 cm.   In the bladder there is normal mucosa.   Assessment/Plan: V10.51B History of bladder cancer (primary encounter diagnosis)   Comment: no evidence of recurrence   Plan: CYSTOURETHROSCOPY   BTR in one year

## 2023-02-13 NOTE — PROGRESS NOTES
Assessment & Plan   Problem List Items Addressed This Visit     CKD (chronic kidney disease) stage 3, GFR 30-59 ml/min (H)    Relevant Medications    lisinopril (ZESTRIL) 2.5 MG tablet    HYPERLIPIDEMIA LDL GOAL <130    Relevant Medications    simvastatin (ZOCOR) 40 MG tablet    Hypertension goal BP (blood pressure) < 140/90    Relevant Medications    lisinopril (ZESTRIL) 2.5 MG tablet   Other Visit Diagnoses     Chronic diarrhea of unknown origin    -  Primary    Relevant Medications    metroNIDAZOLE (FLAGYL) 500 MG tablet         Patient with subacute diarrhea without weight loss, blood, or mucous or recent antibiotic medication     Will treat emperically   If not response, then may need sttol studies and fecal elastase            Work on weight loss  Regular exercise    Return for medication management, follow up of condition.    Fahad Guerrero MD  [facility] ALIREZA Singer is a 87 year old, presenting for the following health issues:  Diarrhea      Diarrhea         Diarrhea  Onset/Duration: 2 weeks   Description:       Consistency of stool: watery, runny, loose and pasty/formed       Blood in stool: No       Number of loose stools past 24 hours: 0  Progression of Symptoms: improving and waxing and waning  Accompanying signs and symptoms:       Fever: No       Nausea/Vomiting: No       Abdominal pain: YES- lower stomach       Weight loss: No       Episodes of constipation: No  History   Ill contacts: No  Recent use of antibiotics: No  Recent travels: No  Recent medication-new or changes(Rx or OTC): No  Precipitating or alleviating factors: None  Therapies tried and outcome: 262 mg Subsalicylate tablet      No blood in the stool   No mucous   No antibiotics  No travel   Wife at rehab 3 weeks        Review of Systems   Gastrointestinal: Positive for diarrhea.      Constitutional, HEENT, cardiovascular, pulmonary, gi and gu systems are negative, except as otherwise noted.     "  Objective    /71 (BP Location: Left arm, Patient Position: Chair, Cuff Size: Adult Regular)   Pulse 58   Temp 96.9  F (36.1  C)   Resp 16   Ht 1.816 m (5' 11.5\")   Wt 68.5 kg (151 lb)   SpO2 97%   BMI 20.77 kg/m    Body mass index is 20.77 kg/m .  Physical Exam   GENERAL: healthy, alert and no distress  EYES: Eyes grossly normal to inspection, PERRL and conjunctivae and sclerae normal  HENT: ear canals and TM's normal, nose and mouth without ulcers or lesions  NECK: no adenopathy, no asymmetry, masses, or scars and thyroid normal to palpation  RESP: lungs clear to auscultation - no rales, rhonchi or wheezes  CV: regular rate and rhythm, normal S1 S2, no S3 or S4, no murmur, click or rub, no peripheral edema and peripheral pulses strong  ABDOMEN: soft, nontender, no hepatosplenomegaly, no masses and bowel sounds normal  MS: no gross musculoskeletal defects noted, no edema  SKIN: no suspicious lesions or rashes  NEURO: Normal strength and tone, mentation intact and speech normal  BACK: no CVA tenderness, no paralumbar tenderness  PSYCH: mentation appears normal, affect normal/bright  LYMPH: no cervical, supraclavicular, axillary, or inguinal adenopathy    No results found for any visits on 02/13/23.                "

## 2023-02-17 ENCOUNTER — OFFICE VISIT (OUTPATIENT)
Dept: INTERNAL MEDICINE | Facility: CLINIC | Age: 88
End: 2023-02-17
Payer: COMMERCIAL

## 2023-02-17 VITALS
HEART RATE: 61 BPM | DIASTOLIC BLOOD PRESSURE: 73 MMHG | TEMPERATURE: 97.9 F | RESPIRATION RATE: 12 BRPM | OXYGEN SATURATION: 96 % | HEIGHT: 72 IN | SYSTOLIC BLOOD PRESSURE: 145 MMHG | WEIGHT: 150 LBS | BODY MASS INDEX: 20.32 KG/M2

## 2023-02-17 DIAGNOSIS — R63.4 UNINTENDED WEIGHT LOSS: ICD-10-CM

## 2023-02-17 DIAGNOSIS — Z00.00 ENCOUNTER FOR SUBSEQUENT ANNUAL WELLNESS VISIT (AWV) IN MEDICARE PATIENT: Primary | ICD-10-CM

## 2023-02-17 DIAGNOSIS — R42 DIZZINESS: ICD-10-CM

## 2023-02-17 DIAGNOSIS — R19.5 LOOSE STOOLS: ICD-10-CM

## 2023-02-17 DIAGNOSIS — N18.31 STAGE 3A CHRONIC KIDNEY DISEASE (H): ICD-10-CM

## 2023-02-17 DIAGNOSIS — H61.23 BILATERAL IMPACTED CERUMEN: ICD-10-CM

## 2023-02-17 DIAGNOSIS — K22.4 ESOPHAGEAL DYSMOTILITY: ICD-10-CM

## 2023-02-17 DIAGNOSIS — R09.82 POST-NASAL DRIP: ICD-10-CM

## 2023-02-17 LAB
CREAT UR-MCNC: 171 MG/DL
HGB BLD-MCNC: 14.4 G/DL (ref 13.3–17.7)
MICROALBUMIN UR-MCNC: 15 MG/L
MICROALBUMIN/CREAT UR: 8.77 MG/G CR (ref 0–17)

## 2023-02-17 PROCEDURE — G0439 PPPS, SUBSEQ VISIT: HCPCS | Performed by: INTERNAL MEDICINE

## 2023-02-17 PROCEDURE — 82043 UR ALBUMIN QUANTITATIVE: CPT | Performed by: INTERNAL MEDICINE

## 2023-02-17 PROCEDURE — 36415 COLL VENOUS BLD VENIPUNCTURE: CPT | Performed by: INTERNAL MEDICINE

## 2023-02-17 PROCEDURE — 85018 HEMOGLOBIN: CPT | Performed by: INTERNAL MEDICINE

## 2023-02-17 PROCEDURE — 82570 ASSAY OF URINE CREATININE: CPT | Performed by: INTERNAL MEDICINE

## 2023-02-17 ASSESSMENT — ENCOUNTER SYMPTOMS
DIARRHEA: 1
COUGH: 0
HEADACHES: 0
NAUSEA: 0
EYE PAIN: 0
CONSTIPATION: 0
HEMATURIA: 0
ARTHRALGIAS: 0
PARESTHESIAS: 0
FREQUENCY: 0
DIZZINESS: 1
ABDOMINAL PAIN: 1
PALPITATIONS: 0
FEVER: 0
JOINT SWELLING: 0
NERVOUS/ANXIOUS: 0
SORE THROAT: 0
WEAKNESS: 0
SHORTNESS OF BREATH: 0
DYSURIA: 0
HEARTBURN: 0
MYALGIAS: 0
HEMATOCHEZIA: 0
CHILLS: 0

## 2023-02-17 ASSESSMENT — ACTIVITIES OF DAILY LIVING (ADL): CURRENT_FUNCTION: NO ASSISTANCE NEEDED

## 2023-02-17 NOTE — LETTER
February 20, 2023    Enmanuel Gallo  [address] [place] [address]          Dear ,    We are writing to inform you of your test results.  All of these tests are within acceptable limits , things look good !       Resulted Orders   Albumin Random Urine Quantitative with Creat Ratio   Result Value Ref Range    Creatinine Urine mg/dL 171 mg/dL    Albumin Urine mg/L 15 mg/L    Albumin Urine mg/g Cr 8.77 0.00 - 17.00 mg/g Cr   Hemoglobin   Result Value Ref Range    Hemoglobin 14.4 13.3 - 17.7 g/dL       If you have any questions or concerns, please call the clinic at the number listed above.       Sincerely,      Orlando Braxton MD

## 2023-02-17 NOTE — PROGRESS NOTES
"SUBJECTIVE:   Enmanuel is a 88 year old who presents for Preventive Visit.    Patient has been advised of split billing requirements and indicates understanding: Yes    Are you in the first 12 months of your Medicare coverage?  No    Healthy Habits:     In general, how would you rate your overall health?  Fair    Frequency of exercise:  6-7 days/week    Duration of exercise:  Less than 15 minutes    Do you usually eat at least 4 servings of fruit and vegetables a day, include whole grains    & fiber and avoid regularly eating high fat or \"junk\" foods?  No    Taking medications regularly:  Yes    Medication side effects:  None    Ability to successfully perform activities of daily living:  No assistance needed    Home Safety:  No safety concerns identified    Hearing Impairment:  Feel that people are mumbling or not speaking clearly    In the past 6 months, have you been bothered by leaking of urine?  No    In general, how would you rate your overall mental or emotional health?  Good      PHQ-2 Total Score: 0    Additional concerns today:  Yes    Patient still does his own snow shoveling   Patient has concerned about his balance problems but no actual falls . He has a chronic and refractory dizziness symptoms, reviewed in other office visit notes with no new issues to discuss   He has known hearing loss and wears his hearing aids only infrequently  Wt Readings from Last 5 Encounters:   02/17/23 68 kg (150 lb)   02/13/23 68.5 kg (151 lb)   08/22/22 67.6 kg (149 lb)   06/07/22 71.5 kg (157 lb 9.6 oz)   01/26/22 73.9 kg (163 lb)       Have you ever done Advance Care Planning? (For example, a Health Directive, POLST, or a discussion with a medical provider or your loved ones about your wishes): Yes, advance care planning is on file.       Fall risk  Fallen 2 or more times in the past year?: No  Any fall with injury in the past year?: Yes    Cognitive Screening   1) Repeat 3 items (Leader, Season, Table)    2) Clock draw: " NORMAL  3) 3 item recall: Recalls 3 objects  Results: 3 items recalled: COGNITIVE IMPAIRMENT LESS LIKELY    Mini-CogTM Copyright ANABEL Carpenter. Licensed by the author for use in [facility]; reprinted with permission (kostas@[facility]). All rights reserved.        Reviewed and updated as needed this visit by clinical staff   Tobacco  Allergies  Meds              Reviewed and updated as needed this visit by Provider                 Social History     Tobacco Use     Smoking status: Former     Years: 40.00     Types: Cigarettes     Quit date: 2003     Years since quittin.1     Passive exposure: Past     Smokeless tobacco: Never   Substance Use Topics     Alcohol use: Yes     Comment: 1 x month       Alcohol Use 2023   Prescreen: >3 drinks/day or >7 drinks/week? No   Prescreen: >3 drinks/day or >7 drinks/week? -         Current providers sharing in care for this patient include:   Patient Care Team:  Orlando Braxton MD as PCP - General (Internal Medicine)  Orlando Braxton MD as Assigned PCP  Rodrigo Hastings MD as Assigned Musculoskeletal Provider  Soraya Lux MD as Assigned Surgical Provider  Angi Bustamante MD as Referring Physician (Internal Medicine)  Jo Mistry PA-C as Physician Assistant (Dermatology)    The following health maintenance items are reviewed in Epic and correct as of today:  Health Maintenance   Topic Date Due     MEDICARE ANNUAL WELLNESS VISIT  2022     MICROALBUMIN  2023     ANNUAL REVIEW OF HM ORDERS  2023     HEMOGLOBIN  2022     EYE EXAM  2023     DTAP/TDAP/TD IMMUNIZATION (3 - Td or Tdap) 2023     LIPID  2023     BMP  2023     FALL RISK ASSESSMENT  2024     ADVANCE CARE PLANNING  2026     PHQ-2 (once per calendar year)  Completed     INFLUENZA VACCINE  Completed     Pneumococcal Vaccine: 65+ Years  Completed     URINALYSIS  Completed     ZOSTER IMMUNIZATION  Completed     COVID-19  Vaccine  Completed     IPV IMMUNIZATION  Aged Out     MENINGITIS IMMUNIZATION  Aged Out     Health Maintenance Due   Topic Date Due     MEDICARE ANNUAL WELLNESS VISIT  11/04/2022     MICROALBUMIN  01/11/2023     ANNUAL REVIEW OF HM ORDERS  01/11/2023     HEMOGLOBIN  11/18/2022        Lab work is in process  Labs reviewed in EPIC  BP Readings from Last 3 Encounters:   02/17/23 (!) 145/73   02/13/23 132/71   01/31/23 133/66    Wt Readings from Last 3 Encounters:   02/17/23 68 kg (150 lb)   02/13/23 68.5 kg (151 lb)   08/22/22 67.6 kg (149 lb)                  Patient Active Problem List   Diagnosis     PVD (peripheral vascular disease) (H)     Erectile dysfunction     Osteoarthritis     HYPERLIPIDEMIA LDL GOAL <130     Advanced directives, counseling/discussion     Hypertension goal BP (blood pressure) < 140/90     History of bladder cancer     CKD (chronic kidney disease) stage 3, GFR 30-59 ml/min (H)     Pseudophakia, Yag Caps, os     Dizziness     Posterior vitreous detachment, right     Hx of vitrectomy for chronic macular hole, os (ER)     Other idiopathic scoliosis, thoracolumbar region     Spondylosis of lumbar region without myelopathy or radiculopathy     Brittle nails     Combined forms of age-related cataract, mild-mod, of right eye     Pain of right eye     History of shingles     Low vitamin B12 level     B12 nutritional deficiency     Occipital neuralgia of left side     Post-nasal drip     Unintended weight loss     Dysphagia, unspecified type     Past Surgical History:   Procedure Laterality Date     CATARACT IOL, RT/LT       COLONOSCOPY  11/02/2008    Normal     HC REVISE ULNAR NERVE AT ELBOW      Left     LASER YAG CAPSULOTOMY  7/2016    left eye     PHACOEMULSIFICATION WITH STANDARD INTRAOCULAR LENS IMPLANT  7/2012    left eye     TURP  1997 ?     VASECTOMY       VITRECTOMY PARSPLANA  8/2011    left eye, repair macular hole     ZZC SPINAL FUSION,ANT,EA ADNL LEVEL      C6-7       Social History      Tobacco Use     Smoking status: Former     Years: 40.00     Types: Cigarettes     Quit date: 2003     Years since quittin.1     Passive exposure: Past     Smokeless tobacco: Never   Substance Use Topics     Alcohol use: Yes     Comment: 1 x month     Family History   Problem Relation Age of Onset     Osteoporosis Mother      Diabetes Father      Arthritis Father      Cancer Father      Respiratory Father      Genitourinary Problems Brother      Circulatory Brother      Macular Degeneration No family hx of      Glaucoma No family hx of      Thyroid Disease No family hx of      Hypertension No family hx of          Current Outpatient Medications   Medication Sig Dispense Refill     acetaminophen (TYLENOL) 650 MG CR tablet Take 1 tablet (650 mg) by mouth every 8 hours as needed for pain 60 tablet 1     carboxymethylcellulose PF (REFRESH LIQUIGEL) 1 % ophthalmic gel Place 1 drop into both eyes 4 times daily 1 Bottle 11     cyanocobalamin (VITAMIN B-12) 1000 MCG tablet Take 1 tablet (1,000 mcg) by mouth daily 90 tablet 3     lisinopril (ZESTRIL) 2.5 MG tablet Take 1 tablet (2.5 mg) by mouth daily 90 tablet 4     metroNIDAZOLE (FLAGYL) 500 MG tablet Take 1 tablet (500 mg) by mouth 3 times daily for 7 days 21 tablet 0     simvastatin (ZOCOR) 40 MG tablet Take 1 tablet (40 mg) by mouth daily 90 tablet 4     No Known Allergies  Recent Labs   Lab Test 22  1431 22  1100 21  1758 20  1400 19  1210 18  1014 17  0745 17  1215 17  1210 16  1011   A1C 5.7*  --   --   --   --   --   --   --   --   --    LDL  --   --  83 68 88 61  --   --    < > 67   HDL  --   --  79 59 70 77  --   --    < > 61   TRIG  --  63 79 51 57 53  --   --    < > 67   ALT  --   --  30  --   --  29  --  27  --  26   CR 1.36*  --  1.05 1.23 1.09 1.13  --  1.22  --   --    GFRESTIMATED 50*  --  64 53* 62 62  --  57*  --   --    GFRESTBLACK  --   --   --  62 72 75  --  69  --   --   "  POTASSIUM 4.8  --  5.4* 5.3 4.8 5.0   < > 5.7*  --   --    TSH  --   --   --   --   --   --   --   --   --  0.67    < > = values in this interval not displayed.              Review of Systems   Constitutional: Negative for chills and fever.   HENT: Positive for hearing loss. Negative for congestion, ear pain and sore throat.    Eyes: Negative for pain and visual disturbance.   Respiratory: Negative for cough and shortness of breath.    Cardiovascular: Negative for chest pain, palpitations and peripheral edema.   Gastrointestinal: Positive for abdominal pain and diarrhea. Negative for constipation, heartburn, hematochezia and nausea.   Genitourinary: Negative for dysuria, frequency, genital sores, hematuria, impotence, penile discharge and urgency.   Musculoskeletal: Negative for arthralgias, joint swelling and myalgias.   Skin: Negative for rash.   Neurological: Positive for dizziness. Negative for weakness, headaches and paresthesias.   Psychiatric/Behavioral: Positive for mood changes. The patient is not nervous/anxious.      Constitutional, HEENT, cardiovascular, pulmonary, gi and gu systems are negative, except as otherwise noted.    OBJECTIVE:   BP (!) 145/73   Pulse 61   Temp 97.9  F (36.6  C) (Temporal)   Resp 12   Ht 1.816 m (5' 11.5\")   Wt 68 kg (150 lb)   SpO2 96%   BMI 20.63 kg/m   Estimated body mass index is 20.63 kg/m  as calculated from the following:    Height as of this encounter: 1.816 m (5' 11.5\").    Weight as of this encounter: 68 kg (150 lb).  Physical Exam  GENERAL: healthy, alert and no distress, appears his stated age , answers all questions appropriately, talkative and appropriate   EYES: Eyes grossly normal to inspection, PERRL and conjunctivae and sclerae normal  HENT: ear canals and TM's normal, nose and mouth without ulcers or lesions  NECK: no adenopathy, no asymmetry, masses, or scars and thyroid normal to palpation  RESP: lungs clear to auscultation - no rales, rhonchi or " wheezes  CV: regular rate and rhythm, normal S1 S2, no S3 or S4, no murmur, click or rub, no peripheral edema and peripheral pulses strong  ABDOMEN: soft, nontender, no hepatosplenomegaly, no masses and bowel sounds normal  MS: no gross musculoskeletal defects noted, no edema  SKIN: no suspicious lesions or rashes  NEURO: Normal strength and tone, mentation intact and speech normal  PSYCH: mentation appears normal, affect normal/bright    Diagnostic Test Results:  Labs reviewed in Epic  No orders of the defined types were placed in this encounter.    We did review the appointment he had with one of my partners, Dr Fahad Guerrero with [facility] on 2-. He was treated with Flagyl (Metronidazole) and he has 2 weeks of continued of diarrhea , patient acknowledges the me that he's having nothing more then one stool per day and it has been loose is all. He doesn't recall ever having a diarrheal illness before. Patient notes that during these last 2 weeks his wife who usually makes his food has not been around and he's atypically making his own meals. His daughter and his neighbor has been bringing the meals. I reviewed this history in significant detail  And come away with no worries for this patient , possibly it is a lactose intolerance problem, possibly a fat malabsorption but no worrisome features .    Patient has many questions . About his chronic dizziness symptoms . Patient has questions about his driving.    Causes of dizziness symptoms , is it related to swallowing ? Patient went to the [facility] and had a swallowing evaluation and was told he has no problem but patient states yes there's a problem. Because he can't swallow, especially breads and sometimes pills        ASSESSMENT / PLAN:   (Z00.00) Encounter for subsequent annual wellness visit (AWV) in Medicare patient  (primary encounter diagnosis)  Comment: routine screening issues   Plan: see orders section of  this encounter     (N18.31) Stage 3a chronic kidney disease (H)  Comment: problem is stable and ongoing monitoring    Plan: Albumin Random Urine Quantitative with Creat         Ratio, REVIEW OF HEALTH MAINTENANCE PROTOCOL         ORDERS, Hemoglobin            (K22.4) Esophageal dysmotility  Comment: patient has had a choking problem for quite some time, he does not recall or possibly does not understand his diagnosis. He had an XR Esophagram w upper GI  Which clearly demonstrated some minor esophageal dysmotility . I did explain this to patient at that time but he continues with the same level of concern and when I recommended input with a speech therapist he was very interested   Plan: REVIEW OF HEALTH MAINTENANCE PROTOCOL ORDERS,         Speech Therapy Referral            (R42) Dizziness  Comment: refractory chronic symptoms   Plan: no new issues to discuss no new findings     (R63.4) Unintended weight loss  Comment: this is a physiologic weight loss which is an age related issue   Plan: we discussed what to be on the watch for  , update me if significant changes have taken place     (R19.5) Loose stools  Comment: I made a point of distinction here. Technically having a single loose stool per day is not the same thing as a diarrheal illness which is much more associated with multiple stools per day and oftentimes associated nausea and vomiting or other symptoms of illness. Patient has nothing of the sort. He took some therapy with Flagyl (Metronidazole) and I don't see the need for this. He's completed the whole course of therapy without benefit . 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 Treatment with Flagyl (Metronidazole) is indicated along with a follow up gastroenterological consultation     (R09.82) Post-nasal  drip  Comment: refractory chronic symptoms   Plan: suggested daily Zyrtec [ cetirizine ] and trial of fluticasone (FLONASE) 50 MCG/ACT nasal spray     (H61.23) Bilateral impacted cerumen  Comment: recommended irrigation but patient chooses to do this himself at home  Plan: follow up depending on how things go     Patient has been advised of split billing requirements and indicates understanding: Yes      COUNSELING:  Reviewed preventive health counseling, as reflected in patient instructions        He reports that he quit smoking about 20 years ago. His smoking use included cigarettes. He has been exposed to tobacco smoke. He has never used smokeless tobacco.      Appropriate preventive services were discussed with this patient, including applicable screening as appropriate for cardiovascular disease, diabetes, osteopenia/osteoporosis, and glaucoma.  As appropriate for age/gender, discussed screening for colorectal cancer, prostate cancer, breast cancer, and cervical cancer. Checklist reviewing preventive services available has been given to the patient.    Reviewed patients plan of care and provided an AVS. The Basic Care Plan (routine screening as documented in Health Maintenance) for Jose Manuel meets the Care Plan requirement. This Care Plan has been established and reviewed with the Patient.          Orlando Braxton MD  [facility]    Identified Health Risks:

## 2023-03-01 ENCOUNTER — MYC MEDICAL ADVICE (OUTPATIENT)
Dept: INTERNAL MEDICINE | Facility: CLINIC | Age: 88
End: 2023-03-01
Payer: COMMERCIAL

## 2023-03-01 DIAGNOSIS — A04.9 BACTERIAL INTESTINAL INFECTION, UNSPECIFIED: ICD-10-CM

## 2023-03-01 DIAGNOSIS — K52.9 CHRONIC DIARRHEA: Primary | ICD-10-CM

## 2023-03-01 DIAGNOSIS — R19.5 LOOSE STOOLS: ICD-10-CM

## 2023-03-01 DIAGNOSIS — Z13.29 SCREENING FOR HYPOTHYROIDISM: ICD-10-CM

## 2023-03-06 ENCOUNTER — LAB (OUTPATIENT)
Dept: LAB | Facility: CLINIC | Age: 88
End: 2023-03-06
Payer: COMMERCIAL

## 2023-03-06 DIAGNOSIS — Z13.29 SCREENING FOR HYPOTHYROIDISM: ICD-10-CM

## 2023-03-06 DIAGNOSIS — K52.9 CHRONIC DIARRHEA: ICD-10-CM

## 2023-03-06 DIAGNOSIS — R19.5 LOOSE STOOLS: ICD-10-CM

## 2023-03-06 DIAGNOSIS — A04.9 BACTERIAL INTESTINAL INFECTION, UNSPECIFIED: ICD-10-CM

## 2023-03-06 LAB
ALBUMIN SERPL-MCNC: 3.7 G/DL (ref 3.4–5)
ALP SERPL-CCNC: 70 U/L (ref 40–150)
ALT SERPL W P-5'-P-CCNC: 26 U/L (ref 0–70)
ANION GAP SERPL CALCULATED.3IONS-SCNC: 1 MMOL/L (ref 3–14)
AST SERPL W P-5'-P-CCNC: 19 U/L (ref 0–45)
BASOPHILS # BLD AUTO: 0 10E3/UL (ref 0–0.2)
BASOPHILS NFR BLD AUTO: 0 %
BILIRUB SERPL-MCNC: 0.5 MG/DL (ref 0.2–1.3)
BUN SERPL-MCNC: 30 MG/DL (ref 7–30)
CALCIUM SERPL-MCNC: 9 MG/DL (ref 8.5–10.1)
CHLORIDE BLD-SCNC: 107 MMOL/L (ref 94–109)
CO2 SERPL-SCNC: 29 MMOL/L (ref 20–32)
CREAT SERPL-MCNC: 1.22 MG/DL (ref 0.66–1.25)
CRP SERPL-MCNC: <2.9 MG/L (ref 0–8)
EOSINOPHIL # BLD AUTO: 0.1 10E3/UL (ref 0–0.7)
EOSINOPHIL NFR BLD AUTO: 2 %
ERYTHROCYTE [DISTWIDTH] IN BLOOD BY AUTOMATED COUNT: 13.9 % (ref 10–15)
ERYTHROCYTE [SEDIMENTATION RATE] IN BLOOD BY WESTERGREN METHOD: 9 MM/HR (ref 0–20)
GFR SERPL CREATININE-BSD FRML MDRD: 57 ML/MIN/1.73M2
GLUCOSE BLD-MCNC: 73 MG/DL (ref 70–99)
HCT VFR BLD AUTO: 44.8 % (ref 40–53)
HGB BLD-MCNC: 14.6 G/DL (ref 13.3–17.7)
LYMPHOCYTES # BLD AUTO: 1.1 10E3/UL (ref 0.8–5.3)
LYMPHOCYTES NFR BLD AUTO: 16 %
MCH RBC QN AUTO: 31.8 PG (ref 26.5–33)
MCHC RBC AUTO-ENTMCNC: 32.6 G/DL (ref 31.5–36.5)
MCV RBC AUTO: 98 FL (ref 78–100)
MONOCYTES # BLD AUTO: 0.6 10E3/UL (ref 0–1.3)
MONOCYTES NFR BLD AUTO: 9 %
NEUTROPHILS # BLD AUTO: 4.8 10E3/UL (ref 1.6–8.3)
NEUTROPHILS NFR BLD AUTO: 73 %
PLATELET # BLD AUTO: 226 10E3/UL (ref 150–450)
POTASSIUM BLD-SCNC: 4.9 MMOL/L (ref 3.4–5.3)
PROT SERPL-MCNC: 7.1 G/DL (ref 6.8–8.8)
RBC # BLD AUTO: 4.59 10E6/UL (ref 4.4–5.9)
SODIUM SERPL-SCNC: 137 MMOL/L (ref 133–144)
TSH SERPL DL<=0.005 MIU/L-ACNC: 0.56 MU/L (ref 0.4–4)
WBC # BLD AUTO: 6.7 10E3/UL (ref 4–11)

## 2023-03-06 PROCEDURE — 86140 C-REACTIVE PROTEIN: CPT

## 2023-03-06 PROCEDURE — 36415 COLL VENOUS BLD VENIPUNCTURE: CPT

## 2023-03-06 PROCEDURE — 85652 RBC SED RATE AUTOMATED: CPT

## 2023-03-06 PROCEDURE — 84443 ASSAY THYROID STIM HORMONE: CPT

## 2023-03-06 PROCEDURE — 82274 ASSAY TEST FOR BLOOD FECAL: CPT

## 2023-03-06 PROCEDURE — 82653 EL-1 FECAL QUANTITATIVE: CPT

## 2023-03-06 PROCEDURE — 85025 COMPLETE CBC W/AUTO DIFF WBC: CPT

## 2023-03-06 PROCEDURE — 87209 SMEAR COMPLEX STAIN: CPT

## 2023-03-06 PROCEDURE — 87506 IADNA-DNA/RNA PROBE TQ 6-11: CPT

## 2023-03-06 PROCEDURE — 80053 COMPREHEN METABOLIC PANEL: CPT

## 2023-03-06 PROCEDURE — 83630 LACTOFERRIN FECAL (QUAL): CPT

## 2023-03-06 PROCEDURE — 87177 OVA AND PARASITES SMEARS: CPT

## 2023-03-06 NOTE — TELEPHONE ENCOUNTER
Patient calling trying to schedule lab appointment for labs that Dr. Orlando Braxton recommended. Writer discussed how a stool sample would be completed, discussing that lab will send patient with supplies to collect and then return to clinic. Patient stated understanding, appointment scheduled for 03/06/23 at 1500.    FLO MooreN RN  [facility]

## 2023-03-07 ENCOUNTER — APPOINTMENT (OUTPATIENT)
Dept: LAB | Facility: CLINIC | Age: 88
End: 2023-03-07
Payer: COMMERCIAL

## 2023-03-07 LAB
C COLI+JEJUNI+LARI FUSA STL QL NAA+PROBE: NOT DETECTED
EC STX1 GENE STL QL NAA+PROBE: NOT DETECTED
EC STX2 GENE STL QL NAA+PROBE: NOT DETECTED
HEMOCCULT STL QL IA: NEGATIVE
LACTOFERRIN STL QL IA: NEGATIVE
NOROV GI+II ORF1-ORF2 JNC STL QL NAA+PR: NOT DETECTED
RVA NSP5 STL QL NAA+PROBE: NOT DETECTED
SALMONELLA SP RPOD STL QL NAA+PROBE: NOT DETECTED
SHIGELLA SP+EIEC IPAH STL QL NAA+PROBE: NOT DETECTED
V CHOL+PARA RFBL+TRKH+TNAA STL QL NAA+PR: NOT DETECTED
Y ENTERO RECN STL QL NAA+PROBE: NOT DETECTED

## 2023-03-08 LAB
ELASTASE PANC STL-MCNT: 472 UG/G
O+P STL MICRO: ABNORMAL
O+P STL MICRO: ABNORMAL

## 2023-03-08 NOTE — TELEPHONE ENCOUNTER
"Called patient, left message to call back at [phone]. Called to relay result note below, addressing this mychart:    \"All of these tests are within acceptable limits . Please check-in with patient and      1. Review test results   2. Review MyChart message I just sent patient   3/ do a check-in and status report. Depending on how things go I was willing to send him for a second opinion with gastroenterological consultation. We have to see if he wants this     Orlando Braxton MD\"    Please see patient response to mychart message: \"Dr Braxton,  yes, please schedule the gastro test referral. Since or conversation on Monday things have changed very little,from no movement to large diarrheal yesterday.\"    ANDREEA Moore RN  [facility]  "

## 2023-03-09 NOTE — TELEPHONE ENCOUNTER
Message reviewed , orders signed   Reroute if additional input requested from me   Otherwise you can close this encounter      Orlando Braxton MD

## 2023-03-10 RX ORDER — METRONIDAZOLE 500 MG/1
500 TABLET ORAL 3 TIMES DAILY
Qty: 30 TABLET | Refills: 0 | Status: SHIPPED | OUTPATIENT
Start: 2023-03-10 | End: 2023-03-20

## 2023-03-13 ENCOUNTER — TRANSFERRED RECORDS (OUTPATIENT)
Dept: HEALTH INFORMATION MANAGEMENT | Facility: CLINIC | Age: 88
End: 2023-03-13
Payer: COMMERCIAL

## 2023-03-24 ENCOUNTER — E-VISIT (OUTPATIENT)
Dept: FAMILY MEDICINE | Facility: CLINIC | Age: 88
End: 2023-03-24
Payer: COMMERCIAL

## 2023-03-24 DIAGNOSIS — S39.012D STRAIN OF LUMBAR REGION, SUBSEQUENT ENCOUNTER: Primary | ICD-10-CM

## 2023-03-24 PROCEDURE — 99421 OL DIG E/M SVC 5-10 MIN: CPT | Performed by: INTERNAL MEDICINE

## 2023-03-29 ENCOUNTER — OFFICE VISIT (OUTPATIENT)
Dept: DERMATOLOGY | Facility: CLINIC | Age: 88
End: 2023-03-29
Attending: INTERNAL MEDICINE
Payer: COMMERCIAL

## 2023-03-29 DIAGNOSIS — D48.5 NEOPLASM OF UNCERTAIN BEHAVIOR OF SKIN: ICD-10-CM

## 2023-03-29 DIAGNOSIS — L82.0 INFLAMED SEBORRHEIC KERATOSIS: Primary | ICD-10-CM

## 2023-03-29 DIAGNOSIS — L57.0 AK (ACTINIC KERATOSIS): ICD-10-CM

## 2023-03-29 PROCEDURE — 11103 TANGNTL BX SKIN EA SEP/ADDL: CPT | Mod: 59 | Performed by: PHYSICIAN ASSISTANT

## 2023-03-29 PROCEDURE — 17000 DESTRUCT PREMALG LESION: CPT | Mod: 59 | Performed by: PHYSICIAN ASSISTANT

## 2023-03-29 PROCEDURE — 11102 TANGNTL BX SKIN SINGLE LES: CPT | Mod: 59 | Performed by: PHYSICIAN ASSISTANT

## 2023-03-29 PROCEDURE — 17110 DESTRUCTION B9 LES UP TO 14: CPT | Performed by: PHYSICIAN ASSISTANT

## 2023-03-29 PROCEDURE — 88305 TISSUE EXAM BY PATHOLOGIST: CPT | Performed by: DERMATOLOGY

## 2023-03-29 PROCEDURE — 17003 DESTRUCT PREMALG LES 2-14: CPT | Mod: 59 | Performed by: PHYSICIAN ASSISTANT

## 2023-03-29 PROCEDURE — 99203 OFFICE O/P NEW LOW 30 MIN: CPT | Mod: 25 | Performed by: PHYSICIAN ASSISTANT

## 2023-03-29 NOTE — LETTER
3/29/2023         RE: Jose Manuel Gallo  [address] [place] [address]        Dear Colleague,    Thank you for referring your patient, Jose Manuel Gallo, to the [facility]. Please see a copy of my visit note below.    Jose Manuel Gallo is an extremely pleasant 88 year old year old male patient here today for rough areas on scalp. Present for years, he notes some areas are sensitive. No bleeding. Patient has no other skin complaints today.  Remainder of the HPI, Meds, PMH, Allergies, FH, and SH was reviewed in chart.    Pertinent Hx:  No personal history of skin cancer   Past Medical History:   Diagnosis Date     Cataract, mild, od; mod, os 7/2/2012     Chronic low back pain      CKD (chronic kidney disease) stage 3, GFR 30-59 ml/min (H)      Erectile dysfunction      Hemorrhoids      History of bladder cancer          HTN      Hyperlipidemia LDL goal <130      Kidney stones      Macular degeneration      Osteoarthritis      Personal history of colonic polyps     10/2002     Personal history of malignant neoplasm of bladder      Personal history of tobacco use     Smoked 40 years, Quit 2003     PVD (peripheral vascular disease) (H)     Left Vertebral Artery occlusion     Rotator cuff tendinitis, left        Past Surgical History:   Procedure Laterality Date     CATARACT IOL, RT/LT       COLONOSCOPY  11/02/2008    Normal     HC REVISE ULNAR NERVE AT ELBOW      Left     LASER YAG CAPSULOTOMY  7/2016    left eye     PHACOEMULSIFICATION WITH STANDARD INTRAOCULAR LENS IMPLANT  7/2012    left eye     TURP  1997 ?     VASECTOMY       VITRECTOMY PARSPLANA  8/2011    left eye, repair macular hole     ZZC SPINAL FUSION,ANT,EA ADNL LEVEL      C6-7        Family History   Problem Relation Age of Onset     Osteoporosis Mother      Diabetes Father      Arthritis Father      Cancer Father      Respiratory Father      Genitourinary Problems Brother      Circulatory Brother       Macular Degeneration No family hx of      Glaucoma No family hx of      Thyroid Disease No family hx of      Hypertension No family hx of        Social History     Socioeconomic History     Marital status:      Spouse name: Not on file     Number of children: Not on file     Years of education: Not on file     Highest education level: Not on file   Occupational History     Not on file   Tobacco Use     Smoking status: Former     Years: 40.00     Types: Cigarettes     Quit date: 2003     Years since quittin.2     Passive exposure: Past     Smokeless tobacco: Never   Vaping Use     Vaping Use: Never used   Substance and Sexual Activity     Alcohol use: Yes     Comment: 1 x month     Drug use: No     Sexual activity: Never   Other Topics Concern     Parent/sibling w/ CABG, MI or angioplasty before 65F 55M? No   Social History Narrative     Not on file     Social Determinants of Health     Financial Resource Strain: Not on file   Food Insecurity: Not on file   Transportation Needs: Not on file   Physical Activity: Not on file   Stress: Not on file   Social Connections: Not on file   Intimate Partner Violence: Not on file   Housing Stability: Not on file       Outpatient Encounter Medications as of 3/29/2023   Medication Sig Dispense Refill     acetaminophen (TYLENOL) 650 MG CR tablet Take 1 tablet (650 mg) by mouth every 8 hours as needed for pain 60 tablet 1     carboxymethylcellulose PF (REFRESH LIQUIGEL) 1 % ophthalmic gel Place 1 drop into both eyes 4 times daily 1 Bottle 11     cyanocobalamin (VITAMIN B-12) 1000 MCG tablet Take 1 tablet (1,000 mcg) by mouth daily 90 tablet 3     lisinopril (ZESTRIL) 2.5 MG tablet Take 1 tablet (2.5 mg) by mouth daily 90 tablet 4     simvastatin (ZOCOR) 40 MG tablet Take 1 tablet (40 mg) by mouth daily 90 tablet 4     Facility-Administered Encounter Medications as of 3/29/2023   Medication Dose Route Frequency Provider Last Rate Last Admin     lidocaine (PF)  (XYLOCAINE) 1 % injection 4 mL  4 mL   Rodrigo Hastings MD   4 mL at 06/07/22 1340     lidocaine (PF) (XYLOCAINE) 1 % injection 4 mL  4 mL   Rodrigo Hastings MD   4 mL at 10/13/21 0925     lidocaine (PF) (XYLOCAINE) 1 % injection 4 mL  4 mL   Rodrigo Hastings MD   4 mL at 05/25/21 0911     lidocaine 1 % injection 4 mL  4 mL   Rodrigo Hastings MD   4 mL at 01/31/23 1322     lidocaine 1 % injection 4 mL  4 mL   Rodrigo Hastings MD   4 mL at 01/26/22 1452     methylPREDNISolone (DEPO-MEDROL) injection 80 mg  80 mg   Rodrigo Hastings MD   80 mg at 01/31/23 1322     methylPREDNISolone (DEPO-MEDROL) injection 80 mg  80 mg   Rodrigo Hastings MD   80 mg at 06/07/22 1340     methylPREDNISolone (DEPO-MEDROL) injection 80 mg  80 mg   Rodrigo Hastings MD   80 mg at 01/26/22 1452     methylPREDNISolone (DEPO-MEDROL) injection 80 mg  80 mg   Rodrigo Hastings MD   80 mg at 10/13/21 0925     methylPREDNISolone (DEPO-MEDROL) injection 80 mg  80 mg   Rodrigo Hastings MD   80 mg at 05/25/21 0911             O:   NAD, WDWN, Alert & Oriented, Mood & Affect wnl, Vitals stable   Here today alone   There were no vitals taken for this visit.   General appearance normal   Vitals stable   Alert, oriented and in no acute distress     Gritty papules on scalp forehead   0.8 cm pink scaly papule on right temple  0.8 cm pink scaly papule on right superior occipital scalp  1.0 cm pink scaly papule on left parietal scalp    Eyes: Conjunctivae/lids:Normal     ENT: Lips normal    MSK:Rosmery    Pulm: Breathing Normal    Neuro/Psych: Orientation:Alert and Orientedx3 ; Mood/Affect:normal   A/P:  1. R/O NMSC on right temple, right superior occipital scalp, left parietal scalp  TANGENTIAL BIOPSY SENT OUT:  After consent, anesthesia with LEC and prep, tangential excision performed and specimen sent out for permanent section histology.  No complications and routine wound care. Patient told to call our office  in 1-2 weeks for result.        2. Actinic keratoses on scalp, left ear x 6  LN2:  Treated with LN2 for 5s for 1-2 cycles. Warned risks of blistering, pain, pigment change, scarring, and incomplete resolution.  Advised patient to return if lesions do not completely resolve.  Wound care sheet given.    3. Inflamed seborrheic keratosis x 6 on back   LN2:  Treated with LN2 for 5s for 1-2 cycles. Warned risks of blistering, pain, pigment change, scarring, and incomplete resolution.  Advised patient to return if lesions do not completely resolve.  Wound care sheet given.        Again, thank you for allowing me to participate in the care of your patient.        Sincerely,        Jo Reynoso PA-C

## 2023-03-29 NOTE — PATIENT INSTRUCTIONS
WOUND CARE INSTRUCTIONS  FOR CRYOSURGERY  For questions please call [phone]        This area treated with liquid nitrogen will form a blister. You do not need to bandage the area until after the blister forms and breaks (which may be a few days).  When the blister breaks, begin daily dressing changes as follows:    1) Clean and dry the area with tap water using clean Q-tip or sterile gauze pad.    2) Apply Vaseline or Aquaphor over entire wound. Other options include Polysporin ointment or Bacitracin ointment over entire wound.  Do NOT use Neosporin ointment.    3) Cover the wound with a band-aid or sterile non-stick gauze pad and micropore paper tape.      REPEAT THESE INSTRUCTIONS AT LEAST ONCE A DAY UNTIL THE WOUND HAS COMPLETELY HEALED.        It is an old wives tale that a wound heals better when it is exposed to air and allowed to dry out. The wound will heal faster with a better cosmetic result if it is kept moist with ointment and covered with a bandage.  Do not let the wound dry out.      Supplies Needed:     *Cotton tipped applicators (Q-tips)   *Polysporin ointment or Bacitracin ointment (NOT NEOSPORIN)   *Band-aids, or non stick gauze pads and micropore paper tape    PATIENT INFORMATION    During the healing process you will notice a number of changes. All wounds develop a small halo of redness surrounding the wound.  This means healing is occurring. Severe itching with extensive redness usually indicates sensitivity to the ointment or bandage tape used to dress the wound.  You should call our office if this develops.      Swelling and/or discoloration around your surgical site is common, particularly when performed around the eye.    All wounds normally drain.  The larger the wound the more drainage there will be.  After 7-10 days, you will notice the wound beginning to shrink and new skin will begin to grow.  The wound is healed when you can see skin has formed over the entire area.  A healed  wound has a healthy, shiny look to the surface and is red to dark pink in color to normalize.  Wounds may take approximately 4-6 weeks to heal.  Larger wounds may take 6-8 weeks.  After the wound is healed you may discontinue dressing changes.    You may experience a sensation of tightness as your wound heals. This is normal and will gradually subside.    Your healed wound may be sensitive to temperature changes. This sensitivity improves with time, but if you re having a lot of discomfort, try to avoid temperature extremes.    Patients frequently experience itching after their wound appears to have healed because of the continue healing under the skin.  Plain Vaseline will help relieve the itching.      Wound Care Instructions     FOR SUPERFICIAL WOUNDS     [facility], [place], or    [facility] [phone]          AFTER 24 HOURS YOU SHOULD REMOVE THE BANDAGE AND BEGIN DAILY DRESSING CHANGES AS FOLLOWS:     1) Remove Dressing.     2) Clean and dry the area with tap water using a Q-tip or sterile gauze pad.     3) Apply Vaseline, Aquaphor, Polysporin ointment or Bacitracin ointment over entire wound.  Do NOT use Neosporin ointment.     4) Cover the wound with a band-aid, or a sterile non-stick gauze pad and micropore paper tape      REPEAT THESE INSTRUCTIONS AT LEAST ONCE A DAY UNTIL THE WOUND HAS COMPLETELY HEALED.    It is an old wives tale that a wound heals better when it is exposed to air and allowed to dry out. The wound will heal faster with a better cosmetic result if it is kept moist with ointment and covered with a bandage.    **Do not let the wound dry out.**      Supplies Needed:      *Cotton tipped applicators (Q-tips)    *Polysporin Ointment or Bacitracin Ointment (NOT NEOSPORIN)    *Band-aids or non-stick gauze pads and micropore paper tape.      PATIENT INFORMATION:    During the healing process you will notice a number of changes. All wounds develop a small halo of  redness surrounding the wound.  This means healing is occurring. Severe itching with extensive redness usually indicates sensitivity to the ointment or bandage tape used to dress the wound.  You should call our office if this develops.      Swelling  and/or discoloration around your surgical site is common, particularly when performed around the eye.    All wounds normally drain.  The larger the wound the more drainage there will be.  After 7-10 days, you will notice the wound beginning to shrink and new skin will begin to grow.  The wound is healed when you can see skin has formed over the entire area.  A healed wound has a healthy, shiny look to the surface and is red to dark pink in color to normalize.  Wounds may take approximately 4-6 weeks to heal.  Larger wounds may take 6-8 weeks.  After the wound is healed you may discontinue dressing changes.    You may experience a sensation of tightness as your wound heals. This is normal and will gradually subside.    Your healed wound may be sensitive to temperature changes. This sensitivity improves with time, but if you re having a lot of discomfort, try to avoid temperature extremes.    Patients frequently experience itching after their wound appears to have healed because of the continue healing under the skin.  Plain Vaseline will help relieve the itching.        POSSIBLE COMPLICATIONS    BLEEDING:    Leave the bandage in place.  Use tightly rolled up gauze or a cloth to apply direct pressure over the bandage for 30  minutes.  Reapply pressure for an additional 30 minutes if necessary  Use additional gauze and tape to maintain pressure once the bleeding has stopped.

## 2023-03-30 NOTE — PROGRESS NOTES
Jose Manuel Gallo is an extremely pleasant 88 year old year old male patient here today for rough areas on scalp. Present for years, he notes some areas are sensitive. No bleeding. Patient has no other skin complaints today.  Remainder of the HPI, Meds, PMH, Allergies, FH, and SH was reviewed in chart.    Pertinent Hx:  No personal history of skin cancer   Past Medical History:   Diagnosis Date     Cataract, mild, od; mod, os 7/2/2012     Chronic low back pain      CKD (chronic kidney disease) stage 3, GFR 30-59 ml/min (H)      Erectile dysfunction      Hemorrhoids      History of bladder cancer          HTN      Hyperlipidemia LDL goal <130      Kidney stones      Macular degeneration      Osteoarthritis      Personal history of colonic polyps     10/2002     Personal history of malignant neoplasm of bladder      Personal history of tobacco use     Smoked 40 years, Quit 2003     PVD (peripheral vascular disease) (H)     Left Vertebral Artery occlusion     Rotator cuff tendinitis, left        Past Surgical History:   Procedure Laterality Date     CATARACT IOL, RT/LT       COLONOSCOPY  11/02/2008    Normal     HC REVISE ULNAR NERVE AT ELBOW      Left     LASER YAG CAPSULOTOMY  7/2016    left eye     PHACOEMULSIFICATION WITH STANDARD INTRAOCULAR LENS IMPLANT  7/2012    left eye     TURP  1997 ?     VASECTOMY       VITRECTOMY PARSPLANA  8/2011    left eye, repair macular hole     ZZC SPINAL FUSION,ANT,EA ADNL LEVEL      C6-7        Family History   Problem Relation Age of Onset     Osteoporosis Mother      Diabetes Father      Arthritis Father      Cancer Father      Respiratory Father      Genitourinary Problems Brother      Circulatory Brother      Macular Degeneration No family hx of      Glaucoma No family hx of      Thyroid Disease No family hx of      Hypertension No family hx of        Social History     Socioeconomic History     Marital status:      Spouse name: Not on file     Number of  children: Not on file     Years of education: Not on file     Highest education level: Not on file   Occupational History     Not on file   Tobacco Use     Smoking status: Former     Years: 40.00     Types: Cigarettes     Quit date: 2003     Years since quittin.2     Passive exposure: Past     Smokeless tobacco: Never   Vaping Use     Vaping Use: Never used   Substance and Sexual Activity     Alcohol use: Yes     Comment: 1 x month     Drug use: No     Sexual activity: Never   Other Topics Concern     Parent/sibling w/ CABG, MI or angioplasty before 65F 55M? No   Social History Narrative     Not on file     Social Determinants of Health     Financial Resource Strain: Not on file   Food Insecurity: Not on file   Transportation Needs: Not on file   Physical Activity: Not on file   Stress: Not on file   Social Connections: Not on file   Intimate Partner Violence: Not on file   Housing Stability: Not on file       Outpatient Encounter Medications as of 3/29/2023   Medication Sig Dispense Refill     acetaminophen (TYLENOL) 650 MG CR tablet Take 1 tablet (650 mg) by mouth every 8 hours as needed for pain 60 tablet 1     carboxymethylcellulose PF (REFRESH LIQUIGEL) 1 % ophthalmic gel Place 1 drop into both eyes 4 times daily 1 Bottle 11     cyanocobalamin (VITAMIN B-12) 1000 MCG tablet Take 1 tablet (1,000 mcg) by mouth daily 90 tablet 3     lisinopril (ZESTRIL) 2.5 MG tablet Take 1 tablet (2.5 mg) by mouth daily 90 tablet 4     simvastatin (ZOCOR) 40 MG tablet Take 1 tablet (40 mg) by mouth daily 90 tablet 4     Facility-Administered Encounter Medications as of 3/29/2023   Medication Dose Route Frequency Provider Last Rate Last Admin     lidocaine (PF) (XYLOCAINE) 1 % injection 4 mL  4 mL   Rodrigo Hastings MD   4 mL at 22 1340     lidocaine (PF) (XYLOCAINE) 1 % injection 4 mL  4 mL   Rodrigo Hastings MD   4 mL at 10/13/21 0925     lidocaine (PF) (XYLOCAINE) 1 % injection 4 mL  4 mL   Rodrigo Hastings  MD Anna   4 mL at 05/25/21 0911     lidocaine 1 % injection 4 mL  4 mL   Rodrigo Hastings MD   4 mL at 01/31/23 1322     lidocaine 1 % injection 4 mL  4 mL   Rodrigo Hastings MD   4 mL at 01/26/22 1452     methylPREDNISolone (DEPO-MEDROL) injection 80 mg  80 mg   Rodrigo Hastings MD   80 mg at 01/31/23 1322     methylPREDNISolone (DEPO-MEDROL) injection 80 mg  80 mg   Rodrigo Hastings MD   80 mg at 06/07/22 1340     methylPREDNISolone (DEPO-MEDROL) injection 80 mg  80 mg   Rodrigo Hastings MD   80 mg at 01/26/22 1452     methylPREDNISolone (DEPO-MEDROL) injection 80 mg  80 mg   Rodrigo Hastings MD   80 mg at 10/13/21 0925     methylPREDNISolone (DEPO-MEDROL) injection 80 mg  80 mg   Rodrigo Hastings MD   80 mg at 05/25/21 0911             O:   NAD, WDWN, Alert & Oriented, Mood & Affect wnl, Vitals stable   Here today alone   There were no vitals taken for this visit.   General appearance normal   Vitals stable   Alert, oriented and in no acute distress     Gritty papules on scalp forehead   0.8 cm pink scaly papule on right temple  0.8 cm pink scaly papule on right superior occipital scalp  1.0 cm pink scaly papule on left parietal scalp    Eyes: Conjunctivae/lids:Normal     ENT: Lips normal    MSK:Rosmery    Pulm: Breathing Normal    Neuro/Psych: Orientation:Alert and Orientedx3 ; Mood/Affect:normal   A/P:  1. R/O NMSC on right temple, right superior occipital scalp, left parietal scalp  TANGENTIAL BIOPSY SENT OUT:  After consent, anesthesia with LEC and prep, tangential excision performed and specimen sent out for permanent section histology.  No complications and routine wound care. Patient told to call our office in 1-2 weeks for result.        2. Actinic keratoses on scalp, left ear x 6  LN2:  Treated with LN2 for 5s for 1-2 cycles. Warned risks of blistering, pain, pigment change, scarring, and incomplete resolution.  Advised patient to return if lesions do not completely  resolve.  Wound care sheet given.    3. Inflamed seborrheic keratosis x 6 on back   LN2:  Treated with LN2 for 5s for 1-2 cycles. Warned risks of blistering, pain, pigment change, scarring, and incomplete resolution.  Advised patient to return if lesions do not completely resolve.  Wound care sheet given.

## 2023-03-31 ENCOUNTER — TELEPHONE (OUTPATIENT)
Dept: DERMATOLOGY | Facility: CLINIC | Age: 88
End: 2023-03-31
Payer: COMMERCIAL

## 2023-03-31 DIAGNOSIS — C44.92 SCC (SQUAMOUS CELL CARCINOMA): Primary | ICD-10-CM

## 2023-03-31 DIAGNOSIS — R19.7 DIARRHEA: Primary | ICD-10-CM

## 2023-03-31 LAB
PATH REPORT.COMMENTS IMP SPEC: ABNORMAL
PATH REPORT.COMMENTS IMP SPEC: ABNORMAL
PATH REPORT.COMMENTS IMP SPEC: YES
PATH REPORT.FINAL DX SPEC: ABNORMAL
PATH REPORT.GROSS SPEC: ABNORMAL
PATH REPORT.MICROSCOPIC SPEC OTHER STN: ABNORMAL
PATH REPORT.RELEVANT HX SPEC: ABNORMAL

## 2023-03-31 NOTE — TELEPHONE ENCOUNTER
----- Message from Jo Mistry PA-C sent at 3/31/2023  3:03 PM CDT -----  Hi Enmanuel,  Your right temple returned as an actinic keratosis, precancerous spot, please schedule cryo.  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 Parker.  Your left parietal scalp returned as a hypertrophic actinic keratosis, please schedule cryo

## 2023-03-31 NOTE — TELEPHONE ENCOUNTER
Spoke with patient and gave/explained results below. Scheduled cryo with felicita at  derm on Wednesday May 24    Patient wishes to go to [facility] for MOHS of SCC right superior occipital scalp.    I explained I will send a referral and someone from  will reach out for scheduling.      Patient expressed understanding.     Routing to , can you please help with scheduling for this patient?    Thank you   Marsha RACHEL RN  [facility]  [phone]

## 2023-04-03 ENCOUNTER — TELEPHONE (OUTPATIENT)
Dept: DERMATOLOGY | Facility: CLINIC | Age: 88
End: 2023-04-03
Payer: COMMERCIAL

## 2023-04-03 NOTE — TELEPHONE ENCOUNTER
Pt already scheduled by scheduling team for mohs consult and mohs procedure.    Trudi Dhaliwal RN on 4/3/2023 at 1:41 PM

## 2023-04-03 NOTE — TELEPHONE ENCOUNTER
Called patient to schedule surgery with Dr. Martell    Date of Surgery: 05/23    Surgery type: Mohs    Consult scheduled: Yes    Has patient had mohs with us before? No    Additional comments: anisha Chance on 4/3/2023 at 10:39 AM

## 2023-04-04 ENCOUNTER — TELEPHONE (OUTPATIENT)
Dept: DERMATOLOGY | Facility: CLINIC | Age: 88
End: 2023-04-04
Payer: COMMERCIAL

## 2023-04-04 NOTE — TELEPHONE ENCOUNTER
Pt called about upcoming Mohs with Dr. Hector Martell at the [facility] office. He was hoping for a different time, but unfortunately we have no other times available.     [place] Chance, Procedure  4/4/2023 2:44 PM

## 2023-04-04 NOTE — TELEPHONE ENCOUNTER
Health Call Center    Phone Message    May a detailed message be left on voicemail: yes     Reason for Call: Other: Patient states he cannot get in to clinic at 8:30AM on 05/23/2023 for MOHS procedure with Dr. Martell. Patient is wondering if he can be scheduled later in the day- after 10AM or later - because his ride lives far away from him. Please call back [phone]     Action Taken: Message routed to:  [facility] Derm Surg    Travel Screening: Not Applicable

## 2023-04-11 ENCOUNTER — APPOINTMENT (OUTPATIENT)
Dept: URGENT CARE | Facility: CLINIC | Age: 88
End: 2023-04-11
Payer: COMMERCIAL

## 2023-04-11 ENCOUNTER — MYC MEDICAL ADVICE (OUTPATIENT)
Dept: INTERNAL MEDICINE | Facility: CLINIC | Age: 88
End: 2023-04-11
Payer: COMMERCIAL

## 2023-04-11 DIAGNOSIS — M19.91 PRIMARY OSTEOARTHRITIS, UNSPECIFIED SITE: ICD-10-CM

## 2023-04-11 DIAGNOSIS — M47.816 SPONDYLOSIS OF LUMBAR REGION WITHOUT MYELOPATHY OR RADICULOPATHY: Primary | ICD-10-CM

## 2023-05-01 ENCOUNTER — PRE VISIT (OUTPATIENT)
Dept: NEUROSURGERY | Facility: CLINIC | Age: 88
End: 2023-05-01
Payer: COMMERCIAL

## 2023-05-02 ENCOUNTER — TELEPHONE (OUTPATIENT)
Dept: NEUROSURGERY | Facility: CLINIC | Age: 88
End: 2023-05-02
Payer: COMMERCIAL

## 2023-05-02 NOTE — TELEPHONE ENCOUNTER
"LVM for patient to call Orthopedics to r/s appointment with appropriate provider per staff message request:   This visit is for a stained muscle, please have patient scheduled with pain management. They have a referral for ortho, and pain management.     \"In my opinion this is a self-limited problem that should most likely improve slowly over time.   I am extremely skeptical of this being anything that would need a surgery or a procedure. Ultimately if your pain is chronic and not improving and you'd want to see Dr. Sathya Villanueva, with [facility] we can place a referral order for this.\"- Orlando Braxton MD     "

## 2023-05-09 ENCOUNTER — TELEPHONE (OUTPATIENT)
Dept: DERMATOLOGY | Facility: CLINIC | Age: 88
End: 2023-05-09

## 2023-05-09 NOTE — TELEPHONE ENCOUNTER
Patient missed Mohs consult today.  I left a message for patient to call [facility] to reschedule.  Ok to do via phone or in person.  Mohs is scheduled 5/23/23.  Peggy Holliday RN

## 2023-05-09 NOTE — TELEPHONE ENCOUNTER
Spoke with patient.  He got lost on his way to the appointment.  He would like to schedule a telephone consult appointment.  Dr. Martell does not have any openings on his schedule prior to the 5/23/2023.    Discussed with patient that we will check with Dr. Martell to find a time to schedule a telephone consult.

## 2023-05-10 NOTE — TELEPHONE ENCOUNTER
Called and spoke to Enmanuel. Pt is planning on a phone call for Monday 05/15.     [place] Chance, Procedure  5/10/2023 3:17 PM

## 2023-05-10 NOTE — TELEPHONE ENCOUNTER
Trudi Dhaliwal, RN  [facility] Adult [facility]; [facility] Adult [place] 6 hours ago (8:53 AM)       [facility]- Can you add telephone consult appointment for pt on Dr Bhatti schedule (ok to double book), Dr castillo wont be at [place] until pt's scheduled procedure and consult needs to be done prior.   Thanks,   HANNAH Saldaña

## 2023-05-15 ENCOUNTER — VIRTUAL VISIT (OUTPATIENT)
Dept: DERMATOLOGY | Facility: CLINIC | Age: 88
End: 2023-05-15
Payer: COMMERCIAL

## 2023-05-15 DIAGNOSIS — D04.4 SQUAMOUS CELL CARCINOMA IN SITU (SCCIS) OF SCALP: Primary | ICD-10-CM

## 2023-05-15 PROCEDURE — 99441 PR PHYSICIAN TELEPHONE EVALUATION 5-10 MIN: CPT | Mod: 95 | Performed by: DERMATOLOGY

## 2023-05-15 ASSESSMENT — PAIN SCALES - GENERAL: PAINLEVEL: NO PAIN (0)

## 2023-05-15 NOTE — PROGRESS NOTES
Mohs Micrographic Surgery Consult Note    May 15, 2023  Start time (if telephone encounter): 3:30 p.m.  End time (if telephone encounter): 3:40 p.m.    Dermatology Problem List:  1. 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 AKs, s/p cryo  - HAK, right temple, s/p biopsy 3/29/2023  - HAK, left parietal scalp, s/p biopsy 3/29/2023    Subjective: The patient is a 88 year old man who presents today for Mohs micrographic surgery consultation for a recent diagnosis of skin cancer.    Skin cancer(s): SCCis  Location(s): right superior occipital scalp  Associated symptoms: none  Onset: within last 1 year    No other associated symptoms, modifying factors, or prior treatments, except when noted above. The patient denies any constitutional symptoms, lymphadenopathy, unintentional weight loss or decreased appetite. No other skin concerns today.    Objective:   Skin: Focused examination of the scalp within the teledermatology photograph(s) on 3/29/23 was performed.   - 1 cm pink scaly papules immediately adjacent to larger scaly pink plaque on right occipital scalp.    Assessment and Plan:     1. Plan for Mohs micrographic surgery for skin cancer(s) above:  - We discussed the nature of the diagnosis/condition above. We discussed the treatment options, including the risks benefits and expectations of these options. We recommend micrographic surgery as the most effective and most tissue sparing option for treatment, and the patient agrees to proceed with this.  The patient is aware of the risks, benefits and expectations of this procedure. The patient will be scheduled for this procedure, if not already done so.  - We anticipate the following closure type: Secondary intent    The patient was discussed with and evaluated by attending physician, Hector Martell MD.    Yung Owens MD  Dermatology, PGY-5  Mohs surgery fellow    Scribe Disclosure:  I, Bradford [place], am serving as a scribe to document  services personally performed by Hector Martell MD based on data collection and the provider's statements to me.     Attending Attestation  I attest that I discussed the case with the Fellow.  I agree with the plan.   I have reviewed the note and edited it as necessary.    Hector Martell M.D.  Professor  Director of Dermatologic Surgery  Department of Dermatology  [facility]

## 2023-05-15 NOTE — NURSING NOTE
Chief Complaint   Patient presents with     Derm Problem     Patient is here today for Mohs consult.     Sallie CROCKER RN

## 2023-05-15 NOTE — LETTER
5/15/2023       RE: Jose Manuel Gallo  [address] [place] [address]     Dear Colleague,    Thank you for referring your patient, Jose Manuel Gallo, to the [facility]MATOLOG[facility] [place] [facility]. Please see a copy of my visit note below.    Mohs Micrographic Surgery Consult Note    May 15, 2023  Start time (if telephone encounter): 3:30 p.m.  End time (if telephone encounter): 3:40 p.m.    Dermatology Problem List:  1. 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 AKs, s/p cryo  - HAK, right temple, s/p biopsy 3/29/2023  - HAK, left parietal scalp, s/p biopsy 3/29/2023    Subjective: The patient is a 88 year old man who presents today for Mohs micrographic surgery consultation for a recent diagnosis of skin cancer.    Skin cancer(s): SCCis  Location(s): right superior occipital scalp  Associated symptoms: none  Onset: within last 1 year    No other associated symptoms, modifying factors, or prior treatments, except when noted above. The patient denies any constitutional symptoms, lymphadenopathy, unintentional weight loss or decreased appetite. No other skin concerns today.    Objective:   Skin: Focused examination of the scalp within the teledermatology photograph(s) on 3/29/23 was performed.   - 1 cm pink scaly papules immediately adjacent to larger scaly pink plaque on right occipital scalp.    Assessment and Plan:     1. Plan for Mohs micrographic surgery for skin cancer(s) above:  - We discussed the nature of the diagnosis/condition above. We discussed the treatment options, including the risks benefits and expectations of these options. We recommend micrographic surgery as the most effective and most tissue sparing option for treatment, and the patient agrees to proceed with this.  The patient is aware of the risks, benefits and expectations of this procedure. The patient will be scheduled for this  procedure, if not already done so.  - We anticipate the following closure type: Secondary intent    The patient was discussed with and evaluated by attending physician, Hector Martell MD.    Yung Owens MD  Dermatology, PGY-5  Mohs surgery fellow    Scribe Disclosure:  I, Bradford [place], am serving as a scribe to document services personally performed by Hector Martell MD based on data collection and the provider's statements to me.     Attending Attestation  I attest that I discussed the case with the Fellow.  I agree with the plan.   I have reviewed the note and edited it as necessary.    Hector Martell M.D.  Professor  Director of Dermatologic Surgery  Department of Dermatology  [facility]

## 2023-05-15 NOTE — PATIENT INSTRUCTIONS
[facility] Dermatology Visit    Thank you for allowing us to participate in your care. Your findings, instructions and follow-up plan are as follows:         When should I call my doctor?  If you are worsening or not improving, please, contact us or seek urgent care as noted below.     Who should I call with questions (adults)?  [facility] (adult and pediatric): [phone]  [facility] (adult): [phone]  For urgent needs outside of business hours call the [facility] at [phone] and ask for the dermatology resident on call  If this is a medical emergency and you are unable to reach an ER, Call 911    Who should I call with questions (pediatric)?  [facility]- Pediatric Dermatology  Dr. Kesha Cid, Dr. Fredis Saunders, Dr. Rita Duval, Mireya Olguin, PA  Dr. Violetta Chin, Dr. Vicki Mcclellan & Dr. Ad Matta  Non Urgent  Nurse Triage Line; [phone]- Yuliana and Daisy RN Care Coordinators   [place] (/Complex ) [phone]    If you need a prescription refill, please contact your pharmacy. Refills are approved or denied by our physicians during normal business hours, Monday through Fridays  Per office policy, refills will not be granted if you have not been seen within the past year (or sooner depending on your child's condition).    Scheduling Information:  Pediatric Appointment Scheduling and Call Center [phone]  Radiology Scheduling- [phone]  Sedation Unit Scheduling- [phone]  [address] Scheduling- General [phone]; Pediatric Dermatology [phone]  Main  Services: [phone]  Danish: [phone]  Nigerian: [phone]  Hmong/Les/Salvadorean: [phone]  Preadmission Nursing Department Fax Number: [phone] (fax all pre-operative paperwork to this number)    For urgent matters arising during evenings, weekends, or  holidays that cannot wait for normal business hours please call [phone] and ask for the dermatology resident on call to be paged.

## 2023-05-16 ENCOUNTER — TELEPHONE (OUTPATIENT)
Dept: DERMATOLOGY | Facility: CLINIC | Age: 88
End: 2023-05-16
Payer: COMMERCIAL

## 2023-05-16 NOTE — TELEPHONE ENCOUNTER
I spoke with Enmanuel to confirm Mohs surgery date/time.  I advised patient to plan on being in clinic through the lunch hour.  I advised patient that they don't need to be fasting and they can take medications as scheduled.  I reviewed that they will have a pressure bandage on for 48 hrs following procedure and that we don't want this to get wet.  I reviewed that following the 48 hrs they will begin daily wound care for 1 week, but should not submerge the wound in standing water such as a bathtub, pool, hot tub, etc.     Patient verbalized understanding and agreed with the plan.     Peggy Holliday RN

## 2023-05-23 ENCOUNTER — OFFICE VISIT (OUTPATIENT)
Dept: DERMATOLOGY | Facility: CLINIC | Age: 88
End: 2023-05-23
Payer: COMMERCIAL

## 2023-05-23 VITALS — DIASTOLIC BLOOD PRESSURE: 72 MMHG | SYSTOLIC BLOOD PRESSURE: 149 MMHG

## 2023-05-23 DIAGNOSIS — D04.4 SQUAMOUS CELL CARCINOMA IN SITU OF SCALP: ICD-10-CM

## 2023-05-23 PROCEDURE — 14301 TIS TRNFR ANY 30.1-60 SQ CM: CPT | Performed by: DERMATOLOGY

## 2023-05-23 PROCEDURE — 17311 MOHS 1 STAGE H/N/HF/G: CPT | Performed by: DERMATOLOGY

## 2023-05-23 PROCEDURE — 15240 FTH/GFT F/C/C/M/N/AX/G/H/F20: CPT | Performed by: DERMATOLOGY

## 2023-05-23 NOTE — NURSING NOTE
The following medication was given:     MEDICATION:  Lidocaine with epinephrine 1% 1:[id number]  ROUTE: SQ  SITE: see procedure note  DOSE: 27mL  LOT #: [id number]  : [employer]  EXPIRATION DATE: 12/31/24  NDC#: 51873-575-22  Was there drug waste? No  Multi-dose vial: Yes    Vaseline gauze applied to area of second intent. Vaseline and pressure dressing applied to Mohs site on scalp.  Wound care instructions reviewed with patient and AVS provided.  Patient verbalized understanding.  Patient will follow up for suture removal: N/A.  He will RTC in 3 weeks for a wound check.  No further questions or concerns at this time.    Peggy Holliday RN  May 23, 2023

## 2023-05-23 NOTE — LETTER
5/23/2023         RE: Jose Manuel Gallo  [address] [place] [address]        Dear Colleague,    Thank you for referring your patient, Jose Manuel Gallo, to the [facility]. Please see a copy of my visit note below.    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    Informed consent was obtained and the patient underwent the procedure as follows:  The patient was placed supine on the operating table.  The cancer was identified, outlined with a marker, and verified by the patient.  The entire surgical field was prepped with Hibiclens;Sterile saline.  The surgical site was anesthetized using Lidocaine 1% with epi 1:100,000.      The area of clinically apparent tumor was not debulked. The layer of tissue was then surgically excised using a #15 blade and was then transferred onto a specimen sheet maintaining the orientation of the specimen. Hemostasis was obtained using heat cautery. The wound site was then covered with a dressing while the tissue samples were processed for examination.    The excised tissue was transported to the Mohs histology laboratory maintaining the tissue  orientation.  The tissue specimen was relaxed so that the entire surgical margin was in a a single horizontal plane for sectioning and inked for precise mapping.  A precise reference map was drawn to reflect the sectioning of the specimen, colored inking of the margins, and orientation on the patient. The tissue was processed using horizontal sectioning of the base and continuous peripheral margins.  The histopathologic sections were reviewed in conjunction with the reference map.    Total blocks: 2    Total slides:  7    There were no cancer cells visualized on examination, therefore Mohs surgery was complete.      Reconstruction:  Rotation Flap with Burrow's Graft    PROCEDURE:  The patient was taken to the operative suite and placed  supine  on the operating room table.  The defect was identified.  Appropriate markings were made with a marking pen to plan the reconstruction.  The area was then infiltrated with 1% lidocaine with epinephrine.  The area was then prepped in a sterile fashion with chlorhexadine  and sterile drapes were applied.  The wound was debeveled and undermined broadly in all directions to the level of fat.  Hemostasis was obtained with bipolar electrodesiccation.  The advancement flap was then designed by incising to the level of fat.  The flap was further undermined in all directions.    Hemostasis was again obtained using  bipolar electrodesiccation.  The flap was then advanced into the defect and secured using buried dermal sutures.   The secondary defect wound edges were closed with buried dermal sutures.  The epidermis was then carefully approximated using 3-0 vicryl and 4-0 monocryl simple running  epidermal sutures.  Redundant areas of tissue were excised using the triangulation technique.  The wound edges were sutured in similar fashion.    The remaining defect was repaired with a [place] wedge graft. from  the raised tissue cone of the advancement flap.  The raised cone was removed  from one end of the flap to serve as a full thickness skin graft. Hemostasis was obtained using bipolar electrodesiccation. The  6.5 x   7.1 cm graft was trimmed of fat and placed in saline gauze.  The graft was placed onto the recipient site and secured with  5-0 fast absorbing gut running subcuticular  epidermal sutures. The graft was trimmed to fit as needed with blunt scissors. Careful attention was given to even approximation of the wound edges. A xeroform bolster was secured over the burrows wedge graft with suture.  A pressure dressing was applied to both surgical sites.    The wound was cleansed with saline, and ointment was applied along the wound surface.   A sterile pressure of non-adherent gauze was applied and wound care instructions were given verbally and in writing.  The patient will return in seven to ten days for suture removal.  The patient left the operating suite in stable condition. Anticipate Derma-Abrasion to be used as a second stage of this reconstruction.  .    Post-Operative Size:  4.8x3.7 cm       The Attending surgeon was present for entire minor procedure and examination. procedure. The attending was always immediately available.        Scribe Disclosure:   I, Denia Galvan, am serving as a scribe to document services personally performed by this physician, Dr. Hector Martell, based on data collection and the provider's statements to me.       Attending attestation:  I personally performed the entire procedure.  I have reviewed the note and edited it as necessary, and agree with its contents.    Hector Martell M.D.  Professor  Director of Dermatologic Surgery  Department of Dermatology  [facility]    Dermatology Surgery Clinic  [facility] [place] [address]      Again, thank you for allowing me to participate in the care of your patient.        Sincerely,        Hector Martell MD

## 2023-05-23 NOTE — NURSING NOTE
Jose Manuel Gallo's goals for this visit include:   Chief Complaint   Patient presents with     Procedure     Mohs - SCCIS right superior occipital scalp       He requests these members of his care team be copied on today's visit information: n/a    PCP: Orlando Braxton    Referring Provider:  Jo Mistry PAMURIEL  [address] [place] [address]    BP (!) 149/72     Do you need any medication refills at today's visit? No  Peggy Holliday RN

## 2023-05-23 NOTE — PATIENT INSTRUCTIONS
Mohs Wound Care Instructions  I will experience scar, altered skin color, bleeding, swelling, pain, crusting and redness. I may experience altered sensation. Risks are excessive bleeding, infection, muscle weakness, thick (hypertrophic or keloidal) scar, and recurrence. A second procedure may be recommended to obtain the best cosmetic or functional result.  Possible complications of any surgical procedure are bleeding, infection, scarring, alteration in skin color and sensation, muscle weakness in the area, wound dehiscence or seperation, or recurrence of the lesion or disease. On occasion, after healing, a secondary procedure or revision may be recommended in order to obtain the best cosmetic or functional result.   After your surgery, a pressure bandage will be placed over the area that has sutures. This will help prevent bleeding. Please follow these instructions as they will help you to prevent complications as your wound heals.  For the First 48 hours After Surgery:  Leave the pressure bandage on and keep it dry. If it should come loose, you may retape it, but do not take it off.  Relax and take it easy. Do not do any vigorous exercise, heavy lifting, or bending forward. This could cause the wound to bleed.  Post-operative pain is usually mild. You may alternate between 1000 mg of Tylenol (acetaminophen) and 400 mg of Ibuprofen every 4 hours.  Do not take more than 4,000mg of acetaminophen in a 24 hour period or 3200 mg of Ibuprofen in a 24 hr period.  Avoid alcohol and vitamin E as these may increase your tendency to bleed.  You may put an ice pack around the bandaged area for 20 minutes every 2-3 hours. This may help reduce swelling, bruising, and pain. Make sure the ice pack is waterproof so that the pressure bandage does not get wet.   You may see a small amount of drainage or blood on your pressure bandage. This is normal. However, if drainage or bleeding continues or saturates the bandage, you will need  to apply firm pressure over the bandage with a washcloth for 15 minutes. If bleeding continues after applying pressure for 15 minutes then go to the nearest emergency room.  48 Hours After Surgery  Carefully remove the bandage and start daily wound care and dressing changes. You may also now shower and get the wound wet.  Daily Wound Care:  Wash wound with a mild soap and water.  Use caution when washing the wound, be gentle and do not let the forceful shower stream hit the wound directly.  Pat dry.  Apply Vaseline (from a new container or tube) over the suture line with a Q-tip. It is very important to keep the wound continuously moist, as wounds heal best in a moist environment.  Keep the site covered until sutures have dissolved.  You can cover it with a Telfa (non-stick) dressing and tape or a band-aid.    If you are unable to keep wound covered, you must apply Vaseline every 2-3 hours (while awake) to ensure it is being kept moist for optimal healing. A dressing overnight is recommended to keep the area moist.  Call Us If:  You have pain that is not controlled with Tylenol/Ibuprofen  You have signs or symptoms of an infection, such as: fever over 100 degrees F, redness, warmth, or foul-smelling or yellow drainage from the wound.  Who should I call with questions?  [facility]: [phone]   [facility]: [phone]  For urgent needs outside of business hours call the [facility] at [phone] and ask to speak with the dermatology resident on call

## 2023-05-23 NOTE — PROGRESS NOTES
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    Informed consent was obtained and the patient underwent the procedure as follows:  The patient was placed supine on the operating table.  The cancer was identified, outlined with a marker, and verified by the patient.  The entire surgical field was prepped with Hibiclens;Sterile saline.  The surgical site was anesthetized using Lidocaine 1% with epi 1:100,000.      The area of clinically apparent tumor was not debulked. The layer of tissue was then surgically excised using a #15 blade and was then transferred onto a specimen sheet maintaining the orientation of the specimen. Hemostasis was obtained using heat cautery. The wound site was then covered with a dressing while the tissue samples were processed for examination.    The excised tissue was transported to the Mohs histology laboratory maintaining the tissue orientation.  The tissue specimen was relaxed so that the entire surgical margin was in a a single horizontal plane for sectioning and inked for precise mapping.  A precise reference map was drawn to reflect the sectioning of the specimen, colored inking of the  margins, and orientation on the patient. The tissue was processed using horizontal sectioning of the base and continuous peripheral margins.  The histopathologic sections were reviewed in conjunction with the reference map.    Total blocks: 2    Total slides:  7    There were no cancer cells visualized on examination, therefore Mohs surgery was complete.      Reconstruction:  Rotation Flap with Burrow's Graft    PROCEDURE:  The patient was taken to the operative suite and placed  supine  on the operating room table.  The defect was identified.  Appropriate markings were made with a marking pen to plan the reconstruction.  The area was then infiltrated with 1% lidocaine with epinephrine.  The area was then prepped in a sterile fashion with chlorhexadine  and sterile drapes were applied.  The wound was debeveled and undermined broadly in all directions to the level of fat.  Hemostasis was obtained with bipolar electrodesiccation.  The advancement flap was then designed by incising to the level of fat.  The flap was further undermined in all directions.    Hemostasis was again obtained using  bipolar electrodesiccation.  The flap was then advanced into the defect and secured using buried dermal sutures.   The secondary defect wound edges were closed with buried dermal sutures.  The epidermis was then carefully approximated using 3-0 vicryl and 4-0 monocryl simple running  epidermal sutures.  Redundant areas of tissue were excised using the triangulation technique.  The wound edges were sutured in similar fashion.    The remaining defect was repaired with a [place] wedge graft. from  the raised tissue cone of the advancement flap.  The raised cone was removed from one end of the flap to serve as a full thickness skin graft. Hemostasis was obtained using bipolar electrodesiccation. The  6.5 x   7.1 cm graft was trimmed of fat and placed in saline gauze.  The graft was placed onto the recipient site and secured with  5-0  fast absorbing gut running subcuticular  epidermal sutures. The graft was trimmed to fit as needed with blunt scissors. Careful attention was given to even approximation of the wound edges. A xeroform bolster was secured over the burrows wedge graft with suture.  A pressure dressing was applied to both surgical sites.    The wound was cleansed with saline, and ointment was applied along the wound surface.   A sterile pressure of non-adherent gauze was applied and wound care instructions were given verbally and in writing.  The patient will return in seven to ten days for suture removal.  The patient left the operating suite in stable condition. Anticipate Derma-Abrasion to be used as a second stage of this reconstruction.  .    Post-Operative Size:  4.8x3.7 cm       The Attending surgeon was present for entire minor procedure and examination. procedure. The attending was always immediately available.        Scribe Disclosure:   I, Denia Galvan, am serving as a scribe to document services personally performed by this physician, Dr. Hector Martell, based on data collection and the provider's statements to me.       Attending attestation:  I personally performed the entire procedure.  I have reviewed the note and edited it as necessary, and agree with its contents.    Hector Martell M.D.  Professor  Director of Dermatologic Surgery  Department of Dermatology  [facility]    Dermatology Surgery Clinic  [facility] Surgery [place] [phone]

## 2023-05-24 ENCOUNTER — OFFICE VISIT (OUTPATIENT)
Dept: DERMATOLOGY | Facility: CLINIC | Age: 88
End: 2023-05-24
Payer: COMMERCIAL

## 2023-05-24 ENCOUNTER — TELEPHONE (OUTPATIENT)
Dept: DERMATOLOGY | Facility: CLINIC | Age: 88
End: 2023-05-24

## 2023-05-24 DIAGNOSIS — L57.0 AK (ACTINIC KERATOSIS): Primary | ICD-10-CM

## 2023-05-24 PROCEDURE — 17003 DESTRUCT PREMALG LES 2-14: CPT | Mod: 79 | Performed by: PHYSICIAN ASSISTANT

## 2023-05-24 PROCEDURE — 17000 DESTRUCT PREMALG LESION: CPT | Mod: 79 | Performed by: PHYSICIAN ASSISTANT

## 2023-05-24 NOTE — LETTER
5/24/2023         RE: Jose Manuel Gallo  [address] [place] [address]        Dear Colleague,    Thank you for referring your patient, Jose Manuel Gallo, to the [facility]. Please see a copy of my visit note below.    Patient is here today for biopsy proven actinic keratosis of right temple and left parietal scalp. Also noticed spot on right NSW.   Here today for cryo.  Actinic keratosis on right temple, left parietal scalp and right NSW (3 total)  LN2:  Treated with LN2 for 5s for 1-2 cycles. Warned risks of blistering, pain, pigment change, scarring, and incomplete resolution.  Advised patient to return if lesions do not completely resolve.  Wound care sheet given.        Again, thank you for allowing me to participate in the care of your patient.        Sincerely,        Jo Reynoso PA-C

## 2023-05-24 NOTE — PROGRESS NOTES
Patient is here today for biopsy proven actinic keratosis of right temple and left parietal scalp. Also noticed spot on right NSW.   Here today for cryo.  Actinic keratosis on right temple, left parietal scalp and right NSW (3 total)  LN2:  Treated with LN2 for 5s for 1-2 cycles. Warned risks of blistering, pain, pigment change, scarring, and incomplete resolution.  Advised patient to return if lesions do not completely resolve.  Wound care sheet given.

## 2023-05-24 NOTE — TELEPHONE ENCOUNTER
Enmanuel is 1 day s/p Mohs to occipital scalp.  I called to follow up on how he is doing post-procedure.  I left a detailed message requesting a call back if there were any questions or concerns.  Otherwise, if no concerns no need to call me back.    Post op appt: scheduled  Next skin exam: scheduled    Peggy Holliday RN

## 2023-06-01 ENCOUNTER — TELEPHONE (OUTPATIENT)
Dept: DERMATOLOGY | Facility: CLINIC | Age: 88
End: 2023-06-01
Payer: COMMERCIAL

## 2023-06-01 NOTE — TELEPHONE ENCOUNTER
Consent to communicate on file.  I left a detailed message that it's ok to remove and discard the vaseline gauze that was placed in the wound bed.  Wash the wound gently with soap and water, apply Vaseline along the suture line and in the wound bed and cover with a dressing.  Do this daily until his post op appt on 6/13/23.    Direct call back number provided if any questions.    Peggy Holliday RN

## 2023-06-01 NOTE — TELEPHONE ENCOUNTER
[facility] Call Center    Phone Message    May a detailed message be left on voicemail: yes     Reason for Call: Other: Pts Daughter Sydni is calling in regards to her fathers wound care after the mohs surgery. Sydni is wondering what she should do with the gauze that is still placed. Pt would like to know if she should leave the gauze or take it off, is okay to leave a detailed message.  Please call [phone] to discuss, thanks!      Action Taken: Message routed to:  Adult Clinics: Dermatology p [id number]    Travel Screening: Not Applicable

## 2023-06-11 ENCOUNTER — NURSE TRIAGE (OUTPATIENT)
Dept: NURSING | Facility: CLINIC | Age: 88
End: 2023-06-11
Payer: COMMERCIAL

## 2023-06-11 NOTE — TELEPHONE ENCOUNTER
Nurse Triage SBAR     Situation: Patient calling to see if he should be concerned about a wound on his scalp      Background: Surgery on scalp 3 weeks ago. Does dressing changes daily-has been seeing slight amount of blood on dressing but today saw yellow pus instead     Assessment: No increased pain, redness, swelling, or warmth. Has not checked temp but does not feel febrile. Appointment already scheduled for Tuesday and wants to know if he should do something about this before then     Recommendation: Advised to call surgeons office in AM for provider recommendation.  Reviewed care advice per protocol. Patient verbalizes understanding and agrees to plan        Reason for Disposition    [1] Pus or cloudy fluid draining from wound AND [2] no fever    Additional Information    Negative: [1] Looks infected (spreading redness, red streak, pus) AND [2] fever    Negative: [1] Red streak runs from the wound AND [2] longer than 1 inch (2.5 cm)    Negative: [1] Skin around the wound has become red AND [2] larger than 2 inches (5 cm)    Negative: [1] Face wound AND [2] looks infected (e.g., spreading redness)    Negative: [1] Finger wound AND [2] entire finger swollen    Negative: Black (necrotic) or blisters develop in wound    Negative: Patient sounds very sick or weak to the triager    Negative: [1] Widespread rash AND [2] bright red, sunburn-like    Negative: SEVERE pain in the wound    Negative: Stitches (or staples) and not infected    Negative: Skin glue used to close wound and not infected    Negative:  surgical wound infection suspected    Negative: Surgical wound infection suspected (post-op)    Negative: Animal bite wound infection suspected    Negative: Chronic wound care and Negative Pressure Wound Therapy (NPWT) symptoms and questions    Negative: [1] Widespread rash AND [2] bright red, sunburn-like AND [3] too weak to stand    Negative: Sounds like a life-threatening emergency to the triager     Negative: [1] Red area or streak AND [2] no fever    Protocols used: WOUND INFECTION-A-AH      Lilly Ansari RN [facility] June 11, 2023 5:41 PM

## 2023-06-13 ENCOUNTER — OFFICE VISIT (OUTPATIENT)
Dept: DERMATOLOGY | Facility: CLINIC | Age: 88
End: 2023-06-13
Payer: COMMERCIAL

## 2023-06-13 DIAGNOSIS — D04.4 SQUAMOUS CELL CARCINOMA IN SITU OF SCALP: Primary | ICD-10-CM

## 2023-06-13 PROCEDURE — 99024 POSTOP FOLLOW-UP VISIT: CPT | Performed by: DERMATOLOGY

## 2023-06-13 ASSESSMENT — PAIN SCALES - GENERAL: PAINLEVEL: NO PAIN (0)

## 2023-06-13 NOTE — LETTER
6/13/2023         RE: Jose Manuel Gallo  [address] [place] [address]        Dear Colleague,    Thank you for referring your patient, Jose Manuel Gallo, to the [facility]. Please see a copy of my visit note below.    [facility] Dermatology Note  Encounter Date: Jun 13, 2023  Office Visit     Dermatology Problem List:  1. 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 Left parietal scalp- Well healed rotation. Discussed continuing with wound care.   -Continue with wound care.     Procedures Performed:   None.     Follow-up: 4 week(s) in-person, or earlier for new or changing lesions    Staff and Scribe:     Scribe Disclosure:   I, Denia Galvan, am serving as a scribe to document services personally performed by this physician, Dr. Hector Martell, based on data collection and the provider's statements to me.     Attending Attestation  I attest that the Scribe recorded the interview and exam that I personally performed.  I have reviewed the note and edited it as necessary.    Hector Martell M.D.  Professor  Director of Dermatologic Surgery  Department of Dermatology  [facility]    ____________________________________________    CC: Wound Check (2 week follow up- left parietal scalp DOS: 05- )    HPI:  Mr. Jose Manuel Gallo is a(n) 88 year old male who presents today as a return patient for a wound check.     Patient is otherwise feeling well, without additional skin concerns.    Labs Reviewed:  N/A    Physical Exam:  Vitals: There were no vitals taken for this visit.  SKIN: Focused examination of the scalp was performed.  -epidermal slough of the full thickness skin graft with deep tissue on the left parietal scalp   - No other lesions of concern on areas examined.     Medications:  Current Outpatient Medications   Medication     acetaminophen  (TYLENOL) 650 MG CR tablet     carboxymethylcellulose PF (REFRESH LIQUIGEL) 1 % ophthalmic gel     cyanocobalamin (VITAMIN B-12) 1000 MCG tablet     lisinopril (ZESTRIL) 2.5 MG tablet     simvastatin (ZOCOR) 40 MG tablet     Current Facility-Administered Medications   Medication     lidocaine (PF) (XYLOCAINE) 1 % injection 4 mL     lidocaine (PF) (XYLOCAINE) 1 % injection 4 mL     lidocaine 1 % injection 4 mL     lidocaine 1 % injection 4 mL     methylPREDNISolone (DEPO-MEDROL) injection 80 mg     methylPREDNISolone (DEPO-MEDROL) injection 80 mg     methylPREDNISolone (DEPO-MEDROL) injection 80 mg     methylPREDNISolone (DEPO-MEDROL) injection 80 mg     methylPREDNISolone (DEPO-MEDROL) injection 80 mg      Past Medical History:   Patient Active Problem List   Diagnosis     Erectile dysfunction     Osteoarthritis     HYPERLIPIDEMIA LDL GOAL <130     Advanced directives, counseling/discussion     Hypertension goal BP (blood pressure) < 140/90     History of bladder cancer     CKD (chronic kidney disease) stage 3, GFR 30-59 ml/min (H)     Pseudophakia, Yag Caps, os     Dizziness     Posterior vitreous detachment, right     Hx of vitrectomy for chronic macular hole, os (ER)     Other idiopathic scoliosis, thoracolumbar region     Spondylosis of lumbar region without myelopathy or radiculopathy     Brittle nails     Combined forms of age-related cataract, mild-mod, of right eye     Pain of right eye     History of shingles     Low vitamin B12 level     B12 nutritional deficiency     Occipital neuralgia of left side     Post-nasal drip     Unintended weight loss     Dysphagia, unspecified type     Past Medical History:   Diagnosis Date     Cataract, mild, od; mod, os 07/02/2012     Chronic low back pain      CKD (chronic kidney disease) stage 3, GFR 30-59 ml/min (H)      Erectile dysfunction      Hemorrhoids      History of bladder cancer          HTN      Hyperlipidemia LDL goal <130      Kidney stones       Macular degeneration      Osteoarthritis      Personal history of colonic polyps     10/2002     Personal history of malignant neoplasm of bladder      Personal history of tobacco use     Smoked 40 years, Quit 2003     PVD (peripheral vascular disease) (H)     Left Vertebral Artery occlusion     Rotator cuff tendinitis, left      Squamous cell carcinoma of skin, unspecified         CC No referring provider defined for this encounter. on close of this encounter.    Again, thank you for allowing me to participate in the care of your patient.        Sincerely,        Hector Martell MD

## 2023-06-13 NOTE — PROGRESS NOTES
[facility] Dermatology Note  Encounter Date: Jun 13, 2023  Office Visit     Dermatology Problem List:  1. 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 Left parietal scalp- Well healed rotation. Discussed continuing with wound care.   -Continue with wound care.     Procedures Performed:   None.     Follow-up: 4 week(s) in-person, or earlier for new or changing lesions    Staff and Scribe:     Scribe Disclosure:   I, Denia Galvan, am serving as a scribe to document services personally performed by this physician, Dr. Hector Martell, based on data collection and the provider's statements to me.     Attending Attestation  I attest that the Scribe recorded the interview and exam that I personally performed.  I have reviewed the note and edited it as necessary.    Hector Martell M.D.  Professor  Director of Dermatologic Surgery  Department of Dermatology  [facility]    ____________________________________________    CC: Wound Check (2 week follow up- left parietal scalp DOS: 05- )    HPI:  Mr. Jose Manuel Gallo is a(n) 88 year old male who presents today as a return patient for a wound check.     Patient is otherwise feeling well, without additional skin concerns.    Labs Reviewed:  N/A    Physical Exam:  Vitals: There were no vitals taken for this visit.  SKIN: Focused examination of the scalp was performed.  -epidermal slough of the full thickness skin graft with deep tissue on the left parietal scalp   - No other lesions of concern on areas examined.     Medications:  Current Outpatient Medications   Medication     acetaminophen (TYLENOL) 650 MG CR tablet     carboxymethylcellulose PF (REFRESH LIQUIGEL) 1 % ophthalmic gel     cyanocobalamin (VITAMIN B-12) 1000 MCG tablet     lisinopril (ZESTRIL) 2.5 MG tablet     simvastatin (ZOCOR) 40 MG tablet     Current Facility-Administered  Medications   Medication     lidocaine (PF) (XYLOCAINE) 1 % injection 4 mL     lidocaine (PF) (XYLOCAINE) 1 % injection 4 mL     lidocaine 1 % injection 4 mL     lidocaine 1 % injection 4 mL     methylPREDNISolone (DEPO-MEDROL) injection 80 mg     methylPREDNISolone (DEPO-MEDROL) injection 80 mg     methylPREDNISolone (DEPO-MEDROL) injection 80 mg     methylPREDNISolone (DEPO-MEDROL) injection 80 mg     methylPREDNISolone (DEPO-MEDROL) injection 80 mg      Past Medical History:   Patient Active Problem List   Diagnosis     Erectile dysfunction     Osteoarthritis     HYPERLIPIDEMIA LDL GOAL <130     Advanced directives, counseling/discussion     Hypertension goal BP (blood pressure) < 140/90     History of bladder cancer     CKD (chronic kidney disease) stage 3, GFR 30-59 ml/min (H)     Pseudophakia, Yag Caps, os     Dizziness     Posterior vitreous detachment, right     Hx of vitrectomy for chronic macular hole, os (ER)     Other idiopathic scoliosis, thoracolumbar region     Spondylosis of lumbar region without myelopathy or radiculopathy     Brittle nails     Combined forms of age-related cataract, mild-mod, of right eye     Pain of right eye     History of shingles     Low vitamin B12 level     B12 nutritional deficiency     Occipital neuralgia of left side     Post-nasal drip     Unintended weight loss     Dysphagia, unspecified type     Past Medical History:   Diagnosis Date     Cataract, mild, od; mod, os 07/02/2012     Chronic low back pain      CKD (chronic kidney disease) stage 3, GFR 30-59 ml/min (H)      Erectile dysfunction      Hemorrhoids      History of bladder cancer          HTN      Hyperlipidemia LDL goal <130      Kidney stones      Macular degeneration      Osteoarthritis      Personal history of colonic polyps     10/2002     Personal history of malignant neoplasm of bladder      Personal history of tobacco use     Smoked 40 years, Quit 2003     PVD (peripheral vascular disease) (H)      Left Vertebral Artery occlusion     Rotator cuff tendinitis, left      Squamous cell carcinoma of skin, unspecified         CC No referring provider defined for this encounter. on close of this encounter.

## 2023-06-13 NOTE — NURSING NOTE
Jose Manuel Gallo's chief complaint for this visit includes:  Chief Complaint   Patient presents with     Wound Check     2 week follow up- left parietal scalp DOS: 05-      PCP: Orlando Braxton    Referring Provider:  No referring provider defined for this encounter.    There were no vitals taken for this visit.  No Pain (0)      No Known Allergies      Do you need any medication refills at today's visit?    No.       Page Pat LPN

## 2023-06-15 ENCOUNTER — MYC MEDICAL ADVICE (OUTPATIENT)
Dept: INTERNAL MEDICINE | Facility: CLINIC | Age: 88
End: 2023-06-15
Payer: COMMERCIAL

## 2023-06-16 NOTE — TELEPHONE ENCOUNTER
"I don't know how to fix this. We need to review with patient a few ideas    1. This sounds like an annoying problem but not one that is health or life threatening    2. Trial of increased fiber / high fiber diet is usually going to be helpful for improving this problem   3. Beyond that we are not sure what to do. If he is interested simply to try some \" stool thickening efforts\" we can recommend he start taking A] iron supplements 2-3 times per day [ over the counter nonprescription is fine or we could send a prescription if he wishes]   Or B]  Questran (Cholestyramine) powder c] possibly 5 milligram dose of escitalopram (LEXAPRO)    See what he wants - perhaps an appointment at this point is wise    Reroute if additional input requested from me     Orlando Braxton MD    "

## 2023-06-26 ENCOUNTER — TELEPHONE (OUTPATIENT)
Dept: FAMILY MEDICINE | Facility: CLINIC | Age: 88
End: 2023-06-26

## 2023-06-26 ENCOUNTER — VIRTUAL VISIT (OUTPATIENT)
Dept: INTERNAL MEDICINE | Facility: CLINIC | Age: 88
End: 2023-06-26
Payer: COMMERCIAL

## 2023-06-26 DIAGNOSIS — R19.00 ABDOMINAL SWELLING: ICD-10-CM

## 2023-06-26 DIAGNOSIS — R63.4 UNINTENDED WEIGHT LOSS: ICD-10-CM

## 2023-06-26 DIAGNOSIS — R19.7 DIARRHEA, UNSPECIFIED TYPE: Primary | ICD-10-CM

## 2023-06-26 DIAGNOSIS — R88.8: ICD-10-CM

## 2023-06-26 DIAGNOSIS — R10.817 GENERALIZED ABDOMINAL TENDERNESS, REBOUND TENDERNESS PRESENCE NOT SPECIFIED: ICD-10-CM

## 2023-06-26 PROCEDURE — 99443 PR PHYSICIAN TELEPHONE EVALUATION 21-30 MIN: CPT | Mod: 95 | Performed by: INTERNAL MEDICINE

## 2023-06-26 NOTE — PROGRESS NOTES
Enmanuel is a 88 year old who is being evaluated via a billable telephone visit.      What phone number would you like to be contacted at? [phone]  How would you like to obtain your AVS? Maverick    Distant Location (provider location):  On-site    Assessment & Plan     Diarrhea, unspecified type  This patient has had a refractory chronic problem now past 3 months of a continuous soft stool that's not normal. He's only going once a day with bowel movement and so technically this more of a loose stool problem and not a odalys diarrhea but this is splitting hairs in definition and fundamentally patient is concerned with his issue. 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 He's been treated for this nevertheless with Flagyl (Metronidazole) and feels since completing this, that his original symptoms which were more of a truly liquidy stool have improved by 50% but never more from there. His visit with a [facility] physicians assistant is available in epic and that healthcare provider had said to please contact her back regarding needs possibly for further follow up in 2 weeks out [ from that appointment 3 months ago] and patient never did do that. He had also been recommended to tale a probiotic and he did take that until it ran out after a month or so and he never had this refilled. Additional issues as detailed below . We definitely want to collect new stool studies to include ova and parasites, cdiff etc.   - Routine parasitology exam; Future  - Fecal Lactoferrin; Future  - Fecal colorectal cancer screen FIT; Future  - Enteric Bacteria and Virus Panel by [facility] Stool; Future  - Claustridum difficil colitis     Unintended weight loss  As got into this discussion patient brought up that he has other issues / concerns including unintentional  weight loss and this rapidly delved into additional concerns that I cannot evaluate via a telephone MD visit. Patient is asked to schedule a follow up appointment with me hopefully in nothing more then 2 weeks or so, will ask my Team [facility] to see if we can get a sooner appointment   - Routine parasitology exam; Future  - Fecal Lactoferrin; Future  - Fecal colorectal cancer screen FIT; Future  - Enteric Bacteria and Virus Panel by [facility] Stool; Future    Abdominal swelling  Patient is telling me some symptoms that I can't evaluate here. Certainly nothing sounds emergent. He's calmly telling me about his symptoms that are of some concern but he has nominal abdominal discomfort and the greatest question is what does he mean by abdominal swelling ? 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  As above   - Routine parasitology exam; Future  - Fecal Lactoferrin; Future  - Fecal colorectal cancer screen FIT; Future  - Enteric Bacteria and Virus Panel by [facility] Stool; Future    Generalized abdominal tenderness, rebound tenderness presence not specified  As above   - Enteric Bacteria and Virus Panel by [facility] Stool; Future    Review of the result(s) of each unique test - upcoming stool studies  Prescription drug management  29 minutes spent by me on the date of the encounter doing chart review, history and exam, documentation and further activities per the note    Orlando Braxton MD  [facility] Singer is a 88 year old, presenting for the following health issues:  Diarrhea    10:04 AM  10:33 AM        No data to display              HPI   He denies ever having this problem before   Loose stools - he says once  per day,   He has this problem every single day .  He saw a gastroenterologist at [facility]  From at the very worst it's improved about 50% on the easy back to normal from it's worst  Flatulence is wet , and this gets messy      Diarrhea  Onset/Duration: x3 months   Description:       Consistency of stool: loose, runny       Blood in stool: No       Number of loose stools past 24 hours: 1/2  Progression of Symptoms: same  Accompanying signs and symptoms:       Fever: No       Nausea/Vomiting: No       Abdominal pain: No       Weight loss: No       Episodes of constipation: No  History   Ill contacts: No  Recent use of antibiotics: No  Recent travels: No  Recent medication-new or changes(Rx or OTC): No  Precipitating or alleviating factors: None  Therapies tried and outcome: imodium       Review of Systems   Constitutional, HEENT, cardiovascular, pulmonary, gi and gu systems are negative, except as otherwise noted.      Objective           Vitals:  No vitals were obtained today due to virtual visit.    Physical Exam   healthy, alert and no distress  PSYCH: Alert and oriented times 3; coherent speech, normal   rate and volume, able to articulate logical thoughts, able   to abstract reason, no tangential thoughts, no hallucinations   or delusions  His affect is normal  RESP: No cough, no audible wheezing, able to talk in full sentences  Remainder of exam unable to be completed due to telephone visits    Orders Placed This Encounter   Procedures     Fecal Lactoferrin     Fecal colorectal cancer screen FIT             Phone call duration: 29 minutes

## 2023-06-26 NOTE — TELEPHONE ENCOUNTER
Reason for Call:  Appointment Request    Patient requesting this type of appt:  Pt was advised by provider to schedule an in person appt, no appts available at this time. Please check and advise.    Requested provider: Orlando Braxton    Reason patient unable to be scheduled: Not within requested timeframe    When does patient want to be seen/preferred time: 3-7 days    Comments:     Could we send this information to you in [place] or would you prefer to receive a phone call?:   Patient would prefer a phone call   Okay to leave a detailed message?: Yes at Home number on file [phone] (home)    Call taken on 6/26/2023 at 10:36 AM by Francia Kulkarni

## 2023-06-26 NOTE — TELEPHONE ENCOUNTER
Spoke with pt. Offered an in person appt for today at 0940 with Dr. Braxton. Pt is having car repairs done so does not have a way to get to clinic. Pt preferred a telephone visit with PCP. Appt scheduled.    Cristel Dillon RN  [facility]

## 2023-06-27 ENCOUNTER — LAB (OUTPATIENT)
Dept: LAB | Facility: CLINIC | Age: 88
End: 2023-06-27
Payer: COMMERCIAL

## 2023-06-27 DIAGNOSIS — R19.7 DIARRHEA, UNSPECIFIED TYPE: ICD-10-CM

## 2023-06-27 DIAGNOSIS — R63.4 UNINTENDED WEIGHT LOSS: ICD-10-CM

## 2023-06-27 DIAGNOSIS — R19.00 ABDOMINAL SWELLING: ICD-10-CM

## 2023-06-27 DIAGNOSIS — R10.817 GENERALIZED ABDOMINAL TENDERNESS, REBOUND TENDERNESS PRESENCE NOT SPECIFIED: ICD-10-CM

## 2023-06-27 DIAGNOSIS — R88.8: ICD-10-CM

## 2023-06-29 PROCEDURE — 82274 ASSAY TEST FOR BLOOD FECAL: CPT

## 2023-06-29 PROCEDURE — 87506 IADNA-DNA/RNA PROBE TQ 6-11: CPT

## 2023-06-29 PROCEDURE — 87177 OVA AND PARASITES SMEARS: CPT

## 2023-06-29 PROCEDURE — 87209 SMEAR COMPLEX STAIN: CPT

## 2023-06-29 NOTE — PROGRESS NOTES
Diarrhea, unspecified type  This patient has had a refractory chronic problem now past 3 months of a continuous soft stool that's not normal. He's only going once a day with bowel movement and so technically this more of a loose stool problem and not a odalys diarrhea but this is splitting hairs in definition and fundamentally patient is concerned with his issue. 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 He's been treated for this nevertheless with Flagyl (Metronidazole) and feels since completing this, that his original symptoms which were more of a truly liquidy stool have improved by 50% but never more from there. His visit with a [facility] physicians assistant is available in epic and [facility] 89 [facility] with htn, hpld,  lbp, b12 def, hx bladderf ca, colon polyps

## 2023-06-30 ENCOUNTER — OFFICE VISIT (OUTPATIENT)
Dept: FAMILY MEDICINE | Facility: CLINIC | Age: 88
End: 2023-06-30
Payer: COMMERCIAL

## 2023-06-30 ENCOUNTER — APPOINTMENT (OUTPATIENT)
Dept: LAB | Facility: CLINIC | Age: 88
End: 2023-06-30
Payer: COMMERCIAL

## 2023-06-30 ENCOUNTER — OFFICE VISIT (OUTPATIENT)
Dept: OPHTHALMOLOGY | Facility: CLINIC | Age: 88
End: 2023-06-30
Payer: COMMERCIAL

## 2023-06-30 VITALS
SYSTOLIC BLOOD PRESSURE: 161 MMHG | BODY MASS INDEX: 21.07 KG/M2 | HEIGHT: 70 IN | WEIGHT: 147.2 LBS | OXYGEN SATURATION: 99 % | HEART RATE: 56 BPM | RESPIRATION RATE: 17 BRPM | DIASTOLIC BLOOD PRESSURE: 83 MMHG

## 2023-06-30 DIAGNOSIS — H01.006 BLEPHARITIS OF BOTH EYES, UNSPECIFIED EYELID, UNSPECIFIED TYPE: ICD-10-CM

## 2023-06-30 DIAGNOSIS — R19.5 LOOSE STOOLS: Primary | ICD-10-CM

## 2023-06-30 DIAGNOSIS — H52.4 PRESBYOPIA: ICD-10-CM

## 2023-06-30 DIAGNOSIS — H18.9 KERATOPATHY: ICD-10-CM

## 2023-06-30 DIAGNOSIS — H35.342 MACULAR HOLE, LEFT: ICD-10-CM

## 2023-06-30 DIAGNOSIS — Z96.1 PSEUDOPHAKIA: ICD-10-CM

## 2023-06-30 DIAGNOSIS — H57.11 PAIN OF RIGHT EYE: ICD-10-CM

## 2023-06-30 DIAGNOSIS — Z01.01 ENCOUNTER FOR EXAMINATION OF EYES AND VISION WITH ABNORMAL FINDINGS: Primary | ICD-10-CM

## 2023-06-30 DIAGNOSIS — R63.4 WEIGHT LOSS: ICD-10-CM

## 2023-06-30 DIAGNOSIS — H43.811 POSTERIOR VITREOUS DETACHMENT, RIGHT: ICD-10-CM

## 2023-06-30 DIAGNOSIS — H25.811 COMBINED FORMS OF AGE-RELATED CATARACT OF RIGHT EYE: ICD-10-CM

## 2023-06-30 DIAGNOSIS — K40.90 NON-RECURRENT UNILATERAL INGUINAL HERNIA WITHOUT OBSTRUCTION OR GANGRENE: ICD-10-CM

## 2023-06-30 DIAGNOSIS — R14.0 ABDOMINAL BLOATING: ICD-10-CM

## 2023-06-30 DIAGNOSIS — H01.003 BLEPHARITIS OF BOTH EYES, UNSPECIFIED EYELID, UNSPECIFIED TYPE: ICD-10-CM

## 2023-06-30 LAB
BASOPHILS # BLD AUTO: 0 10E3/UL (ref 0–0.2)
BASOPHILS NFR BLD AUTO: 1 %
C COLI+JEJUNI+LARI FUSA STL QL NAA+PROBE: NOT DETECTED
EC STX1 GENE STL QL NAA+PROBE: NOT DETECTED
EC STX2 GENE STL QL NAA+PROBE: NOT DETECTED
EOSINOPHIL # BLD AUTO: 0.1 10E3/UL (ref 0–0.7)
EOSINOPHIL NFR BLD AUTO: 2 %
ERYTHROCYTE [DISTWIDTH] IN BLOOD BY AUTOMATED COUNT: 12.5 % (ref 10–15)
HCT VFR BLD AUTO: 41.8 % (ref 40–53)
HEMOCCULT STL QL IA: NEGATIVE
HGB BLD-MCNC: 13.7 G/DL (ref 13.3–17.7)
IMM GRANULOCYTES # BLD: 0 10E3/UL
IMM GRANULOCYTES NFR BLD: 0 %
LIPASE SERPL-CCNC: 43 U/L (ref 13–60)
LYMPHOCYTES # BLD AUTO: 1.1 10E3/UL (ref 0.8–5.3)
LYMPHOCYTES NFR BLD AUTO: 17 %
MCH RBC QN AUTO: 32.5 PG (ref 26.5–33)
MCHC RBC AUTO-ENTMCNC: 32.8 G/DL (ref 31.5–36.5)
MCV RBC AUTO: 99 FL (ref 78–100)
MONOCYTES # BLD AUTO: 0.6 10E3/UL (ref 0–1.3)
MONOCYTES NFR BLD AUTO: 9 %
NEUTROPHILS # BLD AUTO: 4.5 10E3/UL (ref 1.6–8.3)
NEUTROPHILS NFR BLD AUTO: 72 %
NOROV GI+II ORF1-ORF2 JNC STL QL NAA+PR: NOT DETECTED
PLATELET # BLD AUTO: 196 10E3/UL (ref 150–450)
RBC # BLD AUTO: 4.21 10E6/UL (ref 4.4–5.9)
RVA NSP5 STL QL NAA+PROBE: NOT DETECTED
SALMONELLA SP RPOD STL QL NAA+PROBE: NOT DETECTED
SHIGELLA SP+EIEC IPAH STL QL NAA+PROBE: NOT DETECTED
TOTAL PROTEIN SERUM FOR ELP: 6.5 G/DL (ref 6.4–8.3)
V CHOL+PARA RFBL+TRKH+TNAA STL QL NAA+PR: NOT DETECTED
WBC # BLD AUTO: 6.2 10E3/UL (ref 4–11)
Y ENTERO RECN STL QL NAA+PROBE: NOT DETECTED

## 2023-06-30 PROCEDURE — 85025 COMPLETE CBC W/AUTO DIFF WBC: CPT | Performed by: FAMILY MEDICINE

## 2023-06-30 PROCEDURE — 83690 ASSAY OF LIPASE: CPT | Performed by: FAMILY MEDICINE

## 2023-06-30 PROCEDURE — 84165 PROTEIN E-PHORESIS SERUM: CPT

## 2023-06-30 PROCEDURE — 99214 OFFICE O/P EST MOD 30 MIN: CPT | Performed by: FAMILY MEDICINE

## 2023-06-30 PROCEDURE — 92015 DETERMINE REFRACTIVE STATE: CPT | Performed by: OPHTHALMOLOGY

## 2023-06-30 PROCEDURE — 36415 COLL VENOUS BLD VENIPUNCTURE: CPT | Performed by: FAMILY MEDICINE

## 2023-06-30 PROCEDURE — 92014 COMPRE OPH EXAM EST PT 1/>: CPT | Performed by: OPHTHALMOLOGY

## 2023-06-30 PROCEDURE — 84155 ASSAY OF PROTEIN SERUM: CPT | Performed by: FAMILY MEDICINE

## 2023-06-30 RX ORDER — SODIUM CHLORIDE 5 %
OINTMENT (GRAM) OPHTHALMIC (EYE)
Qty: 3.5 G | Refills: 4 | Status: SHIPPED | OUTPATIENT
Start: 2023-06-30

## 2023-06-30 ASSESSMENT — CONF VISUAL FIELD
OS_NORMAL: 1
OD_SUPERIOR_TEMPORAL_RESTRICTION: 0
OS_SUPERIOR_TEMPORAL_RESTRICTION: 0
OS_INFERIOR_TEMPORAL_RESTRICTION: 0
OS_INFERIOR_NASAL_RESTRICTION: 0
OD_INFERIOR_NASAL_RESTRICTION: 0
OD_SUPERIOR_NASAL_RESTRICTION: 0
OD_INFERIOR_TEMPORAL_RESTRICTION: 0
OS_SUPERIOR_NASAL_RESTRICTION: 0
OD_NORMAL: 1

## 2023-06-30 ASSESSMENT — REFRACTION_MANIFEST
OS_ADD: +3.25
OD_ADD: +3.25
OD_AXIS: 034
OS_AXIS: 165
OD_CYLINDER: +6.00
OS_SPHERE: -1.75
OS_CYLINDER: +2.50
OD_SPHERE: +0.75

## 2023-06-30 ASSESSMENT — REFRACTION_WEARINGRX
OD_SPHERE: +0.50
OS_AXIS: 165
OS_ADD: +3.25
SPECS_TYPE: TRIFOCAL
OS_CYLINDER: +2.50
OD_AXIS: 035
OD_ADD: +3.25
OS_SPHERE: -1.75
OD_CYLINDER: +6.00

## 2023-06-30 ASSESSMENT — TONOMETRY
OS_IOP_MMHG: 08
OD_IOP_MMHG: 10
IOP_METHOD: ICARE

## 2023-06-30 ASSESSMENT — VISUAL ACUITY
METHOD: SNELLEN - LINEAR
OS_CC: 20/150
CORRECTION_TYPE: GLASSES
OS_PH_CC: 20/125
OD_CC+: -1
OD_CC: 20/30
METHOD_MR: OPPOSITE SIGNS IN SPHERE
OS_PH_CC+: -1

## 2023-06-30 ASSESSMENT — EXTERNAL EXAM - LEFT EYE: OS_EXAM: PROLAPSED FAT PADS: UPPER, LOWER; MILD-MOD BROW

## 2023-06-30 ASSESSMENT — PAIN SCALES - GENERAL: PAINLEVEL: NO PAIN (0)

## 2023-06-30 ASSESSMENT — EXTERNAL EXAM - RIGHT EYE: OD_EXAM: PROLAPSED FAT PADS: UPPER, LOWER; MILD-MOD BROW

## 2023-06-30 ASSESSMENT — CUP TO DISC RATIO
OS_RATIO: 0.4
OD_RATIO: 0.4

## 2023-06-30 ASSESSMENT — ENCOUNTER SYMPTOMS: DIARRHEA: 1

## 2023-06-30 NOTE — Clinical Note
6/30/2023         RE: Jose Manuel Gallo  [address] [place] [address]        Dear Colleague,    Thank you for referring your patient, Jose Manuel Gallo, to the [facility]. Please see a copy of my visit note below.    No notes on file    Again, thank you for allowing me to participate in the care of your patient.        Sincerely,        Bonifacio Scales MD

## 2023-06-30 NOTE — PROGRESS NOTES
Assessment & Plan     Loose stools  Most likely functional. Reviewed past work, has been negative so far. Discussed possible IBS, malabsorption   - CBC with platelets and differential; Future  - CBC with platelets and differential    Weight loss  Has had modest weight loss; work up for infectious causes not yielding, possibly occult malignancy; FIT negative, recent PSA was normal. Might consider a colonoscopy or CT chest abdomen, pelvis  - Lipase; Futuer  - Protein electrophoresis; Future  - CBC with platelets and differential; Future  - Lipase  - Protein electrophoresis  - CBC with platelets and differential    Abdominal bloating    - Lipase; Future  - Protein electrophoresis; Future  - CBC with platelets and differential; Future  - Lipase  - Protein electrophoresis  - CBC with platelets and differential    Possible  unilateral inguinal hernia Rt    Finding consistent with inguinal hernia, might consider evaluation with CT      See Patient Instructions    Walker Sorto MD  [facility] [place]  Diarrhea -- In diarrhea-prone patients with IBS, the stools are characteristically loose and frequent but of normal total daily volume. In patients with diarrhea-predominant symptoms, we use antidiarrheals (eg, loperamide) as initial treatment and use bile acid sequestrants as second-line therapy     Subjective   Enmanuel is a 88 year old, presenting for the following health issues:  Diarrhea    Loose stools: x 4 months  Had work up which was normal,     Stool:     All of a sudden with very loose about twice a day.    Color: normal color, no blood    Has abdominal pain; also tender with pushing,     Feels gaseous as well, does rumbling    No aggravating and relieving factor    Appetite is good    Has lost weight    Flatulance is wet    Weight change:  Wt Readings from Last 4 Encounters:   06/30/23 66.8 kg (147 lb 3.2 oz)   02/17/23 68 kg (150 lb)   02/13/23 68.5 kg (151 lb)   08/22/22 67.6 kg (149 lb)  "      Wt Readings from Last 10 Encounters:   06/30/23 66.8 kg (147 lb 3.2 oz)   02/17/23 68 kg (150 lb)   02/13/23 68.5 kg (151 lb)   08/22/22 67.6 kg (149 lb)   06/07/22 71.5 kg (157 lb 9.6 oz)   01/26/22 73.9 kg (163 lb)   01/11/22 74 kg (163 lb 3.2 oz)   11/18/21 72.1 kg (159 lb)   10/13/21 72.6 kg (160 lb)   05/25/21 73.5 kg (162 lb)         6/30/2023    11:47 AM   Additional Questions   Roomed by Toña LITTLE   Accompanied by self   Diarrhea, unspecified type  This patient has had a refractory chronic problem now past 3 months of a continuous soft stool that's not normal. He's only going once a day with bowel movement and so technically this more of a loose stool problem and not a odalys diarrhea but this is splitting hairs in definition and fundamentally patient is concerned with his issue. 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 He's been treated for this nevertheless with Flagyl (Metronidazole).    Diarrhea    History of Present Illness       Reason for visit:  DiarrheaHe consumes 2 sweetened beverage(s) daily. He is missing 1 dose(s) of medications per week.     Review of Systems   Gastrointestinal: Positive for diarrhea.         Objective    BP (!) 161/83 (BP Location: Left arm, Cuff Size: Adult Regular)   Pulse 56   Resp 17   Ht 1.79 m (5' 10.47\")   Wt 66.8 kg (147 lb 3.2 oz)   SpO2 99%   BMI 20.84 kg/m    Body mass index is 20.84 kg/m .  Physical Exam   GENERAL: healthy, alert and no distress  RESP: lungs clear to auscultation - no rales, rhonchi or wheezes  CV: regular rate and rhythm, normal S1 S2, no S3 or S4, no murmur, click or rub, no peripheral edema  ABDOMEN: soft, nontender, no hepatosplenomegaly, no masses and bowel sounds normal  MS: no gross musculoskeletal defects noted, no edema    "

## 2023-06-30 NOTE — PATIENT INSTRUCTIONS
"Glasses prescription given - optional  May use artificial tears up to four times a day (like Refresh Optive, Systane Balance, TheraTears, or generic artificial tears are ok. Avoid \"get the red out\" drops).   Start Christian 128 ointment- one squirt both eyes at bedtime.   Call in about a month if you are still having eye pain.   Could add sulfa/pred if Christian and increased lube not of benefit.  Call in February 2024 for an appointment in June 2024 for Complete Exam    Dr. Scales [phone]    "

## 2023-06-30 NOTE — RESULT ENCOUNTER NOTE
Jose Manuel Gallo    Stool tests for bacterial infection and blood loss are normal/negative.     Other tests pending    Sincerely,       DESIRAE LEMON M.D.   (For Dr Braxton)

## 2023-06-30 NOTE — PROGRESS NOTES
" Current Eye Medications:  Systane twice daily both eyes as needed.     Subjective:  PT reports decreased vision in both eyes for the last 6 months. PT notes eyes feel constantly feel sore. PT notes he gets sharp pain in the right eye every morning. The pain typically lasts for about an hour. Occasional floaters and no flashes.     Objective:  See Ophthalmology Exam.       Assessment:  Stable eye exam.  Corneal erosion symptoms right eye or due to keratopathy?      ICD-10-CM    1. Encounter for examination of eyes and vision with abnormal findings  Z01.01       2. Presbyopia  H52.4 REFRACTIVE STATUS      3. Combined forms of age-related cataract, mild-mod, of right eye  H25.811 EYE EXAM (SIMPLE-NONBILLABLE)      4. Pseudophakia, Yag Caps, os  Z96.1       5. Hx of vitrectomy for chronic macular hole, os (ER)  H35.342       6. Blepharitis of both eyes, unspecified eyelid, unspecified type  H01.003     H01.006       7. Pain of right eye  H57.11       8. Keratopathy  H18.9 sodium chloride (CHRISTIAN 128) 5 % ophthalmic ointment      9. Posterior vitreous detachment, right  H43.811            Plan:  Glasses prescription given - optional  May use artificial tears up to four times a day (like Refresh Optive, Systane Balance, TheraTears, or generic artificial tears are ok. Avoid \"get the red out\" drops).   Start Christian 128 ointment- one squirt both eyes at bedtime.   Call in about a month if you are still having eye pain.   Could add sulfa/pred if Christian and increased lube not of benefit.  Call in February 2024 for an appointment in June 2024 for Complete Exam    Dr. Scales [phone]         "

## 2023-07-03 ENCOUNTER — TRANSFERRED RECORDS (OUTPATIENT)
Dept: HEALTH INFORMATION MANAGEMENT | Facility: CLINIC | Age: 88
End: 2023-07-03

## 2023-07-03 LAB
ALBUMIN SERPL ELPH-MCNC: 4 G/DL (ref 3.7–5.1)
ALPHA1 GLOB SERPL ELPH-MCNC: 0.2 G/DL (ref 0.2–0.4)
ALPHA2 GLOB SERPL ELPH-MCNC: 0.7 G/DL (ref 0.5–0.9)
B-GLOBULIN SERPL ELPH-MCNC: 0.7 G/DL (ref 0.6–1)
GAMMA GLOB SERPL ELPH-MCNC: 1 G/DL (ref 0.7–1.6)
M PROTEIN SERPL ELPH-MCNC: 0.1 G/DL
O+P STL MICRO: NEGATIVE
PROT PATTERN SERPL ELPH-IMP: ABNORMAL

## 2023-07-03 NOTE — RESULT ENCOUNTER NOTE
Jose Manuel Gallo    The rest of the stool tests are negative for parasite//bowel infection    Sincerely,       DESIRAE LEMON M.D.

## 2023-07-11 ENCOUNTER — OFFICE VISIT (OUTPATIENT)
Dept: DERMATOLOGY | Facility: CLINIC | Age: 88
End: 2023-07-11
Payer: COMMERCIAL

## 2023-07-11 DIAGNOSIS — D04.4 SQUAMOUS CELL CARCINOMA IN SITU OF SCALP: Primary | ICD-10-CM

## 2023-07-11 PROCEDURE — 99024 POSTOP FOLLOW-UP VISIT: CPT | Performed by: DERMATOLOGY

## 2023-07-11 ASSESSMENT — PAIN SCALES - GENERAL: PAINLEVEL: NO PAIN (0)

## 2023-07-11 NOTE — PROGRESS NOTES
[facility] Dermatology Note  Encounter Date: Jul 11, 2023  Office Visit     Dermatology Problem List:  1. 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 AKs  - HAK, right temple, s/p biopsy 3/29/2023  - HAK, left parietal scalp, s/p biopsy 3/29/2023  -s/p cryo   3. ISKs  -s/p cryo     ____________________________________________    Assessment & Plan:    #Mohs site- parietal scalp -Discussed continuing with wound care.   Will treat with silver nitrate.       Procedures Performed:   None.     Follow-up: prn for new or changing lesions    Staff and Scribe:     Scribe Disclosure:   I, Denai Galvan, am serving as a scribe to document services personally performed by this physician, Dr. Hector Martell, based on data collection and the provider's statements to me.          Attending Attestation  I attest that the Scribe recorded the interview and exam that I personally performed.  I have reviewed the note and edited it as necessary.    Hector Martell M.D.  Professor  Director of Dermatologic Surgery  Department of Dermatology  [facility]    ____________________________________________    CC: Wound Check (Mohs site: parietal scalp DOS: 05-/Pt feels it is doing good, no issues )    HPI:  Mr. Jose Manuel Gallo is a(n) 88 year old male who presents today as a return patient for a wound check.     Last seen 6/13/23 for a wound check. At that time pt was recommended to continue with wound care.    Patient is otherwise feeling well, without additional skin concerns.    Labs Reviewed:  N/A    Physical Exam:  Vitals: There were no vitals taken for this visit.  SKIN: Focused examination of the scalp was performed.  -parietal scalp-well healed flap  -Small area of proud flesh at the full thickness skin graft site    - No other lesions of concern on areas examined.     Medications:  Current Outpatient Medications   Medication     acetaminophen (TYLENOL) 650 MG CR tablet      carboxymethylcellulose PF (REFRESH LIQUIGEL) 1 % ophthalmic gel     cyanocobalamin (VITAMIN B-12) 1000 MCG tablet     lisinopril (ZESTRIL) 2.5 MG tablet     simvastatin (ZOCOR) 40 MG tablet     sodium chloride (ALTHEA 128) 5 % ophthalmic ointment     Current Facility-Administered Medications   Medication     lidocaine (PF) (XYLOCAINE) 1 % injection 4 mL     lidocaine (PF) (XYLOCAINE) 1 % injection 4 mL     lidocaine 1 % injection 4 mL     lidocaine 1 % injection 4 mL     methylPREDNISolone (DEPO-MEDROL) injection 80 mg     methylPREDNISolone (DEPO-MEDROL) injection 80 mg     methylPREDNISolone (DEPO-MEDROL) injection 80 mg     methylPREDNISolone (DEPO-MEDROL) injection 80 mg     methylPREDNISolone (DEPO-MEDROL) injection 80 mg      Past Medical History:   Patient Active Problem List   Diagnosis     Erectile dysfunction     Osteoarthritis     HYPERLIPIDEMIA LDL GOAL <130     Advanced directives, counseling/discussion     Hypertension goal BP (blood pressure) < 140/90     History of bladder cancer     CKD (chronic kidney disease) stage 3, GFR 30-59 ml/min (H)     Pseudophakia, Yag Caps, os     Dizziness     Posterior vitreous detachment, right     Hx of vitrectomy for chronic macular hole, os (ER)     Other idiopathic scoliosis, thoracolumbar region     Spondylosis of lumbar region without myelopathy or radiculopathy     Brittle nails     Combined forms of age-related cataract, mild-mod, of right eye     Pain of right eye     History of shingles     Low vitamin B12 level     B12 nutritional deficiency     Occipital neuralgia of left side     Post-nasal drip     Unintended weight loss     Dysphagia, unspecified type     Past Medical History:   Diagnosis Date     Cataract, mild, od; mod, os 07/02/2012     Chronic low back pain      CKD (chronic kidney disease) stage 3, GFR 30-59 ml/min (H)      Erectile dysfunction      Hemorrhoids      History of bladder cancer          HTN      Hyperlipidemia LDL goal <130       Kidney stones      Macular degeneration      Osteoarthritis      Personal history of colonic polyps     10/2002     Personal history of malignant neoplasm of bladder      Personal history of tobacco use     Smoked 40 years, Quit 2003     PVD (peripheral vascular disease) (H)     Left Vertebral Artery occlusion     Rotator cuff tendinitis, left      Squamous cell carcinoma of skin, unspecified         CC No referring provider defined for this encounter. on close of this encounter.

## 2023-07-11 NOTE — NURSING NOTE
Jose Manuel Gallo's chief complaint for this visit includes:  Chief Complaint   Patient presents with     Wound Check     Mohs site: parietal scalp DOS: 05-  Pt feels it is doing good, no issues      PCP: Orlando Braxton    Referring Provider:  No referring provider defined for this encounter.    There were no vitals taken for this visit.  No Pain (0)      No Known Allergies      Do you need any medication refills at today's visit?    No.       Page Pat LPN

## 2023-07-11 NOTE — LETTER
7/11/2023         RE: Jose Manuel Gallo  [address] [place] [address]        Dear Colleague,    Thank you for referring your patient, Jose Manuel Gallo, to the [facility]. Please see a copy of my visit note below.    [facility] Dermatology Note  Encounter Date: Jul 11, 2023  Office Visit     Dermatology Problem List:  1. 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 AKs  - HAK, right temple, s/p biopsy 3/29/2023  - HAK, left parietal scalp, s/p biopsy 3/29/2023  -s/p cryo   3. ISKs  -s/p cryo     ____________________________________________    Assessment & Plan:    #Mohs site- parietal scalp -Discussed continuing with wound care.   Will treat with silver nitrate.       Procedures Performed:   None.     Follow-up: prn for new or changing lesions    Staff and Scribe:     Scribe Disclosure:   I, Denia Galvan, am serving as a scribe to document services personally performed by this physician, Dr. Hector Martell, based on data collection and the provider's statements to me.          Attending Attestation  I attest that the Scribe recorded the interview and exam that I personally performed.  I have reviewed the note and edited it as necessary.    Hector Martell M.D.  Professor  Director of Dermatologic Surgery  Department of Dermatology  [facility]    ____________________________________________    CC: Wound Check (Mohs site: parietal scalp DOS: 05-/Pt feels it is doing good, no issues )    HPI:  Mr. Jose Manuel Gallo is a(n) 88 year old male who presents today as a return patient for a wound check.     Last seen 6/13/23 for a wound check. At that time pt was recommended to continue with wound care.    Patient is otherwise feeling well, without additional skin concerns.    Labs Reviewed:  N/A    Physical Exam:  Vitals: There were no vitals taken for this visit.  SKIN: Focused examination of the scalp was  performed.  -parietal scalp-well healed flap  -Small area of proud flesh at the full thickness skin graft site    - No other lesions of concern on areas examined.     Medications:  Current Outpatient Medications   Medication     acetaminophen (TYLENOL) 650 MG CR tablet     carboxymethylcellulose PF (REFRESH LIQUIGEL) 1 % ophthalmic gel     cyanocobalamin (VITAMIN B-12) 1000 MCG tablet     lisinopril (ZESTRIL) 2.5 MG tablet     simvastatin (ZOCOR) 40 MG tablet     sodium chloride (ALTHEA 128) 5 % ophthalmic ointment     Current Facility-Administered Medications   Medication     lidocaine (PF) (XYLOCAINE) 1 % injection 4 mL     lidocaine (PF) (XYLOCAINE) 1 % injection 4 mL     lidocaine 1 % injection 4 mL     lidocaine 1 % injection 4 mL     methylPREDNISolone (DEPO-MEDROL) injection 80 mg     methylPREDNISolone (DEPO-MEDROL) injection 80 mg     methylPREDNISolone (DEPO-MEDROL) injection 80 mg     methylPREDNISolone (DEPO-MEDROL) injection 80 mg     methylPREDNISolone (DEPO-MEDROL) injection 80 mg      Past Medical History:   Patient Active Problem List   Diagnosis     Erectile dysfunction     Osteoarthritis     HYPERLIPIDEMIA LDL GOAL <130     Advanced directives, counseling/discussion     Hypertension goal BP (blood pressure) < 140/90     History of bladder cancer     CKD (chronic kidney disease) stage 3, GFR 30-59 ml/min (H)     Pseudophakia, Yag Caps, os     Dizziness     Posterior vitreous detachment, right     Hx of vitrectomy for chronic macular hole, os (ER)     Other idiopathic scoliosis, thoracolumbar region     Spondylosis of lumbar region without myelopathy or radiculopathy     Brittle nails     Combined forms of age-related cataract, mild-mod, of right eye     Pain of right eye     History of shingles     Low vitamin B12 level     B12 nutritional deficiency     Occipital neuralgia of left side     Post-nasal drip     Unintended weight loss     Dysphagia, unspecified type     Past Medical History:    Diagnosis Date     Cataract, mild, od; mod, os 07/02/2012     Chronic low back pain      CKD (chronic kidney disease) stage 3, GFR 30-59 ml/min (H)      Erectile dysfunction      Hemorrhoids      History of bladder cancer          HTN      Hyperlipidemia LDL goal <130      Kidney stones      Macular degeneration      Osteoarthritis      Personal history of colonic polyps     10/2002     Personal history of malignant neoplasm of bladder      Personal history of tobacco use     Smoked 40 years, Quit 2003     PVD (peripheral vascular disease) (H)     Left Vertebral Artery occlusion     Rotator cuff tendinitis, left      Squamous cell carcinoma of skin, unspecified         CC No referring provider defined for this encounter. on close of this encounter.    Again, thank you for allowing me to participate in the care of your patient.        Sincerely,        Hector Martell MD

## 2023-07-17 ENCOUNTER — MYC MEDICAL ADVICE (OUTPATIENT)
Dept: INTERNAL MEDICINE | Facility: CLINIC | Age: 88
End: 2023-07-17
Payer: COMMERCIAL

## 2023-07-17 NOTE — PROGRESS NOTES
"[facility] Interventional Evaluation    Date of visit: 7/17/2023    Reason for consultation:    Jose Manuel Gallo is a 88 year old male who is seen in consultation today at the request of his provider, Dr. Orlando Braxton Primary Care Provider  re: patient's need for interventional evaluation re: spondylosis of the lumbar region without myelopathy or radiculopathy and primary osteoarthritis of unspecified site.       Primary Care Provider is Orlando Braxton.  Pain medications are being prescribed by [place]  review, on no opiate medications        Chief Complaint:  Left sided low back pain  Chief Complaint   Patient presents with     Pain       Pain history:  Jose Manuel Gallo is a 88 year old male who first started having problems with pain as follows:     Long term low back pain on the left side. No radiation to the legs ever.   Has significant curvature of the lumbar spine and x-ray shows facet arthropathy  His pain has gotten markedly worse with more yardwork. He is able to do about 2 hours of lawnwork. Digging holes for landscaping, raking, bagging etc. He does not pace his activity. He will push through the pain.        Pain rating: intensity ranges from 0/10 to 10/10, and Averages varies but likely around 5-7 on a 0-10 scale.  Pain right now is a 2/10.   Describes pain as \"sharp.\"  Pain is almost always there.      Home self care includes: tries to change position. Leaning forward on his knees with his arms folded on his legs    Aggravating factors include: bending, lifting, carrying, yard work    Relieving factors include: laying down typically makes it better but getting back up causes some discomfort    Any bowel or bladder incontinence: none      Current pain-related medication treatments include:  -acetaminophen 650mg Q 8 hours PRN (somewhat helpful)  -ibuprofen 200mg takes 400mg Q 6 hours PRN (not really helpful)    Other pertinent medications:  -none    Previous medication " treatments included:  OPIATES:none  NSAIDS: ibuprofen (not very helpful)  MUSCLE RELAXANTS: none  ANTI-MIGRAINE MEDS: none  ANTI-DEPRESSANTS: none  SLEEP AIDS: melatonin (helpful)  ANTI-CONVULSANTS: gabapentin (tried 300mg TID in 2017 for short trial-used for shingles)  TOPICALS: tried OTC and CBD creams (not helpful)  ANXIOLYTICS: none  MEDICAL CANNABIS: none  Other meds: Tylenol (helpful)      Other treatments have included:  Jose Manuel Gallo has not been seen at a pain clinic in the past.    PT: none  Chiropractic care: tried it a couple of times, did not help  Acupuncture: none  TENs Unit: none    Injections: none    Past Medical History:  Past Medical History:   Diagnosis Date     Cataract, mild, od; mod, os 07/02/2012     Chronic low back pain      CKD (chronic kidney disease) stage 3, GFR 30-59 ml/min (H)      Erectile dysfunction      Hemorrhoids      History of bladder cancer          HTN      Hyperlipidemia LDL goal <130      Kidney stones      Macular degeneration      Osteoarthritis      Personal history of colonic polyps     10/2002     Personal history of malignant neoplasm of bladder      Personal history of tobacco use     Smoked 40 years, Quit 2003     PVD (peripheral vascular disease) (H)     Left Vertebral Artery occlusion     Rotator cuff tendinitis, left      Squamous cell carcinoma of skin, unspecified      Past Surgical History:  Past Surgical History:   Procedure Laterality Date     CATARACT IOL, RT/LT       COLONOSCOPY  11/02/2008    Normal     HC REVISE ULNAR NERVE AT ELBOW      Left     LASER YAG CAPSULOTOMY  7/2016    left eye     PHACOEMULSIFICATION WITH STANDARD INTRAOCULAR LENS IMPLANT  7/2012    left eye     TURP  1997 ?     VASECTOMY       VITRECTOMY PARSPLANA  8/2011    left eye, repair macular hole     ZZC SPINAL FUSION,ANT,EA ADNL LEVEL      C6-7     Medications:  Current Outpatient Medications   Medication Sig Dispense Refill     acetaminophen (TYLENOL) 650 MG CR tablet  "Take 1 tablet (650 mg) by mouth every 8 hours as needed for pain 60 tablet 1     cyanocobalamin (VITAMIN B-12) 1000 MCG tablet Take 1 tablet (1,000 mcg) by mouth daily 90 tablet 3     lisinopril (ZESTRIL) 2.5 MG tablet Take 1 tablet (2.5 mg) by mouth daily 90 tablet 4     simvastatin (ZOCOR) 40 MG tablet Take 1 tablet (40 mg) by mouth daily 90 tablet 4     sodium chloride (ALTHEA 128) 5 % ophthalmic ointment Apply a small squirt approx. 1/4\" into both eyes at bedtime. 3.5 g 4     carboxymethylcellulose PF (REFRESH LIQUIGEL) 1 % ophthalmic gel Place 1 drop into both eyes 4 times daily (Patient not taking: Reported on 7/18/2023) 1 Bottle 11     Allergies:   No Known Allergies  Social History:  Home situation:  (61 years in 2023). Lives with spouse. Only adult child, Sydni  Occupation/Schooling: retired since 1994. He worked in the "[employer] industry  Tobacco use: quit smoking in about 1997  Alcohol use: none  Drug use: none  History of chemical dependency treatment: none    Family history:  Family History   Problem Relation Age of Onset     Osteoporosis Mother      Diabetes Father      Arthritis Father      Cancer Father      Respiratory Father      Genitourinary Problems Brother      Circulatory Brother      Macular Degeneration No family hx of      Glaucoma No family hx of      Thyroid Disease No family hx of      Hypertension No family hx of          Review of Systems:  The 14 system ROS was reviewed with the patient and is positive for: positives are in bold  Constitutional: fever/chills, fatigue, weight gain, weight loss  Eyes/Head: headache, dizziness  ENT: ringing in ears (intermittent)  Allergy/Immune: allergies  Skin: itching, rash, hives  Hematologic: easy bruising  Respiratory: cough, wheezing, shortness of breath  Cardiovascular: swelling in feet, fainting, palpitations, chest pain  GI: abdominal pain, nausea, vomiting, diarrhea, constipation  Endocrine: steroid use  Musculoskeletal:  joint pain, " arthritis, stiffness, gout, back pain, neck pain  Urinary: frequency, urgency, incontinence, hesitancy  Neurologic: weakness, numbness/tingling, seizure, stroke, memory loss  Mental health: depression, anxiety, stress, suicidal ideation      Physical Exam:  Vitals:    07/18/23 0850   BP: (!) 146/80   Pulse: 62     Exam:  Constitutional: healthy, alert and no distress  Head: normocephalic. Atraumatic.   Eyes: no redness or jaundice noted   ENT: oropharnx normal.  MMM.   Cardiovascular: RRR no m/g/r   Respiratory: clear to auscultation A/P. Respirations easy and unlabored. Able to speak in full sentences without SOB or cough noted.    Gastrointestinal: soft, non-tender   : deferred  Skin: scattered bruising  Psychiatric: mentation appears normal and affect normal/bright    Musculoskeletal exam:  Gait/Station/Posture: fair posture  Normal gait. Able to rise onto toes and heels.  Tandem gait was shaky.     Cervical spine:    Flex:  20 degrees   Ext: 20 degrees   Rotation to right: 70 degrees   Rotation to left: 70 degrees   Ext/rotation to the right pain free   Ext/rotation to the left pain free    Thoracic spine:  Normal     Lumbar spine:    Flex:  70 degrees   Ext: 15 degrees, painful   Rotation/ext to right: very mild pain   Rotation/ext to left: painful    SI joints: Non-tender bilaterally    Piriformis: Non-tender bilaterally    Greater trochanters: Non-tender bilaterally     Myofascial tenderness:  Mild left sided low back  Straight leg exam: negative  Devon's maneuver: not done    Neurologic exam:  CN:  Cranial nerves 2-12 are  Grossly normal  Motor:  5/5 UE and LE strength  Reflexes:     Biceps:     R:  1/4 L: 1/4   Brachioradialis   R:  1/4 L: 1/4      Patella:  R:  2/4 L: 2/4   Achilles:  R:  1/4 L: 1/4    Sensory:  (upper and lower extremities):   Light touch: normal    Vibration: normal    Pin prick: normal patchy bilaterally   Allodynia: absent    Dysethesia: absent    Hyperalgesia: absent          Diagnostic tests:  LUMBAR SPINE TWO - THREE VIEWS 1/11/2022 9:39 AM     INDICATION: Osteoarthritis of spine with radiculopathy, lumbar region.     COMPARISON: 7/13/2017                                                                      IMPRESSION: Exam numbered assuming the last rib-bearing vertebral body  is T12. Convex right lumbar angulation centered across L1-L2 where  there is advanced left-sided interspace and endplate degeneration  measures approximately 36 degrees between T12 and L3 not substantially  changed versus prior. Sagittal alignment appears preserved. Normal  lumbar vertebral body heights. No fracture. Moderate lower lumbar  spine facet arthropathy.     ALEJANDRA SOTO MD         Other testing (labs, diagnostics):  3/6/2023  Cr. 1.30  Est GFR 57      Screening tools:     DIRE Score for ongoing opioid management is calculated as follows:    Diagnosis = 2    Intractability = 1    Risk: Psych = 3  Chem Hlth = 3  Reliability = 3  Social = 3    Efficacy = 2    Total DIRE Score = 17 (14 or higher predicts good candidate for ongoing opioid management; 13 or lower predicts poor candidate for opioid management)         Assessment:  1. Lumbar facet joint pain  2. 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 Chronic left-sided low back pain without sciatica  4. Primary osteoarthritis, unspecified site  5. PMHx includes: Cataracts mild OD moderate OS (2012), hypertension, chronic low back pain, peripheral vascular disease, erectile dysfunction, personal history of malignant neoplasm of the bladder, osteoarthritis, hemorrhoids, kidney stones, personal history of tobacco use (smoked 40 years), personal history of colonic polyps (2002), macular degeneration, hyperlipidemia, chronic kidney disease stage III GFR 30-59 mL/minute, rotator cuff tendinitis left, squamous cell carcinoma of skin  6. PSHx includes: Colonoscopy (2008), vitrectomy parsplana (2011), phacoemulsification with  Quality 226: Preventive Care And Screening: Tobacco Use: Screening And Cessation Intervention: Patient screened for tobacco and is an ex-smoker standard intraocular lens implant of the left eye (2012), laser YAG capsulotomy left eye (2016), TURP (1997), cervical spinal fusion C6-7, left ulnar nerve transposition at the elbow, vasectomy.      Plan:  Diagnosis reviewed, treatment option addressed, and risk/benefits discussed.  Self-care instructions given.  I am recommending a multidisciplinary treatment plan to help this patient better manage his pain.      1. Physical Therapy: ordered. Also to do TENS unit trial with PHYSICAL THERAPY   2. Clinical Health Psychologist to address issues of relaxation, behavioral change, coping style, and other factors important to improvement: none  3. Diagnostic Studies: none at present  4. Medication Management:   1. Start gabapentin 100mg. Take 1 cap at bedtime for 7 days, then 1 capsule in the morning and at night for 7 days, then take 1 cap three times daily for 7 days, then 1 in the morning and afternoon and 2 at bedtime. If side effects, then reduce to last tolerable dosage.  2. Trial lidocaine patches, there are 4% strength OTC available  5. Further procedures recommended: schedule left sided lumbar MBB to lumbar RFA as indicated. Our office to contact you  6. Recommendations/follow-up for PCP:  See above  7. Release of information: none  8. Follow up: 8 weeks can be in-person or video.        ASSESSMENT AND PLAN:  (M54.59) Lumbar facet joint pain  (primary encounter diagnosis)  Plan: gabapentin (NEURONTIN) 100 MG capsule, Physical        Therapy Referral, PAIN INJECTION         EVAL/TREAT/FOLLOW UP            (M47.816) Spondylosis of lumbar region without myelopathy or radiculopathy  Plan: gabapentin (NEURONTIN) 100 MG capsule, Physical        Therapy Referral, PAIN INJECTION         EVAL/TREAT/FOLLOW UP            (M54.50,  G89.29) Chronic left-sided low back pain without sciatica  Plan: gabapentin (NEURONTIN) 100 MG capsule, Physical        Therapy Referral, PAIN INJECTION         EVAL/TREAT/FOLLOW UP             (M19.91) Primary osteoarthritis, unspecified site  Plan: Physical Therapy Referral, PAIN INJECTION         EVAL/TREAT/FOLLOW UP               BILLING TIME DOCUMENTATION:   TOTAL TIME includes:   Time spent preparing to see the patient: 5 minutes (reviewing records and tests)  Time spend face to face with the patient: 75 minutes  Time spent ordering tests, medications, procedures and referrals: 0 minutes  Time spent Referring and communicating with other healthcare professionals: 0 minutes  Documenting clinical information in Epic: 7 minutes    The total TIME spent on this patient on the day of the appointment was 87 minutes.      Kayley NIXON, RN CNP, FNP  [facility]  [facility] [place]

## 2023-07-18 ENCOUNTER — OFFICE VISIT (OUTPATIENT)
Dept: PALLIATIVE MEDICINE | Facility: CLINIC | Age: 88
End: 2023-07-18
Attending: INTERNAL MEDICINE
Payer: COMMERCIAL

## 2023-07-18 VITALS — DIASTOLIC BLOOD PRESSURE: 80 MMHG | SYSTOLIC BLOOD PRESSURE: 146 MMHG | HEART RATE: 62 BPM

## 2023-07-18 DIAGNOSIS — M47.816 SPONDYLOSIS OF LUMBAR REGION WITHOUT MYELOPATHY OR RADICULOPATHY: ICD-10-CM

## 2023-07-18 DIAGNOSIS — M54.59 LUMBAR FACET JOINT PAIN: Primary | ICD-10-CM

## 2023-07-18 DIAGNOSIS — G89.29 CHRONIC LEFT-SIDED LOW BACK PAIN WITHOUT SCIATICA: ICD-10-CM

## 2023-07-18 DIAGNOSIS — M19.91 PRIMARY OSTEOARTHRITIS, UNSPECIFIED SITE: ICD-10-CM

## 2023-07-18 DIAGNOSIS — M54.50 CHRONIC LEFT-SIDED LOW BACK PAIN WITHOUT SCIATICA: ICD-10-CM

## 2023-07-18 PROCEDURE — 99205 OFFICE O/P NEW HI 60 MIN: CPT | Mod: 24 | Performed by: NURSE PRACTITIONER

## 2023-07-18 RX ORDER — IBUPROFEN 200 MG
400 TABLET ORAL EVERY 6 HOURS PRN
COMMUNITY

## 2023-07-18 RX ORDER — GABAPENTIN 100 MG/1
CAPSULE ORAL
Qty: 120 CAPSULE | Refills: 0 | Status: SHIPPED | OUTPATIENT
Start: 2023-07-18 | End: 2024-06-12 | Stop reason: SINTOL

## 2023-07-18 ASSESSMENT — PAIN SCALES - GENERAL: PAINLEVEL: MILD PAIN (2)

## 2023-07-18 NOTE — PATIENT INSTRUCTIONS
PLAN:  Physical Therapy: ordered. Also to do TENS unit trial with PHYSICAL THERAPY   Clinical Health Psychologist to address issues of relaxation, behavioral change, coping style, and other factors important to improvement: none  Diagnostic Studies: none at present  Medication Management:   Start gabapentin 100mg. Take 1 cap at bedtime for 7 days, then 1 capsule in the morning and at night for 7 days, then take 1 cap three times daily for 7 days, then 1 in the morning and afternoon and 2 at bedtime. If side effects, then reduce to last tolerable dosage.  Trial lidocaine patches, there are 4% strength OTC available  Further procedures recommended: schedule left sided lumbar MBB to lumbar RFA as indicated. Our office to contact you  Recommendations/follow-up for PCP:  See above  Release of information: none  Follow up: 8 weeks can be in-person or video.    ----------------------------------------------------------------  Clinic Number:  [phone]   Call with any questions about your care and for scheduling assistance.   Calls are returned Monday through Friday between 8 AM and 4:30 PM. We usually get back to you within 2 business days depending on the issue/request.    If we are prescribing your medications:  For opioid medication refills, call the clinic or send a twtMob message 7 days in advance.  Please include:  Name of requested medication  Name of the pharmacy.  For non-opioid medications, call your pharmacy directly to request a refill. Please allow 3-4 days to be processed.   Per [place] State Law:  All controlled substance prescriptions must be filled within 30 days of being written.    For those controlled substances allowing refills, pickup must occur within 30 days of last fill.      We believe regular attendance is key to your success in our program!    Any time you are unable to keep your appointment we ask that you call us at least 24 hours in advance to cancel.This will allow us to offer the appointment  time to another patient.   Multiple missed appointments may lead to dismissal from the clinic.

## 2023-07-20 ENCOUNTER — TELEPHONE (OUTPATIENT)
Dept: PALLIATIVE MEDICINE | Facility: CLINIC | Age: 88
End: 2023-07-20
Payer: COMMERCIAL

## 2023-07-20 NOTE — TELEPHONE ENCOUNTER
Screening questions for MBB Injections:    Injection to be done at which interventional clinic site? [facility] [place]    Procedure ordered by Kayley Alexander     Procedure ordered? Lumbar Medial Branch Block -left sided lumbar medial branch blocks proceeding to lumbar RFA as indicated. Likely at L3-4 and L4-5 but final levels determined by interventionalist LMRICHY #1    What insurance would patient like us to bill for this procedure? UCARE & MEDICARE       MEDICA: REQUIRES A PA FOR BOTH MBB     Worker's comp- Any injection DO NOT SCHEDULE and route to Chava Mendoza.      [employer] insurance - If scheduling an SI joint injection DO NOT SCHEDULE and route to Bibiana Randall.       MBBs must be scheduled with elapsed time interval of at least 2 weeks and not more than 6 months between the First MBB and the Second MBB for insurance purposes       [employer] - Any injection besides hip/shoulder/knee joint DO NOT SCHEDULE and route to Bibiana Randall. She will obtain PA and call pt back to schedule procedure or notify pt of denial.       [employer]- PA required for all MBB's      **BCBS- ALL need to be routed to Bibiana for review if a PA is needed**    IF SCHEDULING IN [place] PAIN OR SPINE PLEASE SCHEDULE AT LEAST 7-10         BUSINESS DAYS OUT SO A PA CAN BE OBTAINED      Genicular Nerve blocks- ALL insurances except for- [employer], Medicare (straight not supplement) and [employer]. Need to be reviewed by Bibiana before scheduling.       Is patient scheduled at [facility]? No    If YES, route every encounter to [facility] CARE NAVIGATION POOL [[phone]]    Any chance of pregnancy? Not Applicable   If YES, do NOT schedule and route to RN pool  - Dr. Martinez route to Shelby Fired and PM&R Nurse  [[id number]]      Is an  needed? No     Patient has a drive home? (mandatory) Yes     Is patient taking any blood thinners (plavix, coumadin, jantoven, warfarin, heparin, pradaxa or  dabigatran )? No    If hold needed, do NOT schedule, route to RN pool/ Dr. Martinez's Team     Is patient taking any aspirin products? No     If more than 325mg/day, OK to schedule; Instruct pt to decrease to less than 325 mg for 7 days AND route to RN pool/ Dr. Martinez's Team    For CERVICAL procedures, hold all aspirin products for 6 days.     Tell pt that if aspirin product is not held for 6 days, the procedure WILL BE cancelled.      Does the patient have a bleeding or clotting disorder? No    If YES, okay to schedule AND route to RN nurse pool/ Dr. Martinez's Team    **For any patients with platelet count <100, must be forwarded to provider**    Is patient diabetic? NO If YES, have them bring their glucometer.    Does patient have an active infection or treated for one within the past week? No    Is patient currently taking any antibiotics?  No    For patients on chronic, preventative, or prophylactic antibiotics, procedures may be scheduled.     For patients on antibiotics for active or recent infection:antibiotic course must have been completed for 4 days    Is patient currently taking any steroid medications? (i.e. Prednisone, Medrol)  No     For patients on steroid medications, course must have been completed for 4 days    Is patient actively being treated for cancer or immunocompromised? No   If YES, do NOT schedule and route to HANNAH/ Dr. Gonzalezs Team    Are you able to get on and off an exam table with minimal or no assistance? Yes   If NO, do NOT schedule and route to RN/ Dr. Gonzalezs Team    Are you able to roll over and lay on your stomach with minimal or no assistance? Yes   If NO, do NOT schedule and route to HANNAH/ Dr. Gonzalezs Team    Any allergies to contrast dye, iodine, shellfish, or numbing and steroid medications? No  (If so, inform nursing and note in scheduling comments.)    Allergies: Patient has no known allergies.     Does patient have an MRI/CT? No: Pt had a Lumbar Xray on 01/11/22 Check  Procedure Scheduling Grid to see if required.      Was the MRI done within the last 3 years?  NA    If yes, [facility], [facility], [facility] etc?   [facility]     If no, do not schedule and route to nursing/ Dr. Martinez's Team    If MRI was not done at [facility], [facility] or [facility] do NOT schedule and route to nursing.      If pt has an imaging disc, the injection MAY be scheduled but pt has to bring disc to appt.     If they show up without the disc the injection cannot be done    Is patient able to transfer to a procedure table with minimal or no assistance? Yes  If NO, do NOT schedule and route to RN/ Dr. Martinez's Team    Medial Branch Block Pre-Procedure Instructions    It is okay to take long acting pain medications (if you are on them) the day of the procedure but try not to take any short acting medications unless absolutely necessary.    YES: Informed    Long acting meds would include: Gabapentin (Neurontin), MS Contin, Oxycontin        Short acting meds would include:  Percocet, Oxycodone, Vicodin, Ibuprofen     The day of the procedure, you should try to do things that provoke your pain, since the injection is being done to see if it will relieve your pain . YES: Informed     If your pain level is a 4 out of 10 or less on the day of the procedu re, please call [phone] to reschedule.  YES: Informed      Reminders:      If you are started on any steroids or antibiotics between now and your appointment, you must contact us because it may affect our ability to perform your procedure - Informed      Instructed pt to arrive 30 minutes early for IV start if required. (Check Procedure Scheduling Grid) INot Applicable       If this is for a cervical MBB aspirin needs to be held for 6 days.  Not Applicable       Do not schedule procedures requiring IV placement in the first appointment of the day or first appointment after lunch. Do NOT schedule at 0745, 0815 or  1245.        For patients 85 or older we recommend having an adult stay w/ them for the remainder of the day.      Does the patient have any questions? No     Karo Moser      [facility]  Pain Management

## 2023-08-08 ENCOUNTER — RADIOLOGY INJECTION OFFICE VISIT (OUTPATIENT)
Dept: PALLIATIVE MEDICINE | Facility: CLINIC | Age: 88
End: 2023-08-08
Attending: NURSE PRACTITIONER
Payer: COMMERCIAL

## 2023-08-08 VITALS — DIASTOLIC BLOOD PRESSURE: 75 MMHG | OXYGEN SATURATION: 100 % | SYSTOLIC BLOOD PRESSURE: 165 MMHG | HEART RATE: 55 BPM

## 2023-08-08 DIAGNOSIS — G89.29 CHRONIC LEFT-SIDED LOW BACK PAIN WITHOUT SCIATICA: ICD-10-CM

## 2023-08-08 DIAGNOSIS — M47.816 SPONDYLOSIS OF LUMBAR REGION WITHOUT MYELOPATHY OR RADICULOPATHY: ICD-10-CM

## 2023-08-08 DIAGNOSIS — M54.50 CHRONIC LEFT-SIDED LOW BACK PAIN WITHOUT SCIATICA: ICD-10-CM

## 2023-08-08 DIAGNOSIS — M54.59 LUMBAR FACET JOINT PAIN: ICD-10-CM

## 2023-08-08 DIAGNOSIS — M19.91 PRIMARY OSTEOARTHRITIS, UNSPECIFIED SITE: ICD-10-CM

## 2023-08-08 PROCEDURE — 64494 INJ PARAVERT F JNT L/S 2 LEV: CPT | Mod: 79 | Performed by: PAIN MEDICINE

## 2023-08-08 PROCEDURE — 64493 INJ PARAVERT F JNT L/S 1 LEV: CPT | Mod: 79 | Performed by: PAIN MEDICINE

## 2023-08-08 ASSESSMENT — PAIN SCALES - GENERAL
PAINLEVEL: EXTREME PAIN (9)
PAINLEVEL: NO PAIN (0)

## 2023-08-08 NOTE — PROGRESS NOTES
Pre procedure Diagnosis:lumbosacral spondylosis   Post procedure Diagnosis: Same  Procedure performed: Left L3,4,5 medial branch block  Indication:  Diagnostic   Anesthesia: none  Complications: none  Operators: Sathya Villanueva MD   Indications:   Jose Manuel Gallo is a 88 year old male. The patient has a history of axial lbp.  Exam shows +++ and pain with extension/rotation, and they have tried conservative treatment including PHYSICAL THERAPY  and meds.    Options/alternatives, benefits and risks were discussed with the patient including but not limited to bleeding, infection, tissue trauma, exposure to radiation, reaction to medications, spinal cord injury, weakness, numbness and paralysis.  Questions were answered to her satisfaction and she agrees to proceed. Voluntary informed consent was obtained and signed.     Vitals were reviewed: Yes  Allergies were reviewed:  Yes   Medications were reviewed:  Yes   Pre-procedure pain score: 10/10    Procedure:  After obtaining signed informed consent, the patient was brought into the procedure suite and was placed in a prone position on the procedure table.   A Pause for the Cause was performed.  The patient was prepped and draped in the usual sterile fashion.     Under AP fluoroscopic guidance the L3, L4, L5 vertebral bodies were identified. The C-arm was rotated to the oblique view to afford optimal visualization the pedicles.  Lidocaine 1% was used to anesthetize the skin at each level.  Under intermittent fluoroscopy, 25G 3.5inch spinal needles were positioned inferior and lateral to the intersection of the transverse process and pedicle at the Left L4 & L5 levels sa. The needle positions were verified and optimized from the AP view.    The anatomic targets for the L3 & L4 medial nerve and L5 dorsal ramus (which functionally incorporates the medial branch) were the  L4 & L5 transverse processes and sacral alar notch, with laterality as described above,  resulting in blockade of the L4/5 and L5/S1 facet joints.    Bupivacaine 0.25% 1 ml was injected at each location.  The needles were removed.      Hemostasis was achieved, the area was cleaned, and bandaids were placed when appropriate.  The patient tolerated the procedure well, and was taken to the recovery room.    Images were saved to PACS.     The patient will continue to monitor progress, and they were given a pain diary to complete at home.  They will either fax or mail this back to us or bring it to their next appointment. We will determine the treatment plan after we review the diary.      Post-procedure pain score: 0/10  Follow-up includes:   -f/u phone call in one week  -will await diary for further planning.    Sathya Villanueva MD  [facility]

## 2023-08-08 NOTE — NURSING NOTE
Discharge Information    IV Discontiued Time:  NA    Amount of Fluid Infused:  NA    Discharge Criteria = When patient returns to baseline or as per MD order    Consciousness:  Pt is fully awake    Circulation:  BP +/- 20% of pre-procedure level    Respiration:  Patient is able to breathe deeply    O2 Sat:  Patient is able to maintain O2 Sat >92% on room air    Activity:  Moves 4 extremities on command    Ambulation:  Patient is able to stand and walk or stand and pivot into wheelchair    Dressing:  Clean/dry or No Dressing    Notes:   Discharge instructions and AVS given to patient    Patient meets criteria for discharge?  YES    Admitted to PCU?  No    Responsible adult present to accompany patient home?  Yes    Signature/Title:    Eladia Cha RN  RN Care Coordinator  [facility]

## 2023-08-08 NOTE — NURSING NOTE
Pre-procedure Intake  If YES to any questions or NO to having a   Please complete laminated checklist and leave on the computer keyboard for Provider, verbally inform provider if able.    For SCS Trial, RFA's or any sedation procedure:  Have you been fasting? NA  If yes, for how long? NA    Are you taking any any blood thinners such as Coumadin, Warfarin, Jantoven, Pradaxa Xarelto, Eliquis, Edoxaban, Enoxaparin, Lovenox, Heparin, Arixtra, Fondaparinux, or Fragmin? OR Antiplatelet medication such as Plavix, Brilinta, or Effient?   No   If yes, when did you take your last dose? NA    Do you take aspirin?  No  If cervical procedure, have you held aspirin for 6 days?   NA    Do you have any allergies to contrast dye, iodine, steroid and/or numbing medications?  NO    Are you currently taking antibiotics or have an active infection?  NO    Have you had a fever/elevated temperature within the past week? NO    Are you currently taking oral steroids? NO    Do you have a ? Yes    Are you pregnant or breastfeeding?  Not Applicable    Have you received the COVID-19 vaccine? Yes  If yes, was it your 1st, 2nd or only dose needed? 5  Date of most recent vaccine: 06/09/22    Notify provider and RNs if systolic BP >170, diastolic BP >100, P >100 or O2 sats < 90%    Shanae Hernandez CMA (AAMA)

## 2023-08-08 NOTE — PATIENT INSTRUCTIONS
[facility]   Medial Branch Block Discharge Instructions      Your procedure was performed by: Dr. Sathya Villanueva       Medications used include:  Lidocaine  Bupivicaine  Omnipaque  Omniscan  Ropivicaine Normal saline     You will need to complete the Pain Scale Log form and return it to us as soon as possible.  Once we have received the form, we will review it and call you to determine the next steps.     The form can be faxed to [phone] or mailed to:   [facility]   [id number]   [place] [address]    You may resume your regular medications  If you were holding your blood thinning medication, please restart taking it: N/A  You may resume your regular activities  Be cautious with walking as numbness and/or weakness in the lower extremities may occur for up to 6-8 hours due to the effects of the anesthetic.  Avoid driving for 6 hours. The local anesthetic could slow your reflexes.   You may shower, however no swimming or tub baths or hot tubs for 24 hours following your procedure.  Your pain will return after the numbing medications have worn off.  You may use your current pain medications as needed.  Unless you have been directed to avoid the use of anti-inflammatory medications (NSAIDS), you may use medications such as ibuprofen, Aleve or Tylenol for pain control if needed.  Some people find it helpful to alternate ibuprofen and Tylenol every 3 hours for a couple of days.  You may use ice packs 10-15 minutes three to four times a day at the injection site for comfort.   Do not use heat to painful areas for 6 to 8 hours. This will give the local anesthetic time to wear off and prevent you from accidentally burning your skin.   If you experience any of the following, call the Pain Clinic during work hours (Monday through Friday 8 am-4:30 pm) at [phone] or the Provider Line after hours at [phone]:  -Fever over 100 degree F  -Swelling,  bleeding, redness, drainage, warmth at the injection site  -Progressive weakness or numbness in your legs   -If lumbar, call if you have a loss of bowel or bladder function  -Unusual new onset of pain that is not improving

## 2023-08-15 ENCOUNTER — TELEPHONE (OUTPATIENT)
Dept: PALLIATIVE MEDICINE | Facility: CLINIC | Age: 88
End: 2023-08-15

## 2023-08-15 NOTE — TELEPHONE ENCOUNTER
[employer] Medicare products follow Medicare guidelines  Per Medicare medical policy-No PA required  RFA  Follows Medicare guidelines       Coverage Guidance-No PA required    Coverage Indications, Limitations, and/or Medical Necessity    Facet Joint Interventions generally consist of four types of procedures: Intraarticular (IA) Facet Joint Injections, Medial Branch Blocks (MBB), Radiofrequency Ablations (RFA) and Facet cyst rupture/aspiration:  Facet Joint Interventions are considered medically reasonable and necessary for the diagnosis and treatment of chronic pain in patients who meet ALL the following criteria:  Moderate to severe chronic neck or low back pain, predominantly axial, that causes functional deficit measured on pain or disability scale*  Pain present for minimum of 3 months with documented failure to respond to noninvasive conservative management (as tolerated)  Absence of untreated radiculopathy or neurogenic claudication (except for radiculopathy caused by facet joint synovial cyst)  There is no non-facet pathology per clinical assessment or radiology studies that could explain the source of the patient's pain, including but not limited to fracture, tumor, infection, or significant deformity.  Pain assessment must be performed and documented at baseline, after each diagnostic procedure using the same pain scale for each assessment. A disability scale must also be obtained at baseline to be used for functional assessment (if patient qualifies for treatment).    A. Diagnostic Facet Joint Procedures (IA or MBB):    The primary indication of a diagnostic facet joint procedure is to diagnose whether the patient has facet syndrome. Intraarticular (IA) facet block(s) are considered reasonable and necessary as a diagnostic test only if medial branch blocks (MMB) cannot be performed due to specific documented anatomic restrictions or there is an indication to proceed with therapeutic intraarticular injections.  These restrictions must be clearly documented in the medical record and made available upon request.  Diagnostic procedures should be performed with the intent that if successful, radiofrequency ablation (RFA) procedure would be considered the primary treatment goal at the diagnosed level(s).  A second diagnostic facet procedure is considered medically necessary to confirm validity of the initial diagnostic facet procedure when administered at the same level. The second diagnostic procedure may only be performed a minimum of 2 weeks after the initial diagnostic procedure. Clinical circumstances that necessitate an exception to the two-week duration may be considered on an individual basis and must be clearly documented in the medical record.  For the first diagnostic facet joint procedure:    For the first diagnostic facet joint procedure to be considered medically reasonable and necessary, the patient must meet the criteria outlined under indications for facet joint interventions.  A second confirmatory diagnostic facet joint procedure is considered medically reasonable and necessary in patients who meet ALL the following criteria:  The patient meets the criteria for the first diagnostic procedure; AND  After the first diagnostic facet joint procedure, there must be a consistent positive response of at least 80% relief of primary (index) pain (with the duration of relief being consistent with the agent used)  Frequency limitation: For each covered spinal region no more than four (4) diagnostic joint sessions will be reimbursed per rolling 12 months, in recognition that the pain generator cannot always be identified with the initial and confirmatory diagnostic procedure.         OKAY TO SCHEDULE FLORIDALMA #2      Bibiana BURRELL    [facility]

## 2023-08-15 NOTE — TELEPHONE ENCOUNTER
Pt had a leftLumbar  medial branch block # 1 on 8/8/2023.  The post medial branch block form was received.    According to pain diary pt would like to proceed with medial branch block #2.  Max relief from block is:    Pt had significant on the day of the block. (80 to 100%)  Physical therapy:    Last done in?:  2022 per chart.   How many sessions?:  1 in that year.    Where was it  done?  [facility]   If outside location does a release of information need to be signed? N/A   If pt has not done PT does it needs to be ordered/started in order to have the radiofrequency ablation? Yes. Spoke with patient and advised that he will likely need to complete 4 sessions minimum of PT in 6 week period and that referral is being sent.,     Routed to [place] to review. Does pt had enough relief insurance-wise to proceed to medial branch block #2?  If so please schedule.    Routing to provider for PT orders.     Eladia Cha RN  [facility]  [phone]

## 2023-08-22 ENCOUNTER — THERAPY VISIT (OUTPATIENT)
Dept: PHYSICAL THERAPY | Facility: CLINIC | Age: 88
End: 2023-08-22
Attending: NURSE PRACTITIONER
Payer: COMMERCIAL

## 2023-08-22 DIAGNOSIS — G89.29 CHRONIC LEFT-SIDED LOW BACK PAIN WITHOUT SCIATICA: ICD-10-CM

## 2023-08-22 DIAGNOSIS — M54.50 CHRONIC LEFT-SIDED LOW BACK PAIN WITHOUT SCIATICA: ICD-10-CM

## 2023-08-22 DIAGNOSIS — M54.59 LUMBAR FACET JOINT PAIN: ICD-10-CM

## 2023-08-22 DIAGNOSIS — M19.91 PRIMARY OSTEOARTHRITIS, UNSPECIFIED SITE: ICD-10-CM

## 2023-08-22 DIAGNOSIS — M47.816 SPONDYLOSIS OF LUMBAR REGION WITHOUT MYELOPATHY OR RADICULOPATHY: ICD-10-CM

## 2023-08-22 PROCEDURE — 97161 PT EVAL LOW COMPLEX 20 MIN: CPT | Mod: GP | Performed by: PHYSICAL THERAPIST

## 2023-08-22 PROCEDURE — 97110 THERAPEUTIC EXERCISES: CPT | Mod: GP | Performed by: PHYSICAL THERAPIST

## 2023-08-22 NOTE — PROGRESS NOTES
PHYSICAL THERAPY EVALUATION  Type of Visit: Evaluation    See electronic medical record for Abuse and Falls Screening details.    Subjective Has had back pain for 30 years. Comes and goes. Right now it's extreme. Gets pain at base of low back. No pain down the legs. Pain is very deep and can last for hours. Lying down flat on back will make pain go away. Yard work can cause pain to double him over. Certain chairs or seats might be too soft and will cause pain.       Presenting condition or subjective complaint: Backache - spasms  Date of onset: 07/18/23    Relevant medical history: Bladder or bowel problems; Dizziness; Vision problems   Dates & types of surgery:      Prior diagnostic imaging/testing results: X-ray     Prior therapy history for the same diagnosis, illness or injury: Yes      Prior Level of Function  Transfers: Independent  Ambulation: Independent  ADL: Independent  IADL: Housekeeping, Laundry, Meal preparation, Yard work    Living Environment  Social support: With a significant other or spouse   Type of home: House   Stairs to enter the home: Yes 1     Ramp: No   Stairs inside the home: Yes 13 Is there a railing: Yes   Help at home: None  Equipment owned: [employer] Cane     Employment: No    Hobbies/Interests: Fishing    Patient goals for therapy: Yard work    Pain assessment:      Objective   LUMBAR SPINE EVALUATION  PAIN: Pain Level at Rest: 0/10  Pain Level with Use: 10/10  INTEGUMENTARY (edema, incisions):   POSTURE: Standing Posture: Forward head, Lordosis decreased  GAIT:   Weightbearing Status: WBAT  Assistive Device(s): None  Gait Deviations: WNL  BALANCE/PROPRIOCEPTION:   WEIGHTBEARING ALIGNMENT:   NON-WEIGHTBEARING ALIGNMENT:    ROM:   (Degrees) Left AROM Left PROM  Right AROM Right PROM   Hip Flexion       Hip Extension       Hip Abduction       Hip Adduction       Hip Internal Rotation       Hip External Rotation       Knee Flexion       Knee Extension       Lumbar Side glide Mildly  restricted Mildly restricted   Lumbar Flexion Mildly restricted / Increase in left lumbar spine pain   Lumbar Extension Mildly restricted / Increase in left lumbar spine pain     PELVIC/SI SCREEN:   STRENGTH: Hip extension - 4/5 B, hip abduction - 4-/5 B    MYOTOMES:    Left Right   T12-L3 (Hip Flexion) 4 4   L2-4 (Quads)  5 5   L4 (Ankle DF) 5 5   L5 (Great Toe Ext) 5 5   S1 (Toe Raise) 5 5     DTR S:    Left Right   C5 (Biceps)     C6 (Brachioradialis)     C7 (Triceps)     L4 (Quad) 1 2   S1 (Achilles)       CORD SIGNS:   DERMATOMES: WNL  NEURAL TENSION:    Left Right   SLR Negative  Negative    SLR with DF Negative  Negative    Femoral Nerve     Slump     Chris (Lumbar)     Chris (Thoracic)     Chris (Cervical)     Median     Ulnar     Radial        FLEXIBILITY: Decreased piriformis L, Decreased hamstrings L, Decreased piriformis R, Decreased hamstrings R  LUMBAR/HIP Special Tests:    Left Right   TORI Negative  Negative    FADIR/Labrum/RACHEL Positive Negative    Femoral Nerve     Stalin's     Piriformis     Quadrant Testing     SLR     Slump     Stork with Extension     Jose Manuel     Hip Scour Negative  Negative       PELVIS/SI SPECIAL TESTS:    Left Right Additional Notes   ASLR      Gaenslen's Test      Pelvic Compression Negative Negative    Pelvic Gapping Negative Negative    Sacral Thrust Positive Negative    Thigh Thrust Negative Negative        FUNCTIONAL TESTS:   PALPATION:   + Tenderness At Location Left Right   Quadratus Lumborum     Erector Spinae + +   Piriformis  + +   PSIS + -   ASIS - -   Iliac Crest - -   Glut Medius     Greater Trochanter -    Ischial Tuberosity     Hamstrings - -   Hip Flexors     Vertebral        ASIS superior on left / Medial malleolus superior on left / Scoliosis of lumbar spine    SPINAL SEGMENTAL CONCLUSIONS: Tender to palpation at T10-L5 with hypomobility    Assessment & Plan   CLINICAL IMPRESSIONS  Medical Diagnosis: Spondylosis of lumbar region without myelopathy or  radiculopathy / Primary osteoarthritis / Lumbar facet joint pain / Chronic left-sided low back pain without sciatica    Treatment Diagnosis: Lumbar spine pain   Impression/Assessment: Patient is a 88 year old male with Lumbar spine complaints.  The following significant findings have been identified: Pain, Decreased ROM/flexibility, Decreased joint mobility, Decreased strength, and Decreased activity tolerance. These impairments interfere with their ability to perform self care tasks, recreational activities, household chores, household mobility, and community mobility as compared to previous level of function.     Clinical Decision Making (Complexity):  Clinical Presentation: Stable/Uncomplicated  Clinical Presentation Rationale: based on medical and personal factors listed in PT evaluation  Clinical Decision Making (Complexity): Low complexity    PLAN OF CARE  Treatment Interventions:  Modalities: E-stim  Interventions: Manual Therapy, Neuromuscular Re-education, Therapeutic Activity, Therapeutic Exercise, Self-Care/Home Management    Long Term Goals     PT Goal 1  Goal Identifier: Yard work  Goal Description: Pt will be able to perform 1 hour of yard work with a minimal increase in pain 2-3/10.  Target Date: 10/17/23      Frequency of Treatment: 1 time per week  Duration of Treatment: 8 weeks    Recommended Referrals to Other Professionals:   Education Assessment:   Learner/Method: Patient;No Barriers to Learning    Risks and benefits of evaluation/treatment have been explained.   Patient/Family/caregiver agrees with Plan of Care.     Evaluation Time:     PT Eval, Low Complexity Minutes (80461): 21       Signing Clinician: Bradford Hoyt PT      [facility]                                                                                   OUTPATIENT PHYSICAL THERAPY      PLAN OF TREATMENT FOR OUTPATIENT REHABILITATION   Patient's Last Name, First Name, Jose Manuel Jalloh  YOB: 1935   Provider's Name   [facility]   Medical Record No.  [id number]     Onset Date: 07/18/23  Start of Care Date: 08/22/23     Medical Diagnosis:  Spondylosis of lumbar region without myelopathy or radiculopathy / Primary osteoarthritis / Lumbar facet joint pain / Chronic left-sided low back pain without sciatica      PT Treatment Diagnosis:  Lumbar spine pain Plan of Treatment  Frequency/Duration: 1 time per week/ 8 weeks    Certification date from 08/22/23 to 10/17/23         See note for plan of treatment details and functional goals     Bradford Hoyt, PT                         I CERTIFY THE NEED FOR THESE SERVICES FURNISHED UNDER        THIS PLAN OF TREATMENT AND WHILE UNDER MY CARE     (Physician attestation of this document indicates review and certification of the therapy plan).                Referring Provider:  Kayley Alexander      Initial Assessment  See Epic Evaluation- Start of Care Date: 08/22/23

## 2023-09-05 ENCOUNTER — OFFICE VISIT (OUTPATIENT)
Dept: ORTHOPEDICS | Facility: CLINIC | Age: 88
End: 2023-09-05
Payer: COMMERCIAL

## 2023-09-05 VITALS
BODY MASS INDEX: 20.13 KG/M2 | HEART RATE: 64 BPM | OXYGEN SATURATION: 97 % | SYSTOLIC BLOOD PRESSURE: 134 MMHG | WEIGHT: 142.2 LBS | DIASTOLIC BLOOD PRESSURE: 67 MMHG

## 2023-09-05 DIAGNOSIS — M75.82 ROTATOR CUFF TENDINITIS, LEFT: Primary | ICD-10-CM

## 2023-09-05 DIAGNOSIS — M19.019 SHOULDER ARTHRITIS: ICD-10-CM

## 2023-09-05 DIAGNOSIS — M75.22 BICEPS TENDONITIS ON LEFT: ICD-10-CM

## 2023-09-05 PROCEDURE — 20610 DRAIN/INJ JOINT/BURSA W/O US: CPT | Mod: LT | Performed by: ORTHOPAEDIC SURGERY

## 2023-09-05 PROCEDURE — 99213 OFFICE O/P EST LOW 20 MIN: CPT | Mod: 25 | Performed by: ORTHOPAEDIC SURGERY

## 2023-09-05 RX ORDER — LIDOCAINE HYDROCHLORIDE 10 MG/ML
4 INJECTION, SOLUTION EPIDURAL; INFILTRATION; INTRACAUDAL; PERINEURAL
Status: SHIPPED | OUTPATIENT
Start: 2023-09-05

## 2023-09-05 RX ORDER — METHYLPREDNISOLONE ACETATE 80 MG/ML
80 INJECTION, SUSPENSION INTRA-ARTICULAR; INTRALESIONAL; INTRAMUSCULAR; SOFT TISSUE
Status: SHIPPED | OUTPATIENT
Start: 2023-09-05

## 2023-09-05 RX ADMIN — METHYLPREDNISOLONE ACETATE 80 MG: 80 INJECTION, SUSPENSION INTRA-ARTICULAR; INTRALESIONAL; INTRAMUSCULAR; SOFT TISSUE at 13:52

## 2023-09-05 RX ADMIN — LIDOCAINE HYDROCHLORIDE 4 ML: 10 INJECTION, SOLUTION EPIDURAL; INFILTRATION; INTRACAUDAL; PERINEURAL at 13:52

## 2023-09-05 ASSESSMENT — PAIN SCALES - GENERAL: PAINLEVEL: SEVERE PAIN (6)

## 2023-09-05 NOTE — PROGRESS NOTES
SUBJECTIVE:  Jose Manuel Gallo is a 88 year old male who is seen in follow-up for left shoulder pain.  He has several corticosteroid injections  for rotator cuff tendonitis. Most recently Jan 31 2023 Length of effectiveness: 7months.    Usually the injections work x 3-5 months or so .  He also has mild glenohumeral joint osteoarthritis   Present symptoms: pain with any movements, lifting      Review of Systems:  Constitutional/General: Negative for fever, chills, change in weight  Integumentary/Skin: Negative for worrisome rashes, moles, or lesions  Neuro: Negative for weakness, dizziness, or paresthesias   Psychiatric: negative for changes in mood or affect    OBJECTIVE:  Physical Exam:  /67 (BP Location: Left arm, Patient Position: Sitting, Cuff Size: Adult Regular)   Pulse 64   Wt 64.5 kg (142 lb 3.2 oz)   SpO2 97%   BMI 20.13 kg/m    General Appearance: healthy, alert and no distress   Skin: no suspicious lesions or rashes  Neuro: Normal strength and tone, mentation intact and speech normal  Vascular: good pulses, and capillary refill   Lymph: no lymphadenopathy   Psych:  mentation appears normal and affect normal/bright  Resp: no increased work of breathing    Left Shoulder Exam:  Shoulder Inspection: no swelling, bruising, discoloration, or obvious deformity or asymmetry  no atrophy  Tender:  Non-tender  Strength: forward flexion: 4+*/5, external rotation: 4+*/5,  Impingement: all grade 2 positive    MRI:   From 5/13/21 : left shoulder     Motion partially compromising assessment.  1. Rotator cuff tendinosis without high-grade tear. Focal subchondral  edema of the anterior facet of greater tuberosity, query enthesitis.  2. Strain of the infraspinatus.  3. Query mild subacromial/subdeltoid bursitis.  4. Mild acromioclavicular joint degenerative change with subacromial  enthesophyte.  5. Extensive subchondral cystlike changes of the posteroinferior  glenoid which is intimate with triceps proximal  attachment without  substantial cartilage abnormality, likely reflecting intra-osseous  ganglion cysts.    ASSESSMENT:  Encounter Diagnoses   Name Primary?    Rotator cuff tendinitis, left Yes    Biceps tendonitis on left     Glenohumeral joint arthritis left    Possible tear.    PLAN:  He would like again to get another corticosteroid injection   Procedure Note:   Informed consent obtained. Risks, benefits and complications of the injection were discussed with the patient and the patient elected to proceed. Left shoulder injected into the subacromial space after sterile prep, using 80mg Depomedrol and 4cc local anesthetic.   Physical therapy:  he agreed to go for home exercise program instruction.    Return to clinic: as needed     DESTINEE Hastings MD  Dept. Orthopedic Surgery  [facility]

## 2023-09-05 NOTE — PROGRESS NOTES
Large Joint Injection/Arthocentesis: L subacromial bursa    Date/Time: 9/5/2023 1:52 PM    Performed by: Jimmy Mancera  Authorized by: Rodrigo Hastings MD    Indications:  Pain  Needle Size:  22 G  Guidance: landmark guided    Approach:  Lateral  Location:  Shoulder      Site:  L subacromial bursa  Medications:  80 mg methylPREDNISolone 80 MG/ML; 4 mL lidocaine (PF) 1 %  Outcome:  Tolerated well, no immediate complications  Procedure discussed: discussed risks, benefits, and alternatives    Consent Given by:  Patient  Timeout: timeout called immediately prior to procedure    Prep: patient was prepped and draped in usual sterile fashion

## 2023-09-05 NOTE — PATIENT INSTRUCTIONS
AFTER VISIT SUMMARY    [facility] CORTISONE Injection Discharge Instructions    You may shower, however avoid swimming, tub baths or hot tubs for 24 hours following your procedure    You may have a mild to moderate increase in pain for several days following the injection.    It may take up to 14 days for the steroid medication to start working although you may feel the effect as early as a few days after the procedure.    You may use ice packs for 10-15 minutes, 3 to 4 times a day at the injection site for comfort    You may use anti-inflammatory medications (such as Ibuprofen or Aleve or Advil) or Tylenol for pain control if necessary    If you were fasting, you may resume your normal diet and medications after the procedure    If you have diabetes, check your blood sugar more frequently than usual as your blood sugar may be higher than normal for 10-14 days following a steroid injection. Contact your doctor who manages your diabetes if your blood sugar is higher than usual      If you experience any of the following, call [facility] [phone]  -Fever over 100 degree F  -Swelling, bleeding, redness, drainage, warmth at the injection site  - New or worsening pain

## 2023-09-05 NOTE — LETTER
9/5/2023         RE: Jose Manuel Gallo  [address] [place] [place] [address]        Dear Colleague,    Thank you for referring your patient, Jose Manuel Gallo, to the [facility]. Please see a copy of my visit note below.    SUBJECTIVE:  Jose Manuel Gallo is a 88 year old male who is seen in follow-up for left shoulder pain.  He has several corticosteroid injections  for rotator cuff tendonitis. Most recently Jan 31 2023 Length of effectiveness: 7months.    Usually the injections work x 3-5 months or so .  He also has mild glenohumeral joint osteoarthritis   Present symptoms: pain with any movements, lifting      Review of Systems:  Constitutional/General: Negative for fever, chills, change in weight  Integumentary/Skin: Negative for worrisome rashes, moles, or lesions  Neuro: Negative for weakness, dizziness, or paresthesias   Psychiatric: negative for changes in mood or affect    OBJECTIVE:  Physical Exam:  /67 (BP Location: Left arm, Patient Position: Sitting, Cuff Size: Adult Regular)   Pulse 64   Wt 64.5 kg (142 lb 3.2 oz)   SpO2 97%   BMI 20.13 kg/m    General Appearance: healthy, alert and no distress   Skin: no suspicious lesions or rashes  Neuro: Normal strength and tone, mentation intact and speech normal  Vascular: good pulses, and capillary refill   Lymph: no lymphadenopathy   Psych:  mentation appears normal and affect normal/bright  Resp: no increased work of breathing    Left Shoulder Exam:  Shoulder Inspection: no swelling, bruising, discoloration, or obvious deformity or asymmetry  no atrophy  Tender:  Non-tender  Strength: forward flexion: 4+*/5, external rotation: 4+*/5,  Impingement: all grade 2 positive    MRI:   From 5/13/21 : left shoulder     Motion partially compromising assessment.  1. Rotator cuff tendinosis without high-grade tear. Focal subchondral  edema of the anterior facet of greater tuberosity, query enthesitis.  2. Strain of the  infraspinatus.  3. Query mild subacromial/subdeltoid bursitis.  4. Mild acromioclavicular joint degenerative change with subacromial  enthesophyte.  5. Extensive subchondral cystlike changes of the posteroinferior  glenoid which is intimate with triceps proximal attachment without  substantial cartilage abnormality, likely reflecting intra-osseous  ganglion cysts.    ASSESSMENT:  Encounter Diagnoses   Name Primary?     Rotator cuff tendinitis, left Yes     Biceps tendonitis on left      Glenohumeral joint arthritis left    Possible tear.    PLAN:  He would like again to get another corticosteroid injection   Procedure Note:   Informed consent obtained. Risks, benefits and complications of the injection were discussed with the patient and the patient elected to proceed. Left shoulder injected into the subacromial space after sterile prep, using 80mg Depomedrol and 4cc local anesthetic.   Physical therapy:  he agreed to go for home exercise program instruction.    Return to clinic: as needed     DESTINEE Hastings MD  Dept. Orthopedic Surgery  [facility]    Large Joint Injection/Arthocentesis: L subacromial bursa    Date/Time: 9/5/2023 1:52 PM    Performed by: Jimmy Mancera  Authorized by: Rodrigo Hastings MD    Indications:  Pain  Needle Size:  22 G  Guidance: landmark guided    Approach:  Lateral  Location:  Shoulder      Site:  L subacromial bursa  Medications:  80 mg methylPREDNISolone 80 MG/ML; 4 mL lidocaine (PF) 1 %  Outcome:  Tolerated well, no immediate complications  Procedure discussed: discussed risks, benefits, and alternatives    Consent Given by:  Patient  Timeout: timeout called immediately prior to procedure    Prep: patient was prepped and draped in usual sterile fashion          Again, thank you for allowing me to participate in the care of your patient.        Sincerely,        Rodrigo Hastings MD

## 2023-09-12 ENCOUNTER — OFFICE VISIT (OUTPATIENT)
Dept: DERMATOLOGY | Facility: CLINIC | Age: 88
End: 2023-09-12
Payer: COMMERCIAL

## 2023-09-12 DIAGNOSIS — D04.4 SQUAMOUS CELL CARCINOMA IN SITU OF SCALP: Primary | ICD-10-CM

## 2023-09-12 PROCEDURE — 99024 POSTOP FOLLOW-UP VISIT: CPT | Performed by: DERMATOLOGY

## 2023-09-12 ASSESSMENT — PAIN SCALES - GENERAL: PAINLEVEL: NO PAIN (0)

## 2023-09-12 NOTE — LETTER
9/12/2023         RE: Jose Manuel Gallo  [address] [place] [address]        Dear Colleague,    Thank you for referring your patient, Jose Manuel Gallo, to the [facility]. Please see a copy of my visit note below.    Well healed flap and skin graft on scalp.  Patient happy.  He will follow up with gen derm 1-2x per year.      Hector Martell MD    Again, thank you for allowing me to participate in the care of your patient.        Sincerely,        Hector Martell MD

## 2023-09-12 NOTE — NURSING NOTE
Jose Manuel Gallo's chief complaint for this visit includes:  Chief Complaint   Patient presents with    Surgical Followup     3 month scar eval S/p Mohs right superior occipital scalp DOS: 5/23/2023     PCP: Orlando Braxton    Referring Provider:  No referring provider defined for this encounter.    There were no vitals taken for this visit.  No Pain (0)      No Known Allergies      Do you need any medication refills at today's visit? No    Aleshia Fountain CMA

## 2023-09-13 ENCOUNTER — THERAPY VISIT (OUTPATIENT)
Dept: PHYSICAL THERAPY | Facility: CLINIC | Age: 88
End: 2023-09-13
Attending: NURSE PRACTITIONER
Payer: COMMERCIAL

## 2023-09-13 ENCOUNTER — OFFICE VISIT (OUTPATIENT)
Dept: PALLIATIVE MEDICINE | Facility: CLINIC | Age: 88
End: 2023-09-13
Payer: COMMERCIAL

## 2023-09-13 VITALS — HEART RATE: 57 BPM | SYSTOLIC BLOOD PRESSURE: 137 MMHG | DIASTOLIC BLOOD PRESSURE: 66 MMHG

## 2023-09-13 DIAGNOSIS — M54.50 LUMBAR SPINE PAIN: Primary | ICD-10-CM

## 2023-09-13 DIAGNOSIS — M54.59 LUMBAR FACET JOINT PAIN: Primary | ICD-10-CM

## 2023-09-13 DIAGNOSIS — M54.50 CHRONIC LEFT-SIDED LOW BACK PAIN WITHOUT SCIATICA: ICD-10-CM

## 2023-09-13 DIAGNOSIS — M47.816 SPONDYLOSIS OF LUMBAR REGION WITHOUT MYELOPATHY OR RADICULOPATHY: ICD-10-CM

## 2023-09-13 DIAGNOSIS — G89.29 CHRONIC LEFT-SIDED LOW BACK PAIN WITHOUT SCIATICA: ICD-10-CM

## 2023-09-13 DIAGNOSIS — M19.91 PRIMARY OSTEOARTHRITIS, UNSPECIFIED SITE: ICD-10-CM

## 2023-09-13 PROCEDURE — 97110 THERAPEUTIC EXERCISES: CPT | Mod: GP | Performed by: PHYSICAL THERAPIST

## 2023-09-13 PROCEDURE — 99213 OFFICE O/P EST LOW 20 MIN: CPT | Performed by: NURSE PRACTITIONER

## 2023-09-13 ASSESSMENT — PAIN SCALES - GENERAL: PAINLEVEL: MILD PAIN (2)

## 2023-09-13 NOTE — PATIENT INSTRUCTIONS
Plan:  Physical Therapy: continue, you need 2 more   Clinical Health Psychologist to address issues of relaxation, behavioral change, coping style, and other factors important to improvement: none  Diagnostic Studies: none at present  Medication Management:   Will not restart gabapentin at this time. We can re-start this IF needed in the future.  Trial lidocaine patches, there are 4% strength OTC available  Further procedures recommended: you have appt 10/19/2023 at 1:15 here in [place] for 2nd medial branch block test  Recommendations/follow-up for PCP:  See above  Release of information: none  Follow up: schedule an appt to see me 8 weeks after you have lumbar RFA (burning procedure), this could be a virtual visit.    ----------------------------------------------------------------  Clinic Number:  [phone]   Call with any questions about your care and for scheduling assistance.   Calls are returned Monday through Friday between 8 AM and 4:30 PM. We usually get back to you within 2 business days depending on the issue/request.    If we are prescribing your medications:  For opioid medication refills, call the clinic or send a [employer] message 7 days in advance.  Please include:  Name of requested medication  Name of the pharmacy.  For non-opioid medications, call your pharmacy directly to request a refill. Please allow 3-4 days to be processed.   Per [place] State Law:  All controlled substance prescriptions must be filled within 30 days of being written.    For those controlled substances allowing refills, pickup must occur within 30 days of last fill.      We believe regular attendance is key to your success in our program!    Any time you are unable to keep your appointment we ask that you call us at least 24 hours in advance to cancel.This will allow us to offer the appointment time to another patient.   Multiple missed appointments may lead to dismissal from the clinic.

## 2023-09-13 NOTE — PROGRESS NOTES
[facility]      9/13/2023      Chief complaint: chronic axial low back pain         Interval history:  Jose Manuel Gallo 88 year old male is known to me for:  Lumbar facet joint pain  Lumbar spondylosis with out myelopathy or radiculopathy  Chronic left-sided low back pain without sciatica  Primary osteoarthritis, unspecified site  PMHx includes: Cataracts mild OD moderate OS (2012), hypertension, chronic low back pain, peripheral vascular disease, erectile dysfunction, personal history of malignant neoplasm of the bladder, osteoarthritis, hemorrhoids, kidney stones, personal history of tobacco use (smoked 40 years), personal history of colonic polyps (2002), macular degeneration, hyperlipidemia, chronic kidney disease stage III GFR 30-59 mL/minute, rotator cuff tendinitis left, squamous cell carcinoma of skin  PSHx includes: Colonoscopy (2008), vitrectomy parsplana (2011), phacoemulsification with standard intraocular lens implant of the left eye (2012), laser YAG capsulotomy left eye (2016), TURP (1997), cervical spinal fusion C6-7, left ulnar nerve transposition at the elbow, vasectomy.      Recommendations/plan at the last visit on 7/17/2023 included:  Physical Therapy: ordered. Also to do TENS unit trial with PHYSICAL THERAPY   Clinical Health Psychologist to address issues of relaxation, behavioral change, coping style, and other factors important to improvement: none  Diagnostic Studies: none at present  Medication Management:   Start gabapentin 100mg. Take 1 cap at bedtime for 7 days, then 1 capsule in the morning and at night for 7 days, then take 1 cap three times daily for 7 days, then 1 in the morning and afternoon and 2 at bedtime. If side effects, then reduce to last tolerable dosage.  Trial lidocaine patches, there are 4% strength OTC available  Further procedures recommended: schedule left sided lumbar MBB to lumbar RFA as indicated. Our office to contact  "you  Recommendations/follow-up for PCP:  See above  Release of information: none  Follow up: 8 weeks can be in-person or video.        Since male last visit, Jose Manuel Gallo reports:    Interval history September 13, 2023  -doing well overall,   -had great relief for 24 hours after the lumbar MBB  -will get him scheduled for 2nd MBB today     Initial evaluation pain history collected on 7/17/2023  Long term low back pain on the left side. No radiation to the legs ever.   Has significant curvature of the lumbar spine and x-ray shows facet arthropathy  His pain has gotten markedly worse with more yardwork. He is able to do about 2 hours of lawnwork. Digging holes for landscaping, raking, bagging etc. He does not pace his activity. He will push through the pain.          At this point, the patient's participation with our multidisciplinary team includes:  The patient has been compliant with the program.  PT -participating   Health Psych - none      Pain rating: intensity ranges from 0/10 to 10/10, and Averages varies but likely around 5-7 on a 0-10 scale.  Pain right now is a 2/10.   Describes pain as \"steady but less jolting.\"  Pain is almost always there.      Current pain-related medication treatments include:  -acetaminophen 650mg Q 8 hours PRN (somewhat helpful)  -ibuprofen 200mg takes 400mg Q 6 hours PRN (not really helpful)      Other pertinent medications:  -none    Previous medication treatments included:  OPIATES:none  NSAIDS: ibuprofen (not very helpful)  MUSCLE RELAXANTS: none  ANTI-MIGRAINE MEDS: none  ANTI-DEPRESSANTS: none  SLEEP AIDS: melatonin (helpful)  ANTI-CONVULSANTS: gabapentin (tried 300mg TID in 2017 for short trial-used for shingles)  TOPICALS: tried OTC and CBD creams (not helpful)  ANXIOLYTICS: none  MEDICAL CANNABIS: none  Other meds: Tylenol (helpful)      Other treatments have included:  Jose Manuel HUNTER Michaelshaheed has not been seen at a pain clinic in the past.    PT: none  Chiropractic care: " tried it a couple of times, did not help  Acupuncture: none  TENs Unit: none    Injections:   8/8/2023 left L3, L4 and L5 medial branch block with Dr. Sathya Villanueva (% relief)    Past Medical History:  Past Medical History:   Diagnosis Date    Cataract, mild, od; mod, os 07/02/2012    Chronic low back pain     CKD (chronic kidney disease) stage 3, GFR 30-59 ml/min (H)     Erectile dysfunction     Hemorrhoids     History of bladder cancer         HTN     Hyperlipidemia LDL goal <130     Kidney stones     Macular degeneration     Osteoarthritis     Personal history of colonic polyps     10/2002    Personal history of malignant neoplasm of bladder     Personal history of tobacco use     Smoked 40 years, Quit 2003    PVD (peripheral vascular disease) (H)     Left Vertebral Artery occlusion    Rotator cuff tendinitis, left     Squamous cell carcinoma of skin, unspecified      Past Surgical History:  Past Surgical History:   Procedure Laterality Date    CATARACT IOL, RT/LT      COLONOSCOPY  11/02/2008    Normal    HC REVISE ULNAR NERVE AT ELBOW      Left    LASER YAG CAPSULOTOMY  7/2016    left eye    PHACOEMULSIFICATION WITH STANDARD INTRAOCULAR LENS IMPLANT  7/2012    left eye    TURP  1997 ?    VASECTOMY      VITRECTOMY PARSPLANA  8/2011    left eye, repair macular hole    ZZC SPINAL FUSION,ANT,EA ADNL LEVEL      C6-7     Medications:  Current Outpatient Medications   Medication Sig Dispense Refill    acetaminophen (TYLENOL) 650 MG CR tablet Take 1 tablet (650 mg) by mouth every 8 hours as needed for pain 60 tablet 1    carboxymethylcellulose PF (REFRESH LIQUIGEL) 1 % ophthalmic gel Place 1 drop into both eyes 4 times daily 1 Bottle 11    cyanocobalamin (VITAMIN B-12) 1000 MCG tablet Take 1 tablet (1,000 mcg) by mouth daily 90 tablet 3    ibuprofen (ADVIL/MOTRIN) 200 MG tablet Take 400 mg by mouth every 6 hours as needed for pain      lisinopril (ZESTRIL) 2.5 MG tablet Take 1 tablet (2.5 mg) by mouth  "daily 90 tablet 4    simvastatin (ZOCOR) 40 MG tablet Take 1 tablet (40 mg) by mouth daily 90 tablet 4    gabapentin (NEURONTIN) 100 MG capsule Take 1 cap at bedtime for 7 days, then 1 capsule in the morning and at night for 7 days, then take 1 cap three times daily for 7 days, then 1 in the morning and afternoon and 2 at bedtime. If side effects, then reduce to last tolerable dosage. (Patient not taking: Reported on 9/12/2023) 120 capsule 0    sodium chloride (ALTHEA 128) 5 % ophthalmic ointment Apply a small squirt approx. 1/4\" into both eyes at bedtime. (Patient not taking: Reported on 8/8/2023) 3.5 g 4     Allergies:   No Known Allergies  Social History:  Home situation:  (61 years in 2023). Lives with spouse. Only adult child, Sydni  Occupation/Schooling: retired since 1994. He worked in the [employer] industry  Tobacco use: quit smoking in about 1997  Alcohol use: none  Drug use: none  History of chemical dependency treatment: none    Family history:  Family History   Problem Relation Age of Onset    Osteoporosis Mother     Diabetes Father     Arthritis Father     Cancer Father     Respiratory Father     Genitourinary Problems Brother     Circulatory Brother     Macular Degeneration No family hx of     Glaucoma No family hx of     Thyroid Disease No family hx of     Hypertension No family hx of              Physical Exam:  Vitals:    09/13/23 1336 09/13/23 1355   BP: (!) 177/75 137/66   Pulse: 56 57       Exam: /66   Pulse 57     Behavioral observations:  Awake, alert, conversant and cooperative     Gait:  normal    Musculoskeletal exam:  strength grossly equal throughout    Neuro exam:  deferred      Skin/vascular/autonomic:  No suspicious lesions on exposed skin.     Other:  na    Is the patient hypertensive today? yes  Hypertensive on recheck of BP?   no  If yes, was patient recommended to see Primary Care Provider in follow up for management of HTN?  na    Diagnostic tests:  LUMBAR SPINE TWO - " THREE VIEWS 1/11/2022 9:39 AM     INDICATION: Osteoarthritis of spine with radiculopathy, lumbar region.     COMPARISON: 7/13/2017                                                                      IMPRESSION: Exam numbered assuming the last rib-bearing vertebral body  is T12. Convex right lumbar angulation centered across L1-L2 where  there is advanced left-sided interspace and endplate degeneration  measures approximately 36 degrees between T12 and L3 not substantially  changed versus prior. Sagittal alignment appears preserved. Normal  lumbar vertebral body heights. No fracture. Moderate lower lumbar  spine facet arthropathy.     ALEJANDRA SOTO MD         Other testing (labs, diagnostics):  3/6/2023  Cr. 1.30  Est GFR 57      Screening tools:     DIRE Score for ongoing opioid management is calculated as follows:    Diagnosis = 2    Intractability = 1    Risk: Psych = 3  Chem Hlth = 3  Reliability = 3  Social = 3    Efficacy = 2    Total DIRE Score = 17 (14 or higher predicts good candidate for ongoing opioid management; 13 or lower predicts poor candidate for opioid management)         Assessment:  Lumbar facet joint pain  Lumbar spondylosis without myelopathy or radiculopathy  Chronic left-sided low back pain without sciatica  Primary osteoarthritis, unspecified site    PMHx includes: Cataracts mild OD moderate OS (2012), hypertension, chronic low back pain, peripheral vascular disease, erectile dysfunction, personal history of malignant neoplasm of the bladder, osteoarthritis, hemorrhoids, kidney stones, personal history of tobacco use (smoked 40 years), personal history of colonic polyps (2002), macular degeneration, hyperlipidemia, chronic kidney disease stage III GFR 30-59 mL/minute, rotator cuff tendinitis left, squamous cell carcinoma of skin  PSHx includes: Colonoscopy (2008), vitrectomy parsplana (2011), phacoemulsification with standard intraocular lens implant of the left eye (2012), laser YAG  capsulotomy left eye (2016), TURP (1997), cervical spinal fusion C6-7, left ulnar nerve transposition at the elbow, vasectomy.      Plan:  Physical Therapy: continue, you need 2 more   Clinical Health Psychologist to address issues of relaxation, behavioral change, coping style, and other factors important to improvement: none  Diagnostic Studies: none at present  Medication Management:   Will not restart gabapentin at this time. We can re-start this IF needed in the future.  Trial lidocaine patches, there are 4% strength OTC available  Further procedures recommended: you have appt 10/19/2023 at 1:15 here in [place] for 2nd medial branch block test  Recommendations/follow-up for PCP:  See above  Release of information: none  Follow up: schedule an appt to see me 8 weeks after you have lumbar RFA (burning procedure), this could be a virtual visit.        ASSESSMENT AND PLAN:  (M54.59) Lumbar facet joint pain  (primary encounter diagnosis)  (M47.816) Spondylosis of lumbar region without myelopathy or radiculopathy  (M54.50,  G89.29) Chronic left-sided low back pain without sciatica  Plan: schedule repeat lumbar medial branch blocks and proceed to lumbar RFA as indicated    (M19.91) Primary osteoarthritis, unspecified site  Plan: return to clinic about 6-8 weeks after likely lumbar RFA      Face to face time: 20 minutes                     Kayley NIXON RN CNP, FNP  [facility]  [facility]

## 2023-09-13 NOTE — TELEPHONE ENCOUNTER
Screening questions for MBB Injections:     Injection to be done at which interventional clinic site? [facility] [place]     Procedure ordered by Kayley Alexander      Procedure ordered? LMBB #2     What insurance would patient like us to bill for this procedure? UCARE & MEDICARE      MEDICA: REQUIRES A PA FOR BOTH MBB   Worker's comp- Any injection DO NOT SCHEDULE and route to Chava Mendoza.     [employer] insurance - If scheduling an SI joint injection DO NOT SCHEDULE and route to Bibiana Randall.      MBBs must be scheduled with elapsed time interval of at least 2 weeks and not more than 6 months between the First MBB and the Second MBB for insurance purposes      [employer] - Any injection besides hip/shoulder/knee joint DO NOT SCHEDULE and route to Bibiana Randall. She will obtain PA and call pt back to schedule procedure or notify pt of denial.      [employer] CIGNA- PA required for all MBB's     **BCBS- ALL need to be routed to Bibiana for review if a PA is needed**  IF SCHEDULING IN [place] [facility] OR SPINE PLEASE SCHEDULE AT LEAST 7-10         BUSINESS DAYS OUT SO A PA CAN BE OBTAINED     Genicular Nerve blocks- ALL insurances except for- [employer], Medicare (straight not supplement) and [employer]. Need to be reviewed by Bibiana before scheduling.         Is patient scheduled at [facility]? No               If YES, route every encounter to [facility] CARE NAVIGATION POOL [[phone]]     Any chance of pregnancy? Not Applicable   If YES, do NOT schedule and route to RN pool  - Dr. Martinez route to Shelby Fried and PM&R Nurse  [[id number]]       Is an  needed? No     Patient has a drive home? (mandatory) Yes     Is patient taking any blood thinners (plavix, coumadin, jantoven, warfarin, heparin, pradaxa or dabigatran )? No               If hold needed, do NOT schedule, route to RN pool/ Dr. Martinez's Team     Is patient taking any aspirin products? No   If more than 325mg/day,  OK to schedule; Instruct pt to decrease to less than 325 mg for 7 days AND route to RN pool/ Dr. Martinez's Team  For CERVICAL procedures, hold all aspirin products for 6 days.   Tell pt that if aspirin product is not held for 6 days, the procedure WILL BE cancelled.      Does the patient have a bleeding or clotting disorder? No  If YES, okay to schedule AND route to RN nurse pool/ Dr. Martinez's Team  **For any patients with platelet count <100, must be forwarded to provider**     Is patient diabetic? NO If YES, have them bring their glucometer.     Does patient have an active infection or treated for one within the past week? No     Is patient currently taking any antibiotics?  No  For patients on chronic, preventative, or prophylactic antibiotics, procedures may be scheduled.   For patients on antibiotics for active or recent infection:antibiotic course must have been completed for 4 days     Is patient currently taking any steroid medications? (i.e. Prednisone, Medrol)  No   For patients on steroid medications, course must have been completed for 4 days     Is patient actively being treated for cancer or immunocompromised? No              If YES, do NOT schedule and route to RN/ Dr. Martinez's Team     Are you able to get on and off an exam table with minimal or no assistance? Yes              If NO, do NOT schedule and route to RN/ Dr. Martinez's Team     Are you able to roll over and lay on your stomach with minimal or no assistance? Yes              If NO, do NOT schedule and route to RN/ Dr. Martinez's Team     Any allergies to contrast dye, iodine, shellfish, or numbing and steroid medications? No  (If so, inform nursing and note in scheduling comments.)     Allergies: Patient has no known allergies.      Does patient have an MRI/CT? No: Pt had a Lumbar Xray on 01/11/22 Check Procedure Scheduling Grid to see if required.     Was the MRI done within the last 3 years?  NA  If yes, where was the MRI done i.e.[facility], [facility], [facility], [facility] etc?   [facility]   If no, do not schedule and route to nursing/ Dr. Martinez's Team  If MRI was not done at [facility], [facility] or [facility] do NOT schedule and route to nursing.    If pt has an imaging disc, the injection MAY be scheduled but pt has to bring disc to appt.   If they show up without the disc the injection cannot be done     Is patient able to transfer to a procedure table with minimal or no assistance? Yes  If NO, do NOT schedule and route to RN/ Dr. Martinez's Team     Medial Branch Block Pre-Procedure Instructions  It is okay to take long acting pain medications (if you are on them) the day of the procedure but try not to take any short acting medications unless absolutely necessary.    YES: Informed    Long acting meds would include: Gabapentin (Neurontin), MS Contin, Oxycontin        Short acting meds would include:  Percocet, Oxycodone, Vicodin, Ibuprofen   The day of the procedure, you should try to do things that provoke your pain, since the injection is being done to see if it will relieve your pain . YES: Informed   If your pain level is a 4 out of 10 or less on the day of the procedu re, please call [phone] to reschedule.  YES: Informed       Reminders:     If you are started on any steroids or antibiotics between now and your appointment, you must contact us because it may affect our ability to perform your procedure - Informed     Instructed pt to arrive 30 minutes early for IV start if required. (Check Procedure Scheduling Grid) INot Applicable      If this is for a cervical MBB aspirin needs to be held for 6 days.  Not Applicable      Do not schedule procedures requiring IV placement in the first appointment of the day or first appointment after lunch. Do NOT schedule at 0745, 0815 or 1245.       For patients 85 or older we recommend having an adult stay w/ them for the remainder of the day.       Does the patient have any questions? No

## 2023-09-20 NOTE — PROGRESS NOTES
Well healed flap and skin graft on scalp.  Patient happy.  He will follow up with gen derm 1-2x per year.      Hector Martell MD

## 2023-09-21 ENCOUNTER — THERAPY VISIT (OUTPATIENT)
Dept: PHYSICAL THERAPY | Facility: CLINIC | Age: 88
End: 2023-09-21
Payer: COMMERCIAL

## 2023-09-21 DIAGNOSIS — M54.50 LUMBAR SPINE PAIN: Primary | ICD-10-CM

## 2023-09-21 PROCEDURE — 97110 THERAPEUTIC EXERCISES: CPT | Mod: GP | Performed by: PHYSICAL THERAPIST

## 2023-09-28 ENCOUNTER — THERAPY VISIT (OUTPATIENT)
Dept: PHYSICAL THERAPY | Facility: CLINIC | Age: 88
End: 2023-09-28
Payer: COMMERCIAL

## 2023-09-28 DIAGNOSIS — M54.50 LUMBAR SPINE PAIN: Primary | ICD-10-CM

## 2023-09-28 PROCEDURE — 97110 THERAPEUTIC EXERCISES: CPT | Mod: GP | Performed by: PHYSICAL THERAPIST

## 2023-09-28 PROCEDURE — 97014 ELECTRIC STIMULATION THERAPY: CPT | Mod: GP | Performed by: PHYSICAL THERAPIST

## 2023-09-29 NOTE — PROGRESS NOTES
PLAN  Continue therapy per current plan of care.    Beginning/End Dates of Progress Note Reporting Period:  08/22/23 to 09/28/2023    Referring Provider:  Kayley Alexander       09/28/23 0500   Appointment Info   Signing clinician's name / credentials Aramis Hoyt DPT   Total/Authorized Visits 8 (POC) (E&T)   Visits Used 4   Medical Diagnosis Spondylosis of lumbar region without myelopathy or radiculopathy / Primary osteoarthritis / Lumbar facet joint pain / Chronic left-sided low back pain without sciatica   PT Tx Diagnosis Lumbar spine pain   Other pertinent information NEXT: Core strengthening   Quick Adds Certification   Progress Note/Certification   Start of Care Date 08/22/23   Onset of illness/injury or Date of Surgery 07/18/23   Therapy Frequency 1 time per week   Predicted Duration 8 weeks   Certification date from 08/22/23   Certification date to 10/17/23   Progress Note Due Date 10/17/23   Progress Note Completed Date 08/22/23   PT Goal 1   Goal Identifier Yard work   Goal Description Pt will be able to perform 1 hour of yard work with a minimal increase in pain 2-3/10.   Target Date 10/17/23   Subjective Report   Subjective Report No change. Having trouble with the side steps at home due to counter not being very long.   Objective Measures   Objective Measures Objective Measure 1;Objective Measure 2;Objective Measure 3   Objective Measure 1   Objective Measure Strength   Details Hip flexion - 4/5 B, Knee extension - 5/5 B, Ankle DF - 5/5 B, Ankle PF - 5/5 B   Objective Measure 2   Objective Measure Patellar reflexes   Details 2 B   Objective Measure 3   Objective Measure Oswestry   Details 44.44%   PT Modalities   PT Modalities Electrical Stimulation   Electrical Stimulation   Electrical Stimulation (Unattended) Minutes (05551) 15   Patient Response/Progress No discomfort noted   Treatment Detail 1 channel placed on left lumbar spine at L4-S1 at level 3.0 intensity to reduce pain and improve  function   Treatment Interventions (PT)   Interventions Therapeutic Procedure/Exercise;Manual Therapy   Therapeutic Procedure/Exercise   Therapeutic Procedures: strength, endurance, ROM, flexibillity minutes (77197) 25   Therapeutic Procedures Ther Proc 2;Ther Proc 3;Ther Proc 4;Ther Proc 5;Ther Proc 6;Ther Proc 7;Ther Proc 8   Ther Proc 1 Pelvic tilts x10 with 5 second holds   Ther Proc 2 Supine knee push x10 B with 5 second holds   Ther Proc 4 Bridges x15 with 3 second holds   Ther Proc 5 Sit to stand x12   Ther Proc 7 Monster walks x6 lengths with GTB   Ther Proc 5 - Details Cueing to perform without using hands   Ther Proc 7 - Details Cueing to perform with feet wider than shoulder width apart   Skilled Intervention Strengthening exercise education to improve function   Patient Response/Progress No increase in discomfort noted   Education   Learner/Method Patient;No Barriers to Learning   Plan   Home program See PTRx (handout)   Comments   Comments Pt states that he has not seen a difference in back pain since beginning physical therapy. Pt has not met functional goals at this time. Pt's Oswestry score remains elevated at this time (44.44%). Pt could see improvement in overall pain levels and function by continuing physical therapy but no progress has been seen at this time.   Total Session Time   Timed Code Treatment Minutes 25   Total Treatment Time (sum of timed and untimed services) 40

## 2023-10-14 DIAGNOSIS — I10 HYPERTENSION GOAL BP (BLOOD PRESSURE) < 140/90: ICD-10-CM

## 2023-10-14 DIAGNOSIS — N18.31 STAGE 3A CHRONIC KIDNEY DISEASE (H): ICD-10-CM

## 2023-10-16 RX ORDER — LISINOPRIL 2.5 MG/1
2.5 TABLET ORAL DAILY
Qty: 90 TABLET | Refills: 0 | Status: SHIPPED | OUTPATIENT
Start: 2023-10-16 | End: 2024-01-15

## 2023-10-17 ENCOUNTER — OFFICE VISIT (OUTPATIENT)
Dept: ORTHOPEDICS | Facility: CLINIC | Age: 88
End: 2023-10-17
Payer: COMMERCIAL

## 2023-10-17 VITALS — HEART RATE: 63 BPM | DIASTOLIC BLOOD PRESSURE: 72 MMHG | OXYGEN SATURATION: 96 % | SYSTOLIC BLOOD PRESSURE: 138 MMHG

## 2023-10-17 DIAGNOSIS — M19.019 SHOULDER ARTHRITIS: ICD-10-CM

## 2023-10-17 DIAGNOSIS — S43.432D SUPERIOR GLENOID LABRUM LESION OF LEFT SHOULDER, SUBSEQUENT ENCOUNTER: ICD-10-CM

## 2023-10-17 DIAGNOSIS — M75.22 BICEPS TENDONITIS ON LEFT: ICD-10-CM

## 2023-10-17 DIAGNOSIS — M75.82 ROTATOR CUFF TENDINITIS, LEFT: Primary | ICD-10-CM

## 2023-10-17 PROCEDURE — 99213 OFFICE O/P EST LOW 20 MIN: CPT | Mod: 25 | Performed by: ORTHOPAEDIC SURGERY

## 2023-10-17 PROCEDURE — 20610 DRAIN/INJ JOINT/BURSA W/O US: CPT | Mod: LT | Performed by: ORTHOPAEDIC SURGERY

## 2023-10-17 RX ORDER — METHYLPREDNISOLONE ACETATE 80 MG/ML
80 INJECTION, SUSPENSION INTRA-ARTICULAR; INTRALESIONAL; INTRAMUSCULAR; SOFT TISSUE
Status: SHIPPED | OUTPATIENT
Start: 2023-10-17

## 2023-10-17 RX ORDER — LIDOCAINE HYDROCHLORIDE 10 MG/ML
4 INJECTION, SOLUTION EPIDURAL; INFILTRATION; INTRACAUDAL; PERINEURAL
Status: SHIPPED | OUTPATIENT
Start: 2023-10-17

## 2023-10-17 RX ADMIN — METHYLPREDNISOLONE ACETATE 80 MG: 80 INJECTION, SUSPENSION INTRA-ARTICULAR; INTRALESIONAL; INTRAMUSCULAR; SOFT TISSUE at 14:39

## 2023-10-17 RX ADMIN — LIDOCAINE HYDROCHLORIDE 4 ML: 10 INJECTION, SOLUTION EPIDURAL; INFILTRATION; INTRACAUDAL; PERINEURAL at 14:39

## 2023-10-17 ASSESSMENT — PAIN SCALES - GENERAL: PAINLEVEL: MILD PAIN (3)

## 2023-10-17 NOTE — LETTER
10/17/2023         RE: Jose Manuel Gallo  [address] [place] [address]        Dear Colleague,    Thank you for referring your patient, Jose Manuel Gallo, to the [facility]. Please see a copy of my visit note below.    SUBJECTIVE:  Jose Manuel Gallo is a 88 year old male who is seen in follow-up for left shoulder pain.  He has several corticosteroid injections for rotator cuff tendonitis. Most recently 9/5/2023 length of effectiveness: Brief,no more than a week.    Usually the injections work x 3-5 months or so .  He also has mild glenohumeral joint osteoarthritis   Present symptoms: pain with any movements, lifting      Review of Systems:  Constitutional/General: Negative for fever, chills, change in weight  Integumentary/Skin: Negative for worrisome rashes, moles, or lesions  Neuro: Negative for weakness, dizziness, or paresthesias   Psychiatric: negative for changes in mood or affect    OBJECTIVE:  Physical Exam:  /72 (BP Location: Left arm, Patient Position: Sitting, Cuff Size: Adult Regular)   Pulse 63   SpO2 96%   General Appearance: healthy, alert and no distress   Skin: no suspicious lesions or rashes  Neuro: Normal strength and tone, mentation intact and speech normal  Vascular: good pulses, and capillary refill   Lymph: no lymphadenopathy   Psych:  mentation appears normal and affect normal/bright  Resp: no increased work of breathing    Left Shoulder Exam:  Shoulder Inspection: no swelling, bruising, discoloration, or obvious deformity or asymmetry  no atrophy  Tender:  Non-tender  Strength: forward flexion: 4+*/5, external rotation: 4+*/5,  Impingement: all grade 2 positive    MRI:   From 5/13/21 : left shoulder     Motion partially compromising assessment.  1. Rotator cuff tendinosis without high-grade tear. Focal subchondral  edema of the anterior facet of greater tuberosity, query enthesitis.  2. Strain of the infraspinatus.  3. Query mild  subacromial/subdeltoid bursitis.  4. Mild acromioclavicular joint degenerative change with subacromial  enthesophyte.  5. Extensive subchondral cystlike changes of the posteroinferior  glenoid which is intimate with triceps proximal attachment without  substantial cartilage abnormality, likely reflecting intra-osseous  ganglion cysts.    ASSESSMENT:  Encounter Diagnoses   Name Primary?     Rotator cuff tendinitis, left Yes     Biceps tendonitis on left      Glenohumeral joint arthritis left      Superior glenoid labrum lesion of left shoulder, subsequent encounter      Possible tear.    PLAN:  We discussed the possible options.  We could do another MRI, working him up for a possible new rotator cuff tear but his plain films would suggest that he has significant osteopenia and a rotator cuff repair may not hold.  Plus at his age he really does not want to go through some things extensive is a rotator cuff surgery with all the recovery time associated with it.  He would like again to get another corticosteroid injection   Procedure Note:   Informed consent obtained. Risks, benefits and complications of the injection were discussed with the patient and the patient elected to proceed. Left shoulder injected into the subacromial space after sterile prep, using 80mg Depomedrol and 4cc local anesthetic.   Physical therapy:  last time, he agreed to go for home exercise program instruction.    If no improvement then we discussed possibly getting an intra-articular injection done under ultrasound guidance.  If he does not get relief in the next 2 to 3 weeks then he can let us know and we would schedule that glenohumeral joint injection.      DESTINEE Hastings MD  Dept. Orthopedic Surgery  [facility]    Large Joint Injection/Arthocentesis: L subacromial bursa    Date/Time: 10/17/2023 2:39 PM    Performed by: Jimmy Mancera  Authorized by: Rodrigo Hastings MD    Indications:  Pain  Needle Size:  22  G  Guidance: landmark guided    Approach:  Lateral  Location:  Shoulder      Site:  L subacromial bursa  Medications:  80 mg methylPREDNISolone 80 MG/ML; 4 mL lidocaine (PF) 1 %  Outcome:  Tolerated well, no immediate complications  Procedure discussed: discussed risks, benefits, and alternatives    Consent Given by:  Patient  Timeout: timeout called immediately prior to procedure    Prep: patient was prepped and draped in usual sterile fashion          Again, thank you for allowing me to participate in the care of your patient.        Sincerely,        Rodrigo Hastings MD

## 2023-10-17 NOTE — PROGRESS NOTES
Large Joint Injection/Arthocentesis: L subacromial bursa    Date/Time: 10/17/2023 2:39 PM    Performed by: Jimmy Mancera  Authorized by: Rodrigo Hastings MD    Indications:  Pain  Needle Size:  22 G  Guidance: landmark guided    Approach:  Lateral  Location:  Shoulder      Site:  L subacromial bursa  Medications:  80 mg methylPREDNISolone 80 MG/ML; 4 mL lidocaine (PF) 1 %  Outcome:  Tolerated well, no immediate complications  Procedure discussed: discussed risks, benefits, and alternatives    Consent Given by:  Patient  Timeout: timeout called immediately prior to procedure    Prep: patient was prepped and draped in usual sterile fashion

## 2023-10-17 NOTE — PROGRESS NOTES
SUBJECTIVE:  Jose Manuel Gallo is a 88 year old male who is seen in follow-up for left shoulder pain.  He has several corticosteroid injections for rotator cuff tendonitis. Most recently 9/5/2023 length of effectiveness: Brief,no more than a week.    Usually the injections work x 3-5 months or so .  He also has mild glenohumeral joint osteoarthritis   Present symptoms: pain with any movements, lifting      Review of Systems:  Constitutional/General: Negative for fever, chills, change in weight  Integumentary/Skin: Negative for worrisome rashes, moles, or lesions  Neuro: Negative for weakness, dizziness, or paresthesias   Psychiatric: negative for changes in mood or affect    OBJECTIVE:  Physical Exam:  /72 (BP Location: Left arm, Patient Position: Sitting, Cuff Size: Adult Regular)   Pulse 63   SpO2 96%   General Appearance: healthy, alert and no distress   Skin: no suspicious lesions or rashes  Neuro: Normal strength and tone, mentation intact and speech normal  Vascular: good pulses, and capillary refill   Lymph: no lymphadenopathy   Psych:  mentation appears normal and affect normal/bright  Resp: no increased work of breathing    Left Shoulder Exam:  Shoulder Inspection: no swelling, bruising, discoloration, or obvious deformity or asymmetry  no atrophy  Tender:  Non-tender  Strength: forward flexion: 4+*/5, external rotation: 4+*/5,  Impingement: all grade 2 positive    MRI:   From 5/13/21 : left shoulder     Motion partially compromising assessment.  1. Rotator cuff tendinosis without high-grade tear. Focal subchondral  edema of the anterior facet of greater tuberosity, query enthesitis.  2. Strain of the infraspinatus.  3. Query mild subacromial/subdeltoid bursitis.  4. Mild acromioclavicular joint degenerative change with subacromial  enthesophyte.  5. Extensive subchondral cystlike changes of the posteroinferior  glenoid which is intimate with triceps proximal attachment without  substantial  cartilage abnormality, likely reflecting intra-osseous  ganglion cysts.    ASSESSMENT:  Encounter Diagnoses   Name Primary?    Rotator cuff tendinitis, left Yes    Biceps tendonitis on left     Glenohumeral joint arthritis left     Superior glenoid labrum lesion of left shoulder, subsequent encounter      Possible tear.    PLAN:  We discussed the possible options.  We could do another MRI, working him up for a possible new rotator cuff tear but his plain films would suggest that he has significant osteopenia and a rotator cuff repair may not hold.  Plus at his age he really does not want to go through some things extensive is a rotator cuff surgery with all the recovery time associated with it.  He would like again to get another corticosteroid injection   Procedure Note:   Informed consent obtained. Risks, benefits and complications of the injection were discussed with the patient and the patient elected to proceed. Left shoulder injected into the subacromial space after sterile prep, using 80mg Depomedrol and 4cc local anesthetic.   Physical therapy:  last time, he agreed to go for home exercise program instruction.    If no improvement then we discussed possibly getting an intra-articular injection done under ultrasound guidance.  If he does not get relief in the next 2 to 3 weeks then he can let us know and we would schedule that glenohumeral joint injection.      DESTINEE Hastings MD  Dept. Orthopedic Surgery  [facility]

## 2023-10-17 NOTE — PATIENT INSTRUCTIONS
AFTER VISIT SUMMARY    [facility] CORTISONE Injection Discharge Instructions    You may shower, however avoid swimming, tub baths or hot tubs for 24 hours following your procedure    You may have a mild to moderate increase in pain for several days following the injection.    It may take up to 14 days for the steroid medication to start working although you may feel the effect as early as a few days after the procedure.    You may use ice packs for 10-15 minutes, 3 to 4 times a day at the injection site for comfort    You may use anti-inflammatory medications (such as Ibuprofen or Aleve or Advil) or Tylenol for pain control if necessary    If you were fasting, you may resume your normal diet and medications after the procedure    If you have diabetes, check your blood sugar more frequently than usual as your blood sugar may be higher than normal for 10-14 days following a steroid injection. Contact your doctor who manages your diabetes if your blood sugar is higher than usual          If you experience any of the following, call [facility] [phone]  -Fever over 100 degree F  -Swelling, bleeding, redness, drainage, warmth at the injection site  - New or worsening pain    Take care Enmanuel.

## 2023-10-19 ENCOUNTER — RADIOLOGY INJECTION OFFICE VISIT (OUTPATIENT)
Dept: PALLIATIVE MEDICINE | Facility: CLINIC | Age: 88
End: 2023-10-19
Payer: COMMERCIAL

## 2023-10-19 VITALS — OXYGEN SATURATION: 98 % | SYSTOLIC BLOOD PRESSURE: 149 MMHG | DIASTOLIC BLOOD PRESSURE: 66 MMHG | HEART RATE: 68 BPM

## 2023-10-19 DIAGNOSIS — M47.816 SPONDYLOSIS OF LUMBAR REGION WITHOUT MYELOPATHY OR RADICULOPATHY: ICD-10-CM

## 2023-10-19 PROCEDURE — 64493 INJ PARAVERT F JNT L/S 1 LEV: CPT | Mod: LT | Performed by: PAIN MEDICINE

## 2023-10-19 PROCEDURE — 64494 INJ PARAVERT F JNT L/S 2 LEV: CPT | Mod: LT | Performed by: PAIN MEDICINE

## 2023-10-19 ASSESSMENT — PAIN SCALES - GENERAL
PAINLEVEL: MODERATE PAIN (4)
PAINLEVEL: MODERATE PAIN (5)

## 2023-10-19 NOTE — NURSING NOTE
Pre-procedure Intake  If YES to any questions or NO to having a   Please complete laminated checklist and leave on the computer keyboard for Provider, verbally inform provider if able.    For SCS Trial, RFA's or any sedation procedure:  Have you been fasting? NA  If yes, for how long?     Are you taking any any blood thinners such as Coumadin, Warfarin, Jantoven, Pradaxa Xarelto, Eliquis, Edoxaban, Enoxaparin, Lovenox, Heparin, Arixtra, Fondaparinux, or Fragmin? OR Antiplatelet medication such as Plavix, Brilinta, or Effient?   No   If yes, when did you take your last dose?     Do you take aspirin?  No  If cervical procedure, have you held aspirin for 6 days?       Do you have any allergies to contrast dye, iodine, steroid and/or numbing medications?  NO    Are you currently taking antibiotics or have an active infection?  NO    Have you had a fever/elevated temperature within the past week? NO    Are you currently taking oral steroids? NO    Do you have a ? Yes    Are you pregnant or breastfeeding?  Not Applicable    Have you received the COVID-19 vaccine? Yes  If yes, was it your 1st, 2nd or only dose needed?   Date of most recent vaccine: 10/29/21    Notify provider and RNs if systolic BP >170, diastolic BP >100, P >100 or O2 sats < 90%

## 2023-10-19 NOTE — PROGRESS NOTES
Pre procedure Diagnosis:lumbosacral spondylosis   Post procedure Diagnosis: Same  Procedure performed: Left L3,4,5 medial branch block  Indication:  Diagnostic   Anesthesia: none  Complications: none  Operators: Sathya Villanueva MD   Indications:   Jose Manuel Gallo is a 88 year old male. The patient has a history of axial lbp.  Exam shows +++ and pain with extension/rotation, and they have tried conservative treatment including PHYSICAL THERAPY  and meds.    Options/alternatives, benefits and risks were discussed with the patient including but not limited to bleeding, infection, tissue trauma, exposure to radiation, reaction to medications, spinal cord injury, weakness, numbness and paralysis.  Questions were answered to her satisfaction and she agrees to proceed. Voluntary informed consent was obtained and signed.     Vitals were reviewed: Yes  Allergies were reviewed:  Yes   Medications were reviewed:  Yes   Pre-procedure pain score: 5/10    Procedure:  After obtaining signed informed consent, the patient was brought into the procedure suite and was placed in a prone position on the procedure table.   A Pause for the Cause was performed.  The patient was prepped and draped in the usual sterile fashion.     Under AP fluoroscopic guidance the L3, L4, L5 vertebral bodies were identified. The C-arm was rotated to the oblique view to afford optimal visualization the pedicles.  Lidocaine 1% was used to anesthetize the skin at each level.  Under intermittent fluoroscopy, 25G 3.5inch spinal needles were positioned inferior and lateral to the intersection of the transverse process and pedicle at the Left L4 & L5 levels sa. The needle positions were verified and optimized from the AP view.    The anatomic targets for the L3 & L4 medial nerve and L5 dorsal ramus (which functionally incorporates the medial branch) were the  L4 & L5 transverse processes and sacral alar notch, with laterality as described above, resulting  in blockade of the L4/5 and L5/S1 facet joints.    Bupivacaine 0.25% 1 ml was injected at each location.  The needles were removed.      Hemostasis was achieved, the area was cleaned, and bandaids were placed when appropriate.  The patient tolerated the procedure well, and was taken to the recovery room.    Images were saved to PACS.     The patient will continue to monitor progress, and they were given a pain diary to complete at home.  They will either fax or mail this back to us or bring it to their next appointment. We will determine the treatment plan after we review the diary.      Post-procedure pain score: 4/10  Follow-up includes:   -f/u phone call in one week  -will await diary for further planning.    Sathya Villanueva MD  [facility]

## 2023-10-19 NOTE — PATIENT INSTRUCTIONS
[facility]   Medial Branch Block Discharge Instructions      Your procedure was performed by: Dr. Sathya Villanueva       Medications used include:  Lidocaine  Bupivicaine      You will need to complete the Pain Scale Log form and return it to us as soon as possible.  Once we have received the form, we will review it and call you to determine the next steps.     The form can be faxed to [phone] or mailed to:   [facility]   [id number]   [place] [address]    You may resume your regular medications  If you were holding your blood thinning medication, please restart taking it: N/A  You may resume your regular activities  Be cautious with walking as numbness and/or weakness in the lower extremities may occur for up to 6-8 hours due to the effects of the anesthetic.  Avoid driving for 6 hours. The local anesthetic could slow your reflexes.   You may shower, however no swimming or tub baths or hot tubs for 24 hours following your procedure.  Your pain will return after the numbing medications have worn off.  You may use your current pain medications as needed.  Unless you have been directed to avoid the use of anti-inflammatory medications (NSAIDS), you may use medications such as ibuprofen, Aleve or Tylenol for pain control if needed.  Some people find it helpful to alternate ibuprofen and Tylenol every 3 hours for a couple of days.  You may use ice packs 10-15 minutes three to four times a day at the injection site for comfort.   Do not use heat to painful areas for 6 to 8 hours. This will give the local anesthetic time to wear off and prevent you from accidentally burning your skin.   If you experience any of the following, call the Pain Clinic during work hours (Monday through Friday 8 am-4:30 pm) at [phone] or the Provider Line after hours at [phone]:  -Fever over 100 degree F  -Swelling, bleeding, redness, drainage, warmth at the  injection site  -Progressive weakness or numbness in your legs or arms  -If lumbar, call if you have a loss of bowel or bladder function  -If cervical, call if you have any unusual headache that is not relieved by Tylenol  -Unusual new onset of pain that is not improving

## 2023-10-19 NOTE — NURSING NOTE
Discharge Information    IV Discontiued Time:  NA    Amount of Fluid Infused:  NA    Discharge Criteria = When patient returns to baseline or as per MD order    Consciousness:  Pt is fully awake    Circulation:  BP +/- 20% of pre-procedure level    Respiration:  Patient is able to breathe deeply    O2 Sat:  Patient is able to maintain O2 Sat >92% on room air    Activity:  Moves 4 extremities on command    Ambulation:  Patient is able to stand and walk or stand and pivot into wheelchair    Dressing:  Clean/dry or No Dressing    Notes:   Discharge instructions and AVS given to patient    Patient meets criteria for discharge?  YES    Admitted to PCU?  No    Responsible adult present to accompany patient home?  Yes    Signature/Title:    param hanley RN  RN Care Coordinator  [facility] [place]

## 2023-10-26 ENCOUNTER — MYC MEDICAL ADVICE (OUTPATIENT)
Dept: PALLIATIVE MEDICINE | Facility: CLINIC | Age: 88
End: 2023-10-26
Payer: COMMERCIAL

## 2023-10-26 DIAGNOSIS — M47.816 SPONDYLOSIS OF LUMBAR REGION WITHOUT MYELOPATHY OR RADICULOPATHY: Primary | ICD-10-CM

## 2023-10-26 NOTE — TELEPHONE ENCOUNTER
Called pt and left message to call back.     Myagi sent to pt:  You  Enmanuel ELSA Karen now (9:22 AM)   Alexander Singer,  We received your pain diary.  Per your report you did not receive adequate relief on the day of the procedure, reporting that you only had mild relief (0-50%) from the injeciton.  Is this correct?        Farrah RN-BSN  [facility]   [phone]

## 2023-10-26 NOTE — TELEPHONE ENCOUNTER
[facility] Call Center    Phone Message    May a detailed message be left on voicemail: yes     Reason for Call: Other: Patient was returning a call to RN. Please  call back when available.      Action Taken: Other: Pain    Travel Screening: Not Applicable

## 2023-10-27 NOTE — TELEPHONE ENCOUNTER
Pt had a left Lumbar  medial branch block # 2 on 10/18/23.  The post medial branch block form was  received.    Max % of pain relief from medial branch block #1:           Pt had significant on the day of the block. (80 to 100%)   Max relief from block #2 is:     Pain diary was filled out incorrectly.  Per pt, he had % relief on the day of the procedure.   Physical therapy:    Last done in?:  2023.   How many sessions?:  4.    Where was it done?  [facility]       Routed to Crow Yan, RN-BSN  [facility]   [phone]

## 2023-10-27 NOTE — TELEPHONE ENCOUNTER
Per patient myChart message:  Enmanuel LOGAN Pain Nurse (supporting You)6 hours ago (9:42 AM)       Sorry. That is not correct. I had significant improvement.      Regards,      Jose Manuel Gallo

## 2023-10-30 NOTE — TELEPHONE ENCOUNTER
[employer] Medicare products follow Medicare guidelines  Per Medicare medical policy-No PA required  RFA  Follows Medicare guidelines     Coverage Guidance-No PA required      C. Facet Joint Denervation:    The thermal radiofrequency destruction of cervical, thoracic, or lumbar paravertebral facet joint (medial branch) nerves are considered medically reasonable and necessary for patients who meet ALL the following criteria:  Initial thermal RFA:  After the patient has had at least two (2) medically reasonable and necessary diagnostic MBBs, with each one providing a consistent minimum of 80% sustained relief of primary (index) pain (with the duration of relief being consistent with the agent used)                Okay to schedule LUIZA BURRELL  Complex   [facility]

## 2023-10-30 NOTE — TELEPHONE ENCOUNTER
Screening Questions for RFA Procedure      Procedure ordered? LRFA     What insurance are we billing for this procedure?  [employer]  IF SCHEDULING AT [facility] PLEASE SCHEDULE AT LEAST 7-10 BUSINESS DAYS OUT SO A PA CAN BE OBTAINED    Is patient scheduled at [facility]? no  If YES, route every encounter to [facility] [[email or web address] C[facility] CARE NAVIGATION POOL [[id number]]  Has patient had this injection before? No  Any chance of pregnancy? NO   If YES, do NOT schedule and route to RN pool     Is  Needed?: No  Will patient have a ?  Yes   If pt is given sedation meds, no driving for 24 hours.  Is pt taking a cab or transportation service? NO      If so will need to be accompanied by an adult too (friend/family member) in order for IV sedation to be given.    Per [place] Policy:  Outpatients are to have responsible adult or family member to accompany them at discharge and drive them home. A service providing medically trained drivers or attendants would be acceptable. Public transportation would not be acceptable unless the patient is accompanied by a responsible adult or family member.  Is patient taking any blood thinners (i.e. plavix, coumadin, jantoven, warfarin, heparin, pradaxa or dabigatran, etc)? No   If YES, do NOT schedule, and route to RN pool    Is patient taking any aspirin products? Yes - Pt takes 81mg daily; instructed to hold 0 day(s) prior to procedure.    If more than 325mg/day, OK to schedule; Instruct pt to decrease to less than 325 mg for 7 days AND route to RN pool  For CERVICAL procedures, hold all aspirin products for 6 days.   Tell pt that if aspirin product is not held for 6 days, the procedure WILL BE cancelled.      Does the patient have a bleeding or clotting disorder? No   If YES, it it OKAY to schedule AND route to RN pool  **For any patients with platelet count <100, must be forwarded to provider**    Is patient diabetic? No If YES, have them bring their  glucometer.    Does patient have an active infection or treated for one within the past week? No   If YES, do NOT schedule and route to RN nurse pool     Is patient currently taking any antibiotics?  No  For patients on chronic, preventative, or prophylactic antibiotics, procedures may be scheduled.   For patients on antibiotics for active or recent infection:antibiotic course must have been completed for 4 days    Is patient currently taking any steroid medications? (i.e. Prednisone, Medrol)  No   For patients on steroid medications, course must have been completed for 4 days    Is patient actively being treated for cancer or immunocompromised, including the spleen having been removed? No  If YES, do NOT schedule and route to RN pool     Any history of complications with sedation medications?  NO   If YES, OK to schedule AND route to RN pool     Any history of sleep apnea?  NO   If YES, OK to schedule AND route to RN pool     Any cardiac history?  NO   If YES, OK to schedule AND route to RN pool     Do you have an implanted pacemaker, ICD (implanted cardiac device) or AICD (automatic implanted cardiac device)?  NO  If YES, do NOT schedule AND route to RN pool.   Obtain name of device :     Obtain name of cardiologist:       Do you have an implanted stimulator?  NO  If YES, OK to schedule AND route to nursing.   Instruct patient to bring in the remote to the appointment and it will need to be turned off.  reviewed      Does patient have an allergy to contrast dye, iodine or shellfish?  No   If YES, OK to schedule. Route to RN pool AND add allergy information to appointment notes    Are you able to get on and off an exam table with minimal or no assistance? Yes   If NO, do NOT schedule and route to RN pool    Are you able to roll over and lay on your stomach with minimal or no assistance? Yes   If NO, do NOT schedule and route to RN pool    Reminders:  If you are started on any steroids or antibiotics  between now and your appointment, you must contact us because it may affect our ability to perform your procedure.  Yes  Informed patient that s/he needs to fast for 6 hours before procedure?  YES  Informed patient that it is OK to take normal medications with sips of water, especially blood pressure medications, before the procedure and must hold blood thinners as instructed.  Yes  Informed patient to arrive 30 minutes before procedure time to have an IV inserted.  reviewed   Do NOT schedule at 0745, 0815 or 1245.  reviewed   All radiofrequency ablations are in a 90 minute time slot.  reviewed

## 2023-11-16 NOTE — ADDENDUM NOTE
Addended by: BECKY OCHOA on: 3/9/2023 09:10 AM     Modules accepted: Orders     Pt has call bell in reach, non slip socks on, and bedrails up x2.  Pt encouraged to wash hands.  He has his parents at bedside.  He is on a clear liquid diet with LR at 125cc/hr and accuchecks ac/hs.  Pt has stool samples pending results.  He has a dental abscess with pain to site. CT ordered tonight.  Pt on gancyclovir for cmv infection.  Pt on ampicillin for infection.

## 2023-11-24 PROBLEM — M54.50 LUMBAR SPINE PAIN: Status: RESOLVED | Noted: 2023-09-13 | Resolved: 2023-11-24

## 2023-11-24 NOTE — PROGRESS NOTES
Patient did not return for further treatment and no additional progress was noted.  Please refer to the progress note and goal flowsheet completed on  9/28/2023 for discharge information.

## 2023-12-05 ENCOUNTER — RADIOLOGY INJECTION OFFICE VISIT (OUTPATIENT)
Dept: PALLIATIVE MEDICINE | Facility: CLINIC | Age: 88
End: 2023-12-05
Payer: COMMERCIAL

## 2023-12-05 VITALS — DIASTOLIC BLOOD PRESSURE: 85 MMHG | HEART RATE: 59 BPM | OXYGEN SATURATION: 97 % | SYSTOLIC BLOOD PRESSURE: 177 MMHG

## 2023-12-05 DIAGNOSIS — G89.18 POST PROCEDURE DISCOMFORT: Primary | ICD-10-CM

## 2023-12-05 DIAGNOSIS — M47.816 SPONDYLOSIS OF LUMBAR REGION WITHOUT MYELOPATHY OR RADICULOPATHY: ICD-10-CM

## 2023-12-05 PROCEDURE — 64635 DESTROY LUMB/SAC FACET JNT: CPT | Mod: LT | Performed by: PAIN MEDICINE

## 2023-12-05 PROCEDURE — 64636 DESTROY L/S FACET JNT ADDL: CPT | Mod: LT | Performed by: PAIN MEDICINE

## 2023-12-05 PROCEDURE — 99153 MOD SED SAME PHYS/QHP EA: CPT | Performed by: PAIN MEDICINE

## 2023-12-05 PROCEDURE — 99152 MOD SED SAME PHYS/QHP 5/>YRS: CPT | Performed by: PAIN MEDICINE

## 2023-12-05 RX ORDER — OXYCODONE HYDROCHLORIDE 5 MG/1
5 TABLET ORAL EVERY 12 HOURS PRN
Qty: 6 TABLET | Refills: 0 | Status: SHIPPED | OUTPATIENT
Start: 2023-12-05 | End: 2023-12-08

## 2023-12-05 RX ORDER — FENTANYL CITRATE 50 UG/ML
12.5-5 INJECTION, SOLUTION INTRAMUSCULAR; INTRAVENOUS EVERY 5 MIN PRN
Status: ACTIVE | OUTPATIENT
Start: 2023-12-05 | End: 2023-12-06

## 2023-12-05 RX ADMIN — FENTANYL CITRATE 12.5 MCG: 50 INJECTION, SOLUTION INTRAMUSCULAR; INTRAVENOUS at 14:19

## 2023-12-05 RX ADMIN — FENTANYL CITRATE 12.5 MCG: 50 INJECTION, SOLUTION INTRAMUSCULAR; INTRAVENOUS at 14:30

## 2023-12-05 ASSESSMENT — PAIN SCALES - GENERAL
PAINLEVEL: MILD PAIN (2)
PAINLEVEL: MILD PAIN (2)

## 2023-12-05 NOTE — NURSING NOTE
Pre-procedure Intake  If YES to any questions or NO to having a   Please complete laminated checklist and leave on the computer keyboard for Provider, verbally inform provider if able.    For SCS Trial, RFA's or any sedation procedure:  Have you been fasting? Yes  If yes, for how long? 12/04/2023    Are you taking any any blood thinners such as Coumadin, Warfarin, Jantoven, Pradaxa Xarelto, Eliquis, Edoxaban, Enoxaparin, Lovenox, Heparin, Arixtra, Fondaparinux, or Fragmin? OR Antiplatelet medication such as Plavix, Brilinta, or Effient?   No   If yes, when did you take your last dose?     Do you take aspirin?  No  If cervical procedure, have you held aspirin for 6 days?   NA    Do you have any allergies to contrast dye, iodine, steroid and/or numbing medications?  NO    Are you currently taking antibiotics or have an active infection?  NO    Have you had a fever/elevated temperature within the past week? NO    Are you currently taking oral steroids? NO    Do you have a ? Yes    Are you pregnant or breastfeeding?  NO    Have you received the COVID-19 vaccine? Yes  If yes, was it your 1st, 2nd or only dose needed? 2nd dose and booster  Date of most recent vaccine: 06/09/2022    Notify provider and RNs if systolic BP >170, diastolic BP >100, P >100 or O2 sats < 90%       Shelby Wolfe MA  [facility] [place]

## 2023-12-05 NOTE — PATIENT INSTRUCTIONS
[facility]   Radiofrequency Ablation (RFA) Discharge Instructions     Today you saw:    Dr. Sathya Villanueva,      You should anticipate procedural pain for up to 2 weeks.   You may receive a prescription for pain medication and you should take this as directed.   It may take up to 8 weeks to receive relief from the RFA   Follow up with your provider in 6-8 weeks.   If you received sedation before, during or after your procedure, for the next 24 hours you shall NOT:    -Drive    -Operate machinery    -Drink alcohol    -Sign any legal documents   You may resume your normal diet   You may resume your regular medications after the procedure   If you were holding your blood thinning medication, please restart taking it: N/A  Be cautious with walking. Numbness and/or weakness in the lower extremities may occur for up to 6-8 hours due to effect of local anesthetic  Avoid strenuous activity for the first 24 hours   You may resume your regular activities after 24 hours   You may shower, however no swimming, tub baths or hot tubs for 24 hours following your procedure   You may use ice packs 10-15 minutes three to four times a day at the injection site for comfort   Do not use heat to painful areas for 6 to 8 hours. This will give the local anesthetic time to wear off and prevent you from accidentally burning your skin.   Unless you have been directed to avoid the use of anti-inflammatory medications (NSAIDS), you may use medications such as ibuprofen, Aleve or Tylenol for pain control if needed.   If you experience any of the following, call the Pain Clinic during work hours (Monday through Friday 8 am-4:30 pm) at [phone] or the Provider Line after hours at [phone]:   -Fever over 100 degree F    -Swelling, bleeding, redness, drainage, warmth at the injection site    -Progressive weakness or numbness in your legs or arms    -Loss of bowel or bladder function    -Unusual headache that is  not relieved by Tylenol    -Unusual new onset of pain that is not improving

## 2023-12-05 NOTE — PROGRESS NOTES
Pre procedure Diagnosis: lumbosacral spondylosis  Post procedure Diagnosis: Same  Procedure performed: Left lumbosacral radiofrequency ablation at L4, L5, sacral ala, fluoroscopically guided  Anesthesia:   MD Time IN: 1400     Sedation start time:  1419  Sedation end time:  1445     Medications given: fentanyl 25 mcg IV; versed 0 mg IV; toradol 0 mg IV  Intravenous fluids were administered, normal saline 0 cc's.  Complications: none  Operators: Sathya Villanueva MD and Marco A Mojica MD (fellow)    Indications:   88 year old M was sent for lumbar rfa.  They have a history of axial lbp.  Exam shows + kemps and they have tried conservative treatment including meds/pt.    Pt had two diagnostic medial branch blocks showing appropriate pain relief, therefore radiofrequency ablation will be done.     Options/alternatives, benefits and risks were discussed with the patient including bleeding, infection, no pain relief, tissue trauma, exposure to radiation, reaction to medications including seizure, spinal cord injury,increased pain after the procedure, weakness, numbness or sensory changes and headache.   We also discussed risks of sedation, including reaction to medications and cardiovascular collapse.    Questions were answered to her satisfaction and she agrees to proceed. Voluntary informed consent was obtained and signed.     Vitals were reviewed: Yes  Allergies were reviewed:  Yes   Medications were reviewed:  Yes   Pre-procedure pain score: 2/10    Procedure:  After obtaining signed, informed consent, the patient was brought into the procedure suite and was placed in a prone position on the procedure table.   A Pause for the Cause was performed.  Patient was prepped and draped in sterile fashion.     Patient had an IV line placed prior the procedure.  The C-arm was positioned in the left oblique view to afford optimal view of the L4-L5 vertebral bodies. Lidocaine 1% was used to anesthetize the skin at each  level.  At each level, a 10cm, 20G curved radiofrequency cannula with a 10mm active tip was positioned, overlying the intersection of the transverse process and pedicle at L4 & L5, and was advanced under intermittent fluoroscopy until it contacted the transverse process and notch, and the tip slightly overran that process, just lateral to the mamillary process.  The position of each cannula was verified and optimized in the oblique view and AP views.    In the AP view, another cannula was placed at the sacral alar notch.      Each position was tested for motor and sensory stimulation, and was positioned so that stimulation was negative for stimuli outside the immediate area of the desired lesion.  Sensory stimulation was completed at 50 Hz, with max stimulation up to 0.3V.  Motor stimulation was completed at 2Hz, up to 1.5V.   Bupivacaine 0.5 % 1ml was injected at each level.  After allowing the local anesthetic to set up, a 90 second, 80 degree Celsius lesion was generated at all three levels.    The needles were then rotated within the pathway of the medial branch, and locations were evaluated with repeat imaging.  A second lesion was then generated at each location.    The needles were flushed with the local anesthetic as they were withdrawn.        Hemostasis was achieved, the area was cleaned, and bandaids were placed when appropriate.  The patient tolerated the procedure well, and was taken to the recovery room.    Images were saved to PACS.    Post-procedure pain score: 2/10  Follow-up includes:   -f/u phone call in one week  -post-procedure pain medications: oxycodone 5mg bid prn  -f/u with referring provider in 8 weeks    Sathya Villanueva MD  [facility]

## 2023-12-05 NOTE — NURSING NOTE
22 gauge Peripheral IV inserted into left anticubital - attempts: 1    MD Time IN: 1400     Sedation start time:  1419  Sedation end time:  1445    Medications given: fentanyl 25 mcg IV; versed 0 mg IV; toradol 0 mg IV  Intravenous fluids were administered, normal saline 0 cc's.  Sedation Level Achieved:  Moderate (conscious) sedation     Eladia Cha RN  [facility]  [phone]

## 2023-12-05 NOTE — NURSING NOTE
Discharge Information    IV Discontiued Time:      Amount of Fluid Infused:  0 ccs    Discharge Criteria = When patient returns to baseline or as per MD order    Consciousness:  Pt is fully awake    Circulation:  BP +/- 20% of pre-procedure level    Respiration:  Patient is able to breathe deeply    O2 Sat:  Patient is able to maintain O2 Sat >92% on room air    Activity:  Moves 4 extremities on command    Ambulation:  Patient is able to stand and walk or stand and pivot into wheelchair    Dressing:  Clean/dry or No Dressing    Notes:   Discharge instructions and AVS given to patient    Patient meets criteria for discharge?  YES    Admitted to PCU?  No    Responsible adult present to accompany patient home?  Yes    Signature/Title:    Eladia Cha RN  RN Care Coordinator  [facility] [place]

## 2024-01-15 DIAGNOSIS — N18.31 STAGE 3A CHRONIC KIDNEY DISEASE (H): ICD-10-CM

## 2024-01-15 DIAGNOSIS — I10 HYPERTENSION GOAL BP (BLOOD PRESSURE) < 140/90: ICD-10-CM

## 2024-01-15 RX ORDER — LISINOPRIL 2.5 MG/1
2.5 TABLET ORAL DAILY
Qty: 90 TABLET | Refills: 0 | Status: SHIPPED | OUTPATIENT
Start: 2024-01-15 | End: 2024-04-22

## 2024-01-18 ENCOUNTER — PATIENT OUTREACH (OUTPATIENT)
Dept: CARE COORDINATION | Facility: CLINIC | Age: 89
End: 2024-01-18
Payer: COMMERCIAL

## 2024-02-01 ENCOUNTER — PATIENT OUTREACH (OUTPATIENT)
Dept: CARE COORDINATION | Facility: CLINIC | Age: 89
End: 2024-02-01
Payer: COMMERCIAL

## 2024-02-02 ENCOUNTER — NURSE TRIAGE (OUTPATIENT)
Dept: NURSING | Facility: CLINIC | Age: 89
End: 2024-02-02
Payer: COMMERCIAL

## 2024-02-02 NOTE — TELEPHONE ENCOUNTER
"  Nurse Triage SBAR    Is this a 2nd Level Triage? YES, LICENSED PRACTITIONER REVIEW IS REQUIRED    Situation: Patient has had ongoing eye pain, but the yesterday it ramped up all day and today.  Usually goes away by noon, but did not.    Assessment:   Patient has had ongoing piercing jabbing pain to right eye for a while.It usually lasts until noon, then goes away.  He can pinpoint the site to the right eye on the right side of the eye where this occurs.  Yesterday, the pain did not go away and was pretty bad.  Pain 8/10  ongoing  Today pain is a 5/10, he did put some salve in it. Ongoing pain  Vision has gotten worse in the right eye,  \"my good eye\"  He is looking at the microwave and has to get right up next to it to see the time.  He is not able to read a book, can't focus in.  Using his phone is a lot of effort  He does have blurry vision, but denies double vision  He is worried about driving and does not drive at night.  The white of his eye is not red  He denies drainage form the eye  He denies fever or chills  He denies fall or injury.  Although he does state that he bumps into things a lot due to his vision.    Protocol Recommended Disposition:   Routing to EYE for a call back    Recommendation:   RN reviewed when to go to the ED   If stabbing jab pain gets worse, vision changes get worse   Horrible headache, any N/T    Routed to provider        Reason for Disposition   Blurred vision and new or worsening    Additional Information   Negative: Followed an eye injury   Negative: Eye pain from chemical in the eye   Negative: Eye pain from foreign body in eye   Negative: Has sinus pain or pressure   Negative: Severe eye pain   Negative: Complete loss of vision in one or both eyes   Negative: Eyelids are very swollen (shut or almost) and fever   Negative: Eyelid (outer) is very red and fever   Negative: Foreign body sensation ('feels like something is in there') and irrigation didn't help   Negative: Vomiting   " "Negative: Ulcer or sore seen on the cornea (clear center part of the eye)   Negative: Recent eye surgery and increasing eye pain    Answer Assessment - Initial Assessment Questions  1. ONSET: \"When did the pain start?\" (e.g., minutes, hours, days)      He has had this jab in the eye piercing feeling for a while, but it would often go away by noon.  However, yesterday lasted all day and his vision is decreased.  2. TIMING: \"Does the pain come and go, or has it been constant since it started?\" (e.g., constant, intermittent, fleeting)      constant  3. SEVERITY: \"How bad is the pain?\"   (Scale 1-10; mild, moderate or severe)    - MILD (1-3): doesn't interfere with normal activities     - MODERATE (4-7): interferes with normal activities or awakens from sleep     - SEVERE (8-10): excruciating pain and patient unable to do normal activities      Yesterday pain was bad all day.  8/10  going,  Today is a 5/10 ongoing  slight piercing jab in the eye,  he did use some salve today and pain is better  4. LOCATION: \"Where does it hurt?\"  (e.g., eyelid, eye, cheekbone)      He can pinpoint to an area on the right eye on the right side  5. CAUSE: \"What do you think is causing the pain?\"      unknown  6. VISION: \"Do you have blurred vision or changes in your vision?\"       Blurred vision  but not double.  7. EYE DISCHARGE: \"Is there any discharge (pus) from the eye(s)?\"  If yes, ask: \"What color is it?\"       no  8. FEVER: \"Do you have a fever?\" If so, ask: \"What is it, how was it measured, and when did it start?\"       no  9. OTHER SYMPTOMS: \"Do you have any other symptoms?\" (e.g., headache, nasal discharge, facial rash)       No headache,  always nasal drainage, no facial rash   \"The right eye is my good eye\"  10. PREGNANCY: \"Is there any chance you are pregnant?\" \"When was your last menstrual period?\"        N/a    Protocols used: Eye Pain-A-[facility]    "

## 2024-02-29 ENCOUNTER — OFFICE VISIT (OUTPATIENT)
Dept: OPHTHALMOLOGY | Facility: CLINIC | Age: 89
End: 2024-02-29
Payer: COMMERCIAL

## 2024-02-29 DIAGNOSIS — H57.11 PAIN OF RIGHT EYE: Primary | ICD-10-CM

## 2024-02-29 DIAGNOSIS — H53.8 BLURRED VISION: ICD-10-CM

## 2024-02-29 PROCEDURE — 92012 INTRM OPH EXAM EST PATIENT: CPT | Performed by: OPHTHALMOLOGY

## 2024-02-29 RX ORDER — PREDNISOLONE ACETATE 10 MG/ML
1 SUSPENSION/ DROPS OPHTHALMIC 2 TIMES DAILY
Qty: 10 ML | Refills: 2 | Status: SHIPPED | OUTPATIENT
Start: 2024-02-29

## 2024-02-29 ASSESSMENT — VISUAL ACUITY
OS_CC: 20/200
OD_CC+: +2
OD_PH_CC+: -2
OD_CC: 20/60
OD_PH_CC: 20/40
CORRECTION_TYPE: GLASSES
METHOD: SNELLEN - LINEAR

## 2024-02-29 ASSESSMENT — EXTERNAL EXAM - LEFT EYE: OS_EXAM: PROLAPSED FAT PADS: UPPER, LOWER; MILD-MOD BROW

## 2024-02-29 ASSESSMENT — EXTERNAL EXAM - RIGHT EYE: OD_EXAM: PROLAPSED FAT PADS: UPPER, LOWER; MILD-MOD BROW

## 2024-02-29 ASSESSMENT — SLIT LAMP EXAM - LIDS: COMMENTS: 1+ BLEPHARITIS, 2+ MEIBOMIAN GLAND DYSFUNCTION

## 2024-02-29 NOTE — PATIENT INSTRUCTIONS
Stop:   Christian 128 ointment   Refresh PM Ointment    Begin:  Prednisolone twice daily in the right eye   Refresh Celluvisc drops in the right eye at bedtime (Over the counter)    Return visit in June 2024 for a complete exam     Bonifacio Scales M.D.  [phone]

## 2024-02-29 NOTE — PROGRESS NOTES
Current Eye Medications:  Refresh PM right eye every evening.  Occasionally he uses Christian 128 ointment in his right eye.  Artificial tears occasionally, but not every day.       Subjective:  the patient complains of pain and blurry vision in his right eye, especially in the mornings.  He is unable to read the newspaper secondary to the blurred vision from the ointment.  The pain in his right eye continues all day, every day.  The blurry vision tends to lessen around 12pm.       Objective:  See Ophthalmology Exam.       Assessment:  Retained ointment in right eye causing blurred vison?      Plan:   Stop:   Christian 128 ointment   Refresh PM Ointment    Begin:  Prednisolone twice daily in the right eye   Refresh Celluvisc drops in the right eye at bedtime (Over the counter)    Return visit in June 2024 for a complete exam     Bonifacio Scales M.D.  [phone]

## 2024-02-29 NOTE — LETTER
2/29/2024         RE: Jose Manuel Gallo  [address] [place] [address]        Dear Colleague,    Thank you for referring your patient, Jose Manuel Gallo, to the [facility]. Please see a copy of my visit note below.     Current Eye Medications:  Refresh PM right eye every evening.  Occasionally he uses Christian 128 ointment in his right eye.  Artificial tears occasionally, but not every day.       Subjective:  the patient complains of pain and blurry vision in his right eye, especially in the mornings.  He is unable to read the newspaper secondary to the blurred vision from the ointment.  The pain in his right eye continues all day, every day.  The blurry vision tends to lessen around 12pm.       Objective:  See Ophthalmology Exam.       Assessment:  Retained ointment in right eye causing blurred vison?      Plan:   Stop:   Christian 128 ointment   Refresh PM Ointment    Begin:  Prednisolone twice daily in the right eye   Refresh Celluvisc drops in the right eye at bedtime (Over the counter)    Return visit in June 2024 for a complete exam     Bonifacio Scales M.D.  [phone]         Again, thank you for allowing me to participate in the care of your patient.        Sincerely,        Bonifacio Scales MD

## 2024-03-30 ENCOUNTER — TRANSFERRED RECORDS (OUTPATIENT)
Dept: HEALTH INFORMATION MANAGEMENT | Facility: CLINIC | Age: 89
End: 2024-03-30

## 2024-04-07 ENCOUNTER — HEALTH MAINTENANCE LETTER (OUTPATIENT)
Age: 89
End: 2024-04-07

## 2024-04-20 NOTE — PROGRESS NOTES
[facility]      4/22/2024      Chief complaint: chronic axial low back pain bilaterally but left is markedly worse on the left side         Interval history:  Jose Manuel Gallo 89 year old male is known to me for:  Lumbar facet joint pain  Lumbar spondylosis with out myelopathy or radiculopathy  Chronic left-sided low back pain without sciatica  Primary osteoarthritis, unspecified site  PMHx includes: Cataracts mild OD moderate OS (2012), hypertension, chronic low back pain, peripheral vascular disease, erectile dysfunction, personal history of malignant neoplasm of the bladder, osteoarthritis, hemorrhoids, kidney stones, personal history of tobacco use (smoked 40 years), personal history of colonic polyps (2002), macular degeneration, hyperlipidemia, chronic kidney disease stage III GFR 30-59 mL/minute, rotator cuff tendinitis left, squamous cell carcinoma of skin  PSHx includes: Colonoscopy (2008), vitrectomy parsplana (2011), phacoemulsification with standard intraocular lens implant of the left eye (2012), laser YAG capsulotomy left eye (2016), TURP (1997), cervical spinal fusion C6-7, left ulnar nerve transposition at the elbow, vasectomy.      Recommendations/plan at the last visit on 9/13/2023 included:  Physical Therapy: continue, you need 2 more   Clinical Health Psychologist to address issues of relaxation, behavioral change, coping style, and other factors important to improvement: none  Diagnostic Studies: none at present  Medication Management:   Will not restart gabapentin at this time. We can re-start this IF needed in the future.  Trial lidocaine patches, there are 4% strength OTC available  Further procedures recommended: you have appt 10/19/2023 at 1:15 here in [place] for 2nd medial branch block test  Recommendations/follow-up for PCP:  See above  Release of information: none  Follow up: schedule an appt to see me 8 weeks after you have lumbar RFA (burning  "procedure), this could be a virtual visit.      Since male last visit, Jose Manuel Gallo reports:    Interval history April 23, 2024  -reports his axial low back pain has returned. Present on both sides but markedly worse on the left.   Feels pretty good in the morning, but by late afternoon he feels awful. Low back is hurting and he cannot continue activity, needs to go lay down.   -had left sided lumbar RFA done 12/5/2023, reduced pain by 50% for about 4 months. Now left sided pain returned. Worst with lumbar extension and extension and rotation/facet loading movements.  -discussed a trial of Tramadol for use on bad pain days, max of 2 tabs per day.       Interval history September 13, 2023  -doing well overall,   -had great relief for 24 hours after the lumbar MBB  -will get him scheduled for 2nd MBB today     Initial evaluation pain history collected on 7/17/2023  Long term low back pain on the left side. No radiation to the legs ever.   Has significant curvature of the lumbar spine and x-ray shows facet arthropathy  His pain has gotten markedly worse with more yardwork. He is able to do about 2 hours of lawnwork. Digging holes for landscaping, raking, bagging etc. He does not pace his activity. He will push through the pain.          At this point, the patient's participation with our multidisciplinary team includes:  The patient has been compliant with the program.  PT -participating   Health Psych - none      Pain rating: intensity ranges from 1/10 to 9/10, and Averages varies but likely around 5-7 on a 0-10 scale.  Pain right now is a 3/10.   Describes pain as \"sharp and stabbing, especially with certain activities.\"  Pain is almost always there.      Current pain-related medication treatments include:  -acetaminophen 650mg Q 8 hours PRN (somewhat helpful)  -ibuprofen 200mg takes 400mg Q 6 hours PRN (not really helpful)      Other pertinent medications:  -none    Previous medication treatments " included:  OPIATES:none  NSAIDS: ibuprofen (not very helpful)  MUSCLE RELAXANTS: none  ANTI-MIGRAINE MEDS: none  ANTI-DEPRESSANTS: none  SLEEP AIDS: melatonin (helpful)  ANTI-CONVULSANTS: gabapentin (tried 300mg TID in 2017 for short trial-used for shingles)  TOPICALS: tried OTC and CBD creams (not helpful)  ANXIOLYTICS: none  MEDICAL CANNABIS: none  Other meds: Tylenol (helpful)      Other treatments have included:  Jose Manuel Gallo has not been seen at a pain clinic in the past.    PT: none  Chiropractic care: tried it a couple of times, did not help  Acupuncture: none  TENs Unit: none    Injections:   8/8/2023 left L3, L4 and L5 medial branch block with Dr. Sathya Villanueva (% relief)  -10/19/2023 left L3, L4 and L5 medial branch blocks with Dr. Sathya Villanueva (helpful, > 80% relief)  -12/5/2023 left lumbar radiofrequency ablation at L4, L5 sacral ala with Dr. Sathya Villanueva (50% relief but then diminishing relief over the next 4 months or so)    Past Medical History:  Past Medical History:   Diagnosis Date    Cataract, mild, od; mod, os 07/02/2012    Chronic low back pain     CKD (chronic kidney disease) stage 3, GFR 30-59 ml/min (H)     Erectile dysfunction     Hemorrhoids     History of bladder cancer         HTN     Hyperlipidemia LDL goal <130     Kidney stones     Macular degeneration     Osteoarthritis     Personal history of colonic polyps     10/2002    Personal history of malignant neoplasm of bladder     Personal history of tobacco use     Smoked 40 years, Quit 2003    PVD (peripheral vascular disease) (H24)     Left Vertebral Artery occlusion    Rotator cuff tendinitis, left     Squamous cell carcinoma of skin, unspecified      Past Surgical History:  Past Surgical History:   Procedure Laterality Date    CATARACT IOL, RT/LT      COLONOSCOPY  11/02/2008    Normal    HC REVISE ULNAR NERVE AT ELBOW      Left    LASER YAG CAPSULOTOMY  7/2016    left eye    PHACOEMULSIFICATION WITH  "STANDARD INTRAOCULAR LENS IMPLANT  7/2012    left eye    TURP  1997 ?    VASECTOMY      VITRECTOMY PARSPLANA  8/2011    left eye, repair macular hole    ZZC SPINAL FUSION,ANT,EA ADNL LEVEL      C6-7     Medications:  Current Outpatient Medications   Medication Sig Dispense Refill    acetaminophen (TYLENOL) 650 MG CR tablet Take 1 tablet (650 mg) by mouth every 8 hours as needed for pain 60 tablet 1    carboxymethylcellulose PF (REFRESH LIQUIGEL) 1 % ophthalmic gel Place 1 drop into both eyes 4 times daily 1 Bottle 11    cyanocobalamin (VITAMIN B-12) 1000 MCG tablet Take 1 tablet (1,000 mcg) by mouth daily 90 tablet 3    ibuprofen (ADVIL/MOTRIN) 200 MG tablet Take 400 mg by mouth every 6 hours as needed for pain      lisinopril (ZESTRIL) 2.5 MG tablet TAKE ONE TABLET BY MOUTH EVERY DAY 90 tablet 0    prednisoLONE acetate (PRED FORTE) 1 % ophthalmic suspension Place 1 drop into the right eye 2 times daily 10 mL 2    simvastatin (ZOCOR) 40 MG tablet TAKE ONE TABLET BY MOUTH EVERY DAY 90 tablet 0    sodium chloride (ALTHEA 128) 5 % ophthalmic ointment Apply a small squirt approx. 1/4\" into both eyes at bedtime. 3.5 g 4    gabapentin (NEURONTIN) 100 MG capsule Take 1 cap at bedtime for 7 days, then 1 capsule in the morning and at night for 7 days, then take 1 cap three times daily for 7 days, then 1 in the morning and afternoon and 2 at bedtime. If side effects, then reduce to last tolerable dosage. (Patient not taking: Reported on 9/12/2023) 120 capsule 0     Allergies:   No Known Allergies  Social History:  Home situation:  (61 years in 2023). Lives with spouse. Only adult childSydni  Occupation/Schooling: retired since 1994. He worked in the [employer] industry  Tobacco use: quit smoking in about 1997  Alcohol use: none  Drug use: none  History of chemical dependency treatment: none    Family history:  Family History   Problem Relation Age of Onset    Osteoporosis Mother     Diabetes Father     Arthritis Father  "    Cancer Father     Respiratory Father     Genitourinary Problems Brother     Circulatory Brother     Macular Degeneration No family hx of     Glaucoma No family hx of     Thyroid Disease No family hx of     Hypertension No family hx of              Physical Exam:  Vitals:    04/23/24 0800   BP: (!) 156/68   Pulse: 57         Exam: BP (!) 156/68   Pulse 57     Behavioral observations:  Awake, alert, conversant and cooperative     Gait:  normal    Musculoskeletal exam:  strength grossly equal throughout  Lumbar flexion 80 degrees  Lumbar extension 20 degrees, somewhat painful  Lumbar ext/rot to the right very mildly uncomfortable  Lumbar ext/rot to the left is painful  SI joints mildly tender on the left to palpation  TORI positive on the left  Patricks positive on the left, mild pain  SI distraction and SI compression are equivocal  Gaenslen's positive on the left side    Neuro exam:  deferred      Skin/vascular/autonomic:  No suspicious lesions on exposed skin.     Other:  na      Diagnostic tests:  LUMBAR SPINE TWO - THREE VIEWS 1/11/2022 9:39 AM     INDICATION: Osteoarthritis of spine with radiculopathy, lumbar region.     COMPARISON: 7/13/2017                                                                      IMPRESSION: Exam numbered assuming the last rib-bearing vertebral body  is T12. Convex right lumbar angulation centered across L1-L2 where  there is advanced left-sided interspace and endplate degeneration  measures approximately 36 degrees between T12 and L3 not substantially  changed versus prior. Sagittal alignment appears preserved. Normal  lumbar vertebral body heights. No fracture. Moderate lower lumbar  spine facet arthropathy.     ALEJANDRA SOTO MD         Other testing (labs, diagnostics):  3/6/2023  Cr. 1.30  Est GFR 57      Screening tools:     DIRE Score for ongoing opioid management is calculated as follows:    Diagnosis = 2    Intractability = 1    Risk: Psych = 3  Chem Hlth = 3   Reliability = 3  Social = 3    Efficacy = 2    Total DIRE Score = 17 (14 or higher predicts good candidate for ongoing opioid management; 13 or lower predicts poor candidate for opioid management)       [place]  Reviewed [place]  April 23, 2024- no concerning fills.  Kayley NIXON, RN CNP, FNP  [facility]  [place] location          Assessment:  Lumbar facet joint pain  Lumbar spondylosis without myelopathy or radiculopathy  Chronic left-sided low back pain without sciatica  Lumbar DDD  Chronic left SI joint pain    Primary osteoarthritis, unspecified site  PMHx includes: Cataracts mild OD moderate OS (2012), hypertension, chronic low back pain, peripheral vascular disease, erectile dysfunction, personal history of malignant neoplasm of the bladder, osteoarthritis, hemorrhoids, kidney stones, personal history of tobacco use (smoked 40 years), personal history of colonic polyps (2002), macular degeneration, hyperlipidemia, chronic kidney disease stage III GFR 30-59 mL/minute, rotator cuff tendinitis left, squamous cell carcinoma of skin  PSHx includes: Colonoscopy (2008), vitrectomy parsplana (2011), phacoemulsification with standard intraocular lens implant of the left eye (2012), laser YAG capsulotomy left eye (2016), TURP (1997), cervical spinal fusion C6-7, left ulnar nerve transposition at the elbow, vasectomy.      Plan:  Physical Therapy: continue exercises learned in P T   Clinical Health Psychologist to address issues of relaxation, behavioral change, coping style, and other factors important to improvement: none  Diagnostic Studies: none at present  Medication Management:   START Tramadol 50mg tablets. Take 1 tablet (50 mg) by mouth every 6 hours as needed for moderate to severe pain Max of 2 tabs per day. Okay to break in half if needed. Caution sedation. Don't drive until you know how you feel on this medication. Fill and begin 4/23/2024. 15 day trial script.   Trial  lidocaine patches, there are 4% strength OTC available  Further procedures recommended: schedule left sided lumbar facet joint steroid injections, our office will contact you  Recommendations/follow-up for PCP:  See above  Release of information: none  Follow up: 4 weeks, can be in-person or video/telephone.       ASSESSMENT AND PLAN:  (M54.59) Lumbar facet joint pain  (primary encounter diagnosis)  Comment:   Plan: traMADol (ULTRAM) 50 MG tablet, PAIN INJECTION         EVAL/TREAT/FOLLOW UP, Adult Pain Clinic         Follow-Up Order            (M47.816) Spondylosis of lumbar region without myelopathy or radiculopathy  Comment:   Plan: traMADol (ULTRAM) 50 MG tablet, PAIN INJECTION         EVAL/TREAT/FOLLOW UP, Adult Pain Clinic         Follow-Up Order            (M54.50,  G89.29) Chronic left-sided low back pain without sciatica  Comment:   Plan: traMADol (ULTRAM) 50 MG tablet, PAIN INJECTION         EVAL/TREAT/FOLLOW UP, Adult Pain Clinic         Follow-Up Order            (M51.36) DDD (degenerative disc disease), lumbar  Comment:   Plan: traMADol (ULTRAM) 50 MG tablet, PAIN INJECTION         EVAL/TREAT/FOLLOW UP, Adult Pain Clinic         Follow-Up Order            (M53.3,  G89.29) Chronic left SI joint pain  Comment:   Plan: Adult Pain Clinic Follow-Up Order              BILLING TIME DOCUMENTATION:   TOTAL TIME includes:   Time spent preparing to see the patient: 0 minutes (reviewing records and tests)  Time spend face to face with the patient: 37 minutes  Time spent ordering tests, medications, procedures and referrals: 0 minutes  Time spent Referring and communicating with other healthcare professionals: 0 minutes  Documenting clinical information in Epic: 5 minutes    The total TIME spent on this patient on the day of the appointment was 42 minutes.                      Kayley NIXON, RN CNP, FNP  [facility]  [facility]

## 2024-04-22 DIAGNOSIS — N18.31 STAGE 3A CHRONIC KIDNEY DISEASE (H): ICD-10-CM

## 2024-04-22 DIAGNOSIS — I10 HYPERTENSION GOAL BP (BLOOD PRESSURE) < 140/90: ICD-10-CM

## 2024-04-22 DIAGNOSIS — E78.5 HYPERLIPIDEMIA LDL GOAL <130: ICD-10-CM

## 2024-04-22 RX ORDER — LISINOPRIL 2.5 MG/1
2.5 TABLET ORAL DAILY
Qty: 90 TABLET | Refills: 0 | Status: SHIPPED | OUTPATIENT
Start: 2024-04-22 | End: 2024-07-29

## 2024-04-22 RX ORDER — SIMVASTATIN 40 MG
40 TABLET ORAL DAILY
Qty: 90 TABLET | Refills: 0 | Status: SHIPPED | OUTPATIENT
Start: 2024-04-22 | End: 2024-07-30

## 2024-04-23 ENCOUNTER — OFFICE VISIT (OUTPATIENT)
Dept: PALLIATIVE MEDICINE | Facility: CLINIC | Age: 89
End: 2024-04-23
Attending: NURSE PRACTITIONER
Payer: COMMERCIAL

## 2024-04-23 VITALS — DIASTOLIC BLOOD PRESSURE: 68 MMHG | SYSTOLIC BLOOD PRESSURE: 156 MMHG | HEART RATE: 57 BPM

## 2024-04-23 DIAGNOSIS — G89.29 CHRONIC LEFT SI JOINT PAIN: ICD-10-CM

## 2024-04-23 DIAGNOSIS — G89.29 CHRONIC LEFT-SIDED LOW BACK PAIN WITHOUT SCIATICA: ICD-10-CM

## 2024-04-23 DIAGNOSIS — M54.59 LUMBAR FACET JOINT PAIN: Primary | ICD-10-CM

## 2024-04-23 DIAGNOSIS — M51.369 DDD (DEGENERATIVE DISC DISEASE), LUMBAR: ICD-10-CM

## 2024-04-23 DIAGNOSIS — M54.50 CHRONIC LEFT-SIDED LOW BACK PAIN WITHOUT SCIATICA: ICD-10-CM

## 2024-04-23 DIAGNOSIS — M47.816 SPONDYLOSIS OF LUMBAR REGION WITHOUT MYELOPATHY OR RADICULOPATHY: ICD-10-CM

## 2024-04-23 DIAGNOSIS — M53.3 CHRONIC LEFT SI JOINT PAIN: ICD-10-CM

## 2024-04-23 PROCEDURE — G2211 COMPLEX E/M VISIT ADD ON: HCPCS | Performed by: NURSE PRACTITIONER

## 2024-04-23 PROCEDURE — 99215 OFFICE O/P EST HI 40 MIN: CPT | Performed by: NURSE PRACTITIONER

## 2024-04-23 RX ORDER — TRAMADOL HYDROCHLORIDE 50 MG/1
50 TABLET ORAL EVERY 6 HOURS PRN
Qty: 30 TABLET | Refills: 0 | Status: SHIPPED | OUTPATIENT
Start: 2024-04-23 | End: 2024-07-11

## 2024-04-23 ASSESSMENT — PAIN SCALES - GENERAL: PAINLEVEL: MILD PAIN (3)

## 2024-04-23 NOTE — PATIENT INSTRUCTIONS
Plan:  Physical Therapy: continue exercises learned in P T   Clinical Health Psychologist to address issues of relaxation, behavioral change, coping style, and other factors important to improvement: none  Diagnostic Studies: none at present  Medication Management:   START Tramadol 50mg tablets. Take 1 tablet (50 mg) by mouth every 6 hours as needed for moderate to severe pain Max of 2 tabs per day. Okay to break in half if needed. Caution sedation. Don't drive until you know how you feel on this medication. Fill and begin 4/23/2024. 15 day trial script.   Trial lidocaine patches, there are 4% strength OTC available  Further procedures recommended: schedule left sided lumbar facet joint steroid injections, our office will contact you  Recommendations/follow-up for PCP:  See above  Release of information: none  Follow up: 4 weeks, can be in-person or video/telephone.     ----------------------------------------------------------------  Clinic Number:  [phone]   Call with any questions about your care and for scheduling assistance.   Calls are returned Monday through Friday between 8 AM and 4:30 PM. We usually get back to you within 2 business days depending on the issue/request.    If we are prescribing your medications:  For opioid medication refills, call the clinic or send a [employer] message 7 days in advance.  Please include:  Name of requested medication  Name of the pharmacy.  For non-opioid medications, call your pharmacy directly to request a refill. Please allow 3-4 days to be processed.   Per [place] State Law:  All controlled substance prescriptions must be filled within 30 days of being written.    For those controlled substances allowing refills, pickup must occur within 30 days of last fill.      We believe regular attendance is key to your success in our program!    Any time you are unable to keep your appointment we ask that you call us at least 24 hours in advance to cancel.This will allow us to offer  the appointment time to another patient.   Multiple missed appointments may lead to dismissal from the clinic.

## 2024-04-24 ENCOUNTER — TELEPHONE (OUTPATIENT)
Dept: PALLIATIVE MEDICINE | Facility: CLINIC | Age: 89
End: 2024-04-24
Payer: COMMERCIAL

## 2024-04-24 DIAGNOSIS — M54.50 CHRONIC LEFT-SIDED LOW BACK PAIN WITHOUT SCIATICA: ICD-10-CM

## 2024-04-24 DIAGNOSIS — G89.29 CHRONIC LEFT-SIDED LOW BACK PAIN WITHOUT SCIATICA: ICD-10-CM

## 2024-04-24 DIAGNOSIS — M54.59 LUMBAR FACET JOINT PAIN: ICD-10-CM

## 2024-04-24 DIAGNOSIS — M47.816 SPONDYLOSIS OF LUMBAR REGION WITHOUT MYELOPATHY OR RADICULOPATHY: Primary | ICD-10-CM

## 2024-04-25 NOTE — TELEPHONE ENCOUNTER
"Screening Questions for Radiology Injections:    Injection to be done at which interventional clinic site? [facility] [place]    If choosing [facility] for location, please inform patient:  \"[facility] is a Hospital based clinic. Before your visit, you should check with your insurance about how it covers the charges for facility services in a hospital-based clinic.     Procedure ordered by Kayley Alexander     Procedure ordered? left sided lumbar facet steroid injections at L3-L4 and L4-L5  ? Transforaminal Cervical RADHA - Send to [facility] ([facility]) - No [facility] providers perform this procedure    What insurance would patient like us to bill for this procedure? [employer]/Medicare    IF SCHEDULING IN [place] PAIN OR SPINE PLEASE SCHEDULE AT LEAST 7-10 BUSINESS DAYS OUT SO A PA CAN BE OBTAINED    Worker's comp or MVA (motor vehicle accident) -Any injection DO NOT SCHEDULE and route to Chava Mednoza.      [employer] insurance - For SI joint injections, DO NOT SCHEDULE and route to Bibiana Randall.       ALL BCBS, Hum[employer] and [employer] CIGNA - DO NOT SCHEDULE and route to Bibiana Randall    MEDICA- ALL INJECTIONS- route to Bibiana Randall    Is patient scheduled at [facility]? No   If YES, route every encounter to [facility] CARE NAVIGATION POOL [[phone]]    Is an  needed? No     Patient has a  home? (Review Grid) YES: Informed     Any chance of pregnancy? Not Applicable   If YES, do NOT schedule and route to RN pool  - Dr. Martinez route to Shelby Fried and PM&R Nurse  [[id number]]      Is patient actively being treated for cancer or immunocompromised? No  If YES, do NOT schedule and route to RN pool/ Dr. Martinez's Team    Does the patient have a bleeding or clotting disorder? No     If YES, okay to schedule AND route to RN nurse / Dr. Martinez's Team     (For any patients with platelet count <100, RN must forward to provider)    Is patient taking any Blood Thinners " OR Antiplatelet medication?  No   If hold needed, do NOT schedule, route to RN pool/ Dr. Martinez's Team  ? Examples:   o Blood Thinners: (Coumadin, Warfarin, Jantoven, Pradaxa, Xarelto, Eliquis, Edoxaban, Enoxaparin, Lovenox, Heparin, Arixtra, Fondaparinux or Fragmin)  o Antiplatelet Medications: (Plavix, Brilinta or Effient)     Is patient taking any aspirin products (includes Excedrin and Fiorinal)? No     If yes route to RN pool/ Dr. Martinez's Team - Do not schedule      Any allergies to contrast dye, iodine, shellfish, or numbing and steroid medications? No  ? If YES, schedule and add allergy information to appointment notes AND route to the RN pool/ Dr. Martinez's Team  ? If RADHA and Contrast Dye / Iodine Allergy? DO NOT SCHEDULE, route to RN pool/ Dr. Gonzalezs Team  ? Allergies: Patient has no known allergies.     Does patient have an active infection or treated for one within the past week? No  ? Is patient currently taking any antibiotics or steroid medications?  No     For patients on chronic, preventative, or prophylactic antibiotics, procedures may be scheduled.     For patients on antibiotics for active or recent infection, schedule 4 days after completed.    For patients on steroid medications, schedule 4 days after completed.     Has the patient had a flu shot or any other vaccinations within the past 7 days? No  If yes, explain that for the vaccine to work best they need to:     ? wait 1 week before and 1 week after getting any Vaccine  ? wait 1 week before and 2 weeks after getting any Covid Vaccine   ? If patient has concerns about the timing, send to RN pool/ Dr. Martinez's Team    Does patient have an MRI/CT?  Not Applicable Include Date and Check Procedure Scheduling Grid to see if required.    Was the MRI/CT done within the last 3 years?  NA     If no route to RN Pool/ Dr. Martinez's Team    If yes, where was the MRI/CT done?      Refer to PACS Transmissions list for approved external locations and route  to RN Pool High Priority/ Dr. Martinez's Team    If MRI was not done at approved external location do NOT schedule and route to RN pool/ Dr. Martinez's Team      If patient has an imaging disc, the injection MAY be scheduled but patient must bring disc to appt or appt will be cancelled.    Is patient able to transfer to a procedure table with minimal or no assistance? Yes   ? If no, do NOT schedule and route to RN Pool/ Dr. Martinez's Team    Procedure Specific Instructions:  ? If celiac plexus block, informed patient NPO for 6 hours and that it is okay to take medications with sips of water, especially blood pressure medications Not Applicable       ? If this is for a cervical procedure, informed patient that aspirin needs to be held for 6 days.   Not Applicable    ? Sedation, If Sedation is ordered for any procedure, patient must be NPO for 6 hours prior to procedure Not Applicable    ? If IV needed:    Do not schedule procedures requiring IV placement in the first appointment of the day or first appointment after lunch. Do NOT schedule at 0745, 0815 or 1245.       Instructed patient to arrive 30 minutes early for IV start if required. (Check Procedure Scheduling Grid)  Not Applicable    Reminders:  ? If you are started on any steroids or antibiotics between now and your appointment, you must contact us because the procedure may need to be cancelled.  Yes    ? As a reminder, receiving steroids can decrease your body's ability to fight infection.   Would you still like to move forward with scheduling the injection?  Yes    ? IV Sedation is not provided for procedures. If oral anti-anxiety medication is needed, the patient should request this from their referring provider.    ? Instruct patient to arrive as directed prior to the scheduled appointment time:  If IV needed 30 minutes before appointment time       For patients 85 or older we recommend having an adult stay w/ them for the remainder of the day.       If the  patient is Diabetic, remind them to bring their glucometer.      Does the patient have any questions?  NO  Karo Moser  [facility]

## 2024-05-23 ENCOUNTER — RADIOLOGY INJECTION OFFICE VISIT (OUTPATIENT)
Dept: PALLIATIVE MEDICINE | Facility: CLINIC | Age: 89
End: 2024-05-23
Attending: NURSE PRACTITIONER
Payer: COMMERCIAL

## 2024-05-23 VITALS — DIASTOLIC BLOOD PRESSURE: 73 MMHG | OXYGEN SATURATION: 100 % | HEART RATE: 54 BPM | SYSTOLIC BLOOD PRESSURE: 154 MMHG

## 2024-05-23 DIAGNOSIS — M51.369 DDD (DEGENERATIVE DISC DISEASE), LUMBAR: ICD-10-CM

## 2024-05-23 DIAGNOSIS — G89.29 CHRONIC LEFT-SIDED LOW BACK PAIN WITHOUT SCIATICA: ICD-10-CM

## 2024-05-23 DIAGNOSIS — M54.59 LUMBAR FACET JOINT PAIN: ICD-10-CM

## 2024-05-23 DIAGNOSIS — M54.50 CHRONIC LEFT-SIDED LOW BACK PAIN WITHOUT SCIATICA: ICD-10-CM

## 2024-05-23 DIAGNOSIS — M47.816 SPONDYLOSIS OF LUMBAR REGION WITHOUT MYELOPATHY OR RADICULOPATHY: ICD-10-CM

## 2024-05-23 PROCEDURE — 64493 INJ PARAVERT F JNT L/S 1 LEV: CPT | Mod: LT | Performed by: PAIN MEDICINE

## 2024-05-23 PROCEDURE — 64494 INJ PARAVERT F JNT L/S 2 LEV: CPT | Mod: LT | Performed by: PAIN MEDICINE

## 2024-05-23 RX ORDER — TRIAMCINOLONE ACETONIDE 40 MG/ML
40 INJECTION, SUSPENSION INTRA-ARTICULAR; INTRAMUSCULAR ONCE
Status: COMPLETED | OUTPATIENT
Start: 2024-05-23 | End: 2024-05-23

## 2024-05-23 RX ADMIN — TRIAMCINOLONE ACETONIDE 40 MG: 40 INJECTION, SUSPENSION INTRA-ARTICULAR; INTRAMUSCULAR at 11:42

## 2024-05-23 ASSESSMENT — PAIN SCALES - GENERAL
PAINLEVEL: MILD PAIN (3)
PAINLEVEL: MODERATE PAIN (4)

## 2024-05-23 NOTE — PATIENT INSTRUCTIONS
[facility]   Procedure Discharge Instructions    Today you saw:    Dr. Sathya Villanueva,         You had a(n):  Facet joint injection    Medications used for lumbar facet: Lidocaine, Omnipaque, Dexamethasone, Bupivicaine        Be cautious with walking. Numbness and/or weakness in the lower extremities may occur for up to 6-8 hours after the procedure due to effect of the local anesthetic  Do not drive for 6 hours. The effect of the local anesthetic could slow your reflexes.   You may resume your regular activities after 24 hours  Avoid strenuous activity for the first 24 hours  You may shower, however avoid swimming, tub baths or hot tubs for 24 hours following your procedure  You may have a mild to moderate increase in pain for several days following the injection.  It may take up to 14 days for the steroid medication to start working although you may feel the effect as early as a few days after the procedure.     You may use ice packs for 10-15 minutes, 3 to 4 times a day at the injection site for comfort  Do not use heat to painful areas for 6 to 8 hours. This will give the local anesthetic time to wear off and prevent you from accidentally burning your skin.   Unless you have been directed to avoid the use of anti-inflammatory medications (NSAIDS), you may use medications such as ibuprofen, Aleve or Tylenol for pain control if needed.   If you have diabetes, check your blood sugar more frequently than usual as your blood sugar may be higher than normal for 10-14 days following a steroid injection. Contact your doctor who manages your diabetes if your blood sugar is higher than usual  Possible side effects of steroids that you may experience include flushing, elevated blood pressure, increased appetite, mild headaches and restlessness.  All of these symptoms will get better with time.  If you experience any of the following, call the Pain Clinic during work hours (Mon-Friday 8-4:30  pm) at [phone] or the Provider Line after hours at [phone]:  -Fever over 100 degree F  -Swelling, bleeding, redness, drainage, warmth at the injection site  -Progressive weakness or numbness in your legs  -Loss of bowel or bladder function  -Unusual headache not relieved by Tylenol or other pain reliever  -Unusual new onset of pain that is not improving

## 2024-05-23 NOTE — PROGRESS NOTES
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  The patient has a history of axial lbp.  Exam shows + kemps and reproduction of pain with extension and lateral rotation.  They have tried conservative treatment including meds/pt/injections.    Options/alternatives, benefits and risks were discussed with the patient including bleeding, infection, flared pain, tissue trauma, exposure to radiation, reaction to medications including seizure, spinal cord injury, paralysis, weakness, numbness and headache.     Questions were answered to her satisfaction and she wishes to proceed. Voluntary informed consent was obtained and signed.     Vitals were reviewed: Yes  Allergies were reviewed:  Yes   Medications were reviewed:  Yes   Pre-procedure pain score: 4/10    Procedure:  After obtaining signed informed consent, the patient was brought into the procedure suite and was placed in a prone position on the procedure table.   A Pause for the Cause was performed.  The patient was prepped and draped in the usual sterile fashion.     Under AP fluoroscopic guidance the l3-4, l4-5 facet joints on the left side were identified, and the C-arm was rotated obliquely to the affected side to open the joint space. A total of 1 ml of 1% lidocaine was injected at the needle entry point and needle tract. Then a 22 gauge 3.5 inch spinal needle was inserted and advanced under fluoroscopic guidance targeting the superior articular process of each joint. Once the needle*made contact with the SAP, it was rotated and advanced into the joint.    AP and lateral fluoroscopic views were obtained to  confirm the needle placement. Then,  Omnipaque 0.5 contrast dye was injected after negative aspiration for heme and CSF in each joint, confirming appropriate needle placement.  A total of 1ml of Omnipaque was used.  Omnipaque wasted:  9 ml.    Then, each joint was injected with 1 ml of a combination of Kenalog 40 mg and 0.25% bupivacaine 1 ml (total injectate volume 2 ml) and the needles were flushed with local anesthetic as they were withdrawn.       Hemostasis was achieved, the area was cleaned, and bandaids were placed when appropriate.  The patient tolerated the procedure well, and was taken to the recovery room.    Images were saved to PACS.    Post-procedure pain score: 3/10  Follow-up includes:   -f/u pwith provider     Sathya Villanueva MD  [facility] [place]

## 2024-05-23 NOTE — NURSING NOTE
Discharge Information    IV Discontiued Time:  NA    Amount of Fluid Infused:  NA    Discharge Criteria = When patient returns to baseline or as per MD order    Consciousness:  Pt is fully awake    Circulation:  BP +/- 20% of pre-procedure level    Respiration:  Patient is able to breathe deeply    O2 Sat:  Patient is able to maintain O2 Sat >92% on room air    Activity:  Moves 4 extremities on command    Ambulation:  Patient is able to stand and walk or stand and pivot into wheelchair    Dressing:  Clean/dry or No Dressing    Notes:   Discharge instructions and AVS given to patient    Patient meets criteria for discharge?  YES    Admitted to PCU?  No    Responsible adult present to accompany patient home?  Yes    Signature/Title:    Eladia Cha RN  RN Care Coordinator  [facility] [place]

## 2024-06-03 ENCOUNTER — TELEPHONE (OUTPATIENT)
Dept: PALLIATIVE MEDICINE | Facility: CLINIC | Age: 89
End: 2024-06-03
Payer: COMMERCIAL

## 2024-06-03 NOTE — TELEPHONE ENCOUNTER
[facility] Call Center    Phone Message    May a detailed message be left on voicemail: yes     Reason for Call: Other: Patient calling to discuss the last injection he had.  He said it did not do any good and what else can they do.  Please try him back.      Action Taken: Message routed to:  Other: [facility]    Travel Screening: Not Applicable     Date of Service:

## 2024-06-03 NOTE — TELEPHONE ENCOUNTER
Routing to  to schedule a follow up with Kayley Alexander to discuss next steps. .  Can be in-person or video/telephone.     FLO TaylorN, RN  Care Coordinator  [facility] [place]

## 2024-06-05 NOTE — TELEPHONE ENCOUNTER
Agree, best action would be a video/telephone visit or in-person to fully discuss next steps.     IF he calls back and is frustrated, it has been 6 months since his RFA. He did get 50% relief after the RFA done in December and it was really good for 1-2 months, then diminishing relief over the previous 4 months.     Unsure if insurance would pay for repeat since only 50% relief.     Kayley NIXON, RN CNP, FNP  [facility]  [facility]

## 2024-06-05 NOTE — TELEPHONE ENCOUNTER
Routing to  to schedule a follow up with Kayley Alexander to discuss next steps. .  Can be in-person or video/telephone.      FLO TaylorN, RN  Care Coordinator  [facility] [place]

## 2024-06-05 NOTE — TELEPHONE ENCOUNTER
Called patient and scheduled a follow up visit for 6/26/2024 at 10:00. Added patient to wait list as he said he actively checks MyChart.      Vicki CROCKER    [facility] [place]

## 2024-06-12 ENCOUNTER — VIRTUAL VISIT (OUTPATIENT)
Dept: PALLIATIVE MEDICINE | Facility: CLINIC | Age: 89
End: 2024-06-12
Attending: NURSE PRACTITIONER
Payer: COMMERCIAL

## 2024-06-12 DIAGNOSIS — M54.50 CHRONIC LEFT-SIDED LOW BACK PAIN WITHOUT SCIATICA: Primary | ICD-10-CM

## 2024-06-12 DIAGNOSIS — M47.816 SPONDYLOSIS OF LUMBAR REGION WITHOUT MYELOPATHY OR RADICULOPATHY: ICD-10-CM

## 2024-06-12 DIAGNOSIS — M53.3 CHRONIC LEFT SI JOINT PAIN: ICD-10-CM

## 2024-06-12 DIAGNOSIS — M54.59 LUMBAR FACET JOINT PAIN: ICD-10-CM

## 2024-06-12 DIAGNOSIS — G89.29 CHRONIC INTRACTABLE PAIN: ICD-10-CM

## 2024-06-12 DIAGNOSIS — M51.369 DDD (DEGENERATIVE DISC DISEASE), LUMBAR: ICD-10-CM

## 2024-06-12 DIAGNOSIS — G89.29 CHRONIC LEFT SI JOINT PAIN: ICD-10-CM

## 2024-06-12 DIAGNOSIS — G89.29 CHRONIC LEFT-SIDED LOW BACK PAIN WITHOUT SCIATICA: Primary | ICD-10-CM

## 2024-06-12 PROCEDURE — 99443 PR PHYSICIAN TELEPHONE EVALUATION 21-30 MIN: CPT | Mod: 93 | Performed by: NURSE PRACTITIONER

## 2024-06-12 ASSESSMENT — PAIN SCALES - PAIN ENJOYMENT GENERAL ACTIVITY SCALE (PEG)
INTERFERED_ENJOYMENT_LIFE: 10 - COMPLETELY INTERFERES
INTERFERED_GENERAL_ACTIVITY: 10 - COMPLETELY INTERFERES
AVG_PAIN_PASTWEEK: 5
PEG_TOTALSCORE: 8.33

## 2024-06-12 NOTE — PROGRESS NOTES
"Enmanuel is a 89 year old who is being evaluated via a billable telephone visit.    What phone number would you like to be contacted at? [phone]   How would you like to obtain your AVS? Maverick  Originating Location (pt. Location): Home    Distant Location (provider location):  On-site    Phone call start: 0842- no answer/ 2nd call answered @ 0849  Phone call end: [phone]  Phone call duration: 15 minutes       [facility]      6/12/2024      Chief complaint:   1.5 weeks ago, he has \"immense pain whenever I sit down after 10 minutes, then I can hardly get up.\" Left sided low back pain  chronic axial low back pain bilaterally but left is markedly worse on the left side         Interval history:  Jose Manuel ELSA Cleo 89 year old male is known to me for:  Lumbar facet joint pain  Lumbar spondylosis with out myelopathy or radiculopathy  Chronic left-sided low back pain without sciatica  Primary osteoarthritis, unspecified site  PMHx includes: Cataracts mild OD moderate OS (2012), hypertension, chronic low back pain, peripheral vascular disease, erectile dysfunction, personal history of malignant neoplasm of the bladder, osteoarthritis, hemorrhoids, kidney stones, personal history of tobacco use (smoked 40 years), personal history of colonic polyps (2002), macular degeneration, hyperlipidemia, chronic kidney disease stage III GFR 30-59 mL/minute, rotator cuff tendinitis left, squamous cell carcinoma of skin  PSHx includes: Colonoscopy (2008), vitrectomy parsplana (2011), phacoemulsification with standard intraocular lens implant of the left eye (2012), laser YAG capsulotomy left eye (2016), TURP (1997), cervical spinal fusion C6-7, left ulnar nerve transposition at the elbow, vasectomy.      Recommendations/plan at the last visit on 4/23/2024 included:  Physical Therapy: continue exercises learned in P T   Clinical Health Psychologist to address issues of relaxation, behavioral change, coping " style, and other factors important to improvement: none  Diagnostic Studies: none at present  Medication Management:   START Tramadol 50mg tablets. Take 1 tablet (50 mg) by mouth every 6 hours as needed for moderate to severe pain Max of 2 tabs per day. Okay to break in half if needed. Caution sedation. Don't drive until you know how you feel on this medication. Fill and begin 4/23/2024. 15 day trial script.   Trial lidocaine patches, there are 4% strength OTC available  Further procedures recommended: schedule left sided lumbar facet joint steroid injections, our office will contact you  Recommendations/follow-up for PCP:  See above  Release of information: none  Follow up: 4 weeks, can be in-person or video/telephone.         Since male last visit, Jose Manuel Gallo reports:    Interval history June 12, 2024  -lumbar facet joint injections in May 2024 were minimally helpful.    -1.5 weeks ago, he began to have immense pain when he sits down for more than 10 minutes. He can barely get up. He much lower level of function. He denies any falls or injuries. He denies any illness. Denies b/b symptoms, numbness/tingling, weakness. Denies saddle anesthesia.   No MRI imaging of lumbar spine, recommend we get MRI imaging.   -he has taken 4 of the tramadol but he doesn't remember if it was helpful.   He is using Tylenol or ibuprofen and a topical gel on his back when the pain is bad, lays down for an hour, and then his back feels somewhat better.         Interval history April 23, 2024  -reports his axial low back pain has returned. Present on both sides but markedly worse on the left.   Feels pretty good in the morning, but by late afternoon he feels awful. Low back is hurting and he cannot continue activity, needs to go lay down.   -had left sided lumbar RFA done 12/5/2023, reduced pain by 50% for about 4 months. Now left sided pain returned. Worst with lumbar extension and extension and rotation/facet loading  "movements.  -discussed a trial of Tramadol for use on bad pain days, max of 2 tabs per day.       Interval history September 13, 2023  -doing well overall,   -had great relief for 24 hours after the lumbar MBB  -will get him scheduled for 2nd MBB today     Initial evaluation pain history collected on 7/17/2023  Long term low back pain on the left side. No radiation to the legs ever.   Has significant curvature of the lumbar spine and x-ray shows facet arthropathy  His pain has gotten markedly worse with more yardwork. He is able to do about 2 hours of lawnwork. Digging holes for landscaping, raking, bagging etc. He does not pace his activity. He will push through the pain.          At this point, the patient's participation with our multidisciplinary team includes:  The patient has been compliant with the program.  PT -participating   Health Psych - none      Pain rating: intensity ranges from 5/10 to 10/10, and Averages varies but likely around between 5-10 depending on activity on a 0-10 scale.  Pain right now is a 5/10.   Describes pain as \"sharp and stabbing, especially with certain activities.\"  Pain is almost always there.      Current pain-related medication treatments include:  -acetaminophen 650mg Q 8 hours PRN (somewhat helpful)  -ibuprofen 200mg takes 400mg Q 6 hours PRN (not really helpful)      Other pertinent medications:  -none    Previous medication treatments included:  OPIATES:none  NSAIDS: ibuprofen (not very helpful)  MUSCLE RELAXANTS: none  ANTI-MIGRAINE MEDS: none  ANTI-DEPRESSANTS: none  SLEEP AIDS: melatonin (helpful)  ANTI-CONVULSANTS: gabapentin (tried 300mg TID in 2017 for short trial-used for shingles)  TOPICALS: tried OTC and CBD creams (not helpful)  ANXIOLYTICS: none  MEDICAL CANNABIS: none  Other meds: Tylenol (helpful)      Other treatments have included:  Jose Manuel Gallo has not been seen at a pain clinic in the past.    PT: none  Chiropractic care: tried it a couple of times, " did not help  Acupuncture: none  TENs Unit: none    Injections:   8/8/2023 left L3, L4 and L5 medial branch block with Dr. Sathya Villanueva (% relief)  -10/19/2023 left L3, L4 and L5 medial branch blocks with Dr. Sathya Villanueva (helpful, > 80% relief)  -12/5/2023 left lumbar radiofrequency ablation at L4, L5 sacral ala with Dr. Sathya Villanueva (50% relief but then diminishing relief over the next 4 months or so)  -5/23/2024 left L3-4, L4-5 facet joint steroid injections with Dr. Sathya Villanueva (cannot rate pain, it was manageable for a few weeks)    Past Medical History:  Past Medical History:   Diagnosis Date    Cataract, mild, od; mod, os 07/02/2012    Chronic low back pain     CKD (chronic kidney disease) stage 3, GFR 30-59 ml/min (H)     Erectile dysfunction     Hemorrhoids     History of bladder cancer         HTN     Hyperlipidemia LDL goal <130     Kidney stones     Macular degeneration     Osteoarthritis     Personal history of colonic polyps     10/2002    Personal history of malignant neoplasm of bladder     Personal history of tobacco use     Smoked 40 years, Quit 2003    PVD (peripheral vascular disease) (H24)     Left Vertebral Artery occlusion    Rotator cuff tendinitis, left     Squamous cell carcinoma of skin, unspecified      Past Surgical History:  Past Surgical History:   Procedure Laterality Date    CATARACT IOL, RT/LT      COLONOSCOPY  11/02/2008    Normal    HC REVISE ULNAR NERVE AT ELBOW      Left    LASER YAG CAPSULOTOMY  7/2016    left eye    PHACOEMULSIFICATION WITH STANDARD INTRAOCULAR LENS IMPLANT  7/2012    left eye    TURP  1997 ?    VASECTOMY      VITRECTOMY PARSPLANA  8/2011    left eye, repair macular hole    ZZC SPINAL FUSION,ANT,EA ADNL LEVEL      C6-7     Medications:  Current Outpatient Medications   Medication Sig Dispense Refill    acetaminophen (TYLENOL) 650 MG CR tablet Take 1 tablet (650 mg) by mouth every 8 hours as needed for pain 60 tablet 1     "carboxymethylcellulose PF (REFRESH LIQUIGEL) 1 % ophthalmic gel Place 1 drop into both eyes 4 times daily 1 Bottle 11    cyanocobalamin (VITAMIN B-12) 1000 MCG tablet Take 1 tablet (1,000 mcg) by mouth daily 90 tablet 3    gabapentin (NEURONTIN) 100 MG capsule Take 1 cap at bedtime for 7 days, then 1 capsule in the morning and at night for 7 days, then take 1 cap three times daily for 7 days, then 1 in the morning and afternoon and 2 at bedtime. If side effects, then reduce to last tolerable dosage. (Patient not taking: Reported on 9/12/2023) 120 capsule 0    ibuprofen (ADVIL/MOTRIN) 200 MG tablet Take 400 mg by mouth every 6 hours as needed for pain      lisinopril (ZESTRIL) 2.5 MG tablet TAKE ONE TABLET BY MOUTH EVERY DAY 90 tablet 0    prednisoLONE acetate (PRED FORTE) 1 % ophthalmic suspension Place 1 drop into the right eye 2 times daily 10 mL 2    simvastatin (ZOCOR) 40 MG tablet TAKE ONE TABLET BY MOUTH EVERY DAY 90 tablet 0    sodium chloride (ALTHEA 128) 5 % ophthalmic ointment Apply a small squirt approx. 1/4\" into both eyes at bedtime. 3.5 g 4    traMADol (ULTRAM) 50 MG tablet Take 1 tablet (50 mg) by mouth every 6 hours as needed for moderate to severe pain Max of 2 tabs per day. Okay to break in half if needed. Caution sedation. Don't drive until you know how you feel on this medication. Fill and begin 4/23/2024. 15 day trial script. 30 tablet 0     Allergies:   No Known Allergies  Social History:  Home situation:  (61 years in 2023). Lives with spouse. Only adult childSydni  Occupation/Schooling: retired since 1994. He worked in the [email or web address] industry  Tobacco use: quit smoking in about 1997  Alcohol use: none  Drug use: none  History of chemical dependency treatment: none    Family history:  Family History   Problem Relation Age of Onset    Osteoporosis Mother     Diabetes Father     Arthritis Father     Cancer Father     Respiratory Father     Genitourinary Problems Brother     Circulatory " Brother     Macular Degeneration No family hx of     Glaucoma No family hx of     Thyroid Disease No family hx of     Hypertension No family hx of              Physical Exam:  There were no vitals filed for this visit.        Exam: There were no vitals taken for this visit.    Behavioral observations:  Awake, alert. Cooperative.   Pulm: respirations easy and unlabored. Able to speak in full sentences without SOB or cough noted.        Diagnostic tests:  LUMBAR SPINE TWO - THREE VIEWS 1/11/2022 9:39 AM     INDICATION: Osteoarthritis of spine with radiculopathy, lumbar region.     COMPARISON: 7/13/2017                                                                      IMPRESSION: Exam numbered assuming the last rib-bearing vertebral body  is T12. Convex right lumbar angulation centered across L1-L2 where  there is advanced left-sided interspace and endplate degeneration  measures approximately 36 degrees between T12 and L3 not substantially  changed versus prior. Sagittal alignment appears preserved. Normal  lumbar vertebral body heights. No fracture. Moderate lower lumbar  spine facet arthropathy.     ALEJANDRA SOTO MD         Other testing (labs, diagnostics):  3/6/2023  Cr. 1.30  Est GFR 57      Screening tools:     DIRE Score for ongoing opioid management is calculated as follows:    Diagnosis = 2    Intractability = 1    Risk: Psych = 3  Chem Hlth = 3  Reliability = 3  Social = 3    Efficacy = 2    Total DIRE Score = 17 (14 or higher predicts good candidate for ongoing opioid management; 13 or lower predicts poor candidate for opioid management)       MN  review:  Reviewed MN  6/12/2024- no concerning fills.  Kayley NIXON, RN CNP, FNP  [facility]  [place] location          Assessment:  Chronic left-sided low back pain without sciatica  Lumbar spondylosis without myelopathy or radiculopathy  Lumbar DDD  Chronic left SI joint pain  Lumbar facet joint pain  Chronic  intractable pain    Primary osteoarthritis, unspecified site  PMHx includes: Cataracts mild OD moderate OS (2012), hypertension, chronic low back pain, peripheral vascular disease, erectile dysfunction, personal history of malignant neoplasm of the bladder, osteoarthritis, hemorrhoids, kidney stones, personal history of tobacco use (smoked 40 years), personal history of colonic polyps (2002), macular degeneration, hyperlipidemia, chronic kidney disease stage III GFR 30-59 mL/minute, rotator cuff tendinitis left, squamous cell carcinoma of skin  PSHx includes: Colonoscopy (2008), vitrectomy parsplana (2011), phacoemulsification with standard intraocular lens implant of the left eye (2012), laser YAG capsulotomy left eye (2016), TURP (1997), cervical spinal fusion C6-7, left ulnar nerve transposition at the elbow, vasectomy.      Plan:  Physical Therapy: continue exercises learned in P T   Clinical Health Psychologist to address issues of relaxation, behavioral change, coping style, and other factors important to improvement: none  Diagnostic Studies: obtain lumbar MRI by calling [facility] Scheduling:  [facility]: [phone]  [facility] [phone]  [facility] (including [place]): [phone]  Medication Management:   TRY Tramadol 50mg tablets. Take 1 tablet (50 mg) by mouth every 6 hours as needed for moderate to severe pain Max of 2 tabs per day. Okay to break in half if needed. Caution sedation. Don't drive until you know how you feel on this medication. Filled and picked up on 4/24. You reported you have  #10 of #14 dispensed. Try this today and report to me if this is helpful at all.    Trial lidocaine patches, there are 4% strength OTC available  Further procedures recommended: none  Recommendations/follow-up for PCP:  See above  Release of information: none  Follow up: 4 weeks in-person.       ASSESSMENT AND PLAN:  (M54.50,  G89.29) Chronic left-sided low back pain  without sciatica  (primary encounter diagnosis)  Comment:   Plan: MR Lumbar Spine w/o Contrast, [facility]        Follow-Up Order            (M47.816) Spondylosis of lumbar region without myelopathy or radiculopathy  Comment:   Plan: MR Lumbar Spine w/o Contrast, [facility]        Follow-Up Order            (M51.36) DDD (degenerative disc disease), lumbar  Comment:   Plan: MR Lumbar Spine w/o Contrast, [facility]        Follow-Up Order            (M53.3,  G89.29) Chronic left SI joint pain  Comment:   Plan: MR Lumbar Spine w/o Contrast, [facility]        Follow-Up Order            (M54.59) Lumbar facet joint pain  Comment:   Plan: MR Lumbar Spine w/o Contrast, [facility]        Follow-Up Order            (G89.29) Chronic intractable pain  Comment:   Plan: Adult Pain Clinic Follow-Up Order              BILLING TIME DOCUMENTATION:   TOTAL TIME includes:   Time spent preparing to see the patient: 2 minutes (reviewing records and tests)  Time spend face to face with the patient: 15 minutes  Time spent ordering tests, medications, procedures and referrals: 0 minutes  Time spent Referring and communicating with other healthcare professionals: 0 minutes  Documenting clinical information in Epic: 7 minutes    The total TIME spent on this patient on the day of the appointment was 24 minutes.                       Kayley NIXON, HANNHA CNP, FNP  [facility]

## 2024-06-12 NOTE — PROGRESS NOTES
PEG: A Three-Item Scale Assessing Pain Intensity and Interference    What number best describes your PAIN ON AVERAGE in the past week? 5    What number best describes how, during the past week, pain has interfered with your ENJOYMENT OF LIFE? 10 - Completely interferes    What number best describes how, during the past week, pain has interfered with your GENERAL ACTIVITY? 10 - Completely interferes    PEG Total Score: 8.33    Shayne HAND, Remberto KA, Darwin MJ, Chente TA, Bebeto J, Micah CISNEROS, Milly SEO, Yohana K. Development and initial validation of the PEG, a 3-item scale assessing pain intensity and interference. Journal of General Internal Medicine. 2009 [place];24:733-738.

## 2024-06-12 NOTE — PATIENT INSTRUCTIONS
Plan:  Physical Therapy: continue exercises learned in P T   Clinical Health Psychologist to address issues of relaxation, behavioral change, coping style, and other factors important to improvement: none  Diagnostic Studies: obtain lumbar MRI by calling [facility] Scheduling:  [facility] [place]: [phone]  [facility]: [phone]  [facility] (including [place]): [phone]  Medication Management:   TRY Tramadol 50mg tablets. Take 1 tablet (50 mg) by mouth every 6 hours as needed for moderate to severe pain Max of 2 tabs per day. Okay to break in half if needed. Caution sedation. Don't drive until you know how you feel on this medication. Filled and picked up on 4/24. You reported you have  #10 of #14 dispensed. Try this today and report to me if this is helpful at all.    Trial lidocaine patches, there are 4% strength OTC available  Further procedures recommended: none  Recommendations/follow-up for PCP:  See above  Release of information: none  Follow up: 4 weeks in-person.   ================================    Clinic Number:  [phone]   Call with any questions about your care and for scheduling assistance.   Calls are returned Monday through Friday between 8 AM and 4:30 PM. We usually get back to you within 2 business days depending on the issue/request.    If we are prescribing your medications:  For opioid medication refills, call the clinic or send a [employer] message 7 days in advance.  Please include:  Name of requested medication  Name of the pharmacy.  For non-opioid medications, call your pharmacy directly to request a refill. Please allow 3-4 days to be processed.   Per [place] State Law:  All controlled substance prescriptions must be filled within 30 days of being written.    For those controlled substances allowing refills, pickup must occur within 30 days of last fill.      We believe regular attendance is key to your success in our program!    Any time you are unable  to keep your appointment we ask that you call us at least 24 hours in advance to cancel.This will allow us to offer the appointment time to another patient.   Multiple missed appointments may lead to dismissal from the clinic.

## 2024-06-28 ENCOUNTER — OFFICE VISIT (OUTPATIENT)
Dept: OPHTHALMOLOGY | Facility: CLINIC | Age: 89
End: 2024-06-28
Payer: COMMERCIAL

## 2024-06-28 DIAGNOSIS — Z96.1 PSEUDOPHAKIA: ICD-10-CM

## 2024-06-28 DIAGNOSIS — H01.003 BLEPHARITIS OF BOTH EYES, UNSPECIFIED EYELID, UNSPECIFIED TYPE: ICD-10-CM

## 2024-06-28 DIAGNOSIS — Z01.01 ENCOUNTER FOR EXAMINATION OF EYES AND VISION WITH ABNORMAL FINDINGS: Primary | ICD-10-CM

## 2024-06-28 DIAGNOSIS — H43.811 POSTERIOR VITREOUS DETACHMENT, RIGHT: ICD-10-CM

## 2024-06-28 DIAGNOSIS — H52.4 PRESBYOPIA: ICD-10-CM

## 2024-06-28 DIAGNOSIS — H01.006 BLEPHARITIS OF BOTH EYES, UNSPECIFIED EYELID, UNSPECIFIED TYPE: ICD-10-CM

## 2024-06-28 DIAGNOSIS — H35.342 MACULAR HOLE, LEFT: ICD-10-CM

## 2024-06-28 DIAGNOSIS — H25.811 COMBINED FORMS OF AGE-RELATED CATARACT OF RIGHT EYE: ICD-10-CM

## 2024-06-28 DIAGNOSIS — H18.9 KERATOPATHY: ICD-10-CM

## 2024-06-28 PROCEDURE — 92014 COMPRE OPH EXAM EST PT 1/>: CPT | Performed by: OPHTHALMOLOGY

## 2024-06-28 PROCEDURE — 92015 DETERMINE REFRACTIVE STATE: CPT | Performed by: OPHTHALMOLOGY

## 2024-06-28 ASSESSMENT — VISUAL ACUITY
OD_CC: 20/50
CORRECTION_TYPE: GLASSES
METHOD: SNELLEN - LINEAR
OS_CC: 20/250 ECC
OD_CC+: +1

## 2024-06-28 ASSESSMENT — CONF VISUAL FIELD
OD_SUPERIOR_NASAL_RESTRICTION: 0
OS_NORMAL: 1
OD_NORMAL: 1
OS_INFERIOR_TEMPORAL_RESTRICTION: 0
OD_INFERIOR_TEMPORAL_RESTRICTION: 0
OS_SUPERIOR_TEMPORAL_RESTRICTION: 0
OD_INFERIOR_NASAL_RESTRICTION: 0
OS_SUPERIOR_NASAL_RESTRICTION: 0
OS_INFERIOR_NASAL_RESTRICTION: 0
OD_SUPERIOR_TEMPORAL_RESTRICTION: 0

## 2024-06-28 ASSESSMENT — REFRACTION_MANIFEST
OD_ADD: +3.25
OS_AXIS: 165
OS_SPHERE: -2.00
OD_CYLINDER: +5.75
OD_AXIS: 033
OS_ADD: +3.25
OS_CYLINDER: +2.50
OD_SPHERE: +0.25

## 2024-06-28 ASSESSMENT — EXTERNAL EXAM - LEFT EYE: OS_EXAM: PROLAPSED FAT PADS: UPPER, LOWER; MILD-MOD BROW

## 2024-06-28 ASSESSMENT — TONOMETRY
OS_IOP_MMHG: 13
OD_IOP_MMHG: 14
IOP_METHOD: APPLANATION

## 2024-06-28 ASSESSMENT — SLIT LAMP EXAM - LIDS
COMMENTS: 1+ BLEPHARITIS, 2+ MEIBOMIAN GLAND DYSFUNCTION
COMMENTS: 1+ BLEPHARITIS, 2+ MEIBOMIAN GLAND DYSFUNCTION

## 2024-06-28 ASSESSMENT — REFRACTION_WEARINGRX
SPECS_TYPE: TRIFOCAL
OD_AXIS: 035
OD_SPHERE: +0.50
OD_CYLINDER: +6.00
OS_CYLINDER: +2.50
OS_SPHERE: -1.75
OS_AXIS: 165
OD_ADD: +3.25
OS_ADD: +3.25

## 2024-06-28 ASSESSMENT — CUP TO DISC RATIO
OD_RATIO: 0.4
OS_RATIO: 0.4

## 2024-06-28 ASSESSMENT — EXTERNAL EXAM - RIGHT EYE: OD_EXAM: PROLAPSED FAT PADS: UPPER, LOWER; MILD-MOD BROW

## 2024-06-28 NOTE — PROGRESS NOTES
" Current Eye Medications:    Prednisolone once daily in the right eye   Refresh Celluvisc drops in the right eye at bedtime and in the morning      Subjective: Here for complete eye exam. Vision has been gradually decreased at distance and near over the past year. Patient complains of right eye pain in the morning when waking up. Left eye feels irritated sometimes.      Objective:  See Ophthalmology Exam.       Assessment:  Stable eye exam.      ICD-10-CM    1. Encounter for examination of eyes and vision with abnormal findings  Z01.01       2. Presbyopia  H52.4       3. Combined forms of age-related cataract, mild-mod, of right eye  H25.811       4. Pseudophakia, Yag Caps, os  Z96.1       5. Hx of vitrectomy for chronic macular hole, os (ER)  H35.342       6. Blepharitis of both eyes, unspecified eyelid, unspecified type  H01.003     H01.006       7. Keratopathy  H18.9       8. Posterior vitreous detachment, right  H43.811            Plan:  Continue:   Prednisolone once daily in the right eye   Refresh Celluvisc twice daily in the right eye     Glasses prescription given - optional    May use artificial tears up to four times a day (like Refresh Optive, Systane Balance, or TheraTears. Avoid \"get the red out\" drops and generic artifical tears).     Call in February 2025 for an appointment in June 2025 for Complete Exam    Dr. Scales [phone]       "

## 2024-06-28 NOTE — PATIENT INSTRUCTIONS
"Continue:   Prednisolone once daily in the right eye   Refresh Celluvisc twice daily in the right eye     Glasses prescription given - optional    May use artificial tears up to four times a day (like Refresh Optive, Systane Balance, or TheraTears. Avoid \"get the red out\" drops and generic artifical tears).     Call in February 2025 for an appointment in June 2025 for Complete Exam    Dr. Scales [phone]    "

## 2024-06-28 NOTE — LETTER
"6/28/2024      Jose Manuel Gallo  [address] [place] [address]      Dear Colleague,    Thank you for referring your patient, Jose Manuel Gallo, to the [facility]. Please see a copy of my visit note below.     Current Eye Medications:    Prednisolone once daily in the right eye   Refresh Celluvisc drops in the right eye at bedtime and in the morning      Subjective: Here for complete eye exam. Vision has been gradually decreased at distance and near over the past year. Patient complains of right eye pain in the morning when waking up. Left eye feels irritated sometimes.      Objective:  See Ophthalmology Exam.       Assessment:  Stable eye exam.      ICD-10-CM    1. Encounter for examination of eyes and vision with abnormal findings  Z01.01       2. Presbyopia  H52.4       3. Combined forms of age-related cataract, mild-mod, of right eye  H25.811       4. Pseudophakia, Yag Caps, os  Z96.1       5. Hx of vitrectomy for chronic macular hole, os (ER)  H35.342       6. Blepharitis of both eyes, unspecified eyelid, unspecified type  H01.003     H01.006       7. Keratopathy  H18.9       8. Posterior vitreous detachment, right  H43.811            Plan:  Continue:   Prednisolone once daily in the right eye   Refresh Celluvisc twice daily in the right eye     Glasses prescription given - optional    May use artificial tears up to four times a day (like Refresh Optive, Systane Balance, or TheraTears. Avoid \"get the red out\" drops and generic artifical tears).     Call in February 2025 for an appointment in June 2025 for Complete Exam    Dr. Scales [phone]         Again, thank you for allowing me to participate in the care of your patient.        Sincerely,        Bonifacio Scales MD  "

## 2024-07-03 ENCOUNTER — ANCILLARY PROCEDURE (OUTPATIENT)
Dept: MRI IMAGING | Facility: CLINIC | Age: 89
End: 2024-07-03
Attending: NURSE PRACTITIONER
Payer: COMMERCIAL

## 2024-07-03 DIAGNOSIS — M53.3 CHRONIC LEFT SI JOINT PAIN: ICD-10-CM

## 2024-07-03 DIAGNOSIS — M54.50 CHRONIC LEFT-SIDED LOW BACK PAIN WITHOUT SCIATICA: ICD-10-CM

## 2024-07-03 DIAGNOSIS — G89.29 CHRONIC LEFT-SIDED LOW BACK PAIN WITHOUT SCIATICA: ICD-10-CM

## 2024-07-03 DIAGNOSIS — M47.816 SPONDYLOSIS OF LUMBAR REGION WITHOUT MYELOPATHY OR RADICULOPATHY: ICD-10-CM

## 2024-07-03 DIAGNOSIS — M54.59 LUMBAR FACET JOINT PAIN: ICD-10-CM

## 2024-07-03 DIAGNOSIS — M51.369 DDD (DEGENERATIVE DISC DISEASE), LUMBAR: ICD-10-CM

## 2024-07-03 DIAGNOSIS — G89.29 CHRONIC LEFT SI JOINT PAIN: ICD-10-CM

## 2024-07-03 PROCEDURE — 72148 MRI LUMBAR SPINE W/O DYE: CPT | Mod: TC | Performed by: RADIOLOGY

## 2024-07-08 ENCOUNTER — MYC MEDICAL ADVICE (OUTPATIENT)
Dept: INTERNAL MEDICINE | Facility: CLINIC | Age: 89
End: 2024-07-08
Payer: COMMERCIAL

## 2024-07-11 DIAGNOSIS — M51.369 DDD (DEGENERATIVE DISC DISEASE), LUMBAR: ICD-10-CM

## 2024-07-11 DIAGNOSIS — M54.50 CHRONIC LEFT-SIDED LOW BACK PAIN WITHOUT SCIATICA: ICD-10-CM

## 2024-07-11 DIAGNOSIS — G89.29 CHRONIC LEFT-SIDED LOW BACK PAIN WITHOUT SCIATICA: ICD-10-CM

## 2024-07-11 DIAGNOSIS — M47.816 SPONDYLOSIS OF LUMBAR REGION WITHOUT MYELOPATHY OR RADICULOPATHY: ICD-10-CM

## 2024-07-11 DIAGNOSIS — M54.59 LUMBAR FACET JOINT PAIN: ICD-10-CM

## 2024-07-12 RX ORDER — TRAMADOL HYDROCHLORIDE 50 MG/1
50 TABLET ORAL EVERY 6 HOURS PRN
Qty: 30 TABLET | Refills: 0 | Status: SHIPPED | OUTPATIENT
Start: 2024-07-12

## 2024-07-12 NOTE — TELEPHONE ENCOUNTER
Medication refill information reviewed.     Due date for traMADol (ULTRAM) 50 MG tablet  is 07/11/24     Prescriptions prepped for review.     Will route to provider.         
Received fax from pharmacy requesting refill(s) for:    traMADol (ULTRAM) 50 MG tablet      Last dispensed from pharmacy on   7/23/24 #15  6/24/24 #15    Patient's last office/virtual visit by prescribing provider on 6/12/24  Next office/virtual appointment scheduled for 7/15/24    Last urine drug screen date: None  Current opioid agreement on file? NO    [facility] [address]    Will route to [facility] for review and preparation of prescription(s).   
Signed Prescriptions:                        Disp   Refills    traMADol (ULTRAM) 50 MG tablet             30 tab*0        Sig: Take 1 tablet (50 mg) by mouth every 6 hours as           needed for moderate to severe pain Max of 2 tabs           per day. Okay to break in half if needed. Caution           sedation. Don't drive until you know how you feel           on this medication. Fill/start 07/12/24  Authorizing Provider: KAYLEY CONTRERAS        Reviewed [place]  July 12, 2024- no concerning fills.    Kayley NIXON, RN CNP, FNP  [facility]  [facility]    
Alert and oriented, no focal deficits, no motor or sensory deficits.

## 2024-07-15 ENCOUNTER — OFFICE VISIT (OUTPATIENT)
Dept: PALLIATIVE MEDICINE | Facility: CLINIC | Age: 89
End: 2024-07-15
Payer: COMMERCIAL

## 2024-07-15 VITALS — DIASTOLIC BLOOD PRESSURE: 73 MMHG | HEART RATE: 55 BPM | SYSTOLIC BLOOD PRESSURE: 141 MMHG

## 2024-07-15 DIAGNOSIS — M48.062 SPINAL STENOSIS OF LUMBAR REGION WITH NEUROGENIC CLAUDICATION: ICD-10-CM

## 2024-07-15 DIAGNOSIS — M54.50 CHRONIC LEFT-SIDED LOW BACK PAIN WITHOUT SCIATICA: ICD-10-CM

## 2024-07-15 DIAGNOSIS — M51.369 DDD (DEGENERATIVE DISC DISEASE), LUMBAR: Primary | ICD-10-CM

## 2024-07-15 DIAGNOSIS — G89.29 CHRONIC LEFT-SIDED LOW BACK PAIN WITHOUT SCIATICA: ICD-10-CM

## 2024-07-15 DIAGNOSIS — M48.061 NEURAL FORAMINAL STENOSIS OF LUMBAR SPINE: ICD-10-CM

## 2024-07-15 PROCEDURE — 99417 PROLNG OP E/M EACH 15 MIN: CPT | Performed by: NURSE PRACTITIONER

## 2024-07-15 PROCEDURE — 99215 OFFICE O/P EST HI 40 MIN: CPT | Performed by: NURSE PRACTITIONER

## 2024-07-15 PROCEDURE — G2211 COMPLEX E/M VISIT ADD ON: HCPCS | Performed by: NURSE PRACTITIONER

## 2024-07-15 ASSESSMENT — PAIN SCALES - GENERAL: PAINLEVEL: WORST PAIN (10)

## 2024-07-15 NOTE — PATIENT INSTRUCTIONS
Plan:  Physical Therapy: continue exercises learned in P T   Clinical Health Psychologist to address issues of relaxation, behavioral change, coping style, and other factors important to improvement: none  Diagnostic Studies: none  Medication Management:   continue Tramadol 50mg tablets. Take 1 tablet (50 mg) by mouth every 6 hours as needed for moderate to severe pain Max of 2 tabs per day. Okay to break in half if needed. Caution sedation. Don't drive until you know how you feel on this medication.   Let me know if the Tramadol is helpful, it will take 20-40 minutes to begin working.    Trial lidocaine patches, there are 4% strength OTC available  Further procedures recommended: schedule lumbar epidural steroid injection, our office will call you.   Recommendations/follow-up for PCP:  See above  Release of information: none  Follow up: 4 weeks in-person or video      ----------------------------------------------------------------  Clinic Number:  [phone]   Call with any questions about your care and for scheduling assistance.   Calls are returned Monday through Friday between 8 AM and 4:30 PM. We usually get back to you within 2 business days depending on the issue/request.    If we are prescribing your medications:  For opioid medication refills, call the clinic or send a [employer] message 7 days in advance.  Please include:  Name of requested medication  Name of the pharmacy.  For non-opioid medications, call your pharmacy directly to request a refill. Please allow 3-4 days to be processed.   Per [place] State Law:  All controlled substance prescriptions must be filled within 30 days of being written.    For those controlled substances allowing refills, pickup must occur within 30 days of last fill.      We believe regular attendance is key to your success in our program!    Any time you are unable to keep your appointment we ask that you call us at least 24 hours in advance to cancel.This will allow us to offer  the appointment time to another patient.   Multiple missed appointments may lead to dismissal from the clinic.

## 2024-07-15 NOTE — PROGRESS NOTES
[facility]      7/15/2024      Chief complaint:   -continued left sided low back pain, worse when standing or walking. Calms when seated or laying down. No radiation to the buttocks or the legs.          Interval history:  Jose Manuel Gallo 89 year old male is known to me for:  Lumbar facet joint pain  Lumbar spondylosis with out myelopathy or radiculopathy  Chronic left-sided low back pain without sciatica  Primary osteoarthritis, unspecified site  PMHx includes: Cataracts mild OD moderate OS (2012), hypertension, chronic low back pain, peripheral vascular disease, erectile dysfunction, personal history of malignant neoplasm of the bladder, osteoarthritis, hemorrhoids, kidney stones, personal history of tobacco use (smoked 40 years), personal history of colonic polyps (2002), macular degeneration, hyperlipidemia, chronic kidney disease stage III GFR 30-59 mL/minute, rotator cuff tendinitis left, squamous cell carcinoma of skin  PSHx includes: Colonoscopy (2008), vitrectomy parsplana (2011), phacoemulsification with standard intraocular lens implant of the left eye (2012), laser YAG capsulotomy left eye (2016), TURP (1997), cervical spinal fusion C6-7, left ulnar nerve transposition at the elbow, vasectomy.      Recommendations/plan at the last visit on 6/12/2024 included:  Physical Therapy: continue exercises learned in P T   Clinical Health Psychologist to address issues of relaxation, behavioral change, coping style, and other factors important to improvement: none  Diagnostic Studies: obtain lumbar MRI by calling [facility] Scheduling:  Anurag Dave [facility]: [phone]  [facility]: [phone]  [facility] (including [place]): [phone]  Medication Management:   TRY Tramadol 50mg tablets. Take 1 tablet (50 mg) by mouth every 6 hours as needed for moderate to severe pain Max of 2 tabs per day. Okay to break in half if needed. Caution sedation.  Don't drive until you know how you feel on this medication. Filled and picked up on 4/24. You reported you have  #10 of #14 dispensed. Try this today and report to me if this is helpful at all.    Trial lidocaine patches, there are 4% strength OTC available  Further procedures recommended: none  Recommendations/follow-up for PCP:  See above  Release of information: none  Follow up: 4 weeks in-person.       Since male last visit, Jose Manuel Gallo reports:    Interval history July 15, 2024  -left sided low back pain, no pain into the buttock or into the legs at all.   It is the most bothersome when rising from being seated for more than 10 minutes.   -Tramadol  helps take the edge off of the pain sometimes.   -pain is worse with standing and walking, no radiation to the legs when doing these activities.   -describes positive grocery cart sign, leaning over on a grocery cart when shopping or onto the counter in the kitchen which partially relieves the pain.       Interval history June 12, 2024  -lumbar facet joint injections in May 2024 were minimally helpful.    -1.5 weeks ago, he began to have immense pain when he sits down for more than 10 minutes. He can barely get up. He much lower level of function. He denies any falls or injuries. He denies any illness. Denies b/b symptoms, numbness/tingling, weakness. Denies saddle anesthesia.   No MRI imaging of lumbar spine, recommend we get MRI imaging.   -he has taken 4 of the tramadol but he doesn't remember if it was helpful.   He is using Tylenol or ibuprofen and a topical gel on his back when the pain is bad, lays down for an hour, and then his back feels somewhat better.     Interval history April 23, 2024  -reports his axial low back pain has returned. Present on both sides but markedly worse on the left.   Feels pretty good in the morning, but by late afternoon he feels awful. Low back is hurting and he cannot continue activity, needs to go lay down.   -had left  "sided lumbar RFA done 12/5/2023, reduced pain by 50% for about 4 months. Now left sided pain returned. Worst with lumbar extension and extension and rotation/facet loading movements.  -discussed a trial of Tramadol for use on bad pain days, max of 2 tabs per day.     Interval history September 13, 2023  -doing well overall,   -had great relief for 24 hours after the lumbar MBB  -will get him scheduled for 2nd MBB today     Initial evaluation pain history collected on 7/17/2023  Long term low back pain on the left side. No radiation to the legs ever.   Has significant curvature of the lumbar spine and x-ray shows facet arthropathy  His pain has gotten markedly worse with more yardwork. He is able to do about 2 hours of lawnwork. Digging holes for landscaping, raking, bagging etc. He does not pace his activity. He will push through the pain.          At this point, the patient's participation with our multidisciplinary team includes:  The patient has been compliant with the program.  PT -participating   Health Psych - none      Pain rating: intensity ranges from 0/10 to 10/10, and Averages varies but likely around between 5-10 depending on activity on a 0-10 scale.  Pain right now is a 10/10.   Describes pain as \"sharp and stabbing, especially with certain activities.\"  Pain is almost always there.      Current pain-related medication treatments include:  -acetaminophen 650mg Q 8 hours PRN (somewhat helpful)  -ibuprofen 200mg takes 400mg Q 6 hours PRN (not really helpful)      Other pertinent medications:  -none    Previous medication treatments included:  OPIATES:none  NSAIDS: ibuprofen (not very helpful)  MUSCLE RELAXANTS: none  ANTI-MIGRAINE MEDS: none  ANTI-DEPRESSANTS: none  SLEEP AIDS: melatonin (helpful)  ANTI-CONVULSANTS: gabapentin (tried 300mg TID in 2017 for short trial-used for shingles)  TOPICALS: tried OTC and CBD creams (not helpful)  ANXIOLYTICS: none  MEDICAL CANNABIS: none  Other meds: Tylenol " (helpful)      Other treatments have included:  Jose Manuel Gallo has not been seen at a pain clinic in the past.    PT: none  Chiropractic care: tried it a couple of times, did not help  Acupuncture: none  TENs Unit: none    Injections:   8/8/2023 left L3, L4 and L5 medial branch block with Dr. Sathya Villanueva (% relief)  -10/19/2023 left L3, L4 and L5 medial branch blocks with Dr. Sathya Villanueva (helpful, > 80% relief)  -12/5/2023 left lumbar radiofrequency ablation at L4, L5 sacral ala with Dr. Sathya Villanueva (50% relief but then diminishing relief over the next 4 months or so)  -5/23/2024 left L3-4, L4-5 facet joint steroid injections with Dr. Sathya Villanueva (cannot rate pain, it was manageable for a few weeks)    Past Medical History:  Past Medical History:   Diagnosis Date    Cataract, mild, od; mod, os 07/02/2012    Chronic low back pain     CKD (chronic kidney disease) stage 3, GFR 30-59 ml/min (H)     Erectile dysfunction     Hemorrhoids     History of bladder cancer         HTN     Hyperlipidemia LDL goal <130     Kidney stones     Macular degeneration     Osteoarthritis     Personal history of colonic polyps     10/2002    Personal history of malignant neoplasm of bladder     Personal history of tobacco use     Smoked 40 years, Quit 2003    PVD (peripheral vascular disease) (H24)     Left Vertebral Artery occlusion    Rotator cuff tendinitis, left     Squamous cell carcinoma of skin, unspecified      Past Surgical History:  Past Surgical History:   Procedure Laterality Date    CATARACT IOL, RT/LT      COLONOSCOPY  11/02/2008    Normal    HC REVISE ULNAR NERVE AT ELBOW      Left    LASER YAG CAPSULOTOMY  7/2016    left eye    PHACOEMULSIFICATION WITH STANDARD INTRAOCULAR LENS IMPLANT  7/2012    left eye    TURP  1997 ?    VASECTOMY      VITRECTOMY PARSPLANA  8/2011    left eye, repair macular hole    ZZC SPINAL FUSION,ANT,EA ADNL LEVEL      C6-7     Medications:  Current Outpatient  "Medications   Medication Sig Dispense Refill    acetaminophen (TYLENOL) 650 MG CR tablet Take 1 tablet (650 mg) by mouth every 8 hours as needed for pain 60 tablet 1    carboxymethylcellulose PF (REFRESH LIQUIGEL) 1 % ophthalmic gel Place 1 drop into both eyes 4 times daily 1 Bottle 11    cyanocobalamin (VITAMIN B-12) 1000 MCG tablet Take 1 tablet (1,000 mcg) by mouth daily 90 tablet 3    ibuprofen (ADVIL/MOTRIN) 200 MG tablet Take 400 mg by mouth every 6 hours as needed for pain      lisinopril (ZESTRIL) 2.5 MG tablet TAKE ONE TABLET BY MOUTH EVERY DAY 90 tablet 0    prednisoLONE acetate (PRED FORTE) 1 % ophthalmic suspension Place 1 drop into the right eye 2 times daily 10 mL 2    simvastatin (ZOCOR) 40 MG tablet TAKE ONE TABLET BY MOUTH EVERY DAY 90 tablet 0    sodium chloride (ALTHEA 128) 5 % ophthalmic ointment Apply a small squirt approx. 1/4\" into both eyes at bedtime. 3.5 g 4    traMADol (ULTRAM) 50 MG tablet Take 1 tablet (50 mg) by mouth every 6 hours as needed for moderate to severe pain Max of 2 tabs per day. Okay to break in half if needed. Caution sedation. Don't drive until you know how you feel on this medication. Fill/start 07/12/24 30 tablet 0     Allergies:     Allergies   Allergen Reactions    Gabapentin Dizziness     Severe dizziness on 100mg dosing     Social History:  Home situation:  (61 years in 2023). Lives with spouse. Only adult child, Sydni  Occupation/Schooling: retired since 1994. He worked in the computer industry  Tobacco use: quit smoking in about 1997  Alcohol use: none  Drug use: none  History of chemical dependency treatment: none    Family history:  Family History   Problem Relation Age of Onset    Osteoporosis Mother     Diabetes Father     Arthritis Father     Cancer Father     Respiratory Father     Genitourinary Problems Brother     Circulatory Brother     Macular Degeneration No family hx of     Glaucoma No family hx of     Thyroid Disease No family hx of     " Hypertension No family hx of              Physical Exam:  Vitals:    07/15/24 1134   BP: (!) 176/71   Pulse: 55       Exam: BP (!) 176/71   Pulse 55     Behavioral observations:  Awake, alert, conversant and cooperative     Gait:  slow    Musculoskeletal exam:  strength grossly equal throughout.   Reduced lumbar ROM due to pain.   SI joints Non-tender bilaterally   Piriformis Non-tender bilaterally   GTs Non-tender bilaterally   SLR negative bilaterally      Neuro exam:  SILT in all extremities      Skin/vascular/autonomic:  multiple bruises and scratches on the arms. States if he bumps into something, he will have a sachin or even bleed at times.     Other:  na        IMAGING:    LUMBAR SPINE TWO - THREE VIEWS 1/11/2022 9:39 AM     INDICATION: Osteoarthritis of spine with radiculopathy, lumbar region.     COMPARISON: 7/13/2017                                                                      IMPRESSION: Exam numbered assuming the last rib-bearing vertebral body  is T12. Convex right lumbar angulation centered across L1-L2 where  there is advanced left-sided interspace and endplate degeneration  measures approximately 36 degrees between T12 and L3 not substantially  changed versus prior. Sagittal alignment appears preserved. Normal  lumbar vertebral body heights. No fracture. Moderate lower lumbar  spine facet arthropathy.     ALEJANDRA SOTO MD         MR LUMBAR SPINE WITHOUT CONTRAST July 3, 2024 1:19 PM     PROVIDED HISTORY: Markedly worsening low back pain in the past 1.5  weeks. No injury or illness. No previous lumbar MRI. Lumbar facet  joint pain. Spondylosis of lumbar region without myelopathy or  radiculopathy. Chronic left-sided low back pain without sciatica.  Chronic left-sided low back pain without sciatica. DDD (degenerative  disc disease), lumbar. Chronic left SI joint pain.     COMPARISON: Plain radiographs 1/11/2020.     TECHNIQUE: Multiplanar multisequence lumbar MR without contrast.      FINDINGS: There are four lumbar-type vertebrae following the last  remaining thoracic type vertebrae. Complete sacralization of L5.     Right convex scoliosis with apex at L1-L2.     The tip of the conus medullaris is at L1.       Opposing endplate edema at L4-L5.     On a level by level basis:     T12-L1: Mild disc height loss and disc degeneration. Left eccentric  disc bulge and bilateral facet arthropathy. Moderate to severe left  neural foraminal stenosis. No right neural foraminal stenosis. Mild  spinal canal stenosis.     L1-L2: Moderate disc height loss and disc degeneration. Left eccentric  disc osteophyte complex and bilateral facet arthropathy. Moderate left  neural foraminal stenosis. Mild spinal canal stenosis. No right neural  foraminal stenosis.     L2-L3: Moderate disc height loss and disc degeneration. Disc  osteophyte complex and bilateral facet arthropathy. Moderate left and  mild right neural foraminal stenosis. Mild spinal canal stenosis.     L3-L4: Moderate disc height loss and disc degeneration. Disc  osteophyte complex and bilateral facet arthropathy. Mild to moderate  bilateral neural foraminal and mild to moderate spinal canal stenosis.  Articular and periarticular edema associated with left facet joint.     L4-L5: Mild to moderate disc height loss and disc degeneration. Right  eccentric bulge osteophyte formation and bilateral facet arthropathy.  Moderate to severe right neural foraminal stenosis with impingement of  the exiting nerve root. Mild left neural foraminal stenosis. No spinal  canal stenosis.     L5-S1: No spinal canal or neuroforaminal stenosis.     Paraspinous tissues are within normal limits.                                                                      IMPRESSION:   1. Four lumbar-type vertebrae following the last remaining thoracic  type vertebrae. Complete sacralization of L5.  2. Right convex scoliosis with the apex at L1-L2.  3. Articular and periarticular edema  associated with left L3-L4 facet  joint, compatible with active inflammatory arthropathy.  4. Modic type I degeneration at L4-L5.  5. Multilevel lumbar spondylosis as detailed above.      ANTIONETTE BERG MD       Other testing (labs, diagnostics):  3/6/2023  Cr. 1.30  Est GFR 57      Screening tools:     DIRE Score for ongoing opioid management is calculated as follows:    Diagnosis = 2    Intractability = 1    Risk: Psych = 3  Chem Hlth = 3  Reliability = 3  Social = 3    Efficacy = 2    Total DIRE Score = 17 (14 or higher predicts good candidate for ongoing opioid management; 13 or lower predicts poor candidate for opioid management)           [place]  review:  Reviewed [place]  7/15/2024- no concerning fills.  Kayley NIXON, RN CNP, FNP  [facility]  [place] location          Assessment:  Lumbar DDD  Spinal stenosis of lumbar region with neurogenic claudication  Neural foraminal stenosis of lumbar spine  Chronic left sided low back pain without sciatica    Chronic intractable pain  Primary osteoarthritis, unspecified site  PMHx includes: Cataracts mild OD moderate OS (2012), hypertension, chronic low back pain, peripheral vascular disease, erectile dysfunction, personal history of malignant neoplasm of the bladder, osteoarthritis, hemorrhoids, kidney stones, personal history of tobacco use (smoked 40 years), personal history of colonic polyps (2002), macular degeneration, hyperlipidemia, chronic kidney disease stage III GFR 30-59 mL/minute, rotator cuff tendinitis left, squamous cell carcinoma of skin  PSHx includes: Colonoscopy (2008), vitrectomy parsplana (2011), phacoemulsification with standard intraocular lens implant of the left eye (2012), laser YAG capsulotomy left eye (2016), TURP (1997), cervical spinal fusion C6-7, left ulnar nerve transposition at the elbow, vasectomy.      Plan:  Physical Therapy: continue exercises learned in P T   Clinical Health Psychologist to  address issues of relaxation, behavioral change, coping style, and other factors important to improvement: none  Diagnostic Studies: none  Medication Management:   continue Tramadol 50mg tablets. Take 1 tablet (50 mg) by mouth every 6 hours as needed for moderate to severe pain Max of 2 tabs per day. Okay to break in half if needed. Caution sedation. Don't drive until you know how you feel on this medication.   Let me know if the Tramadol is helpful, it will take 20-40 minutes to begin working.    Trial lidocaine patches, there are 4% strength OTC available  Further procedures recommended: schedule lumbar epidural steroid injection, our office will call you.   Recommendations/follow-up for PCP:  See above  Release of information: none  Follow up: 4 weeks in-person or video            ASSESSMENT AND PLAN:  (M51.36) DDD (degenerative disc disease), lumbar  (primary encounter diagnosis)  (M48.062) Spinal stenosis of lumbar region with neurogenic claudication  (M48.061) Neural foraminal stenosis of lumbar spine  (M54.50,  G89.29) Chronic left-sided low back pain without sciatica  Comment: patient had many questions re: the cause of his pain and why the last injection didn't help. Also tried to help patient set up a Primary Care Provider visit on [facility] but he didn't remember his password  Plan: PAIN INJECTION EVAL/TREAT/FOLLOW UP, Adult Pain        Clinic Follow-Up Order,  Adult Pain         Clinic Follow-Up Order                    BILLING TIME DOCUMENTATION:   TOTAL TIME includes:   Time spent preparing to see the patient: 6 minutes (reviewing records and tests)  Time spend face to face with the patient: 50 minutes  Time spent ordering tests, medications, procedures and referrals: 0 minutes  Time spent Referring and communicating with other healthcare professionals: 0 minutes  Documenting clinical information in Epic: 6 minutes    The total TIME spent on this patient on the day of the appointment was 56 minutes.                  Kayley NIXON, RN CNP, FNP  [facility]

## 2024-07-16 ENCOUNTER — TELEPHONE (OUTPATIENT)
Dept: PALLIATIVE MEDICINE | Facility: CLINIC | Age: 89
End: 2024-07-16
Payer: COMMERCIAL

## 2024-07-16 DIAGNOSIS — M54.16 LUMBAR RADICULOPATHY: Primary | ICD-10-CM

## 2024-07-16 NOTE — TELEPHONE ENCOUNTER
"Screening Questions for Radiology Injections:    Injection to be done at which interventional clinic site? [facility] [place]    If choosing [facility] for location, please inform patient:  \"[facility] is a Hospital based clinic. Before your visit, you should check with your insurance about how it covers the charges for facility services in a hospital-based clinic.     Procedure ordered by [facility]    Procedure ordered? lumbar epidural injection, likely interlaminar at L3-4   Transforaminal Cervical RADHA - Send to [facility] ([facility] - No [facility] Site providers perform this procedure    What insurance would patient like us to bill for this procedure? ucare  IF SCHEDULING IN [place] [facility] OR SPINE PLEASE SCHEDULE AT LEAST 7-10 BUSINESS DAYS OUT SO A PA CAN BE OBTAINED  Worker's comp or MVA (motor vehicle accident) -Any injection DO NOT SCHEDULE and route to Chava Mendoza.    [employer] insurance - For SI joint injections, DO NOT SCHEDULE and route to Bibiana Randall.     ALL BCBS, [employer] and [employer] - DO NOT SCHEDULE and route to Bibiana Randall  MEDICA- ALL INJECTIONS- route to Bibiana Prakash    Is patient scheduled at [facility]? no   If YES, route every encounter to [facility] SPINE CENTER CARE NAVIGATION POOL [[phone]]    Is an  needed? No     Patient has a  home? (Review Grid) YES: ok    Any chance of pregnancy? NO   If YES, do NOT schedule and route to RN pool  - Dr. Martinez route to Shelby Fried and PM&R Nurse  [[id number]]      Is patient actively being treated for cancer or immunocompromised? No  If YES, do NOT schedule and route to RN pool/ Dr. Martinez's Team    Does the patient have a bleeding or clotting disorder? No   If YES, okay to schedule AND route to RN nurse / Dr. Martinez's Team   (For any patients with platelet count <100, RN must forward to provider)    Is patient taking any Blood Thinners OR Antiplatelet medication?  No   If hold needed, do NOT " schedule, route to RN sona/ Dr. Martinez's Team  Examples:   Blood Thinners: (Coumadin, Warfarin, Jantoven, Pradaxa, Xarelto, Eliquis, Edoxaban, Enoxaparin, Lovenox, Heparin, Arixtra, Fondaparinux or Fragmin)  Antiplatelet Medications: (Plavix, Brilinta or Effient)     Is patient taking any aspirin products (includes Excedrin and Fiorinal)? No   If yes route to RN pool/ Dr. Martinez's Team - Do not schedule    Is patient taking any GLP-1 Antagonist (hold needed for sedation patients only) No   (semaglutide (Ozempic, Wegovy), dulaglutide (Trulicity), exenatide ER (Bydureon), tirzepatide (Mounjaro), Liraglutide (Saxenda, Victoza), semaglutide (Rybelsus), Terzepatide (Zepbound)  If YES, okay to schedule AND route to RN nurse / Dr. Martinez's Team      Any allergies to contrast dye, iodine, shellfish, or numbing and steroid medications? No  If YES, schedule and add allergy information to appointment notes AND route to the RN pool/ Dr. Martinez's Team  If RADHA and Contrast Dye / Iodine Allergy? DO NOT SCHEDULE, route to RN pool/ Dr. Martinez's Team  Allergies: Gabapentin     Does patient have an active infection or treated for one within the past week? No  Is patient currently taking any antibiotics or steroid medications?  No   For patients on chronic, preventative, or prophylactic antibiotics, procedures may be scheduled.   For patients on antibiotics for active or recent infection, schedule 4 days after completed.  For patients on steroid medications, schedule 4 days after completed.     Has the patient had a flu shot or any other vaccinations within the past 7 days? No  If yes, explain that for the vaccine to work best they need to:     wait 1 week before and 1 week after getting any Vaccine  wait 1 week before and 2 weeks after getting any Covid Vaccine   If patient has concerns about the timing, send to RN pool/ Dr. Martinez's Team    Does patient have an MRI/CT?  YES: MRI Include Date and Check Procedure Scheduling Grid to see  if required.  Was the MRI/CT done within the last 3 years?  Yes   If no route to RN Pool/ Dr. Gonzalezs Team  If yes, where was the MRI/CT done? [facility]   Refer to PACS Transmissions list for approved external locations and route to RN Pool High Priority/ Dr. Reyna Team  If MRI was not done at approved external location do NOT schedule and route to RN pool/ Dr. Gonzalezs Team    If patient has an imaging disc, the injection MAY be scheduled but patient must bring disc to appt or appt will be cancelled.    Is patient able to transfer to a procedure table with minimal or no assistance? Yes   If no, do NOT schedule and route to RN Pool/ Dr. Gonzalezs Team    Procedure Specific Instructions:  If celiac plexus block, informed patient NPO for 6 hours and that it is okay to take medications with sips of water, especially blood pressure medications Not Applicable       If this is for a cervical procedure, informed patient that aspirin needs to be held for 6 days.   Not Applicable    Sedation, If Sedation is ordered for any procedure, patient must be NPO for 6 hours prior to procedure Not Applicable    If IV needed:  Do not schedule procedures requiring IV placement in the first appointment of the day or first appointment after lunch. Do NOT schedule at 0745, 0815 or 1245. ok  Instructed patient to arrive 30 minutes early for IV start if required. (Check Procedure Scheduling Grid)  Not Applicable    Reminders:  If you are started on any steroids or antibiotics between now and your appointment, you must contact us because the procedure may need to be cancelled.  Yes    As a reminder, receiving steroids can decrease your body's ability to fight infection.   Would you still like to move forward with scheduling the injection?  Yes    IV Sedation is not provided for procedures. If oral anti-anxiety medication is needed, the patient should request this from their referring provider.    Instruct patient to arrive as directed  prior to the scheduled appointment time:  If IV needed 30 minutes before appointment time     For patients 85 or older we recommend having an adult stay w/ them for the remainder of the day.     If the patient is Diabetic, remind them to bring their glucometer.      Does the patient have any questions?  NO  Angella Mendoza  [facility]

## 2024-07-23 ENCOUNTER — RADIOLOGY INJECTION OFFICE VISIT (OUTPATIENT)
Dept: PALLIATIVE MEDICINE | Facility: CLINIC | Age: 89
End: 2024-07-23
Attending: NURSE PRACTITIONER
Payer: COMMERCIAL

## 2024-07-23 VITALS — HEART RATE: 53 BPM | DIASTOLIC BLOOD PRESSURE: 69 MMHG | OXYGEN SATURATION: 98 % | SYSTOLIC BLOOD PRESSURE: 167 MMHG

## 2024-07-23 DIAGNOSIS — M48.061 NEURAL FORAMINAL STENOSIS OF LUMBAR SPINE: ICD-10-CM

## 2024-07-23 DIAGNOSIS — M54.16 LUMBAR RADICULOPATHY: ICD-10-CM

## 2024-07-23 DIAGNOSIS — M51.369 DDD (DEGENERATIVE DISC DISEASE), LUMBAR: ICD-10-CM

## 2024-07-23 DIAGNOSIS — M48.062 SPINAL STENOSIS OF LUMBAR REGION WITH NEUROGENIC CLAUDICATION: ICD-10-CM

## 2024-07-23 PROCEDURE — 62323 NJX INTERLAMINAR LMBR/SAC: CPT | Performed by: PAIN MEDICINE

## 2024-07-23 RX ADMIN — TRIAMCINOLONE ACETONIDE 40 MG: 40 INJECTION, SUSPENSION INTRA-ARTICULAR; INTRAMUSCULAR at 09:06

## 2024-07-23 ASSESSMENT — PAIN SCALES - GENERAL
PAINLEVEL: MODERATE PAIN (4)
PAINLEVEL: MILD PAIN (2)

## 2024-07-23 NOTE — NURSING NOTE
Discharge Information    IV Discontiued Time:  NA    Amount of Fluid Infused:  NA    Discharge Criteria = When patient returns to baseline or as per MD order    Consciousness:  Pt is fully awake    Circulation:  BP +/- 20% of pre-procedure level    Respiration:  Patient is able to breathe deeply    O2 Sat:  Patient is able to maintain O2 Sat >92% on room air    Activity:  Moves 4 extremities on command    Ambulation:  Patient is able to stand and walk or stand and pivot into wheelchair    Dressing:  Clean/dry or No Dressing    Notes:   Discharge instructions and AVS given to patient    Patient meets criteria for discharge?  YES    Admitted to PCU?  No    Responsible adult present to accompany patient home?  Yes    Signature/Title:    param hanley RN  RN Care Coordinator  [facility]

## 2024-07-23 NOTE — NURSING NOTE
Pre-procedure Intake  If YES to any questions or NO to having a   Please complete laminated checklist and leave on the computer keyboard for Provider, verbally inform provider if able.    For SCS Trial, RFA's or any sedation procedure:  Have you been fasting? NA  If yes, for how long?     Are you taking any any blood thinners such as Coumadin, Warfarin, Jantoven, Pradaxa Xarelto, Eliquis, Edoxaban, Enoxaparin, Lovenox, Heparin, Arixtra, Fondaparinux, or Fragmin? OR Antiplatelet medication such as Plavix, Brilinta, or Effient?   No   If yes, when did you take your last dose?     Do you take aspirin?  No  If cervical procedure, have you held aspirin for 6 days?   NA    Is the Pt taking any GLP-1 Antagonist (hold needed for sedation patients only)  (semaglutide (Ozempic, Wegovy), dulaglutide (Trulicity), exenatide ER (Bydureon), tirzepatide (Mounjaro), Liraglutide (Saxenda, Victoza), semaglutide (Rybelsus)     NA  If yes, when did you take your last dose?     Do you have any allergies to contrast dye, iodine, steroid and/or numbing medications?  NO    Are you currently taking antibiotics or have an active infection?  NO    Have you had a fever/elevated temperature within the past week? NO    Are you currently taking oral steroids? NO    Do you have a ? Yes    Are you pregnant or breastfeeding?  Not Applicable    Have you received any vaccinations in the last week? NO    Notify provider and RNs if systolic BP >170, diastolic BP >100, P >100 or O2 sats < 90%     Shelby Wolfe MA  [facility]

## 2024-07-23 NOTE — PATIENT INSTRUCTIONS
[facility]   Procedure Discharge Instructions    Today you saw:    Dr. Shawnee Conley,   Dr. Sathya Villanueva     You had an:  Epidural steroid injection   -lumbar     Be cautious when walking. Numbness and/or weakness in the lower extremities may occur for up to 6-8 hours after the procedure due to effect of the local anesthetic  Do not drive for 6 hours. The effect of the local anesthetic could slow your reflexes.   You may resume your regular activities after 24 hours  Avoid strenuous activity for the first 24 hours  You may shower, however avoid swimming, tub baths or hot tubs for 24 hours following your procedure  You may have a mild to moderate increase in pain for several days following the injection.  It may take up to 14 days for the steroid medication to start working although you may feel the effect as early as a few days after the procedure.     You may use ice packs for 10-15 minutes, 3 to 4 times a day at the injection site for comfort  Do not use heat to painful areas for 6 to 8 hours. This will give the local anesthetic time to wear off and prevent you from accidentally burning your skin.   Unless you have been directed to avoid the use of anti-inflammatory medications (NSAIDS), you may use medications such as ibuprofen, Aleve or Tylenol for pain control if needed.   If you were fasting, you may resume your normal diet and medications after the procedure  If you have diabetes, check your blood sugar more frequently than usual as your blood sugar may be higher than normal for 10-14 days following a steroid injection. Contact your doctor who manages your diabetes if your blood sugar is higher than usual  Possible side effects of steroids that you may experience include flushing, elevated blood pressure, increased appetite, mild headaches and restlessness.  All of these symptoms will get better with time.  If you experience any of the following, call the Pain Clinic during  work hours (Mon-Friday 8-4:30 pm) at [phone] or the Provider Line after hours at [phone]:  -Fever over 100 degree F  -Swelling, bleeding, redness, drainage, warmth at the injection site  -Progressive weakness or numbness in your legs or arms  -Loss of bowel or bladder function  -Unusual headache that is not relieved by Tylenol or other pain reliever  -Unusual new onset of pain that is not improving

## 2024-07-24 RX ORDER — TRIAMCINOLONE ACETONIDE 40 MG/ML
40 INJECTION, SUSPENSION INTRA-ARTICULAR; INTRAMUSCULAR ONCE
Status: COMPLETED | OUTPATIENT
Start: 2024-07-23 | End: 2024-07-23

## 2024-07-24 NOTE — PROGRESS NOTES
Pre procedure Diagnosis: Lumbar radiculopathy  Post procedure Diagnosis: Same  Procedure performed: L3-4 interlaminar epidural steroid injection   Anesthesia: none  Complications: none  Operators: Sathya Villanueva MD     Indications:   Jose Manuel Gallo is a 89 year old male.  The patient has a history of lbp .  Examination shows neg slump.  he has tried conservative treatment including meds/pt/injections.    MRI rev  Options/alternatives, benefits and risks were discussed with the patient including but not limited to bleeding, infection, no pain relief, tissue trauma, exposure to radiation, reaction to medications, spinal cord injury, dural puncture, weakness, numbness and headache.  Questions were answered to his satisfaction and he wishes to proceed. Voluntary informed consent was obtained and signed.     Vitals were reviewed: Yes  Allergies were reviewed:  Yes   Medications were reviewed:  Yes   Pre-procedure pain score: 4/10    Procedure:  The patient's medical history, medications, and allergies were reviewed and reconciled.  After obtaining signed informed consent, the patient was brought into the procedure suite and was placed in a prone position on the procedure table.   A Pause for the Cause was performed.  Patient was prepped and draped in the usual sterile fashion.     The L3-4 interspace was identified with AP fluoroscopy.  A total of 3ml of 1% lidocaine was used to anesthetize the skin and subcutaneous tissues for a  midline approach.    A 20gauge 3.5inch Touhy needle was advanced utilizing intermittent AP and Lateral fluoroscopy and air for loss of resistance.  The epidural space was encountered on the first pass without difficulties.  Aspiration for blood and CSF was negative.  Needle position was verified by injecting 1 ml of Omnipaque utilizing real-time fluoroscopy that showed good needle placement and epidural spread without signs of intravascular or intrathecal uptake.  Omnipaque wasted:  9  ml.    Then, after repeated negative aspiration for blood or CSF, a combination of Kenalog 40 mg, Bupivicaine 0.25% 2 ml, diluted with 3 ml of normal saline to a total injectate volume of 6 ml was injected into the epidural space in a slow and incremental fashion and the needle was restyletted and withdrawn.  All injected medications were preservative free.    The injection site was cleaned and a sterile dressing was applied.    The patient tolerated the procedure well without complications and was taken to the recovery room for continued observation.    Images were saved to PACS.    Post-procedure pain score: 2/10  Follow-up includes:   -f/u with referring provider     Sathya Villanueva MD  [facility]

## 2024-07-29 DIAGNOSIS — E78.5 HYPERLIPIDEMIA LDL GOAL <130: ICD-10-CM

## 2024-07-29 DIAGNOSIS — N18.31 STAGE 3A CHRONIC KIDNEY DISEASE (H): ICD-10-CM

## 2024-07-29 DIAGNOSIS — I10 HYPERTENSION GOAL BP (BLOOD PRESSURE) < 140/90: ICD-10-CM

## 2024-07-29 RX ORDER — LISINOPRIL 2.5 MG/1
2.5 TABLET ORAL DAILY
Qty: 60 TABLET | Refills: 0 | Status: SHIPPED | OUTPATIENT
Start: 2024-07-29 | End: 2024-09-30

## 2024-07-30 RX ORDER — SIMVASTATIN 40 MG
40 TABLET ORAL DAILY
Qty: 90 TABLET | Refills: 0 | Status: SHIPPED | OUTPATIENT
Start: 2024-07-30

## 2024-08-08 ENCOUNTER — TELEPHONE (OUTPATIENT)
Dept: FAMILY MEDICINE | Facility: CLINIC | Age: 89
End: 2024-08-08
Payer: COMMERCIAL

## 2024-08-08 NOTE — TELEPHONE ENCOUNTER
Patient's daughter calling, says her dad (and mom) are seeming to need more help at home.   She thinks her mom needs more help with daily cares.   See TE for Evi in her chart.    Sydni says she will be with her dad at the visit on 8/19 but hopes to get some help with first steps in maybe getting some care in the home for both parents.    Will be private pay unless hospitalized, Sydni is aware of that.    She will discuss this with Dr. Sorto at the visit.    Added visit note to appointment as well.    Amalia SINGH RN  [facility] Triage

## 2024-08-15 ENCOUNTER — PATIENT OUTREACH (OUTPATIENT)
Dept: CARE COORDINATION | Facility: CLINIC | Age: 89
End: 2024-08-15
Payer: COMMERCIAL

## 2024-08-19 ENCOUNTER — OFFICE VISIT (OUTPATIENT)
Dept: FAMILY MEDICINE | Facility: CLINIC | Age: 89
End: 2024-08-19
Payer: COMMERCIAL

## 2024-08-19 VITALS
SYSTOLIC BLOOD PRESSURE: 138 MMHG | HEART RATE: 57 BPM | BODY MASS INDEX: 18.8 KG/M2 | HEIGHT: 72 IN | TEMPERATURE: 97.1 F | WEIGHT: 138.8 LBS | DIASTOLIC BLOOD PRESSURE: 64 MMHG | RESPIRATION RATE: 16 BRPM | OXYGEN SATURATION: 98 %

## 2024-08-19 DIAGNOSIS — K90.89 OTHER SPECIFIED INTESTINAL MALABSORPTION: ICD-10-CM

## 2024-08-19 DIAGNOSIS — M54.50 CHRONIC LEFT-SIDED LOW BACK PAIN WITHOUT SCIATICA: ICD-10-CM

## 2024-08-19 DIAGNOSIS — N18.32 STAGE 3B CHRONIC KIDNEY DISEASE (H): ICD-10-CM

## 2024-08-19 DIAGNOSIS — L60.3 BRITTLE NAILS: ICD-10-CM

## 2024-08-19 DIAGNOSIS — N18.31 STAGE 3A CHRONIC KIDNEY DISEASE (H): ICD-10-CM

## 2024-08-19 DIAGNOSIS — G89.29 CHRONIC LEFT-SIDED LOW BACK PAIN WITHOUT SCIATICA: ICD-10-CM

## 2024-08-19 DIAGNOSIS — B35.1 ONYCHOMYCOSIS: ICD-10-CM

## 2024-08-19 DIAGNOSIS — R09.82 POST-NASAL DRIP: ICD-10-CM

## 2024-08-19 DIAGNOSIS — R63.4 WEIGHT LOSS: Primary | ICD-10-CM

## 2024-08-19 DIAGNOSIS — R23.3 EASY BRUISABILITY: ICD-10-CM

## 2024-08-19 DIAGNOSIS — E78.5 HYPERLIPIDEMIA LDL GOAL <130: ICD-10-CM

## 2024-08-19 DIAGNOSIS — R91.8 OPACITY OF LUNG ON IMAGING STUDY: ICD-10-CM

## 2024-08-19 PROCEDURE — 99214 OFFICE O/P EST MOD 30 MIN: CPT | Performed by: FAMILY MEDICINE

## 2024-08-19 RX ORDER — IPRATROPIUM BROMIDE 42 UG/1
2 SPRAY, METERED NASAL 4 TIMES DAILY PRN
Qty: 15 ML | Refills: 1 | Status: SHIPPED | OUTPATIENT
Start: 2024-08-19

## 2024-08-19 ASSESSMENT — PAIN SCALES - GENERAL: PAINLEVEL: MODERATE PAIN (4)

## 2024-08-19 NOTE — PATIENT INSTRUCTIONS
Patient Education   Preventive Care Advice   This is general advice given by our system to help you stay healthy. However, your care team may have specific advice just for you. Please talk to your care team about your preventive care needs.  Nutrition  Eat 5 or more servings of fruits and vegetables each day.  Try wheat bread, brown rice and whole grain pasta (instead of white bread, rice, and pasta).  Get enough calcium and vitamin D. Check the label on foods and aim for 100% of the RDA (recommended daily allowance).  Lifestyle  Exercise at least 150 minutes each week  (30 minutes a day, 5 days a week).  Do muscle strengthening activities 2 days a week. These help control your weight and prevent disease.  No smoking.  Wear sunscreen to prevent skin cancer.  Have a dental exam and cleaning every 6 months.  Yearly exams  See your health care team every year to talk about:  Any changes in your health.  Any medicines your care team has prescribed.  Preventive care, family planning, and ways to prevent chronic diseases.  Shots (vaccines)   HPV shots (up to age 26), if you've never had them before.  Hepatitis B shots (up to age 59), if you've never had them before.  COVID-19 shot: Get this shot when it's due.  Flu shot: Get a flu shot every year.  Tetanus shot: Get a tetanus shot every 10 years.  Pneumococcal, hepatitis A, and RSV shots: Ask your care team if you need these based on your risk.  Shingles shot (for age 50 and up)  General health tests  Diabetes screening:  Starting at age 35, Get screened for diabetes at least every 3 years.  If you are younger than age 35, ask your care team if you should be screened for diabetes.  Cholesterol test: At age 39, start having a cholesterol test every 5 years, or more often if advised.  Bone density scan (DEXA): At age 50, ask your care team if you should have this scan for osteoporosis (brittle bones).  Hepatitis C: Get tested at least once in your life.  STIs (sexually  transmitted infections)  Before age 24: Ask your care team if you should be screened for STIs.  After age 24: Get screened for STIs if you're at risk. You are at risk for STIs (including HIV) if:  You are sexually active with more than one person.  You don't use condoms every time.  You or a partner was diagnosed with a sexually transmitted infection.  If you are at risk for HIV, ask about PrEP medicine to prevent HIV.  Get tested for HIV at least once in your life, whether you are at risk for HIV or not.  Cancer screening tests  Cervical cancer screening: If you have a cervix, begin getting regular cervical cancer screening tests starting at age 21.  Breast cancer scan (mammogram): If you've ever had breasts, begin having regular mammograms starting at age 40. This is a scan to check for breast cancer.  Colon cancer screening: It is important to start screening for colon cancer at age 45.  Have a colonoscopy test every 10 years (or more often if you're at risk) Or, ask your provider about stool tests like a FIT test every year or Cologuard test every 3 years.  To learn more about your testing options, visit:   .  For help making a decision, visit:   https://bit.ly/yo80091.  Prostate cancer screening test: If you have a prostate, ask your care team if a prostate cancer screening test (PSA) at age 55 is right for you.  Lung cancer screening: If you are a current or former smoker ages 50 to 80, ask your care team if ongoing lung cancer screenings are right for you.  For informational purposes only. Not to replace the advice of your health care provider. Copyright   2023 [facility]. All rights reserved. Clinically reviewed by the [facility] Transitions Program. [employer] 323500 - REV 01/24.  Preventing Falls: Care Instructions  Injuries and health problems such as trouble walking or poor eyesight can increase your risk of falling. So can some medicines. But there are things you can do to help  "prevent falls. You can exercise to get stronger. You can also arrange your home to make it safer.    Talk to your doctor about the medicines you take. Ask if any of them increase the risk of falls and whether they can be changed or stopped.   Try to exercise regularly. It can help improve your strength and balance. This can help lower your risk of falling.     Practice fall safety and prevention.    Wear low-heeled shoes that fit well and give your feet good support. Talk to your doctor if you have foot problems that make this hard.  Carry a cellphone or wear a medical alert device that you can use to call for help.  Use stepladders instead of chairs to reach high objects. Don't climb if you're at risk for falls. Ask for help, if needed.  Wear the correct eyeglasses, if you need them.    Make your home safer.    Remove rugs, cords, clutter, and furniture from walkways.  Keep your house well lit. Use night-lights in hallways and bathrooms.  Install and use sturdy handrails on stairways.  Wear nonskid footwear, even inside. Don't walk barefoot or in socks without shoes.    Be safe outside.    Use handrails, curb cuts, and ramps whenever possible.  Keep your hands free by using a shoulder bag or backpack.  Try to walk in well-lit areas. Watch out for uneven ground, changes in pavement, and debris.  Be careful in the winter. Walk on the grass or gravel when sidewalks are slippery. Use de-icer on steps and walkways. Add non-slip devices to shoes.    Put grab bars and nonskid mats in your shower or tub and near the toilet. Try to use a shower chair or bath bench when bathing.   Get into a tub or shower by putting in your weaker leg first. Get out with your strong side first. Have a phone or medical alert device in the bathroom with you.   Where can you learn more?  Go to https://www.[employer].net/patiented  Enter G117 in the search box to learn more about \"Preventing Falls: Care Instructions.\"  Current as of: July 17, " 2023               Content Version: 14.0    4347-9926 [employer].   Care instructions adapted under license by your healthcare professional. If you have questions about a medical condition or this instruction, always ask your healthcare professional. [employer] disclaims any warranty or liability for your use of this information.

## 2024-08-19 NOTE — PROGRESS NOTES
Assessment & Plan     Weight loss  Discussed differentials including occult malignancy, inadequate calorie intake, malabsorption.  Discussed evaluation with colonoscopy, which she is not keen to do, will check with his CT abdomen.  - CT Chest Abdomen Pelvis w/o Contrast    Other specified intestinal malabsorption  Given no other clear explanations, possible malabsorption syndrome, will check with CT scan  - CT Chest Abdomen Pelvis w/o Contrast    Easy bruisability   - Discussed that this is a common problem with aging    Onychomycosis    - Discussed pathophysiology; nail of the right great toe proximally discolored and somehow detaching.  Treatment with Lamisil and side effects discussed, defers to wait.  Also discussed fact that nail can just be removed    Brittle nails   -Possibly dietary deficiency, but not concerning at this time    Chronic left-sided low back pain without sciatica    -Continues to bother him but following with the pain clinic    Hyperlipidemia LDL goal <130   -Been stable with no concerns    Stage 3b chronic kidney disease (H)   -  stable    Post-nasal drip  Discussed using ipratropium before meals or as needed  - ipratropium (ATROVENT) 0.06 % nasal spray  Dispense: 15 mL; Refill: 1      See Patient Instructions    Weight Loss; lost about 30 lb in last year   Wt Readings from Last 4 Encounters:   08/19/24 63 kg (138 lb 12.8 oz)   09/05/23 64.5 kg (142 lb 3.2 oz)   06/30/23 66.8 kg (147 lb 3.2 oz)   02/17/23 68 kg (150 lb)     Daughter thinks is not eating enough   CT scan vs Nutritionist.    Diarrhea:     Consistence of stools; diarrhea like.    Easy Bruising     When bumps into anything    Great toes: Onychomycosis     Nails are turning is turning black, Rt foot    Brittle finger nails    - in last couple of weeks, chipping easily    Nasal dripping: Has not also dripping especially during meals.  Has tried OTC nasal sprays without success    -   Will try Atrovent    Subjective   Enmanuel is a 89  year old, presenting for the following health issues:  Follow Up  (Last seen 6/30/2023)  Enmanuel is a 88 yo M with hx chronic low back pain (with pain mgt), hx bladder neoplasm, hx kidney stones, hx colon polyps, htn, ckd3, B12 def,       Seeing Pain Clinic:   Interval history July 15, 2024  -left sided low back pain, no pain into the buttock or into the legs at all.   It is the most bothersome when rising from being seated for more than 10 minutes.   -Tramadol  helps take the edge off of the pain sometimes.   -pain is worse with standing and walking, no radiation to the legs when doing these activities.   -describes positive grocery cart sign, leaning over on a grocery cart when shopping or onto the counter in the kitchen which partially relieves the pain.      Plan:  Physical Therapy: continue exercises learned in P T   Clinical Health Psychologist to address issues of relaxation, behavioral change, coping style, and other factors important to improvement: none  Diagnostic Studies: none  Medication Management:   continue Tramadol 50mg tablets. Take 1 tablet (50 mg) by mouth every 6 hours as needed for moderate to severe pain Max of 2 tabs per day. Okay to break in half if needed. Caution sedation. Don't drive until you know how you feel on this medication.   Let me know if the Tramadol is helpful, it will take 20-40 minutes to begin working.    Trial lidocaine patches, there are 4% strength OTC available  Further procedures recommended: schedule lumbar epidural steroid injection         8/19/2024     8:45 AM   Additional Questions   Roomed by JLUIS VILLALOBOS   Accompanied by daughter     History of Present Illness       Back Pain:  He presents for follow up of back pain. Patient's back pain is a chronic problem.  Location of back pain:  Left lower back  Description of back pain: sharp, shooting and stabbing  Back pain spreads: nowhere    Since patient first noticed back pain, pain is: always present, but gets better and  "worse  Does back pain interfere with his job:  Not applicable       Reason for visit:  General health concerns    He eats 2-3 servings of fruits and vegetables daily.He consumes 1 sweetened beverage(s) daily.He exercises with enough effort to increase his heart rate 9 or less minutes per day.  He exercises with enough effort to increase his heart rate 3 or less days per week.   He is taking medications regularly.         Review of Systems  Constitutional, neuro, ENT, endocrine, pulmonary, cardiac, gastrointestinal, genitourinary, musculoskeletal, integument and psychiatric systems are negative, except as otherwise noted.      Objective    BP (!) 147/73 (BP Location: Left arm, Cuff Size: Adult Regular)   Pulse 57   Temp 97.1  F (36.2  C) (Temporal)   Resp 16   Ht 1.82 m (5' 11.65\")   Wt 63 kg (138 lb 12.8 oz)   SpO2 98%   BMI 19.01 kg/m    Body mass index is 19.01 kg/m .  Physical Exam   GENERAL: alert and no distress  HENT: ear canals and TM's normal, nose and mouth without ulcers or lesions  RESP: lungs clear to auscultation - no rales, rhonchi or wheezes  CV: regular rate and rhythm,  no murmur, click or rub, no peripheral edema  ABDOMEN: soft, nontender, no masses and bowel sounds normal  MS: no gross musculoskeletal defects noted, no edema  SKIN: A few bruises in the arms  NAILS: Rt great toe; nail discolored, semidetached  PSYCH: mentation appears normal, affect normal/bright      Signed Electronically by: Walker Sorto MD    "

## 2024-08-29 ENCOUNTER — ANCILLARY PROCEDURE (OUTPATIENT)
Dept: CT IMAGING | Facility: CLINIC | Age: 89
End: 2024-08-29
Attending: FAMILY MEDICINE
Payer: COMMERCIAL

## 2024-08-29 DIAGNOSIS — R63.4 WEIGHT LOSS: ICD-10-CM

## 2024-08-29 DIAGNOSIS — K90.89 OTHER SPECIFIED INTESTINAL MALABSORPTION: ICD-10-CM

## 2024-08-29 PROCEDURE — 74176 CT ABD & PELVIS W/O CONTRAST: CPT | Mod: TC | Performed by: RADIOLOGY

## 2024-08-29 PROCEDURE — 71250 CT THORAX DX C-: CPT | Mod: TC | Performed by: RADIOLOGY

## 2024-09-30 ENCOUNTER — VIRTUAL VISIT (OUTPATIENT)
Dept: PALLIATIVE MEDICINE | Facility: CLINIC | Age: 89
End: 2024-09-30
Attending: NURSE PRACTITIONER
Payer: COMMERCIAL

## 2024-09-30 ENCOUNTER — TELEPHONE (OUTPATIENT)
Dept: PALLIATIVE MEDICINE | Facility: CLINIC | Age: 89
End: 2024-09-30

## 2024-09-30 DIAGNOSIS — M54.59 LUMBAR FACET JOINT PAIN: Primary | ICD-10-CM

## 2024-09-30 DIAGNOSIS — G89.29 CHRONIC LEFT-SIDED LOW BACK PAIN WITHOUT SCIATICA: ICD-10-CM

## 2024-09-30 DIAGNOSIS — M48.062 SPINAL STENOSIS OF LUMBAR REGION WITH NEUROGENIC CLAUDICATION: ICD-10-CM

## 2024-09-30 DIAGNOSIS — M51.369 DDD (DEGENERATIVE DISC DISEASE), LUMBAR: ICD-10-CM

## 2024-09-30 DIAGNOSIS — M48.061 NEURAL FORAMINAL STENOSIS OF LUMBAR SPINE: ICD-10-CM

## 2024-09-30 DIAGNOSIS — M51.360 DEGENERATION OF INTERVERTEBRAL DISC OF LUMBAR REGION WITH DISCOGENIC BACK PAIN: ICD-10-CM

## 2024-09-30 DIAGNOSIS — M48.061 SPINAL STENOSIS OF LUMBAR REGION WITHOUT NEUROGENIC CLAUDICATION: ICD-10-CM

## 2024-09-30 DIAGNOSIS — M54.50 CHRONIC LEFT-SIDED LOW BACK PAIN WITHOUT SCIATICA: ICD-10-CM

## 2024-09-30 DIAGNOSIS — N18.31 STAGE 3A CHRONIC KIDNEY DISEASE (H): ICD-10-CM

## 2024-09-30 DIAGNOSIS — M47.816 SPONDYLOSIS WITHOUT MYELOPATHY OR RADICULOPATHY, LUMBAR REGION: ICD-10-CM

## 2024-09-30 DIAGNOSIS — I10 HYPERTENSION GOAL BP (BLOOD PRESSURE) < 140/90: ICD-10-CM

## 2024-09-30 PROCEDURE — 99214 OFFICE O/P EST MOD 30 MIN: CPT | Mod: 95 | Performed by: NURSE PRACTITIONER

## 2024-09-30 RX ORDER — LISINOPRIL 2.5 MG/1
2.5 TABLET ORAL DAILY
Qty: 60 TABLET | Refills: 0 | Status: SHIPPED | OUTPATIENT
Start: 2024-09-30

## 2024-09-30 RX ORDER — BUPRENORPHINE 5 UG/H
1 PATCH TRANSDERMAL
Qty: 4 PATCH | Refills: 0 | Status: SHIPPED | OUTPATIENT
Start: 2024-09-30

## 2024-09-30 ASSESSMENT — PAIN SCALES - GENERAL: PAINLEVEL: MODERATE PAIN (4)

## 2024-09-30 NOTE — PROGRESS NOTES
"Enmanuel is a 89 year old who is being evaluated via a billable video visit.    How would you like to obtain your AVS? MyChart  If the video visit is dropped, the invitation should be resent by: Text to cell phone: [phone]  Will anyone else be joining your video visit?   Is Pt currently in [place]? Yes    NOTE:  If Pt is not in [place], Appointment needs to be canceled and rescheduled.    {If patient encounters technical issues they should call [phone] :[id number]}  Video-Visit Details    Type of service:  Video Visit --changed to telephone call, patient doesn't have anyone to help him  Video End Time:{video visit start/end time for provider to select:[id number]}  Originating Location (pt. Location): {video visit patient location:523002::\"Home\"}  {PROVIDER LOCATION On-site should be selected for visits conducted from your clinic location or adjoining [facility] hospital, academic office, or other nearby [facility] building. Off-site should be selected for all other provider locations, including home:[id number]}  Distant Location (provider location):  On-site  Platform used for Video Visit: Telephone   Telephone start: 1610  Telephone end: 1628          [facility] [place]    9/30/2024      Chief complaint:   He has pain almost all of the time. It is present even when he is sitting, even on his couch or sitting on a hard chair at the table.   -he has ongoing  left sided low back pain. He denies any radiation to the buttocks or into the left leg.           Interval history:  Jose Manuel Gallo 89 year old male is known to me for:  Lumbar facet joint pain  Lumbar spondylosis with out myelopathy or radiculopathy  Chronic left-sided low back pain without sciatica  Primary osteoarthritis, unspecified site  PMHx includes: Cataracts mild OD moderate OS (2012), hypertension, chronic low back pain, peripheral vascular disease, erectile dysfunction, personal history of malignant neoplasm of the bladder, osteoarthritis, " hemorrhoids, kidney stones, personal history of tobacco use (smoked 40 years), personal history of colonic polyps (2002), macular degeneration, hyperlipidemia, chronic kidney disease stage III GFR 30-59 mL/minute, rotator cuff tendinitis left, squamous cell carcinoma of skin  PSHx includes: Colonoscopy (2008), vitrectomy parsplana (2011), phacoemulsification with standard intraocular lens implant of the left eye (2012), laser YAG capsulotomy left eye (2016), TURP (1997), cervical spinal fusion C6-7, left ulnar nerve transposition at the elbow, vasectomy.      Recommendations/plan at the last visit on 8/15/2024 included:  Physical Therapy: continue exercises learned in P T   Clinical Health Psychologist to address issues of relaxation, behavioral change, coping style, and other factors important to improvement: none  Diagnostic Studies: none  Medication Management:   continue Tramadol 50mg tablets. Take 1 tablet (50 mg) by mouth every 6 hours as needed for moderate to severe pain Max of 2 tabs per day. Okay to break in half if needed. Caution sedation. Don't drive until you know how you feel on this medication.   Let me know if the Tramadol is helpful, it will take 20-40 minutes to begin working.    Trial lidocaine patches, there are 4% strength OTC available  Further procedures recommended: schedule lumbar epidural steroid injection, our office will call you.   Recommendations/follow-up for PCP:  See above  Release of information: none  Follow up: 4 weeks in-person or video      Since male last visit, Jose Manuel Gallo reports:    Interval history September 30, 2024        Interval history July 15, 2024  -left sided low back pain, no pain into the buttock or into the legs at all.   It is the most bothersome when rising from being seated for more than 10 minutes.   -Tramadol  helps take the edge off of the pain sometimes.   -pain is worse with standing and walking, no radiation to the legs when doing these  activities.   -describes positive grocery cart sign, leaning over on a grocery cart when shopping or onto the counter in the kitchen which partially relieves the pain.       Interval history June 12, 2024  -lumbar facet joint injections in May 2024 were minimally helpful.    -1.5 weeks ago, he began to have immense pain when he sits down for more than 10 minutes. He can barely get up. He much lower level of function. He denies any falls or injuries. He denies any illness. Denies b/b symptoms, numbness/tingling, weakness. Denies saddle anesthesia.   No MRI imaging of lumbar spine, recommend we get MRI imaging.   -he has taken 4 of the tramadol but he doesn't remember if it was helpful.   He is using Tylenol or ibuprofen and a topical gel on his back when the pain is bad, lays down for an hour, and then his back feels somewhat better.     Interval history April 23, 2024  -reports his axial low back pain has returned. Present on both sides but markedly worse on the left.   Feels pretty good in the morning, but by late afternoon he feels awful. Low back is hurting and he cannot continue activity, needs to go lay down.   -had left sided lumbar RFA done 12/5/2023, reduced pain by 50% for about 4 months. Now left sided pain returned. Worst with lumbar extension and extension and rotation/facet loading movements.  -discussed a trial of Tramadol for use on bad pain days, max of 2 tabs per day.     Interval history September 13, 2023  -doing well overall,   -had great relief for 24 hours after the lumbar MBB  -will get him scheduled for 2nd MBB today     Initial evaluation pain history collected on 7/17/2023  Long term low back pain on the left side. No radiation to the legs ever.   Has significant curvature of the lumbar spine and x-ray shows facet arthropathy  His pain has gotten markedly worse with more yardwork. He is able to do about 2 hours of lawnwork. Digging holes for landscaping, raking, bagging etc. He does not  "pace his activity. He will push through the pain.          At this point, the patient's participation with our multidisciplinary team includes:  The patient has been compliant with the program.  PT -participating   Health Psych - none      Pain rating: intensity ranges from 1/10 to 10/10, and Averages varies but likely around between 5-10 depending on activity on a 0-10 scale.  Pain right now is a 4/10.   Describes pain as \"sharp and stabbing, especially with certain activities.\"  Pain is almost always there.      Current pain-related medication treatments include:  -acetaminophen 650mg Q 8 hours PRN (somewhat helpful)  -ibuprofen 200mg takes 400mg Q 6 hours PRN (not really helpful)      Other pertinent medications:  -none    Previous medication treatments included:  OPIATES:Tramadol (feels really sleepy)  NSAIDS: ibuprofen (not very helpful)  MUSCLE RELAXANTS: none  ANTI-MIGRAINE MEDS: none  ANTI-DEPRESSANTS: none  SLEEP AIDS: melatonin (helpful)  ANTI-CONVULSANTS: gabapentin (tried 300mg TID in 2017 for short trial-used for shingles)  TOPICALS: tried OTC and CBD creams (not helpful)  ANXIOLYTICS: none  MEDICAL CANNABIS: none  Other meds: Tylenol (helpful)      Other treatments have included:  Jose Manuel Gallo has not been seen at a pain clinic in the past.    PT: none  Chiropractic care: tried it a couple of times, did not help  Acupuncture: none  TENs Unit: none    Injections:   8/8/2023 left L3, L4 and L5 medial branch block with Dr. Sathya Villanueva (% relief)  -10/19/2023 left L3, L4 and L5 medial branch blocks with Dr. Sathya Villanueva (helpful, > 80% relief)  -12/5/2023 left lumbar radiofrequency ablation at L4, L5 sacral ala with Dr. Sathya Villanueva (50% relief but then diminishing relief over the next 4 months or so)  -5/23/2024 left L3-4, L4-5 facet joint steroid injections with Dr. Sathya Villanueva (cannot rate pain, it was manageable for a few weeks)  -7/23/2024 L3-4 interlaminar RADHA with Dr." Sathya Villanueva (no benefit)    Past Medical History:  Past Medical History:   Diagnosis Date    Cataract, mild, od; mod, os 07/02/2012    Chronic low back pain     CKD (chronic kidney disease) stage 3, GFR 30-59 ml/min (H)     Erectile dysfunction     Hemorrhoids     History of bladder cancer         HTN     Hyperlipidemia LDL goal <130     Kidney stones     Macular degeneration     Osteoarthritis     Personal history of colonic polyps     10/2002    Personal history of malignant neoplasm of bladder     Personal history of tobacco use     Smoked 40 years, Quit 2003    PVD (peripheral vascular disease) (H)     Left Vertebral Artery occlusion    Rotator cuff tendinitis, left     Squamous cell carcinoma of skin, unspecified      Past Surgical History:  Past Surgical History:   Procedure Laterality Date    CATARACT IOL, RT/LT      COLONOSCOPY  11/02/2008    Normal    HC REVISE ULNAR NERVE AT ELBOW      Left    LASER YAG CAPSULOTOMY  7/2016    left eye    PHACOEMULSIFICATION WITH STANDARD INTRAOCULAR LENS IMPLANT  7/2012    left eye    TURP  1997 ?    VASECTOMY      VITRECTOMY PARSPLANA  8/2011    left eye, repair macular hole    ZZC SPINAL FUSION,ANT,EA ADNL LEVEL      C6-7     Medications:  Current Outpatient Medications   Medication Sig Dispense Refill    acetaminophen (TYLENOL) 650 MG CR tablet Take 1 tablet (650 mg) by mouth every 8 hours as needed for pain 60 tablet 1    carboxymethylcellulose PF (REFRESH LIQUIGEL) 1 % ophthalmic gel Place 1 drop into both eyes 4 times daily 1 Bottle 11    cyanocobalamin (VITAMIN B-12) 1000 MCG tablet Take 1 tablet (1,000 mcg) by mouth daily 90 tablet 3    ibuprofen (ADVIL/MOTRIN) 200 MG tablet Take 400 mg by mouth every 6 hours as needed for pain      ipratropium (ATROVENT) 0.06 % nasal spray Spray 2 sprays into both nostrils 4 times daily as needed for rhinitis ((for rhinitis andrei while eating)) 15 mL 1    lisinopril (ZESTRIL) 2.5 MG tablet Take 1 tablet (2.5 mg) by mouth  "daily No further refills until appointment , must be a face to face encounter 60 tablet 0    prednisoLONE acetate (PRED FORTE) 1 % ophthalmic suspension Place 1 drop into the right eye 2 times daily 10 mL 2    simvastatin (ZOCOR) 40 MG tablet TAKE ONE TABLET BY MOUTH EVERY DAY 90 tablet 0    sodium chloride (ATLHEA 128) 5 % ophthalmic ointment Apply a small squirt approx. 1/4\" into both eyes at bedtime. 3.5 g 4    traMADol (ULTRAM) 50 MG tablet Take 1 tablet (50 mg) by mouth every 6 hours as needed for moderate to severe pain Max of 2 tabs per day. Okay to break in half if needed. Caution sedation. Don't drive until you know how you feel on this medication. Fill/start 07/12/24 30 tablet 0     Allergies:     Allergies   Allergen Reactions    Gabapentin Dizziness     Severe dizziness on 100mg dosing     Social History:  Home situation:  (61 years in 2023). Lives with spouse. Only adult child, Sydni  Occupation/Schooling: retired since 1994. He worked in the [employer] industry  Tobacco use: quit smoking in about 1997  Alcohol use: none  Drug use: none  History of chemical dependency treatment: none    Family history:  Family History   Problem Relation Age of Onset    Osteoporosis Mother     Diabetes Father     Arthritis Father     Cancer Father     Respiratory Father     Genitourinary Problems Brother     Circulatory Brother     Macular Degeneration No family hx of     Glaucoma No family hx of     Thyroid Disease No family hx of     Hypertension No family hx of              Physical Exam:  There were no vitals filed for this visit.      Exam: There were no vitals taken for this visit.    Behavioral observations:  Awake, alert. Cooperative. ***  Pulm: respirations easy and unlabored. Able to speak in full sentences without SOB or cough noted.  ***        IMAGING:    LUMBAR SPINE TWO - THREE VIEWS 1/11/2022 9:39 AM     INDICATION: Osteoarthritis of spine with radiculopathy, lumbar region.     COMPARISON: 7/13/2017   "                                                                    IMPRESSION: Exam numbered assuming the last rib-bearing vertebral body  is T12. Convex right lumbar angulation centered across L1-L2 where  there is advanced left-sided interspace and endplate degeneration  measures approximately 36 degrees between T12 and L3 not substantially  changed versus prior. Sagittal alignment appears preserved. Normal  lumbar vertebral body heights. No fracture. Moderate lower lumbar  spine facet arthropathy.     ALEJANDRA SOTO MD         MR LUMBAR SPINE WITHOUT CONTRAST July 3, 2024 1:19 PM     PROVIDED HISTORY: Markedly worsening low back pain in the past 1.5  weeks. No injury or illness. No previous lumbar MRI. Lumbar facet  joint pain. Spondylosis of lumbar region without myelopathy or  radiculopathy. Chronic left-sided low back pain without sciatica.  Chronic left-sided low back pain without sciatica. DDD (degenerative  disc disease), lumbar. Chronic left SI joint pain.     COMPARISON: Plain radiographs 1/11/2020.     TECHNIQUE: Multiplanar multisequence lumbar MR without contrast.     FINDINGS: There are four lumbar-type vertebrae following the last  remaining thoracic type vertebrae. Complete sacralization of L5.     Right convex scoliosis with apex at L1-L2.     The tip of the conus medullaris is at L1.       Opposing endplate edema at L4-L5.     On a level by level basis:     T12-L1: Mild disc height loss and disc degeneration. Left eccentric  disc bulge and bilateral facet arthropathy. Moderate to severe left  neural foraminal stenosis. No right neural foraminal stenosis. Mild  spinal canal stenosis.     L1-L2: Moderate disc height loss and disc degeneration. Left eccentric  disc osteophyte complex and bilateral facet arthropathy. Moderate left  neural foraminal stenosis. Mild spinal canal stenosis. No right neural  foraminal stenosis.     L2-L3: Moderate disc height loss and disc degeneration.  Disc  osteophyte complex and bilateral facet arthropathy. Moderate left and  mild right neural foraminal stenosis. Mild spinal canal stenosis.     L3-L4: Moderate disc height loss and disc degeneration. Disc  osteophyte complex and bilateral facet arthropathy. Mild to moderate  bilateral neural foraminal and mild to moderate spinal canal stenosis.  Articular and periarticular edema associated with left facet joint.     L4-L5: Mild to moderate disc height loss and disc degeneration. Right  eccentric bulge osteophyte formation and bilateral facet arthropathy.  Moderate to severe right neural foraminal stenosis with impingement of  the exiting nerve root. Mild left neural foraminal stenosis. No spinal  canal stenosis.     L5-S1: No spinal canal or neuroforaminal stenosis.     Paraspinous tissues are within normal limits.                                                                      IMPRESSION:   1. Four lumbar-type vertebrae following the last remaining thoracic  type vertebrae. Complete sacralization of L5.  2. Right convex scoliosis with the apex at L1-L2.  3. Articular and periarticular edema associated with left L3-L4 facet  joint, compatible with active inflammatory arthropathy.  4. Modic type I degeneration at L4-L5.  5. Multilevel lumbar spondylosis as detailed above.      ANTIONETTE BERG MD       Other testing (labs, diagnostics):  3/6/2023  Cr. 1.30  Est GFR 57      Screening tools:     DIRE Score for ongoing opioid management is calculated as follows:    Diagnosis = 2    Intractability = 1    Risk: Psych = 3  Chem Hlth = 3  Reliability = 3  Social = 3    Efficacy = 2    Total DIRE Score = 17 (14 or higher predicts good candidate for ongoing opioid management; 13 or lower predicts poor candidate for opioid management)           [place]  review:  Reviewed [place]  9/30/2024- no concerning fills.  Kayley NIXON, RN CNP, FNP  [facility]  [place]  location          Assessment:***  Lumbar DDD  Spinal stenosis of lumbar region with neurogenic claudication  Neural foraminal stenosis of lumbar spine  Chronic left sided low back pain without sciatica    Chronic intractable pain  Primary osteoarthritis, unspecified site  PMHx includes: Cataracts mild OD moderate OS (2012), hypertension, chronic low back pain, peripheral vascular disease, erectile dysfunction, personal history of malignant neoplasm of the bladder, osteoarthritis, hemorrhoids, kidney stones, personal history of tobacco use (smoked 40 years), personal history of colonic polyps (2002), macular degeneration, hyperlipidemia, chronic kidney disease stage III GFR 30-59 mL/minute, rotator cuff tendinitis left, squamous cell carcinoma of skin  PSHx includes: Colonoscopy (2008), vitrectomy parsplana (2011), phacoemulsification with standard intraocular lens implant of the left eye (2012), laser YAG capsulotomy left eye (2016), TURP (1997), cervical spinal fusion C6-7, left ulnar nerve transposition at the elbow, vasectomy.      Plan:***  Physical Therapy: continue exercises learned in P T   Clinical Health Psychologist to address issues of relaxation, behavioral change, coping style, and other factors important to improvement: none  Diagnostic Studies: none  Medication Management:   continue Tramadol 50mg tablets. Take 1 tablet (50 mg) by mouth every 6 hours as needed for moderate to severe pain Max of 2 tabs per day. Okay to break in half if needed. Caution sedation. Don't drive until you know how you feel on this medication.   Let me know if the Tramadol is helpful, it will take 20-40 minutes to begin working.    Trial lidocaine patches, there are 4% strength OTC available  Further procedures recommended: schedule lumbar epidural steroid injection, our office will call you.   Recommendations/follow-up for PCP:  See above  Release of information: none  Follow up: 4 weeks in-person or video                         Kayley NIXON, RN CNP, FNP  [facility] [place] [facility]

## 2024-09-30 NOTE — PATIENT INSTRUCTIONS
----------------------------------------------------------------  [facility] Number:  [phone]   Call with any questions about your care and for scheduling assistance.   Calls are returned Monday through Friday between 8 AM and 4:30 PM. We usually get back to you within 2 business days depending on the issue/request.    If we are prescribing your medications:  For opioid medication refills, call the clinic or send a [employer] message 7 days in advance.  Please include:  Name of requested medication  Name of the pharmacy.  For non-opioid medications, call your pharmacy directly to request a refill. Please allow 3-4 days to be processed.   Per [place] State Law:  All controlled substance prescriptions must be filled within 30 days of being written.    For those controlled substances allowing refills, pickup must occur within 30 days of last fill.      We believe regular attendance is key to your success in our program!    Any time you are unable to keep your appointment we ask that you call us at least 24 hours in advance to cancel.This will allow us to offer the appointment time to another patient.   Multiple missed appointments may lead to dismissal from the clinic.

## 2024-10-03 ENCOUNTER — TELEPHONE (OUTPATIENT)
Dept: PALLIATIVE MEDICINE | Facility: CLINIC | Age: 89
End: 2024-10-03
Payer: COMMERCIAL

## 2024-10-03 NOTE — TELEPHONE ENCOUNTER
repeat left sided lumbar RFA at L4, L5 and sacral ala.             Chava Mendoza  Complex   [facility]  [facility]

## 2024-10-08 NOTE — TELEPHONE ENCOUNTER
Coverage Guidance-No PA required    Coverage Indications, Limitations, and/or Medical Necessity      C. Facet Joint Denervation:      Repeat thermal facet joint RFA at the same anatomic site is considered medically reasonable and necessary provided the patient had a minimum of consistent 50% improvement in pain for at least six (6) months or at least 50% consistent improvement in the ability to perform previously painful movements and ADLs as compared to baseline measurement using the same scale;  Frequency Limitation: For each covered spinal region no more than two (2) radiofrequency sessions will be reimbursed per rolling 12 months.            Routing to review and advise      Bibiana Simmons   [facility]

## 2024-10-09 NOTE — TELEPHONE ENCOUNTER
Outreach X1. Left a  requesting call back. Provided call back number.    Pursuing repeat left sided lumbar RFA at L4, L5 and sacral ala.     12/05/23 Procedure performed: Left lumbosacral radiofrequency ablation at L4, L5, sacral ala    He needs a minimum of consistent 50% improvement in pain for at least six (6) months or at least 50% consistent improvement in the ability to perform previously painful movements and ADLs as compared to baseline measurement using the same scale.    FLO TaylorN, RN  Care Coordinator  [facility] [place]

## 2024-10-09 NOTE — TELEPHONE ENCOUNTER
[facility] Call Center    Phone Message    May a detailed message be left on voicemail: yes     Reason for Call: Other: Patient called in returning call to HANNAH Fam. Please call back when available, thank you!     Action Taken: Message routed to:  Other: [facility]    Travel Screening: Not Applicable     Date of Service:

## 2024-10-10 NOTE — TELEPHONE ENCOUNTER
Spoke to patient and he reports greater than 50% improvement of his pain for greater than 6 months from the last RFA performed. Routing to Bibiana to review if this is sufficient for documentation.      FLO TaylorN, RN  Care Coordinator  [facility] [place]

## 2024-10-16 NOTE — TELEPHONE ENCOUNTER
Screening Questions for RFA Procedure      Procedure ordered?   repeat left sided lumbar RFA at L4, L5 and sacral ala.        What insurance are we billing for this procedure?  [employer]  IF SCHEDULING AT [place] [facility] PLEASE SCHEDULE AT LEAST 7-10 BUSINESS DAYS OUT SO A PA CAN BE OBTAINED    Is patient scheduled at [facility]? no  If YES, route every encounter to [facility] CARE NAVIGATION POOL [[id number]]  Has patient had this injection before? No  Any chance of pregnancy? NO   If YES, do NOT schedule and route to RN pool     Is  Needed?: No  Will patient have a ?  Yes   If pt is given sedation meds, no driving for 24 hours.  Is pt taking a cab or transportation service? NO      If so will need to be accompanied by an adult too (friend/family member) in order for IV sedation to be given.    Per [place] Policy:  Outpatients are to have responsible adult or family member to accompany them at discharge and drive them home. A service providing medically trained drivers or attendants would be acceptable. Public transportation would not be acceptable unless the patient is accompanied by a responsible adult or family member.  Is patient taking any blood thinners (i.e. plavix, coumadin, jantoven, warfarin, heparin, pradaxa or dabigatran, etc)? No   If YES, do NOT schedule, and route to RN pool     Is patient taking any GLP-1 Antagonist (hold needed for sedation patients only) No   (semaglutide (Ozempic, Wegovy), dulaglutide (Trulicity), exenatide ER (Bydureon), tirzepatide (Mounjaro), Liraglutide (Saxenda, Victoza), semaglutide (Rybelsus),Terzepatide (Zepbound)  If YES, okay to schedule AND route to RN nurse / Dr. Martinez's Team    Does the patient have a bleeding or clotting disorder? No   If YES, it it OKAY to schedule AND route to RN pool  **For any patients with platelet count <100, must be forwarded to provider**    Is patient diabetic? No If YES, have them bring their  glucometer.    Does patient have an active infection or treated for one within the past week? No   If YES, do NOT schedule and route to RN nurse pool     Is patient currently taking any antibiotics?  No  For patients on chronic, preventative, or prophylactic antibiotics, procedures may be scheduled.   For patients on antibiotics for active or recent infection:antibiotic course must have been completed for 4 days    Is patient currently taking any steroid medications? (i.e. Prednisone, Medrol)  No   For patients on steroid medications, course must have been completed for 4 days    Is patient actively being treated for cancer or immunocompromised, including the spleen having been removed? No  If YES, do NOT schedule and route to RN pool     Any history of complications with sedation medications?  NO   If YES, OK to schedule AND route to RN pool     Any history of sleep apnea?  NO   If YES, OK to schedule AND route to RN pool     Any cardiac history?  NO   If YES, OK to schedule AND route to RN pool     Do you have an implanted pacemaker, ICD (implanted cardiac device) or AICD (automatic implanted cardiac device)?  NO  If YES, do NOT schedule AND route to RN pool (for all providers including Dr Michaels)   Obtain name of device :     Obtain name of cardiologist:       Do you have an implanted stimulator?  NO  If YES, OK to schedule AND route to nursing.   Instruct patient to bring in the remote to the appointment and it will need to be turned off.  reviewed      Does patient have an allergy to contrast dye, iodine or shellfish?  No   If YES, OK to schedule. Route to RN pool AND add allergy information to appointment notes    Are you able to get on and off an exam table with minimal or no assistance? Yes   If NO, do NOT schedule and route to RN pool    Are you able to roll over and lay on your stomach with minimal or no assistance? Yes   If NO, do NOT schedule and route to RN pool    Reminders:  If you are  started on any steroids or antibiotics between now and your appointment, you must contact us because it may affect our ability to perform your procedure.  Yes  Informed patient that s/he needs to fast for 6 hours before procedure?  YES  Informed patient that it is OK to take normal medications with sips of water, especially blood pressure medications, before the procedure and must hold blood thinners as instructed.  Yes  Informed patient to arrive 30 minutes before procedure time to have an IV inserted.  reviewed   Do NOT schedule at 0745, 0815 or 1245.  reviewed   All radiofrequency ablations are in a 60 minute time slot.If cervical RFA, please schedule each side separate. If okay to do bilateral cervical RFA, schedule for 90 minutes.  reviewed

## 2024-10-20 NOTE — PROGRESS NOTES
Enmanuel is a 89 year old who is being evaluated via a billable video visit.    What phone number would you like to be contacted at? [phone]  Daughter likes to be included in the video visit per patient request. Cell phone for the daughter Sydni is [phone]     How would you like to obtain your AVS? MyChart      Is Pt currently in [place]? Yes    NOTE:  If Pt is not in [place], Appointment needs to be canceled and rescheduled.     Shelby Wolfe MA  [facility]     Video-Visit Details    Type of service:  Video Visit   Video start: unable to reach Enmanuel by video and cannot hear has daughter's audio on [employer], changed to telephone call so all 3 of us can communicate, this is fine with Enmanuel and there is a Consent to Communicate signed for Sydni as well.   Video End Time: see above  Originating Location (pt. Location): Home    Distant Location (provider location):  On-site  Platform used for Video Visit: Enmanuel is unable to access video and his daughter is on video but not with him and I cannot hear her. They prefer to be on the visit together, so changed to a telephone visit.            [facility]    10/21/2024      Chief complaint:   -ongoing left low back and left upper leg pain  First patch on last Thursday had very little pain, changed twice and is wearing the 3rd patch.   He is having  pain of 4/10 just seated. Can't do much in the way of activity.       .           Interval history:  Jose Manuel Gallo 89 year old male is known to me for:  Lumbar facet joint pain  Lumbar spondylosis with out myelopathy or radiculopathy  Chronic left-sided low back pain without sciatica  Primary osteoarthritis, unspecified site  PMHx includes: Cataracts mild OD moderate OS (2012), hypertension, chronic low back pain, peripheral vascular disease, erectile dysfunction, personal history of malignant neoplasm of the bladder, osteoarthritis, hemorrhoids, kidney stones, personal  history of tobacco use (smoked 40 years), personal history of colonic polyps (2002), macular degeneration, hyperlipidemia, chronic kidney disease stage III GFR 30-59 mL/minute, rotator cuff tendinitis left, squamous cell carcinoma of skin  PSHx includes: Colonoscopy (2008), vitrectomy parsplana (2011), phacoemulsification with standard intraocular lens implant of the left eye (2012), laser YAG capsulotomy left eye (2016), TURP (1997), cervical spinal fusion C6-7, left ulnar nerve transposition at the elbow, vasectomy.      Recommendations/plan at the last visit on 9/30/2024 included:  Physical Therapy: continue exercises learned in P T   Clinical Health Psychologist to address issues of relaxation, behavioral change, coping style, and other factors important to improvement: none  Diagnostic Studies: none  Medication Management:   START Butrans 5mcg/hr transdermal. Place on clean dry skin on upper chest, upper back or upper arm. Change every 7 days. Fill 9/30 and start 10/1/2024  Further procedures recommended: schedule repeat left sided lumbar RFA, our office will contact you  Recommendations/follow-up for PCP:  See above  Release of information: none  Follow up: 12 weeks in-person or video      Since male last visit, Jose Manuel Gallo reports:    Interval history October 19, 2024  -Enmanuel is unable to access the video at home and his daughter Sydni is on video but I cannot hear her. We changed to 3 way telephone message.   -Enmanuel has ongoing left sided low back pain that can radiate into the left leg but not past the knees.   -has trouble finding a comfortable place to sit, sitting at dining table for dinner is terrible pain. Sitting on the couch for too long is too soft.   -he placed the first Butrans patch and had pretty good relief the first few weeks. He wore the first one on his upper left back.   He states he has not appreciated any relief from the 2nd and 3rd patches respectively, even when he placed the 3rd  patch in the same spot as the first. He notes he is having some itching at the patch site.  -recommended increase in Butrans patch dosing from 5 mcg/hr to 10mcg/hr. Enmanuel and his daughter are in agreement.   -he has repeat lumbar RFA scheduled for 12/24/2024.         Interval history September 30, 2024  He has left sided low back pain almost all of the time. It is present even when he is sitting, even on his couch or sitting on a hard chair at the table.   -he has ongoing  left sided low back pain. He denies any radiation to the buttocks or into the left leg.    -he is a little frustrated that he is still having low back pain  -today, he notes he is not having any pain that radiates into his buttock or the legs.   -states that this feels most like his low back pain did prior to last left sided lumbar RFA  -discussed Butrans or Belbuca for longer term pain relief since Tramadol helps but doesn't last long enough and it can make him feel sleepy    Interval history July 15, 2024  -left sided low back pain, no pain into the buttock or into the legs at all.   It is the most bothersome when rising from being seated for more than 10 minutes.   -Tramadol  helps take the edge off of the pain sometimes.   -pain is worse with standing and walking, no radiation to the legs when doing these activities.   -describes positive grocery cart sign, leaning over on a grocery cart when shopping or onto the counter in the kitchen which partially relieves the pain.       Interval history June 12, 2024  -lumbar facet joint injections in May 2024 were minimally helpful.    -1.5 weeks ago, he began to have immense pain when he sits down for more than 10 minutes. He can barely get up. He much lower level of function. He denies any falls or injuries. He denies any illness. Denies b/b symptoms, numbness/tingling, weakness. Denies saddle anesthesia.   No MRI imaging of lumbar spine, recommend we get MRI imaging.   -he has taken 4 of the tramadol  "but he doesn't remember if it was helpful.   He is using Tylenol or ibuprofen and a topical gel on his back when the pain is bad, lays down for an hour, and then his back feels somewhat better.     Interval history April 23, 2024  -reports his axial low back pain has returned. Present on both sides but markedly worse on the left.   Feels pretty good in the morning, but by late afternoon he feels awful. Low back is hurting and he cannot continue activity, needs to go lay down.   -had left sided lumbar RFA done 12/5/2023, reduced pain by 50% for about 4 months. Now left sided pain returned. Worst with lumbar extension and extension and rotation/facet loading movements.  -discussed a trial of Tramadol for use on bad pain days, max of 2 tabs per day.     Interval history September 13, 2023  -doing well overall,   -had great relief for 24 hours after the lumbar MBB  -will get him scheduled for 2nd MBB today     Initial evaluation pain history collected on 7/17/2023  Long term low back pain on the left side. No radiation to the legs ever.   Has significant curvature of the lumbar spine and x-ray shows facet arthropathy  His pain has gotten markedly worse with more yardwork. He is able to do about 2 hours of lawnwork. Digging holes for landscaping, raking, bagging etc. He does not pace his activity. He will push through the pain.          At this point, the patient's participation with our multidisciplinary team includes:  The patient has been compliant with the program.  PT -participating   Health Psych - none      Pain rating: intensity ranges from 1/10 to 10/10, and Averages varies  depending on activity on a 0-10 scale. Has trouble finding a good surface to sit on, sitting on a hard chair makes pain worse and being on the sofa too long is too soft and makes his back hurt.   Pain right now is a 5/10.   Describes pain as \"sharp and stabbing, especially with certain activities.\"  Pain is almost always there.      Current " pain-related medication treatments include:  -acetaminophen 650mg Q 8 hours PRN (somewhat helpful)  -Butrans 5mcg/hr transdermal patch change every 7 days (the first patch he wore was quite helpful, 2nd and 3rd not so much, 3rd patch site itches but he reused a site in less than 6 weeks)      Other pertinent medications:  -none    Previous medication treatments included:  OPIATES:Tramadol (feels really sleepy)  NSAIDS: ibuprofen (not very helpful)  MUSCLE RELAXANTS: none  ANTI-MIGRAINE MEDS: none  ANTI-DEPRESSANTS: none  SLEEP AIDS: melatonin (helpful)  ANTI-CONVULSANTS: gabapentin (tried 300mg TID in 2017 for short trial-used for shingles)  TOPICALS: tried OTC and CBD creams (not helpful)  ANXIOLYTICS: none  MEDICAL CANNABIS: none  Other meds: Tylenol (helpful)      Other treatments have included:  Jose Manuel Gallo has not been seen at a pain clinic in the past.    PT: none  Chiropractic care: tried it a couple of times, did not help  Acupuncture: none  TENs Unit: none    Injections:   8/8/2023 left L3, L4 and L5 medial branch block with Dr. Sathya Villanueva (% relief)  -10/19/2023 left L3, L4 and L5 medial branch blocks with Dr. Sathya Villanueva (helpful, > 80% relief)  -12/5/2023 left lumbar radiofrequency ablation at L4, L5 sacral ala with Dr. Sathya Villanueva (50% relief but then diminishing relief over the next 4 months or so)  -5/23/2024 left L3-4, L4-5 facet joint steroid injections with Dr. Sathya Villanueva (cannot rate pain, it was manageable for a few weeks)  -7/23/2024 L3-4 interlaminar RADHA with Dr. Sathya Villanueva (no benefit)    Past Medical History:  Past Medical History:   Diagnosis Date    Cataract, mild, od; mod, os 07/02/2012    Chronic low back pain     CKD (chronic kidney disease) stage 3, GFR 30-59 ml/min (H)     Erectile dysfunction     Hemorrhoids     History of bladder cancer         HTN     Hyperlipidemia LDL goal <130     Kidney stones     Macular degeneration      Osteoarthritis     Personal history of colonic polyps     10/2002    Personal history of malignant neoplasm of bladder     Personal history of tobacco use     Smoked 40 years, Quit 2003    PVD (peripheral vascular disease) (H)     Left Vertebral Artery occlusion    Rotator cuff tendinitis, left     Squamous cell carcinoma of skin, unspecified      Past Surgical History:  Past Surgical History:   Procedure Laterality Date    CATARACT IOL, RT/LT      COLONOSCOPY  11/02/2008    Normal    HC REVISE ULNAR NERVE AT ELBOW      Left    LASER YAG CAPSULOTOMY  7/2016    left eye    PHACOEMULSIFICATION WITH STANDARD INTRAOCULAR LENS IMPLANT  7/2012    left eye    TURP  1997 ?    VASECTOMY      VITRECTOMY PARSPLANA  8/2011    left eye, repair macular hole    ZZC SPINAL FUSION,ANT,EA ADNL LEVEL      C6-7     Medications:  Current Outpatient Medications   Medication Sig Dispense Refill    acetaminophen (TYLENOL) 650 MG CR tablet Take 1 tablet (650 mg) by mouth every 8 hours as needed for pain 60 tablet 1    buprenorphine (BUTRANS) 5 MCG/HR WK patch Place 1 patch over 7 days onto the skin every 7 days. Fill 9/30/2024. Start 10/1/2024. 28 day script for chronic pain. 4 patch 0    carboxymethylcellulose PF (REFRESH LIQUIGEL) 1 % ophthalmic gel Place 1 drop into both eyes 4 times daily 1 Bottle 11    cyanocobalamin (VITAMIN B-12) 1000 MCG tablet Take 1 tablet (1,000 mcg) by mouth daily 90 tablet 3    ibuprofen (ADVIL/MOTRIN) 200 MG tablet Take 400 mg by mouth every 6 hours as needed for pain      ipratropium (ATROVENT) 0.06 % nasal spray Spray 2 sprays into both nostrils 4 times daily as needed for rhinitis ((for rhinitis andrei while eating)) 15 mL 1    lisinopril (ZESTRIL) 2.5 MG tablet Take 1 tablet (2.5 mg) by mouth daily. No further refills until appointment 60 tablet 0    prednisoLONE acetate (PRED FORTE) 1 % ophthalmic suspension Place 1 drop into the right eye 2 times daily 10 mL 2    simvastatin (ZOCOR) 40 MG tablet TAKE ONE  "TABLET BY MOUTH EVERY DAY 90 tablet 0    sodium chloride (ALTHEA 128) 5 % ophthalmic ointment Apply a small squirt approx. 1/4\" into both eyes at bedtime. 3.5 g 4    traMADol (ULTRAM) 50 MG tablet Take 1 tablet (50 mg) by mouth every 6 hours as needed for moderate to severe pain Max of 2 tabs per day. Okay to break in half if needed. Caution sedation. Don't drive until you know how you feel on this medication. Fill/start 07/12/24 30 tablet 0     Allergies:     Allergies   Allergen Reactions    Gabapentin Dizziness     Severe dizziness on 100mg dosing     Social History:  Home situation:  (61 years in 2023). Lives with spouse. Only adult child, Sydni  Occupation/Schooling: retired since 1994. He worked in the [employer] industry  Tobacco use: quit smoking in about 1997  Alcohol use: none  Drug use: none  History of chemical dependency treatment: none    Family history:  Family History   Problem Relation Age of Onset    Osteoporosis Mother     Diabetes Father     Arthritis Father     Cancer Father     Respiratory Father     Genitourinary Problems Brother     Circulatory Brother     Macular Degeneration No family hx of     Glaucoma No family hx of     Thyroid Disease No family hx of     Hypertension No family hx of              Physical Exam:  There were no vitals filed for this visit.      Exam: There were no vitals taken for this visit.    Behavioral observations:  Awake, alert. Cooperative.   Pulm: respirations easy and unlabored. Able to speak in full sentences without SOB or cough noted.          IMAGING:    LUMBAR SPINE TWO - THREE VIEWS 1/11/2022 9:39 AM     INDICATION: Osteoarthritis of spine with radiculopathy, lumbar region.     COMPARISON: 7/13/2017                                                                      IMPRESSION: Exam numbered assuming the last rib-bearing vertebral body  is T12. Convex right lumbar angulation centered across L1-L2 where  there is advanced left-sided interspace and endplate " degeneration  measures approximately 36 degrees between T12 and L3 not substantially  changed versus prior. Sagittal alignment appears preserved. Normal  lumbar vertebral body heights. No fracture. Moderate lower lumbar  spine facet arthropathy.     ALEJANDRA SOTO MD         MR LUMBAR SPINE WITHOUT CONTRAST July 3, 2024 1:19 PM     PROVIDED HISTORY: Markedly worsening low back pain in the past 1.5  weeks. No injury or illness. No previous lumbar MRI. Lumbar facet  joint pain. Spondylosis of lumbar region without myelopathy or  radiculopathy. Chronic left-sided low back pain without sciatica.  Chronic left-sided low back pain without sciatica. DDD (degenerative  disc disease), lumbar. Chronic left SI joint pain.     COMPARISON: Plain radiographs 1/11/2020.     TECHNIQUE: Multiplanar multisequence lumbar MR without contrast.     FINDINGS: There are four lumbar-type vertebrae following the last  remaining thoracic type vertebrae. Complete sacralization of L5.     Right convex scoliosis with apex at L1-L2.     The tip of the conus medullaris is at L1.       Opposing endplate edema at L4-L5.     On a level by level basis:     T12-L1: Mild disc height loss and disc degeneration. Left eccentric  disc bulge and bilateral facet arthropathy. Moderate to severe left  neural foraminal stenosis. No right neural foraminal stenosis. Mild  spinal canal stenosis.     L1-L2: Moderate disc height loss and disc degeneration. Left eccentric  disc osteophyte complex and bilateral facet arthropathy. Moderate left  neural foraminal stenosis. Mild spinal canal stenosis. No right neural  foraminal stenosis.     L2-L3: Moderate disc height loss and disc degeneration. Disc  osteophyte complex and bilateral facet arthropathy. Moderate left and  mild right neural foraminal stenosis. Mild spinal canal stenosis.     L3-L4: Moderate disc height loss and disc degeneration. Disc  osteophyte complex and bilateral facet arthropathy. Mild to  moderate  bilateral neural foraminal and mild to moderate spinal canal stenosis.  Articular and periarticular edema associated with left facet joint.     L4-L5: Mild to moderate disc height loss and disc degeneration. Right  eccentric bulge osteophyte formation and bilateral facet arthropathy.  Moderate to severe right neural foraminal stenosis with impingement of  the exiting nerve root. Mild left neural foraminal stenosis. No spinal  canal stenosis.     L5-S1: No spinal canal or neuroforaminal stenosis.     Paraspinous tissues are within normal limits.                                                                      IMPRESSION:   1. Four lumbar-type vertebrae following the last remaining thoracic  type vertebrae. Complete sacralization of L5.  2. Right convex scoliosis with the apex at L1-L2.  3. Articular and periarticular edema associated with left L3-L4 facet  joint, compatible with active inflammatory arthropathy.  4. Modic type I degeneration at L4-L5.  5. Multilevel lumbar spondylosis as detailed above.      ANTIONETTE BERG MD           Other testing (labs, diagnostics):  3/6/2023  Cr. 1.30  Est GFR 57      Screening tools:     DIRE Score for ongoing opioid management is calculated as follows:    Diagnosis = 2    Intractability = 1    Risk: Psych = 3  Chem Hlth = 3  Reliability = 3  Social = 3    Efficacy = 2    Total DIRE Score = 17 (14 or higher predicts good candidate for ongoing opioid management; 13 or lower predicts poor candidate for opioid management)           [place]:  Reviewed [place]  10/21/2024- no concerning fills.  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  Spinal stenosis of lumbar region without  neurogenic claudication  Neural foraminal stenosis of lumbar spine    Chronic intractable pain  Primary osteoarthritis, unspecified site  PMHx includes: Cataracts mild OD moderate OS (2012), hypertension, chronic low back pain, peripheral vascular disease, erectile dysfunction, personal history of malignant neoplasm of the bladder, osteoarthritis, hemorrhoids, kidney stones, personal history of tobacco use (smoked 40 years), personal history of colonic polyps (2002), macular degeneration, hyperlipidemia, chronic kidney disease stage III GFR 30-59 mL/minute, rotator cuff tendinitis left, squamous cell carcinoma of skin  PSHx includes: Colonoscopy (2008), vitrectomy parsplana (2011), phacoemulsification with standard intraocular lens implant of the left eye (2012), laser YAG capsulotomy left eye (2016), TURP (1997), cervical spinal fusion C6-7, left ulnar nerve transposition at the elbow, vasectomy.      Plan:  Physical Therapy: continue exercises learned in P T   Clinical Health Psychologist to address issues of relaxation, behavioral change, coping style, and other factors important to improvement: none  Diagnostic Studies: none  Medication Management:   INCREASE Butrans 10mcg/hr transdermal. Place on clean dry skin on upper chest, upper back or upper arm. Change every 7 days. Fill 10/21 and start 10/24. Apply patch to clean and dry skin on upper chest, upper arm or upper back. Change site each week and do not reuse the same site for 3-4 weeks to lessen issues with itching, may use Benadryl SPRAY  on the site and let dry prior to applying patch to lessen itching.   Message me with [employer] or call [phone] to speak with a nurse re: how you are doing on the increased dose of Butrans 10mcg/hr patch. Once I know that, then I can help determine if we need to increase dosing any more from there.   Further procedures recommended: you have scheduled repeat left sided lumbar RFA for 12/24/2024.    Recommendations/follow-up for PCP:  See above  Release of information: none  Follow up: 8 weeks in-person or video      ASSESSMENT AND PLAN:  (M54.59) Lumbar facet joint pain  (primary encounter diagnosis)  (M47.816) Spondylosis without myelopathy or radiculopathy, lumbar region  (M54.50,  G89.29) Chronic left-sided low back pain without sciatica  (M51.360) Degeneration of intervertebral disc of lumbar region with discogenic back pain  (M48.061) Spinal stenosis of lumbar region without neurogenic claudication  (M48.061) Neural foraminal stenosis of lumbar spine  Comment: increasing Butrans dosage as the first patch helpful but not after that  Plan: buprenorphine (BUTRANS) 10 MCG/HR WK patch,         Adult Pain Clinic Follow-Up Order                BILLING TIME DOCUMENTATION:   TOTAL TIME includes:   Time spent preparing to see the patient: 3 minutes (reviewing records and tests)  Time spend face to face with the patient: 15 minutes  Time spent ordering tests, medications, procedures and referrals: 0 minutes  Time spent Referring and communicating with other healthcare professionals: 0 minutes  Documenting clinical information in Epic: 20 minutes    The total TIME spent on this patient on the day of the appointment was 38 minutes.               Kayley NIXON, RN CNP, FNP  [facility]

## 2024-10-21 ENCOUNTER — MYC MEDICAL ADVICE (OUTPATIENT)
Dept: PALLIATIVE MEDICINE | Facility: CLINIC | Age: 89
End: 2024-10-21

## 2024-10-21 ENCOUNTER — VIRTUAL VISIT (OUTPATIENT)
Dept: PALLIATIVE MEDICINE | Facility: CLINIC | Age: 89
End: 2024-10-21
Payer: COMMERCIAL

## 2024-10-21 DIAGNOSIS — M54.59 LUMBAR FACET JOINT PAIN: Primary | ICD-10-CM

## 2024-10-21 DIAGNOSIS — M48.061 SPINAL STENOSIS OF LUMBAR REGION WITHOUT NEUROGENIC CLAUDICATION: ICD-10-CM

## 2024-10-21 DIAGNOSIS — G89.29 CHRONIC LEFT-SIDED LOW BACK PAIN WITHOUT SCIATICA: ICD-10-CM

## 2024-10-21 DIAGNOSIS — M51.360 DEGENERATION OF INTERVERTEBRAL DISC OF LUMBAR REGION WITH DISCOGENIC BACK PAIN: ICD-10-CM

## 2024-10-21 DIAGNOSIS — M47.816 SPONDYLOSIS WITHOUT MYELOPATHY OR RADICULOPATHY, LUMBAR REGION: ICD-10-CM

## 2024-10-21 DIAGNOSIS — M54.50 CHRONIC LEFT-SIDED LOW BACK PAIN WITHOUT SCIATICA: ICD-10-CM

## 2024-10-21 DIAGNOSIS — M48.061 NEURAL FORAMINAL STENOSIS OF LUMBAR SPINE: ICD-10-CM

## 2024-10-21 RX ORDER — BUPRENORPHINE 10 UG/H
1 PATCH TRANSDERMAL
Qty: 4 PATCH | Refills: 0 | Status: SHIPPED | OUTPATIENT
Start: 2024-10-21

## 2024-10-21 ASSESSMENT — PAIN SCALES - GENERAL: PAINLEVEL: MODERATE PAIN (5)

## 2024-10-21 NOTE — TELEPHONE ENCOUNTER
See [employer] message sent to patient.     Kayley NIXON RN CNP, FNP  [facility]

## 2024-10-21 NOTE — PATIENT INSTRUCTIONS
Plan:  Physical Therapy: continue exercises learned in P T   Clinical Health Psychologist to address issues of relaxation, behavioral change, coping style, and other factors important to improvement: none  Diagnostic Studies: none  Medication Management:   INCREASE Butrans 10mcg/hr transdermal. Place on clean dry skin on upper chest, upper back or upper arm. Change every 7 days. Fill 10/21 and start 10/24. Apply patch to clean and dry skin on upper chest, upper arm or upper back. Change site each week and do not reuse the same site for 3-4 weeks to lessen issues with itching, may use Benadryl SPRAY  on the site and let dry prior to applying patch to lessen itching.   Message me with [email or web address] or call [phone] to speak with a nurse re: how you are doing on the increased dose of Butrans 10mcg/hr patch. Once I know that, then I can help determine if we need to increase dosing any more from there.   Further procedures recommended: you have scheduled repeat left sided lumbar RFA for 12/24/2024.   Recommendations/follow-up for PCP:  See above  Release of information: none  Follow up: 8 weeks in-person or video      Administration of Butrans    Butrans is for transdermal use (on intact skin) only.   Each Butrans patch is intended to be worn for 7 days  Do not to use Butrans if the pouch seal is broken or the patch is cut, damaged, or changed in any way  Do not to cut the Butrans patch  Apply immediately after removal from the individually sealed pouch  Apply Butrans to the upper outer arm, upper chest, upper back or the side of the chest. These 4 sites (each present on both sides of the body) provide 8 possible application sites. Rotate Butrans among the 8 described skin sites. After Butrans removal, wait a minimum of 21 days before reapplying to the same skin site          For the use of 2 patches, remove your current patch, and apply the 2 new patches adjacent to one another at a different application site  Apply  Butrans to a hairless or nearly hairless skin site.   If none are available, the hair at the site should be clipped, not shaven.   Do not apply Butrans to irritated skin.   If the application site must be cleaned, clean the site with water only. Do not use soaps, alcohol, oils, lotions, or abrasive devices.   Allow the skin to dry before applying Butrans  It is OK to bathe or shower with the patch on. Lightly pat it dry.   If problems with adhesion of Butrans occur, the edges may be taped with first aid tape.   If problems with lack of adhesion continue, the patch may be covered with waterproof or semipermeable adhesive dressings (Tegaderm) suitable for 7 days of wear.   If Butrans falls off during the 7-day dosing interval, dispose of the transdermal system properly and place a new Butrans patch on at a different skin site.   Dispose of used patches by folding the adhesive side of the patch to itself, then flushing the patch down the toilet immediately upon removal.   Unused patches should be removed from their pouches, the protective liners removed, the patches folded so that the adhesive side of the patch adheres to itself, and immediately flushed down the toilet  Dispose of any patches remaining from a prescription as soon as they are no longer needed    Butrans is supplied in cartons containing 4 individually packaged systems and   a pouch containing 4 Patch-Disposal Units

## 2024-10-24 DIAGNOSIS — E78.5 HYPERLIPIDEMIA LDL GOAL <130: ICD-10-CM

## 2024-10-24 RX ORDER — SIMVASTATIN 40 MG
40 TABLET ORAL DAILY
Qty: 90 TABLET | Refills: 0 | OUTPATIENT
Start: 2024-10-24

## 2024-11-09 DIAGNOSIS — E78.5 HYPERLIPIDEMIA LDL GOAL <130: ICD-10-CM

## 2024-11-11 RX ORDER — SIMVASTATIN 40 MG
40 TABLET ORAL DAILY
Qty: 90 TABLET | Refills: 0 | OUTPATIENT
Start: 2024-11-11

## 2024-11-21 DIAGNOSIS — E78.5 HYPERLIPIDEMIA LDL GOAL <130: ICD-10-CM

## 2024-11-21 RX ORDER — SIMVASTATIN 40 MG
40 TABLET ORAL DAILY
Qty: 90 TABLET | Refills: 0 | OUTPATIENT
Start: 2024-11-21

## 2024-11-25 ENCOUNTER — ANCILLARY PROCEDURE (OUTPATIENT)
Dept: CT IMAGING | Facility: CLINIC | Age: 89
End: 2024-11-25
Attending: FAMILY MEDICINE
Payer: COMMERCIAL

## 2024-11-25 DIAGNOSIS — R91.8 OPACITY OF LUNG ON IMAGING STUDY: ICD-10-CM

## 2024-11-25 PROCEDURE — 71250 CT THORAX DX C-: CPT | Mod: TC | Performed by: RADIOLOGY

## 2024-12-02 DIAGNOSIS — I10 HYPERTENSION GOAL BP (BLOOD PRESSURE) < 140/90: ICD-10-CM

## 2024-12-02 DIAGNOSIS — N18.31 STAGE 3A CHRONIC KIDNEY DISEASE (H): ICD-10-CM

## 2024-12-02 RX ORDER — LISINOPRIL 2.5 MG/1
2.5 TABLET ORAL DAILY
Qty: 60 TABLET | Refills: 0 | OUTPATIENT
Start: 2024-12-02

## 2024-12-08 DIAGNOSIS — I10 HYPERTENSION GOAL BP (BLOOD PRESSURE) < 140/90: ICD-10-CM

## 2024-12-08 DIAGNOSIS — N18.31 STAGE 3A CHRONIC KIDNEY DISEASE (H): ICD-10-CM

## 2024-12-09 NOTE — TELEPHONE ENCOUNTER
Appointment needed for refills. Once scheduled route to the provider for a bridger refill.     Shawna Downey RN

## 2024-12-11 RX ORDER — LISINOPRIL 2.5 MG/1
2.5 TABLET ORAL DAILY
Qty: 90 TABLET | Refills: 1 | Status: SHIPPED | OUTPATIENT
Start: 2024-12-11

## 2024-12-13 DIAGNOSIS — E78.5 HYPERLIPIDEMIA LDL GOAL <130: ICD-10-CM

## 2024-12-16 RX ORDER — SIMVASTATIN 40 MG
40 TABLET ORAL DAILY
Qty: 90 TABLET | Refills: 1 | Status: SHIPPED | OUTPATIENT
Start: 2024-12-16

## 2024-12-24 ENCOUNTER — RADIOLOGY INJECTION OFFICE VISIT (OUTPATIENT)
Dept: PALLIATIVE MEDICINE | Facility: CLINIC | Age: 89
End: 2024-12-24
Attending: NURSE PRACTITIONER
Payer: COMMERCIAL

## 2024-12-24 VITALS — HEART RATE: 59 BPM | SYSTOLIC BLOOD PRESSURE: 182 MMHG | DIASTOLIC BLOOD PRESSURE: 93 MMHG | OXYGEN SATURATION: 100 %

## 2024-12-24 DIAGNOSIS — M47.816 SPONDYLOSIS WITHOUT MYELOPATHY OR RADICULOPATHY, LUMBAR REGION: ICD-10-CM

## 2024-12-24 DIAGNOSIS — G89.29 CHRONIC LEFT-SIDED LOW BACK PAIN WITHOUT SCIATICA: ICD-10-CM

## 2024-12-24 DIAGNOSIS — M54.59 LUMBAR FACET JOINT PAIN: ICD-10-CM

## 2024-12-24 DIAGNOSIS — M54.50 CHRONIC LEFT-SIDED LOW BACK PAIN WITHOUT SCIATICA: ICD-10-CM

## 2024-12-24 DIAGNOSIS — M51.360 DEGENERATION OF INTERVERTEBRAL DISC OF LUMBAR REGION WITH DISCOGENIC BACK PAIN: ICD-10-CM

## 2024-12-24 PROCEDURE — 64636 DESTROY L/S FACET JNT ADDL: CPT | Mod: LT | Performed by: PAIN MEDICINE

## 2024-12-24 PROCEDURE — 99152 MOD SED SAME PHYS/QHP 5/>YRS: CPT | Performed by: PAIN MEDICINE

## 2024-12-24 PROCEDURE — 64635 DESTROY LUMB/SAC FACET JNT: CPT | Mod: LT | Performed by: PAIN MEDICINE

## 2024-12-24 PROCEDURE — 99153 MOD SED SAME PHYS/QHP EA: CPT | Performed by: PAIN MEDICINE

## 2024-12-24 RX ORDER — FENTANYL CITRATE 50 UG/ML
12.5-5 INJECTION, SOLUTION INTRAMUSCULAR; INTRAVENOUS EVERY 5 MIN PRN
Status: ACTIVE | OUTPATIENT
Start: 2024-12-24 | End: 2024-12-25

## 2024-12-24 RX ADMIN — FENTANYL CITRATE 25 MCG: 50 INJECTION, SOLUTION INTRAMUSCULAR; INTRAVENOUS at 10:56

## 2024-12-24 ASSESSMENT — PAIN SCALES - GENERAL
PAINLEVEL_OUTOF10: MODERATE PAIN (5)
PAINLEVEL_OUTOF10: MILD PAIN (3)

## 2024-12-24 NOTE — PATIENT INSTRUCTIONS
[facility]   Radiofrequency Ablation (RFA) Discharge Instructions     Today you saw:    Dr. Sathya Villanueva    You should anticipate procedural pain for up to 2 weeks.   It may take up to 8 weeks to receive relief from the RFA   Follow up with your provider in 6-8 weeks.   If you received sedation before, during or after your procedure, for the next 24 hours you shall NOT:    -Drive    -Operate machinery    -Drink alcohol    -Sign any legal documents   You may resume your normal diet   You may resume your regular medications after the procedure   Be cautious with walking. Numbness and/or weakness in the lower extremities may occur for up to 6-8 hours due to effect of local anesthetic  Avoid strenuous activity for the first 24 hours   You may resume your regular activities after 24 hours   You may shower, however no swimming, tub baths or hot tubs for 24 hours following your procedure   You may use ice packs 10-15 minutes three to four times a day at the injection site for comfort   Do not use heat to painful areas for 6 to 8 hours. This will give the local anesthetic time to wear off and prevent you from accidentally burning your skin.   Unless you have been directed to avoid the use of anti-inflammatory medications (NSAIDS), you may use medications such as ibuprofen, Aleve or Tylenol for pain control if needed.   If you experience any of the following, call the Pain Clinic during work hours (Monday through Friday 8 am-4:30 pm) at [phone] or the Provider Line after hours at [phone]:   -Fever over 100 degree F    -Swelling, bleeding, redness, drainage, warmth at the injection site    -Progressive weakness or numbness in your legs    -Loss of bowel or bladder function    -Unusual headache that is not relieved by Tylenol    -Unusual new onset of pain that is not improving

## 2024-12-24 NOTE — NURSING NOTE
Pre-procedure Intake  If YES to any questions or NO to having a   Please complete laminated checklist and leave on the computer keyboard for Provider, verbally inform provider if able.    For SCS Trial, RFA's or any sedation procedure:  Have you been fasting? Yes  If yes, for how long? 6 hrs     Are you taking any any blood thinners such as Coumadin, Warfarin, Jantoven, Pradaxa Xarelto, Eliquis, Edoxaban, Enoxaparin, Lovenox, Heparin, Arixtra, Fondaparinux, or Fragmin? OR Antiplatelet medication such as Plavix, Brilinta, or Effient?   No   If yes, when did you take your last dose?     Do you take aspirin?  No  If cervical procedure, have you held aspirin for 6 days?       Is the Pt taking any GLP-1 Antagonist (hold needed for sedation patients only)  (semaglutide (Ozempic, Wegovy), dulaglutide (Trulicity), exenatide ER (Bydureon), tirzepatide (Mounjaro), Liraglutide (Saxenda, Victoza), semaglutide (Rybelsus)     No   If yes, when did you take your last dose?     Do you have any allergies to contrast dye, iodine, steroid and/or numbing medications?  NO    Are you currently taking antibiotics or have an active infection?  NO    Have you had a fever/elevated temperature within the past week? NO    Are you currently taking oral steroids? NO    Do you have a ? Yes    Are you pregnant or breastfeeding?  Not Applicable    Have you received any vaccinations in the last week? NO    Notify provider and RNs if systolic BP >170, diastolic BP >100, P >100 or O2 sats < 90%

## 2024-12-24 NOTE — PROGRESS NOTES
Pre procedure Diagnosis: lumbosacral spondylosis  Post procedure Diagnosis: Same  Procedure performed: Left lumbosacral radiofrequency ablation at L4, L5, sacral ala, fluoroscopically guided  Anesthesia:        24 gauge Peripheral IV inserted into left arm - attempts: 1     MD Time IN: 1050   Sedation start time:  1057  Sedation end time:  1132     Medications given: fentanyl 25 mcg IV; versed 0 mg IV; toradol 0 mg IV  Intravenous fluids were administered, normal saline 5 cc's.        Complications: none  Operators: Sathya Villanueva MD     Indications:   89 year old M was sent for lumbar rfa.  They have a history of axial lbp.  Exam shows + kemps and they have tried conservative treatment including meds/pt/prior rfa with benefit   Options/alternatives, benefits and risks were discussed with the patient including bleeding, infection, no pain relief, tissue trauma, exposure to radiation, reaction to medications including seizure, spinal cord injury,increased pain after the procedure, weakness, numbness or sensory changes and headache.   We also discussed risks of sedation, including reaction to medications and cardiovascular collapse.    Questions were answered to her satisfaction and she agrees to proceed. Voluntary informed consent was obtained and signed.     Vitals were reviewed: Yes  Allergies were reviewed:  Yes   Medications were reviewed:  Yes   Pre-procedure pain score: 2/10    Procedure:  After obtaining signed, informed consent, the patient was brought into the procedure suite and was placed in a prone position on the procedure table.   A Pause for the Cause was performed.  Patient was prepped and draped in sterile fashion.     Patient had an IV line placed prior the procedure.  The C-arm was positioned in the left oblique view to afford optimal view of the L4-L5 vertebral bodies. Lidocaine 1% was used to anesthetize the skin at each level.  At each level, a 10cm, 20G curved radiofrequency cannula with a  10mm active tip was positioned, overlying the intersection of the transverse process and pedicle at L4 & L5, and was advanced under intermittent fluoroscopy until it contacted the transverse process and notch, and the tip slightly overran that process, just lateral to the mamillary process.  The position of each cannula was verified and optimized in the oblique view and AP views.    In the AP view, another cannula was placed at the sacral alar notch.      Each position was tested for motor and sensory stimulation, and was positioned so that stimulation was negative for stimuli outside the immediate area of the desired lesion.  Sensory stimulation was completed at 50 Hz, with max stimulation up to 0.3V.  Motor stimulation was completed at 2Hz, up to 1.5V.   Bupivacaine 0.5 % 1ml was injected at each level.  After allowing the local anesthetic to set up, a 90 second, 80 degree Celsius lesion was generated at all three levels.    The needles were then rotated within the pathway of the medial branch, and locations were evaluated with repeat imaging.  A second lesion was then generated at each location.    The needles were flushed with the local anesthetic as they were withdrawn.        Hemostasis was achieved, the area was cleaned, and bandaids were placed when appropriate.  The patient tolerated the procedure well, and was taken to the recovery room.    Images were saved to PACS.    Post-procedure pain score: 2/10  Follow-up includes: \\  -post-procedure pain medications: n/a  -f/u with referring provider in 8 weeks    Sathya Villanueva MD  [facility]

## 2024-12-24 NOTE — NURSING NOTE
Discharge Information    IV Discontiued Time:  1152    Amount of Fluid Infused:  5ccs    Discharge Criteria = When patient returns to baseline or as per MD order    Consciousness:  Pt is fully awake    Circulation:  BP +/- 20% of pre-procedure level    Respiration:  Patient is able to breathe deeply    O2 Sat:  Patient is able to maintain O2 Sat >92% on room air    Activity:  Moves 4 extremities on command    Ambulation:  Patient is able to stand and walk or stand and pivot into wheelchair    Dressing:  Clean/dry or No Dressing    Notes:   Discharge instructions and AVS given to patient    Patient meets criteria for discharge?  YES    Admitted to PCU?  No    Responsible adult present to accompany patient home?  Yes    Signature/Title:    Eladia Cha RN  RN Care Coordinator  [facility]

## 2024-12-24 NOTE — NURSING NOTE
24 gauge Peripheral IV inserted into left arm - attempts: 1    MD Time IN: 1050   Sedation start time:  1057  Sedation end time:  1132    Medications given: fentanyl 25 mcg IV; versed 0 mg IV; toradol 0 mg IV  Intravenous fluids were administered, normal saline 5 cc's.  Sedation Level Achieved:  Moderate (conscious) sedation

## 2024-12-27 ENCOUNTER — OFFICE VISIT (OUTPATIENT)
Dept: OPHTHALMOLOGY | Facility: CLINIC | Age: 89
End: 2024-12-27
Payer: COMMERCIAL

## 2024-12-27 DIAGNOSIS — H43.811 POSTERIOR VITREOUS DETACHMENT, RIGHT: ICD-10-CM

## 2024-12-27 DIAGNOSIS — H18.9 KERATOPATHY: ICD-10-CM

## 2024-12-27 DIAGNOSIS — H35.342 MACULAR HOLE, LEFT: ICD-10-CM

## 2024-12-27 DIAGNOSIS — H25.811 COMBINED FORMS OF AGE-RELATED CATARACT OF RIGHT EYE: Primary | ICD-10-CM

## 2024-12-27 PROCEDURE — 92134 CPTRZ OPH DX IMG PST SGM RTA: CPT | Performed by: OPHTHALMOLOGY

## 2024-12-27 PROCEDURE — 92012 INTRM OPH EXAM EST PATIENT: CPT | Performed by: OPHTHALMOLOGY

## 2024-12-27 ASSESSMENT — EXTERNAL EXAM - RIGHT EYE: OD_EXAM: PROLAPSED FAT PADS: UPPER, LOWER; MILD-MOD BROW

## 2024-12-27 ASSESSMENT — REFRACTION_WEARINGRX
OD_AXIS: 035
SPECS_TYPE: TRIFOCAL
OS_AXIS: 165
OS_SPHERE: -1.75
OD_SPHERE: +0.50
OS_CYLINDER: +2.50
OD_CYLINDER: +5.75
OS_ADD: +3.25
OD_ADD: +3.25

## 2024-12-27 ASSESSMENT — REFRACTION_MANIFEST
OD_ADD: +3.25
OS_CYLINDER: +2.50
OD_AXIS: 034
OD_CYLINDER: +6.25
OS_SPHERE: -1.75
OS_AXIS: 165
OS_ADD: +3.25
OD_SPHERE: +0.75

## 2024-12-27 ASSESSMENT — VISUAL ACUITY
OD_CC: 20/50
CORRECTION_TYPE: GLASSES
OS_CC: 20/300
METHOD: SNELLEN - LINEAR

## 2024-12-27 ASSESSMENT — TONOMETRY
OD_IOP_MMHG: 14
IOP_METHOD: APPLANATION
OS_IOP_MMHG: 13

## 2024-12-27 ASSESSMENT — EXTERNAL EXAM - LEFT EYE: OS_EXAM: PROLAPSED FAT PADS: UPPER, LOWER; MILD-MOD BROW

## 2024-12-27 NOTE — PATIENT INSTRUCTIONS
"Continue:  Prednisolone once daily in the right eye as needed      Glasses prescription given - optional     May use artificial tears up to four times a day (like Refresh Optive, Systane Balance, or TheraTears. Avoid \"get the red out\" drops and generic artifical tears).      Call in February 2025 for an appointment in June 2025 for Complete Exam     Dr. Scales [phone]    "

## 2024-12-27 NOTE — PROGRESS NOTES
" Current Eye Medications: Systane - both eyes BID   Prednisolone - right eye PRN pain - has not needed to use lately.     Subjective:  worsening in vision since last visit 6 mos ago, patient unsure if one eye is worse than the other. Not sure when he last updated his glasses. Only using Systane at this time.     Last CE 6/28/24    States pain right eye is resolved.  Difficulty reading and driving.     Objective:  See Ophthalmology Exam.       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  Stable keratopathy right eye.      Plan:  Continue:  Prednisolone once daily in the right eye as needed      Glasses prescription given - optional  Will try glasses change; next option would be cataract surgery right eye.     May use artificial tears up to four times a day (like Refresh Optive, Systane Balance, or TheraTears. Avoid \"get the red out\" drops and generic artifical tears).      Call in February 2025 for an appointment in June 2025 for Complete Exam     Dr. Scales [phone]       "

## 2024-12-27 NOTE — LETTER
"12/27/2024      Jose Manuel Gallo  [address] [place] [address]      Dear Colleague,    Thank you for referring your patient, Jose Manuel Gallo, to the [facility]. Please see a copy of my visit note below.     Current Eye Medications: Systane - both eyes BID   Prednisolone - right eye PRN pain - has not needed to use lately.     Subjective:  worsening in vision since last visit 6 mos ago, patient unsure if one eye is worse than the other. Not sure when he last updated his glasses. Only using Systane at this time.     Last CE 6/28/24    States pain right eye is resolved.  Difficulty reading and driving.     Objective:  See Ophthalmology Exam.       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  Stable keratopathy right eye.      Plan:  Continue:  Prednisolone once daily in the right eye as needed      Glasses prescription given - optional  Will try glasses change; next option would be cataract surgery right eye.     May use artificial tears up to four times a day (like Refresh Optive, Systane Balance, or TheraTears. Avoid \"get the red out\" drops and generic artifical tears).      Call in February 2025 for an appointment in June 2025 for Complete Exam     Dr. Scales [phone]           Again, thank you for allowing me to participate in the care of your patient.        Sincerely,        Bonifacio Scales MD    Electronically signed"

## 2024-12-31 ENCOUNTER — OFFICE VISIT (OUTPATIENT)
Dept: PALLIATIVE MEDICINE | Facility: CLINIC | Age: 89
End: 2024-12-31
Attending: NURSE PRACTITIONER
Payer: COMMERCIAL

## 2024-12-31 VITALS — DIASTOLIC BLOOD PRESSURE: 77 MMHG | SYSTOLIC BLOOD PRESSURE: 146 MMHG | HEART RATE: 55 BPM

## 2024-12-31 DIAGNOSIS — M47.816 SPONDYLOSIS WITHOUT MYELOPATHY OR RADICULOPATHY, LUMBAR REGION: ICD-10-CM

## 2024-12-31 DIAGNOSIS — M48.061 SPINAL STENOSIS OF LUMBAR REGION WITHOUT NEUROGENIC CLAUDICATION: ICD-10-CM

## 2024-12-31 DIAGNOSIS — M54.59 LUMBAR FACET JOINT PAIN: Primary | ICD-10-CM

## 2024-12-31 DIAGNOSIS — G89.29 CHRONIC LEFT-SIDED LOW BACK PAIN WITHOUT SCIATICA: ICD-10-CM

## 2024-12-31 DIAGNOSIS — M54.50 CHRONIC LEFT-SIDED LOW BACK PAIN WITHOUT SCIATICA: ICD-10-CM

## 2024-12-31 DIAGNOSIS — M48.061 NEURAL FORAMINAL STENOSIS OF LUMBAR SPINE: ICD-10-CM

## 2024-12-31 PROCEDURE — 99213 OFFICE O/P EST LOW 20 MIN: CPT | Performed by: NURSE PRACTITIONER

## 2024-12-31 PROCEDURE — G2211 COMPLEX E/M VISIT ADD ON: HCPCS | Performed by: NURSE PRACTITIONER

## 2024-12-31 ASSESSMENT — PAIN SCALES - GENERAL: PAINLEVEL_OUTOF10: MODERATE PAIN (4)

## 2024-12-31 NOTE — PROGRESS NOTES
[facility]    12/31/2024      Chief complaint:   -chronic low back pain. Axial. No pain into the legs    .           Interval history:  Jose Manuel Gallo 89 year old male is known to me for:  Lumbar facet joint pain  Lumbar spondylosis with out myelopathy or radiculopathy  Chronic left-sided low back pain without sciatica  Primary osteoarthritis, unspecified site  PMHx includes: Cataracts mild OD moderate OS (2012), hypertension, chronic low back pain, peripheral vascular disease, erectile dysfunction, personal history of malignant neoplasm of the bladder, osteoarthritis, hemorrhoids, kidney stones, personal history of tobacco use (smoked 40 years), personal history of colonic polyps (2002), macular degeneration, hyperlipidemia, chronic kidney disease stage III GFR 30-59 mL/minute, rotator cuff tendinitis left, squamous cell carcinoma of skin  PSHx includes: Colonoscopy (2008), vitrectomy parsplana (2011), phacoemulsification with standard intraocular lens implant of the left eye (2012), laser YAG capsulotomy left eye (2016), TURP (1997), cervical spinal fusion C6-7, left ulnar nerve transposition at the elbow, vasectomy.      Recommendations/plan at the last visit on 10/19/2024 included:  Physical Therapy: continue exercises learned in P T   Clinical Health Psychologist to address issues of relaxation, behavioral change, coping style, and other factors important to improvement: none  Diagnostic Studies: none  Medication Management:   INCREASE Butrans 10mcg/hr transdermal. Place on clean dry skin on upper chest, upper back or upper arm. Change every 7 days. Fill 10/21 and start 10/24. Apply patch to clean and dry skin on upper chest, upper arm or upper back. Change site each week and do not reuse the same site for 3-4 weeks to lessen issues with itching, may use Benadryl SPRAY  on the site and let dry prior to applying patch to lessen itching.   Message me with [employer] or call  [phone] to speak with a nurse re: how you are doing on the increased dose of Butrans 10mcg/hr patch. Once I know that, then I can help determine if we need to increase dosing any more from there.   Further procedures recommended: you have scheduled repeat left sided lumbar RFA for 12/24/2024.   Recommendations/follow-up for PCP:  See above  Release of information: none  Follow up: 8 weeks in-person or video        Since male last visit, Jose Manuel Gallo reports:    Interval history December 31, 2024  -repeat lumbar RFA done last week on 12/24/2024. Thinks that his back pain might be starting to improve a little bit.   -BP is up today,  came down a little bit in a recheck.   -his computer was hacked and he is working on rectifying it.  -he ran out of the Butrans, doesn't really feel that it did very much, not having more pain since stopping it      Interval history October 19, 2024  -Enmanuel is unable to access the video at home and his daughter Sydni is on video but I cannot hear her. We changed to 3 way telephone message.   -Enmanuel has ongoing left sided low back pain that can radiate into the left leg but not past the knees.   -has trouble finding a comfortable place to sit, sitting at dining table for dinner is terrible pain. Sitting on the couch for too long is too soft.   -he placed the first Butrans patch and had pretty good relief the first few weeks. He wore the first one on his upper left back.   He states he has not appreciated any relief from the 2nd and 3rd patches respectively, even when he placed the 3rd patch in the same spot as the first. He notes he is having some itching at the patch site.  -recommended increase in Butrans patch dosing from 5 mcg/hr to 10mcg/hr. Enmanuel and his daughter are in agreement.   -he has repeat lumbar RFA scheduled for 12/24/2024.     Interval history September 30, 2024  He has left sided low back pain almost all of the time. It is present even when he is sitting, even on his  couch or sitting on a hard chair at the table.   -he has ongoing  left sided low back pain. He denies any radiation to the buttocks or into the left leg.    -he is a little frustrated that he is still having low back pain  -today, he notes he is not having any pain that radiates into his buttock or the legs.   -states that this feels most like his low back pain did prior to last left sided lumbar RFA  -discussed Butrans or Belbuca for longer term pain relief since Tramadol helps but doesn't last long enough and it can make him feel sleepy    Interval history July 15, 2024  -left sided low back pain, no pain into the buttock or into the legs at all.   It is the most bothersome when rising from being seated for more than 10 minutes.   -Tramadol  helps take the edge off of the pain sometimes.   -pain is worse with standing and walking, no radiation to the legs when doing these activities.   -describes positive grocery cart sign, leaning over on a grocery cart when shopping or onto the counter in the kitchen which partially relieves the pain.     Interval history June 12, 2024  -lumbar facet joint injections in May 2024 were minimally helpful.    -1.5 weeks ago, he began to have immense pain when he sits down for more than 10 minutes. He can barely get up. He much lower level of function. He denies any falls or injuries. He denies any illness. Denies b/b symptoms, numbness/tingling, weakness. Denies saddle anesthesia.   No MRI imaging of lumbar spine, recommend we get MRI imaging.   -he has taken 4 of the tramadol but he doesn't remember if it was helpful.   He is using Tylenol or ibuprofen and a topical gel on his back when the pain is bad, lays down for an hour, and then his back feels somewhat better.     Interval history April 23, 2024  -reports his axial low back pain has returned. Present on both sides but markedly worse on the left.   Feels pretty good in the morning, but by late afternoon he feels awful. Low  "back is hurting and he cannot continue activity, needs to go lay down.   -had left sided lumbar RFA done 12/5/2023, reduced pain by 50% for about 4 months. Now left sided pain returned. Worst with lumbar extension and extension and rotation/facet loading movements.  -discussed a trial of Tramadol for use on bad pain days, max of 2 tabs per day.        Initial evaluation pain history collected on 7/17/2023  Long term low back pain on the left side. No radiation to the legs ever.   Has significant curvature of the lumbar spine and x-ray shows facet arthropathy  His pain has gotten markedly worse with more yardwork. He is able to do about 2 hours of lawnwork. Digging holes for landscaping, raking, bagging etc. He does not pace his activity. He will push through the pain.          At this point, the patient's participation with our multidisciplinary team includes:  The patient has been compliant with the program.  PT -participating   Health Psych - none      Pain rating: intensity ranges from 2/10 to 8/10, and Averages varies  depending on activity on a 0-10 scale. Has trouble finding a good surface to sit on.  Pain right now is a 4/10.   Describes pain as \"sharp and stabbing, especially with certain activities.\"  Pain is almost always there.      Current pain-related medication treatments include:  -acetaminophen 650mg Q 8 hours PRN (somewhat helpful)      Other pertinent medications:  -none    Previous medication treatments included:  OPIATES:Tramadol (feels really sleepy)  NSAIDS: ibuprofen (not very helpful)  MUSCLE RELAXANTS: none  ANTI-MIGRAINE MEDS: none  ANTI-DEPRESSANTS: none  SLEEP AIDS: melatonin (helpful)  ANTI-CONVULSANTS: gabapentin (tried 300mg TID in 2017 for short trial-used for shingles)  TOPICALS: tried OTC and CBD creams (not helpful)  ANXIOLYTICS: none  MEDICAL CANNABIS: none  Other meds: Tylenol (helpful)      Other treatments have included:  Jose Manuel Gallo has not been seen at a pain clinic " in the past.    PT: none  Chiropractic care: tried it a couple of times, did not help  Acupuncture: none  TENs Unit: none    Injections:   8/8/2023 left L3, L4 and L5 medial branch block with Dr. Sathya Villanueva (% relief)  -10/19/2023 left L3, L4 and L5 medial branch blocks with Dr. Sathya Villanueva (helpful, > 80% relief)  -12/5/2023 left lumbar radiofrequency ablation at L4, L5 sacral ala with Dr. Sathya Villanueva (50% relief but then diminishing relief over the next 4 months or so)  -5/23/2024 left L3-4, L4-5 facet joint steroid injections with Dr. Sathya Villanueva (cannot rate pain, it was manageable for a few weeks)  -7/23/2024 L3-4 interlaminar RADHA with Dr. Sathya Villanueva (no benefit)  -12/24/2024 left lumbosacral RFA at L4, L5 and sacral ala with Dr. Sathya Villanueva (this is only one week out on 12/31/2024, he does feel he's had a bit of relief in that short of time)    Past Medical History:  Past Medical History:   Diagnosis Date    Cataract, mild, od; mod, os 07/02/2012    Chronic low back pain     CKD (chronic kidney disease) stage 3, GFR 30-59 ml/min (H)     Erectile dysfunction     Hemorrhoids     History of bladder cancer         HTN     Hyperlipidemia LDL goal <130     Kidney stones     Macular degeneration     Osteoarthritis     Personal history of colonic polyps     10/2002    Personal history of malignant neoplasm of bladder     Personal history of tobacco use     Smoked 40 years, Quit 2003    PVD (peripheral vascular disease) (H)     Left Vertebral Artery occlusion    Rotator cuff tendinitis, left     Squamous cell carcinoma of skin, unspecified      Past Surgical History:  Past Surgical History:   Procedure Laterality Date    CATARACT IOL, RT/LT      COLONOSCOPY  11/02/2008    Normal    HC REVISE ULNAR NERVE AT ELBOW      Left    LASER YAG CAPSULOTOMY  7/2016    left eye    PHACOEMULSIFICATION WITH STANDARD INTRAOCULAR LENS IMPLANT  7/2012    left eye    TURP  1997 ?    VASECTOMY  "     VITRECTOMY PARSPLANA  8/2011    left eye, repair macular hole    ZZC SPINAL FUSION,ANT,EA ADNL LEVEL      C6-7     Medications:  Current Outpatient Medications   Medication Sig Dispense Refill    acetaminophen (TYLENOL) 650 MG CR tablet Take 1 tablet (650 mg) by mouth every 8 hours as needed for pain 60 tablet 1    buprenorphine (BUTRANS) 10 MCG/HR WK patch Place 1 patch over 7 days onto the skin every 7 days. Fill 10/21 and start 10/24/2024. Early fill due to increased dosage. This is for chronic pain 4 patch 0    cyanocobalamin (VITAMIN B-12) 1000 MCG tablet Take 1 tablet (1,000 mcg) by mouth daily 90 tablet 3    ibuprofen (ADVIL/MOTRIN) 200 MG tablet Take 400 mg by mouth every 6 hours as needed for pain      ipratropium (ATROVENT) 0.06 % nasal spray Spray 2 sprays into both nostrils 4 times daily as needed for rhinitis ((for rhinitis andrei while eating)) 15 mL 1    lisinopril (ZESTRIL) 2.5 MG tablet TAKE ONE TABLET BY MOUTH EVERY DAY 90 tablet 1    prednisoLONE acetate (PRED FORTE) 1 % ophthalmic suspension Place 1 drop into the right eye 2 times daily 10 mL 2    simvastatin (ZOCOR) 40 MG tablet TAKE ONE TABLET BY MOUTH EVERY DAY 90 tablet 1    traMADol (ULTRAM) 50 MG tablet Take 1 tablet (50 mg) by mouth every 6 hours as needed for moderate to severe pain Max of 2 tabs per day. Okay to break in half if needed. Caution sedation. Don't drive until you know how you feel on this medication. Fill/start 07/12/24 30 tablet 0    carboxymethylcellulose PF (REFRESH LIQUIGEL) 1 % ophthalmic gel Place 1 drop into both eyes 4 times daily (Patient not taking: Reported on 12/31/2024) 1 Bottle 11    sodium chloride (ALTHEA 128) 5 % ophthalmic ointment Apply a small squirt approx. 1/4\" into both eyes at bedtime. (Patient not taking: Reported on 12/31/2024) 3.5 g 4     Allergies:     Allergies   Allergen Reactions    Gabapentin Dizziness     Severe dizziness on 100mg dosing     Social History:  Home situation:  (61 years " in 2023). Lives with spouse. Only adult childSydni  Occupation/Schooling: retired since 1994. He worked in the computer industry  Tobacco use: quit smoking in about 1997  Alcohol use: none  Drug use: none  History of chemical dependency treatment: none    Family history:  Family History   Problem Relation Age of Onset    Osteoporosis Mother     Diabetes Father     Arthritis Father     Cancer Father     Respiratory Father     Genitourinary Problems Brother     Circulatory Brother     Macular Degeneration No family hx of     Glaucoma No family hx of     Thyroid Disease No family hx of     Hypertension No family hx of              Physical Exam:  Vitals:    12/31/24 1257 12/31/24 1310   BP: (!) 173/72 (!) 146/77   Pulse: 55 55       Behavioral observations:      Gait:  normal    Musculoskeletal exam:  strength grossly equal throughout    Neuro exam:  deferred    Skin/vascular/autonomic:  No suspicious lesions on exposed skin.     Other:  na          IMAGING:    LUMBAR SPINE TWO - THREE VIEWS 1/11/2022 9:39 AM     INDICATION: Osteoarthritis of spine with radiculopathy, lumbar region.     COMPARISON: 7/13/2017                                                                      IMPRESSION: Exam numbered assuming the last rib-bearing vertebral body  is T12. Convex right lumbar angulation centered across L1-L2 where  there is advanced left-sided interspace and endplate degeneration  measures approximately 36 degrees between T12 and L3 not substantially  changed versus prior. Sagittal alignment appears preserved. Normal  lumbar vertebral body heights. No fracture. Moderate lower lumbar  spine facet arthropathy.     ALEJANDRA SOTO MD         MR LUMBAR SPINE WITHOUT CONTRAST July 3, 2024 1:19 PM     PROVIDED HISTORY: Markedly worsening low back pain in the past 1.5  weeks. No injury or illness. No previous lumbar MRI. Lumbar facet  joint pain. Spondylosis of lumbar region without myelopathy or  radiculopathy. Chronic  left-sided low back pain without sciatica.  Chronic left-sided low back pain without sciatica. DDD (degenerative  disc disease), lumbar. Chronic left SI joint pain.     COMPARISON: Plain radiographs 1/11/2020.     TECHNIQUE: Multiplanar multisequence lumbar MR without contrast.     FINDINGS: There are four lumbar-type vertebrae following the last  remaining thoracic type vertebrae. Complete sacralization of L5.     Right convex scoliosis with apex at L1-L2.     The tip of the conus medullaris is at L1.       Opposing endplate edema at L4-L5.     On a level by level basis:     T12-L1: Mild disc height loss and disc degeneration. Left eccentric  disc bulge and bilateral facet arthropathy. Moderate to severe left  neural foraminal stenosis. No right neural foraminal stenosis. Mild  spinal canal stenosis.     L1-L2: Moderate disc height loss and disc degeneration. Left eccentric  disc osteophyte complex and bilateral facet arthropathy. Moderate left  neural foraminal stenosis. Mild spinal canal stenosis. No right neural  foraminal stenosis.     L2-L3: Moderate disc height loss and disc degeneration. Disc  osteophyte complex and bilateral facet arthropathy. Moderate left and  mild right neural foraminal stenosis. Mild spinal canal stenosis.     L3-L4: Moderate disc height loss and disc degeneration. Disc  osteophyte complex and bilateral facet arthropathy. Mild to moderate  bilateral neural foraminal and mild to moderate spinal canal stenosis.  Articular and periarticular edema associated with left facet joint.     L4-L5: Mild to moderate disc height loss and disc degeneration. Right  eccentric bulge osteophyte formation and bilateral facet arthropathy.  Moderate to severe right neural foraminal stenosis with impingement of  the exiting nerve root. Mild left neural foraminal stenosis. No spinal  canal stenosis.     L5-S1: No spinal canal or neuroforaminal stenosis.     Paraspinous tissues are within normal limits.                                                                       IMPRESSION:   1. Four lumbar-type vertebrae following the last remaining thoracic  type vertebrae. Complete sacralization of L5.  2. Right convex scoliosis with the apex at L1-L2.  3. Articular and periarticular edema associated with left L3-L4 facet  joint, compatible with active inflammatory arthropathy.  4. Modic type I degeneration at L4-L5.  5. Multilevel lumbar spondylosis as detailed above.      ANTIONETTE BERG MD           Other testing (labs, diagnostics):  3/6/2023  Cr. 1.30  Est GFR 57      Screening tools:     DIRE Score for ongoing opioid management is calculated as follows:    Diagnosis = 2    Intractability = 1    Risk: Psych = 3  Chem Hlth = 3  Reliability = 3  Social = 3    Efficacy = 2    Total DIRE Score = 17 (14 or higher predicts good candidate for ongoing opioid management; 13 or lower predicts poor candidate for opioid management)           [place]:  Reviewed [place]  12/31/2024- no concerning fills.  Kayley NIXON, RN CNP, FNP  [facility]  [place] location          Assessment:  Lumbar facet joint pain  Spondylosis without myelopathy or radiculopathy, lumbar region  Chronic left sided low back pain without sciatica  Spinal stenosis of lumbar region without neurogenic claudication  Neural foraminal stenosis of lumbar spine    Chronic intractable pain  Primary osteoarthritis, unspecified site  PMHx includes: Cataracts mild OD moderate OS (2012), hypertension, chronic low back pain, peripheral vascular disease, erectile dysfunction, personal history of malignant neoplasm of the bladder, osteoarthritis, hemorrhoids, kidney stones, personal history of tobacco use (smoked 40 years), personal history of colonic polyps (2002), macular degeneration, hyperlipidemia, chronic kidney disease stage III GFR 30-59 mL/minute, rotator cuff tendinitis left, squamous cell carcinoma of skin  PSHx includes:  Colonoscopy (2008), vitrectomy parsplana (2011), phacoemulsification with standard intraocular lens implant of the left eye (2012), laser YAG capsulotomy left eye (2016), TURP (1997), cervical spinal fusion C6-7, left ulnar nerve transposition at the elbow, vasectomy.      Plan:  Physical Therapy: continue exercises learned in P T   Clinical Health Psychologist to address issues of relaxation, behavioral change, coping style, and other factors important to improvement: none  Diagnostic Studies: none  Medication Management:   We will avoid medications at this time since you aren't having more pain without the Butrans  Further procedures recommended: none  Recommendations/follow-up for PCP:  See above  Release of information: none  Follow up: 4 months      ASSESSMENT AND PLAN:  (M54.59) Lumbar facet joint pain  (primary encounter diagnosis)  Comment:   Plan: Adult Pain Clinic Follow-Up Order            (M47.816) Spondylosis without myelopathy or radiculopathy, lumbar region  Comment:   Plan: Adult Pain Clinic Follow-Up Order            (M54.50,  G89.29) Chronic left-sided low back pain without sciatica  Comment:   Plan: Adult Pain Clinic Follow-Up Order            (M48.061) Spinal stenosis of lumbar region without neurogenic claudication  Comment:   Plan: Adult Pain Clinic Follow-Up Order            (M48.061) Neural foraminal stenosis of lumbar spine  Comment:   Plan: Adult Pain Clinic Follow-Up Order              Face to face time with patient: 25 minutes             Kayley NIXON, RN CNP, FNP  [facility]  [facility] [place]

## 2024-12-31 NOTE — PATIENT INSTRUCTIONS
Plan:  Physical Therapy: continue exercises learned in P T   Clinical Health Psychologist to address issues of relaxation, behavioral change, coping style, and other factors important to improvement: none  Diagnostic Studies: none  Medication Management:   We will avoid medications at this time since you aren't having more pain without the Butrans  Further procedures recommended: none  Recommendations/follow-up for PCP:  See above  Release of information: none  Follow up: 4 months      ----------------------------------------------------------------  Clinic Number:  [phone]   Call with any questions about your care and for scheduling assistance.   Calls are returned Monday through Friday between 8 AM and 4:30 PM. We usually get back to you within 2 business days depending on the issue/request.    If we are prescribing your medications:  For opioid medication refills, call the clinic or send a [employer] message 7 days in advance.  Please include:  Name of requested medication  Name of the pharmacy.  For non-opioid medications, call your pharmacy directly to request a refill. Please allow 3-4 days to be processed.   Per [place] State Law:  All controlled substance prescriptions must be filled within 30 days of being written.    For those controlled substances allowing refills, pickup must occur within 30 days of last fill.      We believe regular attendance is key to your success in our program!    Any time you are unable to keep your appointment we ask that you call us at least 24 hours in advance to cancel.This will allow us to offer the appointment time to another patient.   Multiple missed appointments may lead to dismissal from the clinic.

## 2025-03-10 ENCOUNTER — OFFICE VISIT (OUTPATIENT)
Dept: FAMILY MEDICINE | Facility: CLINIC | Age: OVER 89
End: 2025-03-10
Payer: COMMERCIAL

## 2025-03-10 VITALS
SYSTOLIC BLOOD PRESSURE: 136 MMHG | HEART RATE: 54 BPM | WEIGHT: 150 LBS | BODY MASS INDEX: 21 KG/M2 | RESPIRATION RATE: 16 BRPM | OXYGEN SATURATION: 98 % | TEMPERATURE: 97.5 F | HEIGHT: 71 IN | DIASTOLIC BLOOD PRESSURE: 66 MMHG

## 2025-03-10 DIAGNOSIS — N18.32 STAGE 3B CHRONIC KIDNEY DISEASE (H): ICD-10-CM

## 2025-03-10 DIAGNOSIS — Z00.00 ENCOUNTER FOR MEDICARE ANNUAL WELLNESS EXAM: Primary | ICD-10-CM

## 2025-03-10 DIAGNOSIS — R21 SCALY PATCH RASH: ICD-10-CM

## 2025-03-10 DIAGNOSIS — E78.5 HYPERLIPIDEMIA LDL GOAL <130: ICD-10-CM

## 2025-03-10 LAB — HGB BLD-MCNC: 13.1 G/DL (ref 13.3–17.7)

## 2025-03-10 PROCEDURE — 3078F DIAST BP <80 MM HG: CPT | Performed by: FAMILY MEDICINE

## 2025-03-10 PROCEDURE — 82043 UR ALBUMIN QUANTITATIVE: CPT | Performed by: FAMILY MEDICINE

## 2025-03-10 PROCEDURE — 82570 ASSAY OF URINE CREATININE: CPT | Performed by: FAMILY MEDICINE

## 2025-03-10 PROCEDURE — 3075F SYST BP GE 130 - 139MM HG: CPT | Performed by: FAMILY MEDICINE

## 2025-03-10 PROCEDURE — 36415 COLL VENOUS BLD VENIPUNCTURE: CPT | Performed by: FAMILY MEDICINE

## 2025-03-10 PROCEDURE — 85018 HEMOGLOBIN: CPT | Performed by: FAMILY MEDICINE

## 2025-03-10 PROCEDURE — 80061 LIPID PANEL: CPT | Performed by: FAMILY MEDICINE

## 2025-03-10 PROCEDURE — 80053 COMPREHEN METABOLIC PANEL: CPT | Performed by: FAMILY MEDICINE

## 2025-03-10 PROCEDURE — G0439 PPPS, SUBSEQ VISIT: HCPCS | Performed by: FAMILY MEDICINE

## 2025-03-10 NOTE — PATIENT INSTRUCTIONS
Patient Education   Preventive Care Advice   This is general advice given by our system to help you stay healthy. However, your care team may have specific advice just for you. Please talk to your care team about your preventive care needs.  Nutrition  Eat 5 or more servings of fruits and vegetables each day.  Try wheat bread, brown rice and whole grain pasta (instead of white bread, rice, and pasta).  Get enough calcium and vitamin D. Check the label on foods and aim for 100% of the RDA (recommended daily allowance).  Lifestyle  Exercise at least 150 minutes each week  (30 minutes a day, 5 days a week).  Do muscle strengthening activities 2 days a week. These help control your weight and prevent disease.  No smoking.  Wear sunscreen to prevent skin cancer.  Have a dental exam and cleaning every 6 months.  Yearly exams  See your health care team every year to talk about:  Any changes in your health.  Any medicines your care team has prescribed.  Preventive care, family planning, and ways to prevent chronic diseases.  Shots (vaccines)   HPV shots (up to age 26), if you've never had them before.  Hepatitis B shots (up to age 59), if you've never had them before.  COVID-19 shot: Get this shot when it's due.  Flu shot: Get a flu shot every year.  Tetanus shot: Get a tetanus shot every 10 years.  Pneumococcal, hepatitis A, and RSV shots: Ask your care team if you need these based on your risk.  Shingles shot (for age 50 and up)  General health tests  Diabetes screening:  Starting at age 35, Get screened for diabetes at least every 3 years.  If you are younger than age 35, ask your care team if you should be screened for diabetes.  Cholesterol test: At age 39, start having a cholesterol test every 5 years, or more often if advised.  Bone density scan (DEXA): At age 50, ask your care team if you should have this scan for osteoporosis (brittle bones).  Hepatitis C: Get tested at least once in your life.  STIs (sexually  transmitted infections)  Before age 24: Ask your care team if you should be screened for STIs.  After age 24: Get screened for STIs if you're at risk. You are at risk for STIs (including HIV) if:  You are sexually active with more than one person.  You don't use condoms every time.  You or a partner was diagnosed with a sexually transmitted infection.  If you are at risk for HIV, ask about PrEP medicine to prevent HIV.  Get tested for HIV at least once in your life, whether you are at risk for HIV or not.  Cancer screening tests  Cervical cancer screening: If you have a cervix, begin getting regular cervical cancer screening tests starting at age 21.  Breast cancer scan (mammogram): If you've ever had breasts, begin having regular mammograms starting at age 40. This is a scan to check for breast cancer.  Colon cancer screening: It is important to start screening for colon cancer at age 45.  Have a colonoscopy test every 10 years (or more often if you're at risk) Or, ask your provider about stool tests like a FIT test every year or Cologuard test every 3 years.  To learn more about your testing options, visit:   .  For help making a decision, visit:   https://bit.ly/ih16233.  Prostate cancer screening test: If you have a prostate, ask your care team if a prostate cancer screening test (PSA) at age 55 is right for you.  Lung cancer screening: If you are a current or former smoker ages 50 to 80, ask your care team if ongoing lung cancer screenings are right for you.  For informational purposes only. Not to replace the advice of your health care provider. Copyright   2023 [facility]. All rights reserved. Clinically reviewed by the [facility]. Fastr [id number] - REV 01/24.  Preventing Falls: Care Instructions  Injuries and health problems such as trouble walking or poor eyesight can increase your risk of falling. So can some medicines. But there are things you can do to help  "prevent falls. You can exercise to get stronger. You can also arrange your home to make it safer.    Talk to your doctor about the medicines you take. Ask if any of them increase the risk of falls and whether they can be changed or stopped.   Try to exercise regularly. It can help improve your strength and balance. This can help lower your risk of falling.         Practice fall safety and prevention.   Wear low-heeled shoes that fit well and give your feet good support. Talk to your doctor if you have foot problems that make this hard.  Carry a cellphone or wear a medical alert device that you can use to call for help.  Use stepladders instead of chairs to reach high objects. Don't climb if you're at risk for falls. Ask for help, if needed.  Wear the correct eyeglasses, if you need them.        Make your home safer.   Remove rugs, cords, clutter, and furniture from walkways.  Keep your house well lit. Use night-lights in hallways and bathrooms.  Install and use sturdy handrails on stairways.  Wear nonskid footwear, even inside. Don't walk barefoot or in socks without shoes.        Be safe outside.   Use handrails, curb cuts, and ramps whenever possible.  Keep your hands free by using a shoulder bag or backpack.  Try to walk in well-lit areas. Watch out for uneven ground, changes in pavement, and debris.  Be careful in the winter. Walk on the grass or gravel when sidewalks are slippery. Use de-icer on steps and walkways. Add non-slip devices to shoes.    Put grab bars and nonskid mats in your shower or tub and near the toilet. Try to use a shower chair or bath bench when bathing.   Get into a tub or shower by putting in your weaker leg first. Get out with your strong side first. Have a phone or medical alert device in the bathroom with you.   Where can you learn more?  Go to https://www.appbackrwise.net/patiented  Enter G117 in the search box to learn more about \"Preventing Falls: Care Instructions.\"  Current as of: " July 31, 2024  Content Version: 14.3    2024 [employer].   Care instructions adapted under license by your healthcare professional. If you have questions about a medical condition or this instruction, always ask your healthcare professional. [employer] disclaims any warranty or liability for your use of this information.    Hearing Loss: Care Instructions  Overview     Hearing loss is a sudden or slow decrease in how well you hear. It can range from slight to profound. Permanent hearing loss can occur with aging. It also can happen when you are exposed long-term to loud noise. Examples include listening to loud music, riding motorcycles, or being around other loud machines.  Hearing loss can affect your work and home life. It can make you feel lonely or depressed. You may feel that you have lost your independence. But hearing aids and other devices can help you hear better and feel connected to others.  Follow-up care is a key part of your treatment and safety. Be sure to make and go to all appointments, and call your doctor if you are having problems. It's also a good idea to know your test results and keep a list of the medicines you take.  How can you care for yourself at home?  Avoid loud noises whenever possible. This helps keep your hearing from getting worse.  Always wear hearing protection around loud noises.  Wear a hearing aid as directed.  A professional can help you pick a hearing aid that will work best for you.  You can also get hearing aids over the counter for mild to moderate hearing loss.  Have hearing tests as your doctor suggests. They can show whether your hearing has changed. Your hearing aid may need to be adjusted.  Use other devices as needed. These may include:  Telephone amplifiers and hearing aids that can connect to a television, stereo, radio, or microphone.  Devices that use lights or vibrations. These alert you to the doorbell, a ringing telephone, or a baby  "monitor.  Television closed-captioning. This shows the words at the bottom of the screen. Most new TVs can do this.  TTY (text telephone). This lets you type messages back and forth on the telephone instead of talking or listening. These devices are also called TDD. When messages are typed on the keyboard, they are sent over the phone line to a receiving TTY. The message is shown on a monitor.  Use text messaging, social media, and email if it is hard for you to communicate by telephone.  Try to learn a listening technique called speechreading. It is not lipreading. You pay attention to people's gestures, expressions, posture, and tone of voice. These clues can help you understand what a person is saying. Face the person you are talking to, and have them face you. Make sure the lighting is good. You need to see the other person's face clearly.  Think about counseling if you need help to adjust to your hearing loss.  When should you call for help?  Watch closely for changes in your health, and be sure to contact your doctor if:    You think your hearing is getting worse.     You have new symptoms, such as dizziness or nausea.   Where can you learn more?  Go to https://www.BomTrip.com.net/patiented  Enter R798 in the search box to learn more about \"Hearing Loss: Care Instructions.\"  Current as of: September 27, 2023  Content Version: 14.3    2024 [employer].   Care instructions adapted under license by your healthcare professional. If you have questions about a medical condition or this instruction, always ask your healthcare professional. [employer] disclaims any warranty or liability for your use of this information.    Learning About Sleeping Well  What does sleeping well mean?     Sleeping well means getting enough sleep to feel good and stay healthy. How much sleep is enough varies among people.  The number of hours you sleep and how you feel when you wake up are both important. If you do " not feel refreshed, you probably need more sleep. Another sign of not getting enough sleep is feeling tired during the day.  Experts recommend that adults get at least 7 or more hours of sleep per day. Children and older adults need more sleep.  Why is getting enough sleep important?  Getting enough quality sleep is a basic part of good health. When your sleep suffers, your physical health, mood, and your thoughts can suffer too. You may find yourself feeling more grumpy or stressed. Not getting enough sleep also can lead to serious problems, including injury, accidents, anxiety, and depression.  What might cause poor sleeping?  Many things can cause sleep problems, including:  Changes to your sleep schedule.  Stress. Stress can be caused by fear about a single event, such as giving a speech. Or you may have ongoing stress, such as worry about work or school.  Depression, anxiety, and other mental or emotional conditions.  Changes in your sleep habits or surroundings. This includes changes that happen where you sleep, such as noise, light, or sleeping in a different bed. It also includes changes in your sleep pattern, such as having jet lag or working a late shift.  Health problems, such as pain, breathing problems, and restless legs syndrome.  Lack of regular exercise.  Using alcohol, nicotine, or caffeine before bed.  How can you help yourself?  Here are some tips that may help you sleep more soundly and wake up feeling more refreshed.  Your sleeping area   Use your bedroom only for sleeping and sex. A bit of light reading may help you fall asleep. But if it doesn't, do your reading elsewhere in the house. Try not to use your TV, computer, smartphone, or tablet while you are in bed.  Be sure your bed is big enough to stretch out comfortably, especially if you have a sleep partner.  Keep your bedroom quiet, dark, and cool. Use curtains, blinds, or a sleep mask to block out light. To block out noise, use earplugs,  "soothing music, or a \"white noise\" machine.  Your evening and bedtime routine   Create a relaxing bedtime routine. You might want to take a warm shower or bath, or listen to soothing music.  Go to bed at the same time every night. And get up at the same time every morning, even if you feel tired.  What to avoid   Limit caffeine (coffee, tea, caffeinated sodas) during the day, and don't have any for at least 6 hours before bedtime.  Avoid drinking alcohol before bedtime. Alcohol can cause you to wake up more often during the night.  Try not to smoke or use tobacco, especially in the evening. Nicotine can keep you awake.  Limit naps during the day, especially close to bedtime.  Avoid lying in bed awake for too long. If you can't fall asleep or if you wake up in the middle of the night and can't get back to sleep within about 20 minutes, get out of bed and go to another room until you feel sleepy.  Avoid taking medicine right before bed that may keep you awake or make you feel hyper or energized. Your doctor can tell you if your medicine may do this and if you can take it earlier in the day.  If you can't sleep   Imagine yourself in a peaceful, pleasant scene. Focus on the details and feelings of being in a place that is relaxing.  Get up and do a quiet or boring activity until you feel sleepy.  Avoid drinking any liquids before going to bed to help prevent waking up often to use the bathroom.  Where can you learn more?  Go to https://www.Manflu.net/patiented  Enter J942 in the search box to learn more about \"Learning About Sleeping Well.\"  Current as of: July 31, 2024  Content Version: 14.3    2024 [employer].   Care instructions adapted under license by your healthcare professional. If you have questions about a medical condition or this instruction, always ask your healthcare professional. [employer] disclaims any warranty or liability for your use of this information.    Chronic Pain: " Care Instructions  Your Care Instructions     Chronic pain is pain that lasts a long time (months or even years) and may or may not have a clear cause. It is different from acute pain, which usually does have a clear cause--like an injury or illness--and gets better over time. Chronic pain:  Lasts over time but may vary from day to day.  Does not go away despite efforts to end it.  May disrupt your sleep and lead to fatigue.  May cause depression or anxiety.  May make your muscles tense, causing more pain.  Can disrupt your work, hobbies, home life, and relationships with friends and family.  Chronic pain is a very real condition. It is not just in your head. Treatment can help and usually includes several methods used together, such as medicines, physical therapy, exercise, and other treatments. Learning how to relax and changing negative thought patterns can also help you cope.  Chronic pain is complex. Taking an active role in your treatment will help you better manage your pain. Tell your doctor if you have trouble dealing with your pain. You may have to try several things before you find what works best for you.  Follow-up care is a key part of your treatment and safety. Be sure to make and go to all appointments, and call your doctor if you are having problems. It's also a good idea to know your test results and keep a list of the medicines you take.  How can you care for yourself at home?  Pace yourself. Break up large jobs into smaller tasks. Save harder tasks for days when you have less pain, or go back and forth between hard tasks and easier ones. Take rest breaks.  Relax, and reduce stress. Relaxation techniques such as deep breathing or meditation can help.  Keep moving. Gentle, daily exercise can help reduce pain over the long run. Try low- or no-impact exercises such as walking, swimming, and stationary biking. Do stretches to stay flexible.  Try heat, cold packs, and massage.  Get enough sleep.  Chronic pain can make you tired and drain your energy. Talk with your doctor if you have trouble sleeping because of pain.  Think positive. Your thoughts can affect your pain level. Do things that you enjoy to distract yourself when you have pain instead of focusing on the pain. See a movie, read a book, listen to music, or spend time with a friend.  If you think you are depressed, talk to your doctor about treatment.  Keep a daily pain diary. Record how your moods, thoughts, sleep patterns, activities, and medicine affect your pain. You may find that your pain is worse during or after certain activities or when you are feeling a certain emotion. Having a record of your pain can help you and your doctor find the best ways to treat your pain.  Take pain medicines exactly as directed.  If the doctor gave you a prescription medicine for pain, take it as prescribed.  If you are not taking a prescription pain medicine, ask your doctor if you can take an over-the-counter medicine.  Reducing constipation caused by pain medicine  Talk to your doctor about a laxative. If a laxative doesn't work, your doctor may suggest a prescription medicine.  Include fruits, vegetables, beans, and whole grains in your diet each day. These foods are high in fiber.  If your doctor recommends it, get more exercise. Walking is a good choice. Bit by bit, increase the amount you walk every day. Try for at least 30 minutes on most days of the week.  Schedule time each day for a bowel movement. A daily routine may help. Take your time and do not strain when having a bowel movement.  When should you call for help?   Call your doctor now or seek immediate medical care if:    Your pain gets worse or is out of control.     You feel down or blue, or you do not enjoy things like you once did. You may be depressed, which is common in people with chronic pain. Depression can be treated.     You have vomiting or cramps for more than 2 hours.   Watch  "closely for changes in your health, and be sure to contact your doctor if:    You cannot sleep because of pain.     You are very worried or anxious about your pain.     You have trouble taking your pain medicine.     You have any concerns about your pain medicine.     You have trouble with bowel movements, such as:  No bowel movement in 3 days.  Blood in the anal area, in your stool, or on the toilet paper.  Diarrhea for more than 24 hours.   Where can you learn more?  Go to https://www.STORYS.JP.net/patiented  Enter N004 in the search box to learn more about \"Chronic Pain: Care Instructions.\"  Current as of: July 31, 2024  Content Version: 14.3    2024 [email or web address].   Care instructions adapted under license by your healthcare professional. If you have questions about a medical condition or this instruction, always ask your healthcare professional. [email or web address] disclaims any warranty or liability for your use of this information.       "

## 2025-03-10 NOTE — PROGRESS NOTES
Assessment & Plan     Encounter for Medicare annual wellness exam     - Comprehensive metabolic panel (BMP + Alb, Alk Phos, ALT, AST, Total. Bili, TP)  - Lipid Profile (Chol, Trig, HDL, LDL calc)  - Comprehensive metabolic panel (BMP + Alb, Alk Phos, ALT, AST, Total. Bili, TP)  - Lipid Profile (Chol, Trig, HDL, LDL calc)    Scaly patch rashes (Lt scalp)  With a history of skin cancer discussed having follow-up with dermatology  - Adult Dermatology  Referral    Hyperlipidemia LDL goal <130  - Comprehensive metabolic panel (BMP + Alb, Alk Phos, ALT, AST, Total. Bili, TP)  - Lipid Profile (Chol, Trig, HDL, LDL calc)  - Comprehensive metabolic panel (BMP + Alb, Alk Phos, ALT, AST, Total. Bili, TP)  - Lipid Profile (Chol, Trig, HDL, LDL calc)    Stage 3b chronic kidney disease (H)    Stable  - Comprehensive metabolic panel (BMP + Alb, Alk Phos, ALT, AST, Total. Bili, TP)  - Albumin Random Urine Quantitative with Creat Ratio  - Hemoglobin  - Comprehensive metabolic panel (BMP + Alb, Alk Phos, ALT, AST, Total. Bili, TP)  - Albumin Random Urine Quantitative with Creat Ratio  - Hemoglobin      See Patient Instructions    Subjective   Enmanuel is a 89 year old, presenting for the following health issues:    Yes you are not think you Derm Problem (Spots on the top of his head)  3/10: I have had a cancerous spot removed from the top of my head about a year ago. Have more spots appearing that don't heal  AW; bp    Lumbar Facet Joint Pain   Had 2 ablations; helps but help for atleast 6 months.    Functional capacity:    Living arrangement: live in their own home    Ambulation; a little     Vision: uses glasses, eyes gradually getting worse    Hearing: could be better; has hearing aids    Bladder control: Good    Memory: okay overall    Driving: eye getting worse, sees eye specialist; avoid night and long distance    ADLS: Need help with: they are independent (wife fell in rehab)    Lives with wife: she fell and was  hospitalized and now in Rehab        3/10/2025     3:38 PM   Additional Questions   Roomed by Felicia RACHEL CMA   Accompanied by Self         3/10/2025     3:38 PM   Patient Reported Additional Medications   Patient reports taking the following new medications None     History of Present Illness       Reason for visit:  Has spots on the top on his head he would like checked.  Symptom onset:  More than a month  Symptoms include:  Itchy  Symptom intensity:  Mild  Symptom progression:  Worsening  Had these symptoms before:  Yes  Has tried/received treatment for these symptoms:  Yes  Previous treatment was successful:  No  What makes it worse:  None  What makes it better:  Nothing   He is taking medications regularly.      Weight  Has gained about 12 lbs  Wt Readings from Last 4 Encounters:   03/10/25 68 kg (150 lb)   08/19/24 63 kg (138 lb 12.8 oz)   09/05/23 64.5 kg (142 lb 3.2 oz)   06/30/23 66.8 kg (147 lb 3.2 oz)      Shot: had Covid and flu at pharmacy    Annual Wellness Visit     Patient has been advised of split billing requirements and indicates understanding: Yes     Health Care Directive  Patient does not have a Health Care Directive: Patient states has Advance Directive and will bring in a copy to clinic.  In general, how would you rate your overall physical health? good  Do you have a special diet?  Regular (no restrictions)        3/10/2025   Exercise, Social Connection, Stress   Days per week of moderate/strenous exercise 0 days   Average minutes spent exercising at this level 0 min   Frequency of gathering with friends or relatives Twice   Feel stress (tense, anxious, or unable to sleep) Not at all     Do you see a dentist two times every year?  Yes  Have you been more tired than usual lately?  (!) YES   Discussed possible causes of fatigue.   If you drink alcohol do you typically have >3 drinks per day or >7 drinks per week? No  Do you have a current opioid prescription? (!) YES   How severe is your pain on a  scale from 1-10? 0/10   {  Do you use any other controlled substances or medications that are not prescribed by a provider? None  Social History     Tobacco Use    Smoking status: Former     Current packs/day: 0.00     Types: Cigarettes     Start date: 1963     Quit date: 2003     Years since quittin.2     Passive exposure: Past    Smokeless tobacco: Never   Vaping Use    Vaping status: Never Used   Substance Use Topics    Alcohol use: Yes     Comment: 1 x month    Drug use: No     Needs assistance for the following daily activities: no assistance needed  Which of the following safety concerns are present in your home?  none identified   Do you (or your family members) have any concerns about your safety while driving?  (!) YES   Addressed any concerns about safety while driving.    Do you have any of the following hearing concerns?: (!) I FEEL THAT PEOPLE ARE MUMBLING OR NOT SPEAKING CLEARLY, (!) I NEED TO ASK PEOPLE TO SPEAK UP OR REPEAT THEMSELVES, (!) IT'S HARDER TO UNDERSTAND WOMEN'S VOICES THAN MEN'S VOICES, (!) IT'S HARD TO FOLLOW A CONVERSATION IN A NOISY RESTAURANT OR CROWDED ROOM, (!) TROUBLE UNDERSTANDING A SPEAKER AT A PUBLIC MEETING OR [employer] SERVICE, (!) TROUBLE UNDERSTANDING SOFT OR WHISPERED SPEECH, and (!) TROUBLE UNDERSTANDING SPEECH ON THE TELEPHONE  In the past 6 months, have you been bothered by leaking of urine? No            3/10/2025   Fall Risk   Fallen 2 or more times in the past year? No   Trouble with walking or balance? Yes   Gait Speed Test (Document in seconds) 4.84   Gait Speed Test Interpretation Less than or equal to 5.00 seconds - PASS          Today's PHQ-2 Score:       3/10/2025     3:22 PM   PHQ-2 (  [employer])   Q1: Little interest or pleasure in doing things 0   Q2: Feeling down, depressed or hopeless 0   PHQ-2 Score 0    Q1: Little interest or pleasure in doing things Not at all   Q2: Feeling down, depressed or hopeless Not at all   PHQ-2 Score 0        Patient-reported     Reviewed and updated as needed this visit by Provider                    Patient Active Problem List   Diagnosis    Erectile dysfunction    Osteoarthritis    HYPERLIPIDEMIA LDL GOAL <130    Hypertension goal BP (blood pressure) < 140/90    History of bladder cancer    Stage 3b chronic kidney disease (H)    Pseudophakia, Yag Caps, os    Dizziness    Posterior vitreous detachment, right    Hx of vitrectomy for chronic macular hole, os (ER)    Other idiopathic scoliosis, thoracolumbar region    Spondylosis of lumbar region without myelopathy or radiculopathy    Brittle nails    Combined forms of age-related cataract, mild-mod, of right eye    Pain of right eye    History of shingles    Low vitamin B12 level    B12 nutritional deficiency    Occipital neuralgia of left side    Post-nasal drip    Unintended weight loss    Dysphagia, unspecified type     Past Surgical History:   Procedure Laterality Date    CATARACT IOL, RT/LT      COLONOSCOPY  2008    Normal    HC REVISE ULNAR NERVE AT ELBOW      Left    LASER YAG CAPSULOTOMY  2016    left eye    PHACOEMULSIFICATION WITH STANDARD INTRAOCULAR LENS IMPLANT  2012    left eye    TURP   ?    VASECTOMY      VITRECTOMY PARSPLANA  2011    left eye, repair macular hole    ZZC SPINAL FUSION,ANT,EA ADNL LEVEL      C6-7       Social History     Tobacco Use    Smoking status: Former     Current packs/day: 0.00     Types: Cigarettes     Start date: 1963     Quit date: 2003     Years since quittin.2     Passive exposure: Past    Smokeless tobacco: Never   Substance Use Topics    Alcohol use: Yes     Comment: 1 x month     Family History   Problem Relation Age of Onset    Osteoporosis Mother     Diabetes Father     Arthritis Father     Cancer Father     Respiratory Father     Genitourinary Problems Brother     Circulatory Brother     Macular Degeneration No family hx of     Glaucoma No family hx of     Thyroid Disease No family hx of      Hypertension No family hx of            Current providers sharing in care for this patient include:  Patient Care Team:  Walker Sorto MD as PCP - General (Family Medicine)  Rodrigo Hastings MD as Assigned Musculoskeletal Provider  Angi Bustamante MD as Referring Physician (Internal Medicine)  Jo Mistry PA-C as Physician Assistant (Dermatology)  Bonifacio Scales MD as MD (Ophthalmology)  Kayley Alexander APRN CNP as Nurse Practitioner (Pain Medicine)  Kayley Alexander APRN CNP as Assigned Neuroscience Provider  Kayley Alexander APRN CNP as Assigned Pain Medication Provider  Walker Sorto MD as Assigned PCP  Bonifacio Scales MD as Assigned Surgical Provider  Bonifacio Scales MD as MD (Ophthalmology)    The following health maintenance items are reviewed in Epic and correct as of today:  Health Maintenance   Topic Date Due    DTAP/TDAP/TD IMMUNIZATION (2 - Td or Tdap) 07/22/2023    LIPID  08/03/2023    MEDICARE ANNUAL WELLNESS VISIT  02/17/2024    MICROALBUMIN  02/17/2024    ANNUAL REVIEW OF HM ORDERS  02/17/2024    BMP  03/06/2024    HEMOGLOBIN  06/30/2024    COVID-19 Vaccine (6 - 2024-25 season) 09/01/2024    EYE EXAM  12/27/2025    FALL RISK ASSESSMENT  03/10/2026    ADVANCE CARE PLANNING  03/10/2028    PHQ-2 (once per calendar year)  Completed    INFLUENZA VACCINE  Completed    Pneumococcal Vaccine: 50+ Years  Completed    URINALYSIS  Completed    ZOSTER IMMUNIZATION  Completed    RSV VACCINE  Completed    HPV IMMUNIZATION  Aged Out    MENINGITIS IMMUNIZATION  Aged Out       Appropriate preventive services were discussed with this patient, including applicable screening as appropriate for fall prevention, nutrition, physical activity, Tobacco-use cessation, weight loss and cognition.  Checklist reviewing preventive services available has been given to the patient.           3/10/2025   Mini Cog   Clock Draw Score 2 Normal   3 Item Recall 1 object recalled  "  Mini Cog Total Score 3         Review of Systems  Constitutional, neuro, ENT, endocrine, pulmonary, cardiac, gastrointestinal, genitourinary, musculoskeletal, integument and psychiatric systems are negative, except as otherwise noted.      Objective    BP (!) 145/74 (BP Location: Right arm, Patient Position: Sitting, Cuff Size: Adult Regular)   Pulse 54   Temp 97.5  F (36.4  C) (Temporal)   Resp 16   Ht 1.81 m (5' 11.26\")   Wt 68 kg (150 lb)   SpO2 98%   BMI 20.77 kg/m    Body mass index is 20.77 kg/m .  Physical Exam   GENERAL: alert and no distress  EYES: Eyes grossly normal to inspection.   HENT: ear canals and TM's normal, nose and mouth without ulcers or lesions  NECK: no adenopathy, no asymmetry, masses, or scars  RESP: lungs clear to auscultation - no rales, rhonchi or wheezes  CV: regular rate and rhythm, no murmur, click or rub, no peripheral edema  ABDOMEN: soft, nontender, no hepatosplenomegaly, no masses and bowel sounds normal  MS: no gross musculoskeletal defects noted, no edema  SKIN: scattered patches on the scalp more on the left side; dry with sandpaper feeling  NEURO: Normal strength and tone, mentation intact and speech normal  PSYCH: mentation appears normal, affect normal/bright      Signed Electronically by: Walker Sorto MD    "

## 2025-03-11 LAB
CHOLEST SERPL-MCNC: 152 MG/DL
CREAT UR-MCNC: 137 MG/DL
FASTING STATUS PATIENT QL REPORTED: NORMAL
HDLC SERPL-MCNC: 77 MG/DL
LDLC SERPL CALC-MCNC: 56 MG/DL
MICROALBUMIN UR-MCNC: <12 MG/L
MICROALBUMIN/CREAT UR: NORMAL MG/G{CREAT}
NONHDLC SERPL-MCNC: 75 MG/DL
TRIGL SERPL-MCNC: 96 MG/DL

## 2025-03-13 LAB
ALBUMIN SERPL BCG-MCNC: 3.9 G/DL (ref 3.5–5.2)
ALP SERPL-CCNC: 96 U/L (ref 40–150)
ALT SERPL W P-5'-P-CCNC: 22 U/L (ref 0–70)
ANION GAP SERPL CALCULATED.3IONS-SCNC: 9 MMOL/L (ref 7–15)
AST SERPL W P-5'-P-CCNC: 26 U/L (ref 0–45)
BILIRUB SERPL-MCNC: 0.3 MG/DL
BUN SERPL-MCNC: 33.5 MG/DL (ref 8–23)
CALCIUM SERPL-MCNC: 9.1 MG/DL (ref 8.8–10.4)
CHLORIDE SERPL-SCNC: 107 MMOL/L (ref 98–107)
CREAT SERPL-MCNC: 1.29 MG/DL (ref 0.67–1.17)
EGFRCR SERPLBLD CKD-EPI 2021: 53 ML/MIN/1.73M2
FASTING STATUS PATIENT QL REPORTED: ABNORMAL
GLUCOSE SERPL-MCNC: 103 MG/DL (ref 70–99)
HCO3 SERPL-SCNC: 24 MMOL/L (ref 22–29)
POTASSIUM SERPL-SCNC: 5.1 MMOL/L (ref 3.4–5.3)
PROT SERPL-MCNC: 6.7 G/DL (ref 6.4–8.3)
SODIUM SERPL-SCNC: 140 MMOL/L (ref 135–145)

## 2025-03-18 ENCOUNTER — TELEPHONE (OUTPATIENT)
Dept: OPHTHALMOLOGY | Facility: CLINIC | Age: OVER 89
End: 2025-03-18
Payer: COMMERCIAL

## 2025-03-18 NOTE — TELEPHONE ENCOUNTER
[facility] Call Center    Phone Message    May a detailed message be left on voicemail: yes     Reason for Call: Other: Enmanuel called because he mentioned that Dr. Scales mentioned he should get his Cataracts repaired and would like a call back to talk about this. Please call Enmanuel at [phone]. Thank you        Travel Screening: Not Applicable     Date of Service:

## 2025-03-25 ENCOUNTER — OFFICE VISIT (OUTPATIENT)
Dept: OPHTHALMOLOGY | Facility: CLINIC | Age: OVER 89
End: 2025-03-25
Payer: COMMERCIAL

## 2025-03-25 DIAGNOSIS — H35.342 MACULAR HOLE, LEFT: ICD-10-CM

## 2025-03-25 DIAGNOSIS — Z96.1 PSEUDOPHAKIA: ICD-10-CM

## 2025-03-25 DIAGNOSIS — H25.811 COMBINED FORMS OF AGE-RELATED CATARACT OF RIGHT EYE: Primary | ICD-10-CM

## 2025-03-25 DIAGNOSIS — H17.9 CORNEAL SCAR, RIGHT EYE: ICD-10-CM

## 2025-03-25 DIAGNOSIS — H43.811 POSTERIOR VITREOUS DETACHMENT, RIGHT: ICD-10-CM

## 2025-03-25 PROCEDURE — 92012 INTRM OPH EXAM EST PATIENT: CPT | Performed by: OPHTHALMOLOGY

## 2025-03-25 ASSESSMENT — REFRACTION_MANIFEST
OS_ADD: +3.00
OS_AXIS: 156
OS_SPHERE: -0.75
OD_ADD: +3.00
OD_AXIS: 017
OD_CYLINDER: +4.75
OD_SPHERE: -0.75
OS_CYLINDER: +4.25

## 2025-03-25 ASSESSMENT — VISUAL ACUITY
OD_PH_CC+: +2
OD_SC: 20/60 SLOW
OD_CC: 20/40 SLOW
OD_PH_CC: 20/40
CORRECTION_TYPE: GLASSES
OS_CC: 20/300
OS_PH_CC: 20/200
METHOD: SNELLEN - LINEAR
OD_CC+: -1

## 2025-03-25 ASSESSMENT — REFRACTION_WEARINGRX
OS_SPHERE: -1.25
OD_SPHERE: PLANO
OD_AXIS: 042
OD_CYLINDER: +4.25
OS_ADD: +3.00
OS_CYLINDER: +2.00
SPECS_TYPE: TRIFOCAL-LINED
OS_AXIS: 172
OD_ADD: +3.00

## 2025-03-25 ASSESSMENT — EXTERNAL EXAM - LEFT EYE: OS_EXAM: PROLAPSED FAT PADS: UPPER, LOWER; MILD-MOD BROW

## 2025-03-25 ASSESSMENT — EXTERNAL EXAM - RIGHT EYE: OD_EXAM: PROLAPSED FAT PADS: UPPER, LOWER; MILD-MOD BROW

## 2025-03-25 NOTE — LETTER
3/25/2025      Jose Manuel Gallo  [address] [place] [address]      Dear Colleague,    Thank you for referring your patient, Jose Manuel Gallo, to the [facility]. Please see a copy of my visit note below.     Current Eye Medications:  Systane Ultra BID both eyes     Subjective: here for glasses recheck. He got new glasses in January.  Was told at that appt that he may need cataract surgery in the right eye. He feels he cannot read the paper. Also states that he sees better with the left eye now than the right eye he thinks. Glasses are not matching the Rx found last visit. The bifocals are a bit high on the glasses.      Patient having difficulty driving to facility where wife is, 12 miles away.  Daughter can drive him to the [facility].       Objective:  See Ophthalmology Exam.       Assessment:  Refractive shift.  Cataract probably visually significant in patient with corneal scar and significant astigmatism.      ICD-10-CM    1. Combined forms of age-related cataract, mild-mod, of right eye  H25.811       2. Hx of vitrectomy for chronic macular hole, os (ER)  H35.342       3. Pseudophakia, Yag Caps, os  Z96.1       4. Posterior vitreous detachment, right  H43.811       5. 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     Referral sent to Dr. Augustin at the [facility] for a cataract evaluation. His office will call you to schedule an appointment.  Discussed possible toric lens or corneal procedure possible with cataract surgery to lessen cylinder.    Bonifacio Scales M.D.  [phone]            Again, thank you for allowing me to participate in the care of your patient.        Sincerely,        Bonifacio Scales MD    Electronically signed

## 2025-03-25 NOTE — PROGRESS NOTES
Current Eye Medications:  Systane Ultra BID both eyes     Subjective: here for glasses recheck. He got new glasses in January.  Was told at that appt that he may need cataract surgery in the right eye. He feels he cannot read the paper. Also states that he sees better with the left eye now than the right eye he thinks. Glasses are not matching the Rx found last visit. The bifocals are a bit high on the glasses.      Patient having difficulty driving to facility where wife is, 12 miles away.  Daughter can drive him to the [facility].       Objective:  See Ophthalmology Exam.       Assessment:  Refractive shift.  Cataract probably visually significant in patient with corneal scar and significant astigmatism.      ICD-10-CM    1. Combined forms of age-related cataract, mild-mod, of right eye  H25.811       2. Hx of vitrectomy for chronic macular hole, os (ER)  H35.342       3. Pseudophakia, Yag Caps, os  Z96.1       4. Posterior vitreous detachment, right  H43.811       5. 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     Referral sent to Dr. Augustin at the [facility] for a cataract evaluation. His office will call you to schedule an appointment.  Discussed possible toric lens or corneal procedure possible with cataract surgery to lessen cylinder.    Bonifacio Scales M.D.  [phone]

## 2025-03-25 NOTE — PATIENT INSTRUCTIONS
Have your right lens reground at the optical shop.     Referral sent to Dr. Augustin at the [facility] for a cataract evaluation. His office will call you to schedule an appointment.     Bonifacio Scales M.D.  [phone]

## 2025-03-26 PROBLEM — H17.9 CORNEAL SCAR, RIGHT EYE: Status: ACTIVE | Noted: 2025-03-26

## 2025-03-31 ENCOUNTER — OFFICE VISIT (OUTPATIENT)
Dept: DERMATOLOGY | Facility: CLINIC | Age: OVER 89
End: 2025-03-31
Attending: FAMILY MEDICINE
Payer: COMMERCIAL

## 2025-03-31 DIAGNOSIS — D48.9 NEOPLASM OF UNCERTAIN BEHAVIOR: ICD-10-CM

## 2025-03-31 DIAGNOSIS — Z85.828 HISTORY OF NONMELANOMA SKIN CANCER: ICD-10-CM

## 2025-03-31 DIAGNOSIS — L57.0 ACTINIC KERATOSIS: Primary | ICD-10-CM

## 2025-03-31 RX ORDER — FLUOROURACIL 50 MG/G
CREAM TOPICAL 2 TIMES DAILY
Qty: 40 G | Refills: 1 | Status: SHIPPED | OUTPATIENT
Start: 2025-03-31

## 2025-03-31 ASSESSMENT — PAIN SCALES - GENERAL: PAINLEVEL_OUTOF10: NO PAIN (0)

## 2025-03-31 NOTE — PROGRESS NOTES
[facility] Dermatology Note  Encounter Date: Mar 31, 2025  Office Visit     Reviewed patients past medical history and pertinent chart review prior to patients visit today.     Dermatology Problem List:  # Neoplasm of uncertain behavior x2: left temple and left vertex scalp, s/p shave biopsy 3/31/2025     1. 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 AKs  - HAK, right temple, s/p biopsy 3/29/2023  - HAK, left parietal scalp, s/p biopsy 3/29/2023  -s/p cryo   -initiate Efudex 3/31/2025  3. ISKs  -s/p cryo    Family Hx: Negative for family history of skin cancer.  ____________________________________________    Assessment & Plan:     # Neoplasm of uncertain behavior x2:  left temple and left vertex scalp.  DDx includes SCCIS vs SCC vs HAK. Shave biopsy today.  Photograph obtained.  Procedure Note: Biopsy by shave technique  The risks and benefits of the procedure were described to the patient. These include but are not limited to bleeding, infection, scar, incomplete removal, and non-diagnostic biopsy. Verbal informed consent was obtained. The above site(s) was cleansed with an alcohol pad and injected with 1% lidocaine with epinephrine. Once anesthesia was obtained, a biopsy(ies) was performed with Gilette blade. The tissue(s) was placed in a labeled container(s) with formalin and sent to pathology. Hemostasis was achieved with aluminum chloride. Vaseline and a bandage were applied to the wound(s). The patient tolerated the procedure well and was given post biopsy care instructions.    # Diffuse actinic keratoses, scalp and face  Actinic keratoses are pre-cancerous skin growths caused by sun exposure. Treatment is recommended and medically indicated.  Patient to treat actinic keratoses with topical efudex therapy after biopsy sites have healed:    1)  Apply a thin coat of the cream bid to affected areas of face and scalp for 2 weeks. Discussed what ideal reaction looks like  "with patient. Wash hands after application.  2)  Apply Vaseline/Aquaphore 30 minutes after each Efudex treatment.  3)  Wash area in morning and evening prior to application  4) Continue Vaseline in between therapy. May use hydrocortisone 1% ointment for itching if needed BID.   5)  Discuss \"Raw irritating feeling\", redness, scaling, scabbing, erosion, and swelling. Unless you are told otherwise, you may take the recommended dose of acetaminophen (Tylenol ) for discomfort and diphenhydramine (Benadryl ) for itching.  6)  Given detailed instructions in AVS    # Personal history of nonmelanoma skin cancer  - No signs of recurrence. Continued observation recommended.   - Advised to monitor for changing, non-healing, bleeding, painful, changing, or otherwise symptomatic lesions  - Sun protection: Counseled SPF 30+ sunscreen, UPF clothing, sun avoidance, tanning bed avoidance.    Follow up: pending biopsy results.     All risks, benefits and alternatives were discussed with patient.  Patient is in agreement and understands the assessment and plan.  All questions were answered.  Dagmar Grady PA-C  [facility] Dermatology  _______________________________________    CC: Skin Check (- spot on top of head, itchy, patient has used otc lotion)     HPI:  Mr. Jose Manuel Gallo is a(n) 89 year old male who presents today as a return patient for evaluation of a skin lesion involving the scalp. He is accompanied by his daughter.  He was last seen in dermatology on 9/12/2023 by Dr. 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    Today, he has concerns about scaly lesions throughout his scalp.  None of these itch, bleed, or cause pain.  He does feel he has continued to develop more throughout the past 1 year.  None of these are growing significantly in size.    Patient is otherwise feeling well, without " additional skin concerns.   Physical Exam:  SKIN: Focused examination of face and scalp only was performed per patient request.  There is a well healed scar involving the R superior occipital scalp without evidence of recurrence, nodularity, or tenderness to palpation  - scalp, diffuse pink macule(s) with overlying adherent scale and hyperkeratotic papules consistent with an actinic keratoses  - nose, cheeks, pink macule(s) with overlying adherent scale consistent with an actinic keratosis   - left temple, 0.8 cm erythematous hyperkeratotic papule  - left vertex scalp, 0.8 cm hyperkeratotic papule    - No other lesions of concern on areas examined.               Medications:  Current Outpatient Medications   Medication Sig Dispense Refill    acetaminophen (TYLENOL) 650 MG CR tablet Take 1 tablet (650 mg) by mouth every 8 hours as needed for pain 60 tablet 1    buprenorphine (BUTRANS) 10 MCG/HR WK patch Place 1 patch over 7 days onto the skin every 7 days. Fill 10/21 and start 10/24/2024. Early fill due to increased dosage. This is for chronic pain 4 patch 0    cyanocobalamin (VITAMIN B-12) 1000 MCG tablet Take 1 tablet (1,000 mcg) by mouth daily 90 tablet 3    ibuprofen (ADVIL/MOTRIN) 200 MG tablet Take 400 mg by mouth every 6 hours as needed for pain      ipratropium (ATROVENT) 0.06 % nasal spray Spray 2 sprays into both nostrils 4 times daily as needed for rhinitis ((for rhinitis andrei while eating)) 15 mL 1    lisinopril (ZESTRIL) 2.5 MG tablet TAKE ONE TABLET BY MOUTH EVERY DAY 90 tablet 1    polyethylene glycol-propylene glycol (SYSTANE ULTRA) 0.4-0.3 % SOLN ophthalmic solution Place 1 drop into both eyes 2 times daily.      simvastatin (ZOCOR) 40 MG tablet TAKE ONE TABLET BY MOUTH EVERY DAY 90 tablet 1    traMADol (ULTRAM) 50 MG tablet Take 1 tablet (50 mg) by mouth every 6 hours as needed for moderate to severe pain Max of 2 tabs per day. Okay to break in half if needed. Caution sedation. Don't drive until you  know how you feel on this medication. Fill/start 07/12/24 30 tablet 0     No current facility-administered medications for this visit.      Past Medical History:   Patient Active Problem List   Diagnosis    Erectile dysfunction    Osteoarthritis    HYPERLIPIDEMIA LDL GOAL <130    Hypertension goal BP (blood pressure) < 140/90    History of bladder cancer    Stage 3b chronic kidney disease (H)    Pseudophakia, Yag Caps, os    Dizziness    Posterior vitreous detachment, right    Hx of vitrectomy for chronic macular hole, os (ER)    Other idiopathic scoliosis, thoracolumbar region    Spondylosis of lumbar region without myelopathy or radiculopathy    Brittle nails    Combined forms of age-related cataract, mild-mod, of right eye    Pain of right eye    History of shingles    Low vitamin B12 level    B12 nutritional deficiency    Occipital neuralgia of left side    Post-nasal drip    Unintended weight loss    Dysphagia, unspecified type    Corneal scar, right eye     Past Medical History:   Diagnosis Date    Cataract, mild, od; mod, os 07/02/2012    Chronic low back pain     CKD (chronic kidney disease) stage 3, GFR 30-59 ml/min (H)     Erectile dysfunction     Hemorrhoids     History of bladder cancer         HTN     Hyperlipidemia LDL goal <130     Kidney stones     Macular degeneration     Osteoarthritis     Personal history of colonic polyps     10/2002    Personal history of malignant neoplasm of bladder     Personal history of tobacco use     Smoked 40 years, Quit 2003    PVD (peripheral vascular disease)     Left Vertebral Artery occlusion    Rotator cuff tendinitis, left     Squamous cell carcinoma of skin, unspecified        CC Walker Sorto MD  [address] [place] [id number] on close of this encounter.

## 2025-03-31 NOTE — PATIENT INSTRUCTIONS
Efudex Treatment  Today, you are being prescribed Fluorouracil (Efudex) a topical cream used for the treatment of Actinic Keratosis (AKs).  The medication is working to eliminate the unhealthy cells.  This treatment may be very uncomfortable.  Apply the efudex twice daily for two weeks to the affected areas of the face and scalp.  You may experience some mild discomfort while being treated.  You will want to stop any other creams such as glycolic acid products, retin A, Tazorac, etc. to the area. You may use bland makeup/cover-up as long as it doesn't sting or cause you discomfort.  Use a Q-tip or use gloves if applying it with your fingertips. If applied with unprotected fingertips, it is important to wash your hands well after you apply this medicine.   Keep this medication away from pets.  We recommend avoiding excessive sun exposure to the treated area. Wear a sunscreen with SPF 50+ to the areas being treated prior to any sun exposure as you will be more sensitive to the sun and more prone to sunburn while being treated and afterwards during healing process.   You may use ointments such as vaseline or Aquaphor, or moisturizing creams such as Vanicream or Cetaphil cream over bothersome areas. If the reaction becomes itchy you may apply over the counter hydrocorisone 1% cream or ointment twice daily to itchy areas. If the reaction becomes too bothersome, please call the clinic as we may need to discontinue treatment or reevaluate in the clinic.     Potential Side Effects  Your treated areas may be quite red during therapy.  This will improve slowly following the discontinuation of therapy.   During the first week of application, mild inflammation may occur.   During the following weeks, redness, and swelling may occur with some crusting and burning.   Lesions resolve as the skin cells turn over.   Over 1 to 2 weeks, new skin grows into the treatment area.  Keep this medication away from pets as it can be very  harmful to them.     Specific side effects that usually do not require medical attention (report to your doctor or health care professional if they continue or are bothersome) include:  Red or dark-colored skin   Mild erosion (loss of upper layer of skin)   Mild eye irritation including burning, itching, sensitivity, stinging, or watering   Increased sensitivity of the skin to sun and ultraviolet light   Pain and burning of the affected area   Dryness, scaling or swelling of the affected area   Skin rash, itching of the affected area   Tenderness   If you have severe pain, please, call the clinic immediately and indicate that you have pain.  Ask for a call from the RN.      Wound Care After a Biopsy    What is a skin biopsy?  A skin biopsy allows the doctor to examine a very small piece of tissue under the microscope to determine the diagnosis and the best treatment for the skin condition. A local anesthetic (numbing medicine)  is injected with a very small needle into the skin area to be tested. A small piece of skin is taken from the area. Sometimes a suture (stitch) is used.     What are the risks of a skin biopsy?  I will experience scar, bleeding, swelling, pain, crusting and redness. I may experience incomplete removal or recurrence. Risks of this procedure are excessive bleeding, bruising, infection, nerve damage, numbness, thick (hypertrophic or keloidal) scar and non-diagnostic biopsy.    How should I care for my wound for the first 24 hours?  Keep the wound dry and covered for 24 hours  If it bleeds, hold direct pressure on the area for 15 minutes. If bleeding does not stop then go to the emergency room  Avoid strenuous exercise the first 1-2 days or as your doctor instructs you    How should I care for the wound after 24 hours?  After 24 hours, remove the bandage  You may bathe or shower as normal  If you had a scalp biopsy, you can shampoo as usual and can use shower water to clean the biopsy site  daily  Clean the wound twice a day with gentle soap and water  Do not scrub, be gentle  Apply white petroleum/Vaseline after cleaning the wound with a cotton swab or a clean finger, and keep the site covered with a Bandaid /bandage. Bandages are not necessary with a scalp biopsy  If you are unable to cover the site with a Bandaid /bandage, re-apply ointment 2-3 times a day to keep the site moist. Moisture will help with healing  Avoid strenuous activity for first 1-2 days  Avoid lakes, rivers, pools, and oceans until the stitches are removed or the site is healed    How do I clean my wound?  Wash hands thoroughly with soap or use hand  before all wound care  Clean the wound with gentle soap and water  Apply white petroleum/Vaseline  to wound after it is clean  Replace the Bandaid /bandage to keep the wound covered for the first few days or as instructed by your doctor  If you had a scalp biopsy, warm shower water to the area on a daily basis should suffice    What should I use to clean my wound?   Cotton-tipped applicators (Qtips )  White petroleum jelly (Vaseline ). Use a clean new container and use Q-tips to apply.  Bandaids   as needed  Gentle soap     How should I care for my wound long term?  Do not get your wound dirty  Keep up with wound care for one week or until the area is healed.  A small scab will form and fall off by itself when the area is completely healed. The area will be red and will become pink in color as it heals. Sun protection is very important for how your scar will turn out. Sunscreen with an SPF 30 or greater is recommended once the area is healed.  You should have some soreness but it should be mild and slowly go away over several days. Talk to your doctor about using tylenol for pain,    When should I call my doctor?  If you have increased:   Pain or swelling  Pus or drainage (clear or slightly yellow drainage is ok)  Temperature over 100F  Spreading redness or warmth around  wound    When will I hear about my results?  The biopsy results can take 2 weeks to come back.  Your results will automatically release to [employer] before your provider has even reviewed them.  The clinic will call you with the results, send you a [employer] message, or have you schedule a follow-up clinic or phone time to discuss the results.  Contact our clinics if you do not hear from us in 2 weeks. If further treatment is needed for a skin cancer, this will be done at either our [place] or [place] office.    Who should I call with questions?  [facility]: [phone]  [facility]: [phone]  For urgent needs outside of business hours call the [facility] at [phone] and ask for the dermatology resident on call

## 2025-03-31 NOTE — LETTER
3/31/2025      Jose Manuel Gallo  [address] [place] [address]      Dear Colleague,    Thank you for referring your patient, Jose Manuel Gallo, to the [facility]. Please see a copy of my visit note below.    [facility] Dermatology Note  Encounter Date: Mar 31, 2025  Office Visit     Reviewed patients past medical history and pertinent chart review prior to patients visit today.     Dermatology Problem List:  # Neoplasm of uncertain behavior x2: left temple and left vertex scalp, s/p shave biopsy 3/31/2025     1. 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 AKs  - HAK, right temple, s/p biopsy 3/29/2023  - HAK, left parietal scalp, s/p biopsy 3/29/2023  -s/p cryo   -initiate Efudex 3/31/2025  3. ISKs  -s/p cryo    Family Hx: Negative for family history of skin cancer.  ____________________________________________    Assessment & Plan:     # Neoplasm of uncertain behavior x2:  left temple and left vertex scalp.  DDx includes SCCIS vs SCC vs HAK. Shave biopsy today.  Photograph obtained.  Procedure Note: Biopsy by shave technique  The risks and benefits of the procedure were described to the patient. These include but are not limited to bleeding, infection, scar, incomplete removal, and non-diagnostic biopsy. Verbal informed consent was obtained. The above site(s) was cleansed with an alcohol pad and injected with 1% lidocaine with epinephrine. Once anesthesia was obtained, a biopsy(ies) was performed with Gilette blade. The tissue(s) was placed in a labeled container(s) with formalin and sent to pathology. Hemostasis was achieved with aluminum chloride. Vaseline and a bandage were applied to the wound(s). The patient tolerated the procedure well and was given post biopsy care instructions.    # Diffuse actinic keratoses, scalp and face  Actinic keratoses are pre-cancerous skin growths caused by sun exposure. Treatment is recommended  "and medically indicated.  Patient to treat actinic keratoses with topical efudex therapy after biopsy sites have healed:    1)  Apply a thin coat of the cream bid to affected areas of face and scalp for 2 weeks. Discussed what ideal reaction looks like with patient. Wash hands after application.  2)  Apply Vaseline/Aquaphore 30 minutes after each Efudex treatment.  3)  Wash area in morning and evening prior to application  4) Continue Vaseline in between therapy. May use hydrocortisone 1% ointment for itching if needed BID.   5)  Discuss \"Raw irritating feeling\", redness, scaling, scabbing, erosion, and swelling. Unless you are told otherwise, you may take the recommended dose of acetaminophen (Tylenol ) for discomfort and diphenhydramine (Benadryl ) for itching.  6)  Given detailed instructions in AVS    # Personal history of nonmelanoma skin cancer  - No signs of recurrence. Continued observation recommended.   - Advised to monitor for changing, non-healing, bleeding, painful, changing, or otherwise symptomatic lesions  - Sun protection: Counseled SPF 30+ sunscreen, UPF clothing, sun avoidance, tanning bed avoidance.    Follow up: pending biopsy results.     All risks, benefits and alternatives were discussed with patient.  Patient is in agreement and understands the assessment and plan.  All questions were answered.  Dagmar Grady PA-C  [facility]  _______________________________________    CC: Skin Check (- spot on top of head, itchy, patient has used otc lotion)     HPI:  Mr. Jose Manuel Galol is a(n) 89 year old male who presents today as a return patient for evaluation of a skin lesion involving the scalp. He is accompanied by his daughter.  He was last seen in dermatology on 9/12/2023 by Dr. 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    Today, he has concerns about " scaly lesions throughout his scalp.  None of these itch, bleed, or cause pain.  He does feel he has continued to develop more throughout the past 1 year.  None of these are growing significantly in size.    Patient is otherwise feeling well, without additional skin concerns.   Physical Exam:  SKIN: Focused examination of face and scalp only was performed per patient request.  There is a well healed scar involving the R superior occipital scalp without evidence of recurrence, nodularity, or tenderness to palpation  - scalp, diffuse pink macule(s) with overlying adherent scale and hyperkeratotic papules consistent with an actinic keratoses  - nose, cheeks, pink macule(s) with overlying adherent scale consistent with an actinic keratosis   - left temple, 0.8 cm erythematous hyperkeratotic papule  - left vertex scalp, 0.8 cm hyperkeratotic papule    - No other lesions of concern on areas examined.               Medications:  Current Outpatient Medications   Medication Sig Dispense Refill     acetaminophen (TYLENOL) 650 MG CR tablet Take 1 tablet (650 mg) by mouth every 8 hours as needed for pain 60 tablet 1     buprenorphine (BUTRANS) 10 MCG/HR WK patch Place 1 patch over 7 days onto the skin every 7 days. Fill 10/21 and start 10/24/2024. Early fill due to increased dosage. This is for chronic pain 4 patch 0     cyanocobalamin (VITAMIN B-12) 1000 MCG tablet Take 1 tablet (1,000 mcg) by mouth daily 90 tablet 3     ibuprofen (ADVIL/MOTRIN) 200 MG tablet Take 400 mg by mouth every 6 hours as needed for pain       ipratropium (ATROVENT) 0.06 % nasal spray Spray 2 sprays into both nostrils 4 times daily as needed for rhinitis ((for rhinitis andrei while eating)) 15 mL 1     lisinopril (ZESTRIL) 2.5 MG tablet TAKE ONE TABLET BY MOUTH EVERY DAY 90 tablet 1     polyethylene glycol-propylene glycol (SYSTANE ULTRA) 0.4-0.3 % SOLN ophthalmic solution Place 1 drop into both eyes 2 times daily.       simvastatin (ZOCOR) 40 MG tablet  TAKE ONE TABLET BY MOUTH EVERY DAY 90 tablet 1     traMADol (ULTRAM) 50 MG tablet Take 1 tablet (50 mg) by mouth every 6 hours as needed for moderate to severe pain Max of 2 tabs per day. Okay to break in half if needed. Caution sedation. Don't drive until you know how you feel on this medication. Fill/start 07/12/24 30 tablet 0     No current facility-administered medications for this visit.      Past Medical History:   Patient Active Problem List   Diagnosis     Erectile dysfunction     Osteoarthritis     HYPERLIPIDEMIA LDL GOAL <130     Hypertension goal BP (blood pressure) < 140/90     History of bladder cancer     Stage 3b chronic kidney disease (H)     Pseudophakia, Yag Caps, os     Dizziness     Posterior vitreous detachment, right     Hx of vitrectomy for chronic macular hole, os (ER)     Other idiopathic scoliosis, thoracolumbar region     Spondylosis of lumbar region without myelopathy or radiculopathy     Brittle nails     Combined forms of age-related cataract, mild-mod, of right eye     Pain of right eye     History of shingles     Low vitamin B12 level     B12 nutritional deficiency     Occipital neuralgia of left side     Post-nasal drip     Unintended weight loss     Dysphagia, unspecified type     Corneal scar, right eye     Past Medical History:   Diagnosis Date     Cataract, mild, od; mod, os 07/02/2012     Chronic low back pain      CKD (chronic kidney disease) stage 3, GFR 30-59 ml/min (H)      Erectile dysfunction      Hemorrhoids      History of bladder cancer          HTN      Hyperlipidemia LDL goal <130      Kidney stones      Macular degeneration      Osteoarthritis      Personal history of colonic polyps     10/2002     Personal history of malignant neoplasm of bladder      Personal history of tobacco use     Smoked 40 years, Quit 2003     PVD (peripheral vascular disease)     Left Vertebral Artery occlusion     Rotator cuff tendinitis, left      Squamous cell carcinoma of skin,  unspecified        CC Walker Sorto MD  [address] [place] [id number] on close of this encounter.       Again, thank you for allowing me to participate in the care of your patient.        Sincerely,        Laurie Grady PA-C    Electronically signed

## 2025-04-03 ENCOUNTER — TELEPHONE (OUTPATIENT)
Dept: DERMATOLOGY | Facility: CLINIC | Age: OVER 89
End: 2025-04-03
Payer: COMMERCIAL

## 2025-04-03 DIAGNOSIS — D48.9 NEOPLASM OF UNCERTAIN BEHAVIOR: Primary | ICD-10-CM

## 2025-04-03 NOTE — TELEPHONE ENCOUNTER
Called patient:  Patient notified and educated on test results   Mohs procedure explained- all questions answered  appointment scheduled- mohs packet mailed.     Thank you,  Jami ODONNELL RN  Dermatology   [phone]

## 2025-04-03 NOTE — TELEPHONE ENCOUNTER
Patient notified of test results and providers message, he needs to call his daughter to discuss which location would be easier for her to drive to    Daughter will Sydni Meyer will call back to discuss scheduled    Thank you,    Ursula GUAN RN BSN  [facility]- [phone]

## 2025-04-03 NOTE — TELEPHONE ENCOUNTER
----- Message from Laurie Grady sent at 4/3/2025  7:39 AM CDT -----  A. Left temple, BCC, needs Mohs.   B. Left vertex scalp, hypertrophic actinic keratosis. - An actinic keratosis is not a skin cancer, but has the potential to become cancerous. Actinic keratoses are caused by sun exposure.  Given that the actinic keratosis was hypertrophic, which means that it was more thick, it does need further treatment.  Once biopsy site has healed, patient to treat with topical Efudex therapy twice daily for 2 weeks as prescribed during visit, this should be applied to his whole scalp as previously discussed.      Please place order and call patient to schedule for Mohs. Thank you!

## 2025-04-08 NOTE — PROGRESS NOTES
Surgical Office Location :   [facility]  [address] [place] [address]

## 2025-04-09 ENCOUNTER — OFFICE VISIT (OUTPATIENT)
Dept: DERMATOLOGY | Facility: CLINIC | Age: OVER 89
End: 2025-04-09
Payer: COMMERCIAL

## 2025-04-09 DIAGNOSIS — L57.0 AK (ACTINIC KERATOSIS): ICD-10-CM

## 2025-04-09 DIAGNOSIS — C44.319 BASAL CELL CARCINOMA (BCC) OF LEFT TEMPLE REGION: Primary | ICD-10-CM

## 2025-04-09 PROCEDURE — 17000 DESTRUCT PREMALG LESION: CPT | Performed by: DERMATOLOGY

## 2025-04-09 PROCEDURE — 17311 MOHS 1 STAGE H/N/HF/G: CPT | Performed by: DERMATOLOGY

## 2025-04-09 NOTE — PATIENT INSTRUCTIONS
Open Wound Care     for left temple        No strenuous activity for 48 hours    Take Tylenol as needed for discomfort.                                                .         Do not drink alcoholic beverages for 48 hours.    Keep the pressure bandage in place for 24 hours. If the bandage becomes blood tinged or loose, reinforce it with gauze and tape.        (Refer to the reverse side of this page for management of bleeding).    Remove bandage in 24 hours and begin wound care as follows:     Clean area with tap water using a Q tip or gauze pad, (shower / bathe normally)  Dry wound with Q tip or gauze pad  Apply Aquaphor, Vaseline, Polysporin or Bacitracin Ointment with a Q tip  Do NOT use Neosporin Ointment *  Cover the wound with a band-aid or nonstick gauze pad and paper tape.  Repeat wound care once a day until wound is completely healed.    It is an old wives tale that a wound heals better when it is exposed to air and allowed to dry out. The wound will heal faster with a better cosmetic result if it is kept moist with ointment and covered with a bandage.  Do not let the wound dry out.      Supplies Needed:                Qtips or gauze pads                Polysporin or Bacitracin Ointment                Bandaids or nonstick gauze pads and paper tape    Wound care kits and brown paper tape are available for purchase at   the pharmacy.    BLEEDING:    Use tightly rolled up gauze or cloth to apply direct pressure over the bandage for 20   minutes.  Reapply pressure for an additional 20 minutes if necessary  Call the office or go to the nearest emergency room if pressure fails to stop the bleeding.  Use additional gauze and tape to maintain pressure once the bleeding has stopped.  Begin wound care 24 hours after surgery as directed.                  WOUND HEALING    One week after surgery a pink / red halo will form around the outside of the wound.   This is new skin.  The center of the wound will appear  yellowish white and produce some drainage.  The pink halo will slowly migrate in toward the center of the wound until the wound is covered with new shiny pink skin.  There will be no more drainage when the wound is completely healed.  It will take six months to one year for the redness to fade.  The scar may be itchy, tight and sensitive to extreme temperatures for a year after the surgery.  Massaging the area several times a day for several minutes after the wound is completely healed will help the scar soften and normalize faster. Begin massage only after healing is complete.      In case of emergency call: Dr Parkre: [phone]    [facility]: [phone]    [facility]:[phone]     WOUND CARE INSTRUCTIONS   FOR CRYOSURGERY       This area treated with liquid nitrogen should form a blister (areas treated may or may not blister-skin may just turn dark and slough off). You do not need to bandage the area unless a blister forms and breaks (which may be a few days). When the blister breaks, begin daily dressing changes as follows:     1) Clean and dry the area with tap water using clean Q-tip or sterile gauze pad.   2) Apply Polysporin, Aquaphor, Vaseline or Bacitracin ointment over entire wound. Do NOT use Neosporin ointment.   3) Cover the wound with a band-aid or sterile non-stick gauze pad and micropore paper tape.     REPEAT THESE INSTRUCTIONS AT LEAST ONCE A DAY UNTIL THE WOUND HAS COMPLETELY HEALED.   It is an old wives tale that a wound heals better when it is exposed to air and allowed to dry out. The wound will heal faster with a better cosmetic result if it is kept moist with ointment and covered with a bandage.   *Do not let the wound dry out*    Supplies Needed:     *Cotton tipped applicators (Q-tips)   *Aquaphor, Vaseline, Polysporin or Bacitracin ointment (NOT NEOSPORIN)   *Band-aids, or non stick gauze pads and micropore paper tape     PATIENT INFORMATION   During the healing  process you will notice a number of changes. All wounds develop a small halo of redness surrounding the wound. This means healing is occurring.   Severe itching with extensive redness usually indicates sensitivity to the ointment or bandage tape used to dress the wound. You should call our office if this develops.   Swelling and/or discoloration around your surgical site is common, particularly when performed around the eye.   All wounds normally drain. The larger the wound the more drainage there will be. After 7-10 days, you will notice the wound beginning to shrink and new skin will begin to grow. The wound is healed when you can see skin has formed over the entire area. A healed wound has a healthy, shiny look to the surface and is red to dark pink in color to normalize. Wounds may take approximately 4-6 weeks to heal. Larger wounds may take 6-8 weeks. After the wound is healed you may discontinue dressing changes.     You may experience a sensation of tightness as your wound heals. This is normal and will gradually subside.   Your healed wound may be sensitive to temperature changes. This sensitivity improves with time, but if you're having a lot of discomfort, try to avoid temperature extremes.     Patients frequently experience itching after their wound appears to have healed because of the continue healing under the skin. Plain Vaseline will help relieve the itching.            Proper skin care from [facility]:    -Eliminate harsh soaps as they strip the natural oils from the skin, often resulting in dry itchy skin ( i.e. Dial, Zest, [place])  -Use mild soaps such as Cetaphil or Dove Sensitive Skin in the shower. You do not need to use soap on arms, legs, and trunk every time you shower unless visibly soiled.   -Avoid hot or cold showers.  -After showering, lightly dry off and apply moisturizing within 2-3 minutes. This will help trap moisture in the skin.   -Aggressive use of a moisturizer at  least 1-2 times a day to the entire body (including -Vanicream, Cetaphil, Aquaphor or Cerave) and moisturize hands after every washing.  -We recommend using moisturizers that come in a tub that needs to be scooped out, not a pump. This has more of an oil base. It will hold moisture in your skin much better than a water base moisturizer. The above recommended are non-pore clogging.      Wear a sunscreen with at least SPF 30 on your face, ears, neck and V of the chest daily. Wear sunscreen on other areas of the body if those areas are exposed to the sun throughout the day. Sunscreens can contain physical and/or chemical blockers. Physical blockers are less likely to clog pores, these include zinc oxide and titanium dioxide. Reapply every two hour and after swimming.     Sunscreen examples: https://www.ewg.org/sunscreen/    UV radiation  UVA radiation remains constant throughout the day and throughout the year. It is a longer wavelength than UVB and therefore penetrates deeper into the skin leading to immediate and delayed tanning, photoaging, and skin cancer. 70-80% of UVA and UVB radiation occurs between the hours of 10am-2pm.  UVB radiation  UVB radiation causes the most harmful effects and is more significant during the summer months. However, snow and ice can reflect UVB radiation leading to skin damage during the winter months as well. UVB radiation is responsible for tanning, burning, inflammation, delayed erythema (pinkness), pigmentation (brown spots), and skin cancer.     I recommend self monthly full body exams and yearly full body exams with a dermatology provider. If you develop a new or changing lesion please follow up for examination. Most skin cancers are pink and scaly or pink and pearly. However, we do see blue/brown/black skin cancers.  Consider the ABCDEs of melanoma when giving yourself your monthly full body exam ( don't forget the groin, buttocks, feet, toes, etc). A-asymmetry, B-borders,  C-color, D-diameter, E-elevation or evolving. If you see any of these changes please follow up in clinic. If you cannot see your back I recommend purchasing a hand held mirror to use with a larger wall mirror.       Checking for Skin Cancer  You can find cancer early by checking your skin each month. There are 3 kinds of skin cancer. They are melanoma, basal cell carcinoma, and squamous cell carcinoma. Doing monthly skin checks is the best way to find new marks or skin changes. Follow the instructions below for checking your skin.   The ABCDEs of checking moles for melanoma   Check your moles or growths for signs of melanoma using ABCDE:   Asymmetry: the sides of the mole or growth don t match  Border: the edges are ragged, notched, or blurred  Color: the color within the mole or growth varies  Diameter: the mole or growth is larger than 6 mm (size of a pencil eraser)  Evolving: the size, shape, or color of the mole or growth is changing (evolving is not shown in the images below)    Checking for other types of skin cancer  Basal cell carcinoma or squamous cell carcinoma have symptoms such as:     A spot or mole that looks different from all other marks on your skin  Changes in how an area feels, such as itching, tenderness, or pain  Changes in the skin's surface, such as oozing, bleeding, or scaliness  A sore that does not heal  New swelling or redness beyond the border of a mole    Who s at risk?  Anyone can get skin cancer. But you are at greater risk if you have:   Fair skin, light-colored hair, or light-colored eyes  Many moles or abnormal moles on your skin  A history of sunburns from sunlight or tanning beds  A family history of skin cancer  A history of exposure to radiation or chemicals  A weakened immune system  If you have had skin cancer in the past, you are at risk for recurring skin cancer.   How to check your skin  Do your monthly skin checkups in front of a full-length mirror. Check all parts of your  body, including your:   Head (ears, face, neck, and scalp)  Torso (front, back, and sides)  Arms (tops, undersides, upper, and lower armpits)  Hands (palms, backs, and fingers, including under the nails)  Buttocks and genitals  Legs (front, back, and sides)  Feet (tops, soles, toes, including under the nails, and between toes)  If you have a lot of moles, take digital photos of them each month. Make sure to take photos both up close and from a distance. These can help you see if any moles change over time.   Most skin changes are not cancer. But if you see any changes in your skin, call your doctor right away. Only he or she can diagnose a problem. If you have skin cancer, seeing your doctor can be the first step toward getting the treatment that could save your life.   [employer] last reviewed this educational content on 4/1/2019 2000-2020 [employer]. [address] [place] [address]. All rights reserved. This information is not intended as a substitute for professional medical care. Always follow your healthcare professional's instructions.       When should I call my doctor?  If you are worsening or not improving, please, contact us or seek urgent care as noted below.     Who should I call with questions (adults)?    [facility] [phone]  For urgent needs outside of business hours call the [facility] at [phone] and ask for the dermatology resident on call to be paged  If this is a medical emergency and you are unable to reach an ER, Call [phone]      If you need a prescription refill, please contact your pharmacy. Refills are approved or denied by our Physicians during normal business hours, Monday through Friday.  Per office policy, refills will not be granted if you have not been seen within the past year (or sooner depending on the condition).

## 2025-04-09 NOTE — PROGRESS NOTES
Jose Manuel Gallo is an extremely pleasant 89 year old year old male patient here today for evaluation and managment of basal cell carcinoma on left temple and actinic keratosis on scalp. .  Patient has no other skin complaints today.  Remainder of the HPI, Meds, PMH, Allergies, FH, and SH was reviewed in chart.      Past Medical History:   Diagnosis Date    Basal cell carcinoma 04/09/2025    Left [place]    Cataract, mild, od; mod, os 07/02/2012    Chronic low back pain     CKD (chronic kidney disease) stage 3, GFR 30-59 ml/min (H)     Erectile dysfunction     Hemorrhoids     History of bladder cancer         HTN     Hyperlipidemia LDL goal <130     Kidney stones     Macular degeneration     Osteoarthritis     Personal history of colonic polyps     10/2002    Personal history of malignant neoplasm of bladder     Personal history of tobacco use     Smoked 40 years, Quit 2003    PVD (peripheral vascular disease)     Left Vertebral Artery occlusion    Rotator cuff tendinitis, left     Squamous cell carcinoma of skin, unspecified        Past Surgical History:   Procedure Laterality Date    CATARACT IOL, RT/LT      COLONOSCOPY  11/02/2008    Normal    HC REVISE ULNAR NERVE AT ELBOW      Left    LASER YAG CAPSULOTOMY  7/2016    left eye    PHACOEMULSIFICATION WITH STANDARD INTRAOCULAR LENS IMPLANT  7/2012    left eye    TURP  1997 ?    VASECTOMY      VITRECTOMY PARSPLANA  8/2011    left eye, repair macular hole    ZZC SPINAL FUSION,ANT,EA ADNL LEVEL      C6-7        Family History   Problem Relation Age of Onset    Osteoporosis Mother     Diabetes Father     Arthritis Father     Cancer Father     Respiratory Father     Genitourinary Problems Brother     Circulatory Brother     Macular Degeneration No family hx of     Glaucoma No family hx of     Thyroid Disease No family hx of     Hypertension No family hx of        Social History     Socioeconomic History    Marital status:      Spouse name: Not on file     Number of children: Not on file    Years of education: Not on file    Highest education level: Not on file   Occupational History    Not on file   Tobacco Use    Smoking status: Former     Current packs/day: 0.00     Types: Cigarettes     Start date: 1963     Quit date: 2003     Years since quittin.2     Passive exposure: Past    Smokeless tobacco: Never   Vaping Use    Vaping status: Never Used   Substance and Sexual Activity    Alcohol use: Yes     Comment: 1 x month    Drug use: No    Sexual activity: Never   Other Topics Concern    Parent/sibling w/ CABG, MI or angioplasty before 65F 55M? No   Social History Narrative    Not on file     Social Drivers of Health     Financial Resource Strain: Not on file   Food Insecurity: Not on file   Transportation Needs: Not on file   Physical Activity: Inactive (3/10/2025)    Exercise Vital Sign     Days of Exercise per Week: 0 days     Minutes of Exercise per Session: 0 min   Stress: No Stress Concern Present (3/10/2025)    [employer] - Occupational Stress Questionnaire     Feeling of Stress : Not at all   Social Connections: Unknown (3/10/2025)    Social Connection and Isolation Panel [NHANES]     Frequency of Communication with Friends and Family: Not on file     Frequency of Social Gatherings with Friends and Family: Twice a week     Attends Spiritism Services: Not on file     Active Member of Clubs or Organizations: Not on file     Attends Club or Organization Meetings: Not on file     Marital Status: Not on file   Interpersonal Safety: Low Risk  (3/10/2025)    Interpersonal Safety     Do you feel physically and emotionally safe where you currently live?: Yes     Within the past 12 months, have you been hit, slapped, kicked or otherwise physically hurt by someone?: No     Within the past 12 months, have you been humiliated or emotionally abused in other ways by your partner or ex-partner?: No   Housing Stability: Not on file        Outpatient Encounter Medications as of 4/9/2025   Medication Sig Dispense Refill    acetaminophen (TYLENOL) 650 MG CR tablet Take 1 tablet (650 mg) by mouth every 8 hours as needed for pain 60 tablet 1    buprenorphine (BUTRANS) 10 MCG/HR WK patch Place 1 patch over 7 days onto the skin every 7 days. Fill 10/21 and start 10/24/2024. Early fill due to increased dosage. This is for chronic pain 4 patch 0    cyanocobalamin (VITAMIN B-12) 1000 MCG tablet Take 1 tablet (1,000 mcg) by mouth daily 90 tablet 3    fluorouracil (EFUDEX) 5 % external cream Apply topically 2 times daily. Apply to affected areas twice daily on the scalp and face for 2 weeks. Keep away from pets. Avoid sun exposure during treatment. 40 g 1    ibuprofen (ADVIL/MOTRIN) 200 MG tablet Take 400 mg by mouth every 6 hours as needed for pain      ipratropium (ATROVENT) 0.06 % nasal spray Spray 2 sprays into both nostrils 4 times daily as needed for rhinitis ((for rhinitis andrei while eating)) 15 mL 1    lisinopril (ZESTRIL) 2.5 MG tablet TAKE ONE TABLET BY MOUTH EVERY DAY 90 tablet 1    polyethylene glycol-propylene glycol (SYSTANE ULTRA) 0.4-0.3 % SOLN ophthalmic solution Place 1 drop into both eyes 2 times daily.      simvastatin (ZOCOR) 40 MG tablet TAKE ONE TABLET BY MOUTH EVERY DAY 90 tablet 1    traMADol (ULTRAM) 50 MG tablet Take 1 tablet (50 mg) by mouth every 6 hours as needed for moderate to severe pain Max of 2 tabs per day. Okay to break in half if needed. Caution sedation. Don't drive until you know how you feel on this medication. Fill/start 07/12/24 30 tablet 0     No facility-administered encounter medications on file as of 4/9/2025.             O:   NAD, WDWN, Alert & Oriented, Mood & Affect wnl, Vitals stable   General appearance normal   Vitals stable   Alert, oriented and in no acute distress     L temple 8mm ulcerated papule  Post scalp gritty scaly papule     Eyes: Conjunctivae/lids:Normal     ENT: Lips, mucosa:  normal    MSK:Normal    Cardiovascular: peripheral edema none    Pulm: Breathing Normal    Neuro/Psych: Orientation:Alert and Orientedx3 ; Mood/Affect:normal       A/P:  L temple basal cell carcinoma   MOHS:   Location    The rationale for Mohs surgery was discussed with the patient and consent was obtained.  The risks and benefits as well as alternatives to therapy were discussed, in detail.  Specifically, the risks of infection, scarring, bleeding, prolonged wound healing, incomplete removal, allergy to anesthesia, nerve injury and recurrence were addressed.  Indication for Mohs was Location. Prior to the procedure, the treatment site was clearly identified and, if available, confirmed with previous photos and confirmed by the patient   All components of the Universal Protocol/PAUSE rule were completed.  The Mohs surgeon operated in two distinct and integrated capacities as the surgeon and pathologist.      The area was prepped with Betasept.  A rim of normal appearing skin was marked circumferentially around the lesion.  The area was infiltrated with local anesthesia.  The tumor was first debulked to remove all clinically apparent tumor.  An incision following the standard Mohs approach was done and the specimen was oriented,mapped and placed in 1 block(s).  Each specimen was then chromacoded and processed in the Mohs laboratory using standard Mohs technique and submitted for frozen section histology.  Frozen section analysis showed no residual tumor but CLEAR MARGINS.      The tumor was excised using standard Mohs technique in 1 stages(s).  CLEAR MARGINS OBTAINED and Final defect size was 1.3 cm.     We discussed the options for wound management in full with the patient including risks/benefits/ possible outcomes.      REPAIR SECOND INTENT: We discussed the options for wound management in full with the patient including risks/benefits/possible outcomes. Decision made to allow the wound to heal by second  intention. Cautery was used for for hemostasis. EBL minimal; complications none; wound care routine.  The patient was discharged in good condition and will return in one month or prn for wound evaluation.    2. Post scalp actinic keratosis   LN2:  Treated with LN2 for 5s for 1-2 cycles. Warned risks of blistering, pain, pigment change, scarring, and incomplete resolution.  Advised patient to return if lesions do not completely resolve.  Wound care sheet given.    It was a pleasure speaking to Jose Manuel Gallo today.  Previous clinic notes and pertinent laboratory tests were reviewed prior to Jose Manuel Gallo's visit.  Signs and Symptoms of skin cancer discussed with patient.  Patient encouraged to perform monthly skin exams.  UV precautions reviewed with patient.  Risks of non-melanoma skin cancer discussed with patient   Return to clinic 6 months

## 2025-04-09 NOTE — LETTER
4/9/2025      Jose Manuel Gallo  [address] [place] [address]      Dear Colleague,    Thank you for referring your patient, Jose Manuel Gallo, to the [facility]. Please see a copy of my visit note below.    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 .  Patient has no other skin complaints today.  Remainder of the HPI, Meds, PMH, Allergies, FH, and SH was reviewed in chart.      Past Medical History:   Diagnosis Date     Basal cell carcinoma 04/09/2025    Left [place]     Cataract, mild, od; mod, os 07/02/2012     Chronic low back pain      CKD (chronic kidney disease) stage 3, GFR 30-59 ml/min (H)      Erectile dysfunction      Hemorrhoids      History of bladder cancer          HTN      Hyperlipidemia LDL goal <130      Kidney stones      Macular degeneration      Osteoarthritis      Personal history of colonic polyps     10/2002     Personal history of malignant neoplasm of bladder      Personal history of tobacco use     Smoked 40 years, Quit 2003     PVD (peripheral vascular disease)     Left Vertebral Artery occlusion     Rotator cuff tendinitis, left      Squamous cell carcinoma of skin, unspecified        Past Surgical History:   Procedure Laterality Date     CATARACT IOL, RT/LT       COLONOSCOPY  11/02/2008    Normal     HC REVISE ULNAR NERVE AT ELBOW      Left     LASER YAG CAPSULOTOMY  7/2016    left eye     PHACOEMULSIFICATION WITH STANDARD INTRAOCULAR LENS IMPLANT  7/2012    left eye     TURP  1997 ?     VASECTOMY       VITRECTOMY PARSPLANA  8/2011    left eye, repair macular hole     ZZC SPINAL FUSION,ANT,EA ADNL LEVEL      C6-7        Family History   Problem Relation Age of Onset     Osteoporosis Mother      Diabetes  Father      Arthritis Father      Cancer Father      Respiratory Father      Genitourinary Problems Brother      Circulatory Brother      Macular Degeneration No family hx of      Glaucoma No family hx of      Thyroid Disease No family hx of      Hypertension No family hx of        Social History     Socioeconomic History     Marital status:      Spouse name: Not on file     Number of children: Not on file     Years of education: Not on file     Highest education level: Not on file   Occupational History     Not on file   Tobacco Use     Smoking status: Former     Current packs/day: 0.00     Types: Cigarettes     Start date: 1963     Quit date: 2003     Years since quittin.2     Passive exposure: Past     Smokeless tobacco: Never   Vaping Use     Vaping status: Never Used   Substance and Sexual Activity     Alcohol use: Yes     Comment: 1 x month     Drug use: No     Sexual activity: Never   Other Topics Concern     Parent/sibling w/ CABG, MI or angioplasty before 65F 55M? No   Social History Narrative     Not on file     Social Drivers of Health     Financial Resource Strain: Not on file   Food Insecurity: Not on file   Transportation Needs: Not on file   Physical Activity: Inactive (3/10/2025)    Exercise Vital Sign      Days of Exercise per Week: 0 days      Minutes of Exercise per Session: 0 min   Stress: No Stress Concern Present (3/10/2025)    [employer] [place] [employer] - Occupational Stress Questionnaire      Feeling of Stress : Not at all   Social Connections: Unknown (3/10/2025)    Social Connection and Isolation Panel [NHANES]      Frequency of Communication with Friends and Family: Not on file      Frequency of Social Gatherings with Friends and Family: Twice a week      Attends [employer] Services: Not on file      Active Member of Clubs or Organizations: Not on file      Attends Club or Organization Meetings: Not on file      Marital Status: Not on file   Interpersonal  Safety: Low Risk  (3/10/2025)    Interpersonal Safety      Do you feel physically and emotionally safe where you currently live?: Yes      Within the past 12 months, have you been hit, slapped, kicked or otherwise physically hurt by someone?: No      Within the past 12 months, have you been humiliated or emotionally abused in other ways by your partner or ex-partner?: No   Housing Stability: Not on file       Outpatient Encounter Medications as of 4/9/2025   Medication Sig Dispense Refill     acetaminophen (TYLENOL) 650 MG CR tablet Take 1 tablet (650 mg) by mouth every 8 hours as needed for pain 60 tablet 1     buprenorphine (BUTRANS) 10 MCG/HR WK patch Place 1 patch over 7 days onto the skin every 7 days. Fill 10/21 and start 10/24/2024. Early fill due to increased dosage. This is for chronic pain 4 patch 0     cyanocobalamin (VITAMIN B-12) 1000 MCG tablet Take 1 tablet (1,000 mcg) by mouth daily 90 tablet 3     fluorouracil (EFUDEX) 5 % external cream Apply topically 2 times daily. Apply to affected areas twice daily on the scalp and face for 2 weeks. Keep away from pets. Avoid sun exposure during treatment. 40 g 1     ibuprofen (ADVIL/MOTRIN) 200 MG tablet Take 400 mg by mouth every 6 hours as needed for pain       ipratropium (ATROVENT) 0.06 % nasal spray Spray 2 sprays into both nostrils 4 times daily as needed for rhinitis ((for rhinitis andrei while eating)) 15 mL 1     lisinopril (ZESTRIL) 2.5 MG tablet TAKE ONE TABLET BY MOUTH EVERY DAY 90 tablet 1     polyethylene glycol-propylene glycol (SYSTANE ULTRA) 0.4-0.3 % SOLN ophthalmic solution Place 1 drop into both eyes 2 times daily.       simvastatin (ZOCOR) 40 MG tablet TAKE ONE TABLET BY MOUTH EVERY DAY 90 tablet 1     traMADol (ULTRAM) 50 MG tablet Take 1 tablet (50 mg) by mouth every 6 hours as needed for moderate to severe pain Max of 2 tabs per day. Okay to break in half if needed. Caution sedation. Don't drive until you know how you feel on this  medication. Fill/start 07/12/24 30 tablet 0     No facility-administered encounter medications on file as of 4/9/2025.             O:   NAD, WDWN, Alert & Oriented, Mood & Affect wnl, Vitals stable   General appearance normal   Vitals stable   Alert, oriented and in no acute distress     L temple 8mm ulcerated papule  Post scalp gritty scaly papule     Eyes: Conjunctivae/lids:Normal     ENT: Lips, mucosa: normal    MSK:Normal    Cardiovascular: peripheral edema none    Pulm: Breathing Normal    Neuro/Psych: Orientation:Alert and Orientedx3 ; Mood/Affect:normal       A/P:  L temple basal cell carcinoma   MOHS:   Location    The rationale for Mohs surgery was discussed with the patient and consent was obtained.  The risks and benefits as well as alternatives to therapy were discussed, in detail.  Specifically, the risks of infection, scarring, bleeding, prolonged wound healing, incomplete removal, allergy to anesthesia, nerve injury and recurrence were addressed.  Indication for Mohs was Location. Prior to the procedure, the treatment site was clearly identified and, if available, confirmed with previous photos and confirmed by the patient   All components of the Universal Protocol/PAUSE rule were completed.  The Mohs surgeon operated in two distinct and integrated capacities as the surgeon and pathologist.      The area was prepped with Betasept.  A rim of normal appearing skin was marked circumferentially around the lesion.  The area was infiltrated with local anesthesia.  The tumor was first debulked to remove all clinically apparent tumor.  An incision following the standard Mohs approach was done and the specimen was oriented,mapped and placed in 1 block(s).  Each specimen was then chromacoded and processed in the Mohs laboratory using standard Mohs technique and submitted for frozen section histology.  Frozen section analysis showed no residual tumor but CLEAR MARGINS.      The tumor was excised using standard  Mohs technique in 1 stages(s).  CLEAR MARGINS OBTAINED and Final defect size was 1.3 cm.     We discussed the options for wound management in full with the patient including risks/benefits/ possible outcomes.      REPAIR SECOND INTENT: We discussed the options for wound management in full with the patient including risks/benefits/possible outcomes. Decision made to allow the wound to heal by second intention. Cautery was used for for hemostasis. EBL minimal; complications none; wound care routine.  The patient was discharged in good condition and will return in one month or prn for wound evaluation.    2. Post scalp actinic keratosis   LN2:  Treated with LN2 for 5s for 1-2 cycles. Warned risks of blistering, pain, pigment change, scarring, and incomplete resolution.  Advised patient to return if lesions do not completely resolve.  Wound care sheet given.    It was a pleasure speaking to Jose Manuel Gallo today.  Previous clinic notes and pertinent laboratory tests were reviewed prior to Jose Manuel Gallo's visit.  Signs and Symptoms of skin cancer discussed with patient.  Patient encouraged to perform monthly skin exams.  UV precautions reviewed with patient.  Risks of non-melanoma skin cancer discussed with patient   Return to clinic 6 months        Again, thank you for allowing me to participate in the care of your patient.        Sincerely,        Aaron Parker MD    Electronically signed

## 2025-04-28 ENCOUNTER — OFFICE VISIT (OUTPATIENT)
Dept: PALLIATIVE MEDICINE | Facility: CLINIC | Age: OVER 89
End: 2025-04-28
Attending: NURSE PRACTITIONER
Payer: COMMERCIAL

## 2025-04-28 VITALS — DIASTOLIC BLOOD PRESSURE: 67 MMHG | SYSTOLIC BLOOD PRESSURE: 144 MMHG | HEART RATE: 57 BPM

## 2025-04-28 DIAGNOSIS — M47.816 SPONDYLOSIS WITHOUT MYELOPATHY OR RADICULOPATHY, LUMBAR REGION: ICD-10-CM

## 2025-04-28 DIAGNOSIS — M54.59 LUMBAR FACET JOINT PAIN: Primary | ICD-10-CM

## 2025-04-28 DIAGNOSIS — G89.29 CHRONIC LEFT-SIDED LOW BACK PAIN WITHOUT SCIATICA: ICD-10-CM

## 2025-04-28 DIAGNOSIS — M48.061 SPINAL STENOSIS OF LUMBAR REGION WITHOUT NEUROGENIC CLAUDICATION: ICD-10-CM

## 2025-04-28 DIAGNOSIS — M48.061 NEURAL FORAMINAL STENOSIS OF LUMBAR SPINE: ICD-10-CM

## 2025-04-28 DIAGNOSIS — M54.50 CHRONIC LEFT-SIDED LOW BACK PAIN WITHOUT SCIATICA: ICD-10-CM

## 2025-04-28 PROCEDURE — 99213 OFFICE O/P EST LOW 20 MIN: CPT | Performed by: NURSE PRACTITIONER

## 2025-04-28 PROCEDURE — 3078F DIAST BP <80 MM HG: CPT | Performed by: NURSE PRACTITIONER

## 2025-04-28 PROCEDURE — 1125F AMNT PAIN NOTED PAIN PRSNT: CPT | Performed by: NURSE PRACTITIONER

## 2025-04-28 PROCEDURE — 3077F SYST BP >= 140 MM HG: CPT | Performed by: NURSE PRACTITIONER

## 2025-04-28 PROCEDURE — G2211 COMPLEX E/M VISIT ADD ON: HCPCS | Performed by: NURSE PRACTITIONER

## 2025-04-28 ASSESSMENT — PAIN SCALES - GENERAL: PAINLEVEL_OUTOF10: MILD PAIN (2)

## 2025-04-28 NOTE — PATIENT INSTRUCTIONS
Plan:  Physical Therapy: continue exercises learned in P T   Clinical Health Psychologist to address issues of relaxation, behavioral change, coping style, and other factors important to improvement: none  Diagnostic Studies: none  Medication Management:   none  Further procedures recommended: none  Contact me when axial low back pain worsens with lumbar extension and ext/rotation movements and we can repeat the lumbar RFA procedure then  Recommendations/follow-up for PCP:  See above  Release of information: none  Follow up: 12 months, sooner if needed    ----------------------------------------------------------------  Clinic Number:  [phone]   Call with any questions about your care and for scheduling assistance.   Calls are returned Monday through Friday between 8 AM and 4:30 PM. We usually get back to you within 2 business days depending on the issue/request.    If we are prescribing your medications:  For opioid medication refills, call the clinic or send a [employer] message 7 days in advance.  Please include:  Name of requested medication  Name of the pharmacy.  For non-opioid medications, call your pharmacy directly to request a refill. Please allow 3-4 days to be processed.   Per [place] State Law:  All controlled substance prescriptions must be filled within 30 days of being written.    For those controlled substances allowing refills, pickup must occur within 30 days of last fill.      We believe regular attendance is key to your success in our program!    Any time you are unable to keep your appointment we ask that you call us at least 24 hours in advance to cancel.This will allow us to offer the appointment time to another patient.   Multiple missed appointments may lead to dismissal from the clinic.

## 2025-04-28 NOTE — PROGRESS NOTES
[facility]    4/28/2025      Chief complaint:   Minimal low back pain, axial.    .           Interval history:  Jose Manuel Gallo 89 year old male is known to me for:  Lumbar facet joint pain  Lumbar spondylosis with out myelopathy or radiculopathy  Chronic left-sided low back pain without sciatica  Primary osteoarthritis, unspecified site  PMHx includes: Cataracts mild OD moderate OS (2012), hypertension, chronic low back pain, peripheral vascular disease, erectile dysfunction, personal history of malignant neoplasm of the bladder, osteoarthritis, hemorrhoids, kidney stones, personal history of tobacco use (smoked 40 years), personal history of colonic polyps (2002), macular degeneration, hyperlipidemia, chronic kidney disease stage III GFR 30-59 mL/minute, rotator cuff tendinitis left, squamous cell carcinoma of skin  PSHx includes: Colonoscopy (2008), vitrectomy parsplana (2011), phacoemulsification with standard intraocular lens implant of the left eye (2012), laser YAG capsulotomy left eye (2016), TURP (1997), cervical spinal fusion C6-7, left ulnar nerve transposition at the elbow, vasectomy.      Recommendations/plan at the last visit on 4/27/2025 included:  Physical Therapy: continue exercises learned in P T   Clinical Health Psychologist to address issues of relaxation, behavioral change, coping style, and other factors important to improvement: none  Diagnostic Studies: none  Medication Management:   We will avoid medications at this time since you aren't having more pain without the Butrans  Further procedures recommended: none  Recommendations/follow-up for PCP:  See above  Release of information: none  Follow up: 4 months        Since male last visit, Jose Manuel Gallo reports:    Interval history April 28, 2025  -Enmanuel is doing very well.  -minimal axial low back pain, bothers him if he gardens too much in the lawn.   -has had excellent relief from December 2024 lumbar  RFA  -here with daughter today.   -not on any medications from our office  -will check in yearly and to message me when he needs repeat lumbar RFA        Interval history December 31, 2024  -repeat lumbar RFA done last week on 12/24/2024. Thinks that his back pain might be starting to improve a little bit.   -BP is up today,  came down a little bit in a recheck.   -his computer was hacked and he is working on rectifying it.  -he ran out of the Butrans, doesn't really feel that it did very much, not having more pain since stopping it    Interval history October 19, 2024  -Enmanuel is unable to access the video at home and his daughter Sydni is on video but I cannot hear her. We changed to 3 way telephone message.   -Enmanuel has ongoing left sided low back pain that can radiate into the left leg but not past the knees.   -has trouble finding a comfortable place to sit, sitting at dining table for dinner is terrible pain. Sitting on the couch for too long is too soft.   -he placed the first Butrans patch and had pretty good relief the first few weeks. He wore the first one on his upper left back.   He states he has not appreciated any relief from the 2nd and 3rd patches respectively, even when he placed the 3rd patch in the same spot as the first. He notes he is having some itching at the patch site.  -recommended increase in Butrans patch dosing from 5 mcg/hr to 10mcg/hr. Enmanuel and his daughter are in agreement.   -he has repeat lumbar RFA scheduled for 12/24/2024.     Interval history September 30, 2024  He has left sided low back pain almost all of the time. It is present even when he is sitting, even on his couch or sitting on a hard chair at the table.   -he has ongoing  left sided low back pain. He denies any radiation to the buttocks or into the left leg.    -he is a little frustrated that he is still having low back pain  -today, he notes he is not having any pain that radiates into his buttock or the legs.   -states that  this feels most like his low back pain did prior to last left sided lumbar RFA  -discussed Butrans or Belbuca for longer term pain relief since Tramadol helps but doesn't last long enough and it can make him feel sleepy    Interval history July 15, 2024  -left sided low back pain, no pain into the buttock or into the legs at all.   It is the most bothersome when rising from being seated for more than 10 minutes.   -Tramadol  helps take the edge off of the pain sometimes.   -pain is worse with standing and walking, no radiation to the legs when doing these activities.   -describes positive grocery cart sign, leaning over on a grocery cart when shopping or onto the counter in the kitchen which partially relieves the pain.     Interval history June 12, 2024  -lumbar facet joint injections in May 2024 were minimally helpful.    -1.5 weeks ago, he began to have immense pain when he sits down for more than 10 minutes. He can barely get up. He much lower level of function. He denies any falls or injuries. He denies any illness. Denies b/b symptoms, numbness/tingling, weakness. Denies saddle anesthesia.   No MRI imaging of lumbar spine, recommend we get MRI imaging.   -he has taken 4 of the tramadol but he doesn't remember if it was helpful.   He is using Tylenol or ibuprofen and a topical gel on his back when the pain is bad, lays down for an hour, and then his back feels somewhat better.     Initial evaluation pain history collected on 7/17/2023  Long term low back pain on the left side. No radiation to the legs ever.   Has significant curvature of the lumbar spine and x-ray shows facet arthropathy  His pain has gotten markedly worse with more yardwork. He is able to do about 2 hours of lawnwork. Digging holes for landscaping, raking, bagging etc. He does not pace his activity. He will push through the pain.          At this point, the patient's participation with our multidisciplinary team includes:  The patient has been  "compliant with the program.  PT -participating   Health Psych - none      Pain rating: intensity ranges from 0/10 to 4/10, and Averages varies  depending on activity on a 0-10 scale. Has trouble finding a good surface to sit on.  Pain right now is a 2/10.   Describes pain as \"sharp and stabbing, especially with certain activities.\"  Pain is almost always there.      Current pain-related medication treatments include:  -acetaminophen 650mg Q 8 hours PRN (somewhat helpful)      Other pertinent medications:  -none    Previous medication treatments included:  OPIATES:Tramadol (feels really sleepy)  NSAIDS: ibuprofen (not very helpful)  MUSCLE RELAXANTS: none  ANTI-MIGRAINE MEDS: none  ANTI-DEPRESSANTS: none  SLEEP AIDS: melatonin (helpful)  ANTI-CONVULSANTS: gabapentin (tried 300mg TID in 2017 for short trial-used for shingles)  TOPICALS: tried OTC and CBD creams (not helpful)  ANXIOLYTICS: none  MEDICAL CANNABIS: none  Other meds: Tylenol (helpful)      Other treatments have included:  Jose Manuel Gallo has not been seen at a pain clinic in the past.    PT: none  Chiropractic care: tried it a couple of times, did not help  Acupuncture: none  TENs Unit: none    Injections:   8/8/2023 left L3, L4 and L5 medial branch block with Dr. Sathya Villanueva (% relief)  -10/19/2023 left L3, L4 and L5 medial branch blocks with Dr. Sathya Villanueva (helpful, > 80% relief)  -12/5/2023 left lumbar radiofrequency ablation at L4, L5 sacral ala with Dr. Sathya Villanueva (50% relief but then diminishing relief over the next 4 months or so)  -5/23/2024 left L3-4, L4-5 facet joint steroid injections with Dr. Sathya Villanueva (cannot rate pain, it was manageable for a few weeks)  -7/23/2024 L3-4 interlaminar RADHA with Dr. Sathya Villanueva (no benefit)  -12/24/2024 left lumbosacral RFA at L4, L5 and sacral ala with Dr. Sathya Villanueva (this is only one week out on 12/31/2024, he does feel he's had a bit of relief in that short of " time)    Past Medical History:  Past Medical History:   Diagnosis Date    Basal cell carcinoma 04/09/2025    Left Cheondoism    Cataract, mild, od; mod, os 07/02/2012    Chronic low back pain     CKD (chronic kidney disease) stage 3, GFR 30-59 ml/min (H)     Erectile dysfunction     Hemorrhoids     History of bladder cancer         HTN     Hyperlipidemia LDL goal <130     Kidney stones     Macular degeneration     Osteoarthritis     Personal history of colonic polyps     10/2002    Personal history of malignant neoplasm of bladder     Personal history of tobacco use     Smoked 40 years, Quit 2003    PVD (peripheral vascular disease)     Left Vertebral Artery occlusion    Rotator cuff tendinitis, left     Squamous cell carcinoma of skin, unspecified      Past Surgical History:  Past Surgical History:   Procedure Laterality Date    CATARACT IOL, RT/LT      COLONOSCOPY  11/02/2008    Normal    HC REVISE ULNAR NERVE AT ELBOW      Left    LASER YAG CAPSULOTOMY  7/2016    left eye    PHACOEMULSIFICATION WITH STANDARD INTRAOCULAR LENS IMPLANT  7/2012    left eye    TURP  1997 ?    VASECTOMY      VITRECTOMY PARSPLANA  8/2011    left eye, repair macular hole    ZZC SPINAL FUSION,ANT,EA ADNL LEVEL      C6-7     Medications:  Current Outpatient Medications   Medication Sig Dispense Refill    acetaminophen (TYLENOL) 650 MG CR tablet Take 1 tablet (650 mg) by mouth every 8 hours as needed for pain 60 tablet 1    cyanocobalamin (VITAMIN B-12) 1000 MCG tablet Take 1 tablet (1,000 mcg) by mouth daily 90 tablet 3    fluorouracil (EFUDEX) 5 % external cream Apply topically 2 times daily. Apply to affected areas twice daily on the scalp and face for 2 weeks. Keep away from pets. Avoid sun exposure during treatment. 40 g 1    ibuprofen (ADVIL/MOTRIN) 200 MG tablet Take 400 mg by mouth every 6 hours as needed for pain      ipratropium (ATROVENT) 0.06 % nasal spray Spray 2 sprays into both nostrils 4 times daily as needed for rhinitis  ((for rhinitis andrei while eating)) 15 mL 1    lisinopril (ZESTRIL) 2.5 MG tablet TAKE ONE TABLET BY MOUTH EVERY DAY 90 tablet 1    polyethylene glycol-propylene glycol (SYSTANE ULTRA) 0.4-0.3 % SOLN ophthalmic solution Place 1 drop into both eyes 2 times daily.      simvastatin (ZOCOR) 40 MG tablet TAKE ONE TABLET BY MOUTH EVERY DAY 90 tablet 1    buprenorphine (BUTRANS) 10 MCG/HR WK patch Place 1 patch over 7 days onto the skin every 7 days. Fill 10/21 and start 10/24/2024. Early fill due to increased dosage. This is for chronic pain (Patient not taking: Reported on 4/28/2025) 4 patch 0    traMADol (ULTRAM) 50 MG tablet Take 1 tablet (50 mg) by mouth every 6 hours as needed for moderate to severe pain Max of 2 tabs per day. Okay to break in half if needed. Caution sedation. Don't drive until you know how you feel on this medication. Fill/start 07/12/24 30 tablet 0     Allergies:     Allergies   Allergen Reactions    Gabapentin Dizziness     Severe dizziness on 100mg dosing     Social History:  Home situation:  (61 years in 2023). Lives with spouse. Only adult child, Sydni  Occupation/Schooling: retired since 1994. He worked in the computer industry  Tobacco use: quit smoking in about 1997  Alcohol use: none  Drug use: none  History of chemical dependency treatment: none    Family history:  Family History   Problem Relation Age of Onset    Osteoporosis Mother     Diabetes Father     Arthritis Father     Cancer Father     Respiratory Father     Genitourinary Problems Brother     Circulatory Brother     Macular Degeneration No family hx of     Glaucoma No family hx of     Thyroid Disease No family hx of     Hypertension No family hx of              Physical Exam:  Vitals:    04/28/25 1308   BP: (!) 144/67   Pulse: 57       Behavioral observations:  Awake, alert, conversant and cooperative     Gait:  normal    Musculoskeletal exam:  strength grossly equal throughout. Moves well in the exam room    Neuro exam:   deferred    Skin/vascular/autonomic:  No suspicious lesions on exposed skin.     Other:  na        IMAGING:    LUMBAR SPINE TWO - THREE VIEWS 1/11/2022 9:39 AM     INDICATION: Osteoarthritis of spine with radiculopathy, lumbar region.     COMPARISON: 7/13/2017                                                                      IMPRESSION: Exam numbered assuming the last rib-bearing vertebral body  is T12. Convex right lumbar angulation centered across L1-L2 where  there is advanced left-sided interspace and endplate degeneration  measures approximately 36 degrees between T12 and L3 not substantially  changed versus prior. Sagittal alignment appears preserved. Normal  lumbar vertebral body heights. No fracture. Moderate lower lumbar  spine facet arthropathy.     ALEJANDRA SOTO MD         MR LUMBAR SPINE WITHOUT CONTRAST July 3, 2024 1:19 PM     PROVIDED HISTORY: Markedly worsening low back pain in the past 1.5  weeks. No injury or illness. No previous lumbar MRI. Lumbar facet  joint pain. Spondylosis of lumbar region without myelopathy or  radiculopathy. Chronic left-sided low back pain without sciatica.  Chronic left-sided low back pain without sciatica. DDD (degenerative  disc disease), lumbar. Chronic left SI joint pain.     COMPARISON: Plain radiographs 1/11/2020.     TECHNIQUE: Multiplanar multisequence lumbar MR without contrast.     FINDINGS: There are four lumbar-type vertebrae following the last  remaining thoracic type vertebrae. Complete sacralization of L5.     Right convex scoliosis with apex at L1-L2.     The tip of the conus medullaris is at L1.       Opposing endplate edema at L4-L5.     On a level by level basis:     T12-L1: Mild disc height loss and disc degeneration. Left eccentric  disc bulge and bilateral facet arthropathy. Moderate to severe left  neural foraminal stenosis. No right neural foraminal stenosis. Mild  spinal canal stenosis.     L1-L2: Moderate disc height loss and disc  degeneration. Left eccentric  disc osteophyte complex and bilateral facet arthropathy. Moderate left  neural foraminal stenosis. Mild spinal canal stenosis. No right neural  foraminal stenosis.     L2-L3: Moderate disc height loss and disc degeneration. Disc  osteophyte complex and bilateral facet arthropathy. Moderate left and  mild right neural foraminal stenosis. Mild spinal canal stenosis.     L3-L4: Moderate disc height loss and disc degeneration. Disc  osteophyte complex and bilateral facet arthropathy. Mild to moderate  bilateral neural foraminal and mild to moderate spinal canal stenosis.  Articular and periarticular edema associated with left facet joint.     L4-L5: Mild to moderate disc height loss and disc degeneration. Right  eccentric bulge osteophyte formation and bilateral facet arthropathy.  Moderate to severe right neural foraminal stenosis with impingement of  the exiting nerve root. Mild left neural foraminal stenosis. No spinal  canal stenosis.     L5-S1: No spinal canal or neuroforaminal stenosis.     Paraspinous tissues are within normal limits.                                                                      IMPRESSION:   1. Four lumbar-type vertebrae following the last remaining thoracic  type vertebrae. Complete sacralization of L5.  2. Right convex scoliosis with the apex at L1-L2.  3. Articular and periarticular edema associated with left L3-L4 facet  joint, compatible with active inflammatory arthropathy.  4. Modic type I degeneration at L4-L5.  5. Multilevel lumbar spondylosis as detailed above.      ANTIONETTE BERG MD           Other testing (labs, diagnostics):  3/10/2025  Cr. 1.29  Est GFR 53      Screening tools:     DIRE Score for ongoing opioid management is calculated as follows:    Diagnosis = 2    Intractability = 1    Risk: Psych = 3  Chem Hlth = 3  Reliability = 3  Social = 3    Efficacy = 2    Total DIRE Score = 17 (14 or higher predicts good candidate for ongoing opioid  management; 13 or lower predicts poor candidate for opioid management)           MN  review:  Reviewed MN  4/28/2025- no concerning fills.  Kayley NIXON, RN CNP, FNP  [facility]  [place] location          Assessment:  Lumbar facet joint pain  Spondylosis without myelopathy or radiculopathy, lumbar region  Chronic left sided low back pain without sciatica  Spinal stenosis of lumbar region without neurogenic claudication  Neural foraminal stenosis of lumbar spine    Chronic intractable pain  Primary osteoarthritis, unspecified site  PMHx includes: Cataracts mild OD moderate OS (2012), hypertension, chronic low back pain, peripheral vascular disease, erectile dysfunction, personal history of malignant neoplasm of the bladder, osteoarthritis, hemorrhoids, kidney stones, personal history of tobacco use (smoked 40 years), personal history of colonic polyps (2002), macular degeneration, hyperlipidemia, chronic kidney disease stage III GFR 30-59 mL/minute, rotator cuff tendinitis left, squamous cell carcinoma of skin  PSHx includes: Colonoscopy (2008), vitrectomy parsplana (2011), phacoemulsification with standard intraocular lens implant of the left eye (2012), laser YAG capsulotomy left eye (2016), TURP (1997), cervical spinal fusion C6-7, left ulnar nerve transposition at the elbow, vasectomy.      Plan:  Physical Therapy: continue exercises learned in P T   Clinical Health Psychologist to address issues of relaxation, behavioral change, coping style, and other factors important to improvement: none  Diagnostic Studies: none  Medication Management:   none  Further procedures recommended: none  Contact me when axial low back pain worsens with lumbar extension and ext/rotation movements and we can repeat the lumbar RFA procedure then  Recommendations/follow-up for PCP:  See above  Release of information: none  Follow up: 12 months, sooner if needed        ASSESSMENT AND  PLAN:  (M54.59) Lumbar facet joint pain  (primary encounter diagnosis)  Comment:   Plan: Adult Pain Clinic Follow-Up Order            (M47.816) Spondylosis without myelopathy or radiculopathy, lumbar region  Comment:   Plan: Adult Pain Clinic Follow-Up Order            (M54.50,  G89.29) Chronic left-sided low back pain without sciatica  Comment:   Plan: Adult Pain Clinic Follow-Up Order            (M48.061) Spinal stenosis of lumbar region without neurogenic claudication  Comment:   Plan: Adult Pain Clinic Follow-Up Order            (M48.061) Neural foraminal stenosis of lumbar spine  Comment:   Plan: Adult Pain Clinic Follow-Up Order              BILLING TIME DOCUMENTATION:   TOTAL TIME includes:   Time spent preparing to see the patient: 1 minutes (reviewing records and tests)  Time spend face to face with the patient: 7 minutes  Time spent ordering tests, medications, procedures and referrals: 0 minutes  Time spent Referring and communicating with other healthcare professionals: 0 minutes  Documenting clinical information in Epic: 2 minutes    The total TIME spent on this patient on the day of the appointment was 10 minutes.              Kayley NIXON, RN CNP, FNP  [facility]  [facility] [place]

## 2025-05-15 ENCOUNTER — OFFICE VISIT (OUTPATIENT)
Dept: OPHTHALMOLOGY | Facility: CLINIC | Age: OVER 89
End: 2025-05-15
Attending: OPHTHALMOLOGY
Payer: COMMERCIAL

## 2025-05-15 DIAGNOSIS — H35.342 MACULAR HOLE, LEFT EYE: ICD-10-CM

## 2025-05-15 DIAGNOSIS — H52.211 IRREGULAR ASTIGMATISM OF RIGHT EYE: ICD-10-CM

## 2025-05-15 DIAGNOSIS — H25.811 COMBINED FORMS OF AGE-RELATED CATARACT OF RIGHT EYE: Primary | ICD-10-CM

## 2025-05-15 DIAGNOSIS — Z98.890 H/O VITRECTOMY: ICD-10-CM

## 2025-05-15 DIAGNOSIS — Z96.1 PSEUDOPHAKIA: ICD-10-CM

## 2025-05-15 DIAGNOSIS — H17.9 CORNEAL SCAR, RIGHT EYE: ICD-10-CM

## 2025-05-15 PROCEDURE — 76519 ECHO EXAM OF EYE: CPT | Performed by: OPHTHALMOLOGY

## 2025-05-15 PROCEDURE — 92134 CPTRZ OPH DX IMG PST SGM RTA: CPT | Performed by: OPHTHALMOLOGY

## 2025-05-15 PROCEDURE — G0463 HOSPITAL OUTPT CLINIC VISIT: HCPCS | Performed by: OPHTHALMOLOGY

## 2025-05-15 PROCEDURE — 92025 CPTRIZED CORNEAL TOPOGRAPHY: CPT | Performed by: OPHTHALMOLOGY

## 2025-05-15 ASSESSMENT — REFRACTION_WEARINGRX
OS_CYLINDER: +2.00
OS_SPHERE: -1.25
OS_AXIS: 172
OD_CYLINDER: +4.25
OD_AXIS: 042
OD_SPHERE: PLANO
SPECS_TYPE: TRIFOCAL-LINED
OD_ADD: +3.00
OS_ADD: +3.00

## 2025-05-15 ASSESSMENT — VISUAL ACUITY
OS_CC: 20/400
OD_CC: 20/60
METHOD: SNELLEN - LINEAR
OD_CC+: +1
OS_PH_CC: 20/200
CORRECTION_TYPE: GLASSES

## 2025-05-15 ASSESSMENT — TONOMETRY
OS_IOP_MMHG: 16
IOP_METHOD: TONOPEN
OD_IOP_MMHG: 14

## 2025-05-15 ASSESSMENT — EXTERNAL EXAM - LEFT EYE: OS_EXAM: PROLAPSED FAT PADS: UPPER, LOWER; MILD-MOD BROW

## 2025-05-15 ASSESSMENT — EXTERNAL EXAM - RIGHT EYE: OD_EXAM: PROLAPSED FAT PADS: UPPER, LOWER; MILD-MOD BROW

## 2025-05-15 ASSESSMENT — REFRACTION_MANIFEST
OD_CYLINDER: +4.00
OD_AXIS: 040
OD_SPHERE: PLANO

## 2025-05-15 ASSESSMENT — CONF VISUAL FIELD
OD_INFERIOR_NASAL_RESTRICTION: 0
OD_SUPERIOR_NASAL_RESTRICTION: 0
METHOD: COUNTING FINGERS
OD_SUPERIOR_TEMPORAL_RESTRICTION: 0
OD_INFERIOR_TEMPORAL_RESTRICTION: 0
OD_NORMAL: 1

## 2025-05-15 ASSESSMENT — CUP TO DISC RATIO
OD_RATIO: 0.4
OS_RATIO: 0.4

## 2025-05-15 NOTE — PROGRESS NOTES
HPI       Cataract Evaluation     Additional comments:   Combined forms of age-related cataract, mild-mod, of right eye   Ref by :  Bonifacio Scales MD                Comments    Pt states difficulty seeing   No flashes or floaters  No eye pain or redness    Karo Augustin COT 9:58 AM May 15, 2025               Last edited by Karo Augustin on 5/15/2025  9:58 AM.          Review of systems for the eyes was negative other than the pertinent positives/negatives listed in the HPI.      Assessment & Plan      Jose Manuel Gallo is a 89 year old male with the following diagnoses:   1. Combined forms of age-related cataract, mild-mod, of right eye    2. Pseudophakia - Left Eye    3. H/O vitrectomy - Left Eye    4. Macular hole, left eye    5. Corneal scar, right eye    6. Irregular astigmatism of right eye         New patient, referred by Dr. Cortes for blurred vision and cataract eval.  Here today with his daughter  S/P cataract extraction left eye 2012  S/P Pars plana vitrectomy (PPV) for macular hole repair left eye 2012  Poor vision left eye 2/2 macular scar affecting the macula    Right eye with irregular atigmatism affecting vision > cataract at this time  Recommend hard contact lens eval prior to considering surgery options  Pending outcome and tolerance to hard lens, may consider cornea consult v cataract extraction with IC-8 lens      Patient disposition:   Return for Dr. Beal for new hard contact eval; Charli to follow same day.           Attending Physician Attestation:  Complete documentation of historical and exam elements from today's encounter can be found in the full encounter summary report (not reduplicated in this progress note).  I personally obtained the chief complaint(s) and history of present illness.  I confirmed and edited as necessary the review of systems, past medical/surgical history, family history, social history, and examination findings as documented by others; and I examined the  patient myself.  I personally reviewed the relevant tests, images, and reports as documented above.  I formulated and edited as necessary the assessment and plan and discussed the findings and management plan with the patient and family. . - Mario Augustin MD

## 2025-05-15 NOTE — NURSING NOTE
Chief Complaints and History of Present Illnesses   Patient presents with    Cataract Evaluation       Combined forms of age-related cataract, mild-mod, of right eye   Ref by :  Bonifacio Scales MD        Chief Complaint(s) and History of Present Illness(es)       Cataract Evaluation              Comments:   Combined forms of age-related cataract, mild-mod, of right eye   Ref by :  Bonifacio Scales MD                 Comments    Pt states difficulty seeing   No flashes or floaters  No eye pain or redness    Karo Augustin COT 9:58 AM May 15, 2025

## 2025-06-03 DIAGNOSIS — N18.31 STAGE 3A CHRONIC KIDNEY DISEASE (H): ICD-10-CM

## 2025-06-03 DIAGNOSIS — E78.5 HYPERLIPIDEMIA LDL GOAL <130: ICD-10-CM

## 2025-06-03 DIAGNOSIS — I10 HYPERTENSION GOAL BP (BLOOD PRESSURE) < 140/90: ICD-10-CM

## 2025-06-03 RX ORDER — LISINOPRIL 2.5 MG/1
2.5 TABLET ORAL
Qty: 90 TABLET | Refills: 1 | Status: SHIPPED | OUTPATIENT
Start: 2025-06-03

## 2025-06-03 RX ORDER — SIMVASTATIN 40 MG
40 TABLET ORAL DAILY
Qty: 90 TABLET | Refills: 0 | Status: SHIPPED | OUTPATIENT
Start: 2025-06-03

## 2025-06-04 DIAGNOSIS — I10 HYPERTENSION GOAL BP (BLOOD PRESSURE) < 140/90: ICD-10-CM

## 2025-06-04 DIAGNOSIS — N18.31 STAGE 3A CHRONIC KIDNEY DISEASE (H): ICD-10-CM

## 2025-06-04 RX ORDER — LISINOPRIL 2.5 MG/1
2.5 TABLET ORAL
Qty: 90 TABLET | Refills: 1 | OUTPATIENT
Start: 2025-06-04

## 2025-06-30 ENCOUNTER — DOCUMENTATION ONLY (OUTPATIENT)
Dept: OPHTHALMOLOGY | Facility: CLINIC | Age: OVER 89
End: 2025-06-30
Payer: COMMERCIAL

## 2025-07-08 ENCOUNTER — OFFICE VISIT (OUTPATIENT)
Dept: OPHTHALMOLOGY | Facility: CLINIC | Age: OVER 89
End: 2025-07-08
Attending: OPHTHALMOLOGY
Payer: COMMERCIAL

## 2025-07-08 ENCOUNTER — OFFICE VISIT (OUTPATIENT)
Dept: OPTOMETRY | Facility: CLINIC | Age: OVER 89
End: 2025-07-08
Attending: OPHTHALMOLOGY
Payer: COMMERCIAL

## 2025-07-08 DIAGNOSIS — H17.821 PERIPHERAL OPACITY OF RIGHT CORNEA: Primary | ICD-10-CM

## 2025-07-08 DIAGNOSIS — H52.211 IRREGULAR ASTIGMATISM OF RIGHT EYE: ICD-10-CM

## 2025-07-08 DIAGNOSIS — H25.811 COMBINED FORMS OF AGE-RELATED CATARACT OF RIGHT EYE: Primary | ICD-10-CM

## 2025-07-08 DIAGNOSIS — H17.9 CORNEAL SCAR, RIGHT EYE: ICD-10-CM

## 2025-07-08 DIAGNOSIS — H25.811 MIXED TYPE AGE-RELATED CATARACT, RIGHT EYE: ICD-10-CM

## 2025-07-08 DIAGNOSIS — Z98.890 H/O VITRECTOMY: ICD-10-CM

## 2025-07-08 DIAGNOSIS — Z96.1 PSEUDOPHAKIA: ICD-10-CM

## 2025-07-08 PROCEDURE — 99213 OFFICE O/P EST LOW 20 MIN: CPT | Performed by: OPHTHALMOLOGY

## 2025-07-08 ASSESSMENT — VISUAL ACUITY
OS_CC: 20/400
OD_PH_CC: 20/40
METHOD: SNELLEN - LINEAR
CORRECTION_TYPE: GLASSES
OD_PH_CC+: -1
METHOD: SNELLEN - LINEAR
VA_OR_OD_CURRENT_RX: 20/30-3
OD_CC+: -2
OD_PH_CC: 20/40
OD_CC: 20/50
OS_CC: 20/400
OD_CC: 20/50

## 2025-07-08 ASSESSMENT — REFRACTION_CURRENTRX
OD_BRAND: ROSE K2
OD_SPHERE: -1.00
OD_DIAMETER: 11.2
OD_BASECURVE: 8.04

## 2025-07-08 ASSESSMENT — SLIT LAMP EXAM - LIDS
COMMENTS: 1+ BLEPHARITIS, 2+ MEIBOMIAN GLAND DYSFUNCTION

## 2025-07-08 ASSESSMENT — REFRACTION_WEARINGRX
OD_SPHERE: PLANO
OD_ADD: +3.00
OS_CYLINDER: +2.00
OD_AXIS: 042
SPECS_TYPE: TRIFOCAL-LINED
OD_CYLINDER: +4.25
OS_SPHERE: -1.25
OS_AXIS: 172
OS_ADD: +3.00

## 2025-07-08 ASSESSMENT — TONOMETRY
IOP_METHOD: ICARE
OD_IOP_MMHG: 15
OS_IOP_MMHG: 15

## 2025-07-08 NOTE — PROGRESS NOTES
Review of systems for the eyes was negative other than the pertinent positives/negatives listed in the HPI.      Assessment & Plan      Jose Manuel Glalo is a 89 year old male with the following diagnoses:   1. Combined forms of age-related cataract, mild-mod, of right eye    2. Pseudophakia - Left Eye    3. H/O vitrectomy - Left Eye    4. Corneal scar, right eye    5. Irregular astigmatism of right eye         Referred by Dr. Cortes for blurred vision and cataract eval.  Here today with his daughter  S/P cataract extraction left eye 2012  S/P Pars plana vitrectomy (PPV) for macular hole repair left eye 2012  Poor vision left eye 2/2 macular scar affecting the macula     Right eye with irregular atigmatism and cataract at this time  Best corrected visual acuity 20/30 today with hard contact lens     Risks, benefits and alternatives to cataract extraction/IOL implantation discussed  Recommend trial of hard contact lens at home for now  If able to tolerate, but unsatisfied with visual acuity we can proceed to cataract extraction with standard intraocular lens   If unable to tolerate contact lens wear, would recommend cataract extraction with IC-8 lens right eye           Patient disposition:   Return in about 1 year (around 7/8/2026) for DFE, moe.           Attending Physician Attestation:  Complete documentation of historical and exam elements from today's encounter can be found in the full encounter summary report (not reduplicated in this progress note).  I personally obtained the chief complaint(s) and history of present illness.  I confirmed and edited as necessary the review of systems, past medical/surgical history, family history, social history, and examination findings as documented by others; and I examined the patient myself.  I personally reviewed the relevant tests, images, and reports as documented above.  I formulated and edited as necessary the assessment and plan and discussed the findings  and management plan with the patient and family. . - Mario Augustin MD

## 2025-07-08 NOTE — PROGRESS NOTES
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 Requires front surface toric  -Reviewed findings with pt and daughter - they would like to discuss CE/IOL with Dr. Augustin and would consider lens wear after if needed for best vision.   -Would rec RGP only based on I&R/handling. Tinted lens such as electric blue or brown    2.) Cataract right eye  -Approximately 2 lines of visual significance based on contact lens testing today  -His main goal is to stay with driveable vision. May consider taking cataract out first and see if he is happy with vision, but can return here for contact lens fit if vision is not satisfactory. He is open to CL wear if needed    Per discussion after both appts they would like to try CL first. Can consider CE/IOL if needed after. Will order WAVE lens with some FST but limit somewhat and HOLD for lens dispense, I&R    I have confirmed the patient's CC, HPI and reviewed Past Medical History, Past Surgical History, Social History, Family History, Problem List, Medication List and agree with Tech note.     Mary Beal, ABBY LIRAO PRIYAS

## 2025-07-15 PROBLEM — H57.11 PAIN OF RIGHT EYE: Status: RESOLVED | Noted: 2018-01-03 | Resolved: 2025-07-15

## 2025-07-15 PROBLEM — Z86.19 HISTORY OF SHINGLES: Status: RESOLVED | Noted: 2018-05-11 | Resolved: 2025-07-15

## 2025-07-15 PROBLEM — L60.3 BRITTLE NAILS: Status: RESOLVED | Noted: 2017-07-13 | Resolved: 2025-07-15

## 2025-07-16 ENCOUNTER — TRANSFERRED RECORDS (OUTPATIENT)
Dept: HEALTH INFORMATION MANAGEMENT | Facility: CLINIC | Age: OVER 89
End: 2025-07-16
Payer: COMMERCIAL

## 2025-07-21 ENCOUNTER — PATIENT OUTREACH (OUTPATIENT)
Dept: CARE COORDINATION | Facility: CLINIC | Age: OVER 89
End: 2025-07-21
Payer: COMMERCIAL

## 2025-07-21 NOTE — LETTER
[facility] COORDINATION  [address] [place] [address]    July 21, 2025    Jose Manuel Gallo  [address] [place] [address]      Dear Enmanuel,    I am a clinic community health worker who works with Walker Sorto MD with the [facility]. I wanted to thank you for spending the time to talk with me.  Below is a description of clinic care coordination and how we can further assist you.       The clinic care coordination team is made up of a registered nurse, , financial resource worker and community health worker who understand the health care system. The goal of clinic care coordination is to help you manage your health and improve access to the health care system. Our team works alongside your provider to assist you in determining your health and social needs. We can help you obtain health care and community resources, providing you with necessary information and education. We can work with you through any barriers and develop a care plan that helps coordinate and strengthen the communication between you and your care team.  Our services are voluntary and are offered without charge to you personally.    Please feel free to contact me with any questions or concerns regarding care coordination and what we can offer.      We are focused on providing you with the highest-quality healthcare experience possible.    Sincerely,     EMMY Schneider  [facility]  [facility]

## 2025-07-21 NOTE — PROGRESS NOTES
Clinic Care Coordination Contact  Community Health Worker Initial Outreach    CHW Initial Information Gathering:  Referral Source: PCP  Preferred Urgent Care: Other ([facility])  No PCP office visit in Past Year: No       Patient accepts CC: No, due to the patient stating that at this time there are no needs from CCC. Patient will be sent Care Coordination introduction letter for future reference.     EMMY Schneider  [phone]  [facility]

## 2025-07-30 ENCOUNTER — TELEPHONE (OUTPATIENT)
Dept: OPHTHALMOLOGY | Facility: CLINIC | Age: OVER 89
End: 2025-07-30
Payer: COMMERCIAL

## 2025-07-30 ENCOUNTER — OFFICE VISIT (OUTPATIENT)
Dept: OPTOMETRY | Facility: CLINIC | Age: OVER 89
End: 2025-07-30
Payer: COMMERCIAL

## 2025-07-30 ENCOUNTER — OFFICE VISIT (OUTPATIENT)
Dept: OPHTHALMOLOGY | Facility: CLINIC | Age: OVER 89
End: 2025-07-30
Attending: OPHTHALMOLOGY
Payer: COMMERCIAL

## 2025-07-30 DIAGNOSIS — H35.342 MACULAR HOLE, LEFT EYE: ICD-10-CM

## 2025-07-30 DIAGNOSIS — H17.9 CORNEAL SCAR, RIGHT EYE: ICD-10-CM

## 2025-07-30 DIAGNOSIS — H17.821 PERIPHERAL OPACITY OF RIGHT CORNEA: ICD-10-CM

## 2025-07-30 DIAGNOSIS — H52.211 IRREGULAR ASTIGMATISM OF RIGHT EYE: ICD-10-CM

## 2025-07-30 DIAGNOSIS — H25.811 COMBINED FORM OF SENILE CATARACT OF RIGHT EYE: Primary | ICD-10-CM

## 2025-07-30 DIAGNOSIS — H25.811 MIXED TYPE AGE-RELATED CATARACT, RIGHT EYE: Primary | ICD-10-CM

## 2025-07-30 PROCEDURE — 92134 CPTRZ OPH DX IMG PST SGM RTA: CPT | Performed by: OPHTHALMOLOGY

## 2025-07-30 PROCEDURE — 99214 OFFICE O/P EST MOD 30 MIN: CPT | Performed by: OPHTHALMOLOGY

## 2025-07-30 ASSESSMENT — SLIT LAMP EXAM - LIDS
COMMENTS: 1+ BLEPHARITIS, 2+ MEIBOMIAN GLAND DYSFUNCTION

## 2025-07-30 ASSESSMENT — REFRACTION_WEARINGRX
OD_CYLINDER: +5.00
OD_CYLINDER: +5.00
OD_AXIS: 030
OD_ADD: +3.00
OD_AXIS: 030
OD_ADD: +3.00
OD_SPHERE: -0.50
OD_SPHERE: -0.50

## 2025-07-30 ASSESSMENT — EXTERNAL EXAM - RIGHT EYE
OD_EXAM: PROLAPSED FAT PADS: UPPER, LOWER; MILD-MOD BROW
OD_EXAM: PROLAPSED FAT PADS: UPPER, LOWER; MILD-MOD BROW

## 2025-07-30 ASSESSMENT — VISUAL ACUITY
METHOD: SNELLEN - LINEAR
CORRECTION_TYPE: CONTACTS
OD_CC: 20/80
METHOD: SNELLEN - LINEAR
OD_CC: 20/80

## 2025-07-30 ASSESSMENT — REFRACTION_MANIFEST
OD_SPHERE: -1.00
OD_SPHERE: -1.00
OD_CYLINDER: SPHERE
OD_CYLINDER: SPHERE

## 2025-07-30 ASSESSMENT — REFRACTION_CURRENTRX
OD_DIAMETER: 10.8
OD_BRAND: WAVE GP
OD_BASECURVE: 8.33
OD_SPHERE: 5.99
OD_CYLINDER: CUSTOM

## 2025-07-30 ASSESSMENT — CUP TO DISC RATIO: OD_RATIO: 0.4

## 2025-07-30 ASSESSMENT — EXTERNAL EXAM - LEFT EYE
OS_EXAM: PROLAPSED FAT PADS: UPPER, LOWER; MILD-MOD BROW
OS_EXAM: PROLAPSED FAT PADS: UPPER, LOWER; MILD-MOD BROW

## 2025-07-30 NOTE — TELEPHONE ENCOUNTER
Called patient to schedule surgery with Dr. Augustin    Spoke with: Enmanuel (Patient) Sydni (Daughter)    Date(s) of Surgery: 09/08/2025    Patient aware of approximate arrival time: Yes Magdi are aware that a nurse will call 2-3 days prior to surgery with arrival time.     Tissue: Not Applicable Ordered: Not Applicable    Location of surgery: [facility]     Pre-Op H&P: [facility]. Advised to Magdi pre-op H&P needs to be completed and Sydni stated she would get it set up.      Informed patient that they need to call to schedule pre-op H&P within 30 days of surgery date: Yes    Post-Op Appt Dates:   09/09/2025 - 9am   09/25/2025 - 9am     Discussed with patient pre-op RN will call 2-3 days prior to surgery with arrival time and instructions:  Yes       Standard Surgery Packet Sent: Yes 07/30/25  via Received in clinic by Magdi       Informed patient that they will need an adult  to bring patient home from surgery: Yes  : Sydni (Daughter)         Additional Comments:  Spoke with Magdi in person to schedule surgery. Packet was received in person by Sydni.      All patients questions were answered and was instructed to review surgical packet and call back [phone] with any questions or concerns.       Vidhi Man on 7/30/2025 at 11:27 AM

## 2025-07-30 NOTE — PROGRESS NOTES
Review of systems for the eyes was negative other than the pertinent positives/negatives listed in the HPI.      Assessment & Plan      Jose Manuel Gallo is a 89 year old male with the following diagnoses:   1. 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 Macular hole, left eye    3. Corneal scar, right eye       Returns for recheck of cataract  Difficulty with I&R of contact lenses and limited improvement in visual acuity   Poor vision left eye 2/2 macular scar affecting the macula     Right eye with irregular atigmatism and cataract     Risks, benefits and alternatives to cataract extraction/IOL implantation discussed; consent obtained.  Will schedule surgery today    Special equipment/needs:    Anesthesia:Topical  Dilation:Moderate  Iris expansion:Iris [place]  Pseudoexfoliation: No pseudoexfoliation  Trypan Blue: No  Aphthera IC8 lens  Goal emmetropia  Expectant refractive correction reviewed  Guarded visual potential reviewed in the setting of irregular astigmatism  Dex         Attending Physician Attestation:  Complete documentation of historical and exam elements from today's encounter can be found in the full encounter summary report (not reduplicated in this progress note).  I personally obtained the chief complaint(s) and history of present illness.  I confirmed and edited as necessary the review of systems, past medical/surgical history, family history, social history, and examination findings as documented by others; and I examined the patient myself.  I personally reviewed the relevant tests, images, and reports as documented above.  I formulated and edited as necessary the assessment and plan and discussed the findings and management plan with the patient and family. . Today with Jose Manuel Gallo  and his daughter, I reviewed the indications, risks, benefits, and alternatives of the proposed surgical procedure including, but not limited to, failure obtain the desired result  and need for  additional surgery, bleeding, infection, loss of vision, loss of the eye, and the remote possibility of permanent damage to any organ system or death with the use of anesthesia.  I provided multiple opportunities for the questions, answered all questions to the best of my ability, and confirmed that my answers and my discussion were understood.    - Mario Augustin MD

## 2025-07-30 NOTE — PROGRESS NOTES
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 No improvement in BCVA today on Rx lens 20/50- range and feels blurry  -Difficulty with I&R, lens handling today    With minimal visual improvement and unstable sphero-cyl overrefraction, I would not recommend CL at this time. He would like to discuss surgical options with Dr. Augustin at this time. NO CL dispensed    I have confirmed the patient's CC, HPI and reviewed Past Medical History, Past Surgical History, Social History, Family History, Problem List, Medication List and agree with Tech note.     Mary Beal, OD FAAO FSLS

## 2025-08-19 ENCOUNTER — OFFICE VISIT (OUTPATIENT)
Dept: FAMILY MEDICINE | Facility: CLINIC | Age: OVER 89
End: 2025-08-19
Payer: COMMERCIAL

## 2025-08-19 VITALS
OXYGEN SATURATION: 98 % | HEIGHT: 71 IN | BODY MASS INDEX: 20.35 KG/M2 | SYSTOLIC BLOOD PRESSURE: 140 MMHG | HEART RATE: 57 BPM | RESPIRATION RATE: 16 BRPM | DIASTOLIC BLOOD PRESSURE: 65 MMHG | WEIGHT: 145.4 LBS

## 2025-08-19 DIAGNOSIS — H25.9 SENILE CATARACT, UNSPECIFIED AGE-RELATED CATARACT TYPE, UNSPECIFIED LATERALITY: ICD-10-CM

## 2025-08-19 DIAGNOSIS — N18.32 STAGE 3B CHRONIC KIDNEY DISEASE (H): ICD-10-CM

## 2025-08-19 DIAGNOSIS — I10 HTN, GOAL BELOW 140/90: ICD-10-CM

## 2025-08-19 DIAGNOSIS — Z01.818 PREOP GENERAL PHYSICAL EXAM: Primary | ICD-10-CM

## 2025-08-19 PROCEDURE — 3078F DIAST BP <80 MM HG: CPT | Performed by: FAMILY MEDICINE

## 2025-08-19 PROCEDURE — 3077F SYST BP >= 140 MM HG: CPT | Performed by: FAMILY MEDICINE

## 2025-08-19 PROCEDURE — 99214 OFFICE O/P EST MOD 30 MIN: CPT | Performed by: FAMILY MEDICINE
